# Patient Record
Sex: FEMALE
[De-identification: names, ages, dates, MRNs, and addresses within clinical notes are randomized per-mention and may not be internally consistent; named-entity substitution may affect disease eponyms.]

---

## 2017-07-11 ENCOUNTER — FORM ENCOUNTER (OUTPATIENT)
Age: 77
End: 2017-07-11

## 2018-10-14 ENCOUNTER — FORM ENCOUNTER (OUTPATIENT)
Age: 78
End: 2018-10-14

## 2019-10-27 ENCOUNTER — FORM ENCOUNTER (OUTPATIENT)
Age: 79
End: 2019-10-27

## 2020-04-06 ENCOUNTER — APPOINTMENT (OUTPATIENT)
Dept: DISASTER EMERGENCY | Facility: CLINIC | Age: 80
End: 2020-04-06
Payer: MEDICARE

## 2020-04-06 VITALS
HEART RATE: 64 BPM | TEMPERATURE: 98.6 F | OXYGEN SATURATION: 94 % | DIASTOLIC BLOOD PRESSURE: 80 MMHG | RESPIRATION RATE: 18 BRPM | SYSTOLIC BLOOD PRESSURE: 130 MMHG

## 2020-04-06 DIAGNOSIS — Z20.828 CONTACT WITH AND (SUSPECTED) EXPOSURE TO OTHER VIRAL COMMUNICABLE DISEASES: ICD-10-CM

## 2020-04-06 PROBLEM — Z00.00 ENCOUNTER FOR PREVENTIVE HEALTH EXAMINATION: Status: ACTIVE | Noted: 2020-04-06

## 2020-04-06 PROCEDURE — 99201 OFFICE OUTPATIENT NEW 10 MINUTES: CPT | Mod: CS

## 2020-04-06 NOTE — HISTORY OF PRESENT ILLNESS
[Patient presents to the office today for COVID-19 evaluation and testing.] : Patient presents to the office today for COVID-19 evaluation and testing. [] : no dizziness on standing [With Confirmed Case] : patient exposed to a confirmed case of COVID-19 [Age >= 60 years] : age >= 60 years [None] : none [Clear] : clear [Good Air Entry] : good air entry [Speaks in full sentences] : speaks in full sentences [Grossly normal, interacts, not tired or weak] : grossly normal, interacts, not tired or weak [COVID-19 testing ordered and specimen obtained] : COVID-19 testing ordered and specimen obtained [Discharged with current Quarantine instructions and advised of signs of worsening illness.] : Patient discharged with current quarantine instructions and advised of signs of worsening illness. Patient told to seek emergent care if symptoms occur. [FreeTextEntry1] : She states she is here today because she wants to be tested for Covid\par \par She states her  has been dx with covid and she states he is hospitalized\par \par She currently has no sx [TextBox_48] : Has cough but states it is chronic [TextBox_97] : O2 sat 94% RA

## 2020-04-06 NOTE — ADDENDUM
[FreeTextEntry1] : She was advised to follow up with her PMD regarding chronic cough and borderline low oxygen saturation

## 2020-04-07 LAB — SARS-COV-2 N GENE NPH QL NAA+PROBE: DETECTED

## 2020-12-09 ENCOUNTER — FORM ENCOUNTER (OUTPATIENT)
Age: 80
End: 2020-12-09

## 2021-12-15 ENCOUNTER — APPOINTMENT (OUTPATIENT)
Dept: BREAST CENTER | Facility: CLINIC | Age: 81
End: 2021-12-15

## 2021-12-17 ENCOUNTER — NON-APPOINTMENT (OUTPATIENT)
Age: 81
End: 2021-12-17

## 2022-11-09 ENCOUNTER — APPOINTMENT (OUTPATIENT)
Dept: NEUROSURGERY | Facility: CLINIC | Age: 82
End: 2022-11-09

## 2022-11-09 VITALS
WEIGHT: 185 LBS | BODY MASS INDEX: 29.38 KG/M2 | SYSTOLIC BLOOD PRESSURE: 123 MMHG | HEART RATE: 76 BPM | OXYGEN SATURATION: 97 % | HEIGHT: 66.5 IN | TEMPERATURE: 97.7 F | DIASTOLIC BLOOD PRESSURE: 82 MMHG

## 2022-11-09 DIAGNOSIS — Z86.39 PERSONAL HISTORY OF OTHER ENDOCRINE, NUTRITIONAL AND METABOLIC DISEASE: ICD-10-CM

## 2022-11-09 DIAGNOSIS — Z86.79 PERSONAL HISTORY OF OTHER DISEASES OF THE CIRCULATORY SYSTEM: ICD-10-CM

## 2022-11-09 DIAGNOSIS — U07.1 COVID-19: ICD-10-CM

## 2022-11-09 DIAGNOSIS — Z82.49 FAMILY HISTORY OF ISCHEMIC HEART DISEASE AND OTHER DISEASES OF THE CIRCULATORY SYSTEM: ICD-10-CM

## 2022-11-09 PROCEDURE — 99205 OFFICE O/P NEW HI 60 MIN: CPT

## 2022-11-09 RX ORDER — METOPROLOL SUCCINATE 25 MG/1
25 TABLET, EXTENDED RELEASE ORAL
Refills: 0 | Status: ACTIVE | COMMUNITY

## 2022-11-09 RX ORDER — HYDROXYCHLOROQUINE SULFATE 400 MG/1
TABLET ORAL
Refills: 0 | Status: ACTIVE | COMMUNITY

## 2022-11-09 RX ORDER — ATORVASTATIN CALCIUM 40 MG/1
40 TABLET, FILM COATED ORAL
Refills: 0 | Status: ACTIVE | COMMUNITY

## 2022-11-09 RX ORDER — ALENDRONATE SODIUM 70 MG/1
70 TABLET ORAL
Refills: 0 | Status: ACTIVE | COMMUNITY

## 2022-11-17 NOTE — ASSESSMENT
[FreeTextEntry1] : IMPRESSION:\par 1. Pt with increased episodes of falls, urinary incontinence and forgetfulness.  Suspect hydrocephalus.\par \par \par \par PLAN:\par 1. Will coordinate PT evaluation pre and post lumbar puncture.\par 2. High volume lumbar puncture.\par 3. F/U after lumbar puncture to discuss plan for possible VPS insertion.\par 4. MRI brain w/wo contrast\par 5. F/U after imaging.\par \par

## 2022-11-17 NOTE — REVIEW OF SYSTEMS
[Confused or Disoriented] : confusion [Memory Lapses or Loss] : memory loss [Dizziness] : dizziness [Fainting] : fainting [Joint Pain] : joint pain [Joint Swelling] : joint swelling [Negative] : Respiratory [de-identified] : hydrocephalus [FreeTextEntry9] : arthritis

## 2022-11-17 NOTE — PHYSICAL EXAM
[General Appearance - Alert] : alert [General Appearance - Well Nourished] : well nourished [General Appearance - Well-Appearing] : healthy appearing [Affect] : the affect was normal [Person] : oriented to person [Motor Handedness Right-Handed] : the patient is right hand dominant [] : no respiratory distress [Respiration, Rhythm And Depth] : normal respiratory rhythm and effort [Exaggerated Use Of Accessory Muscles For Inspiration] : no accessory muscle use [Place] : disoriented to place [Time] : disoriented to time [FreeTextEntry8] : came to visit via wheelchair

## 2022-11-17 NOTE — HISTORY OF PRESENT ILLNESS
[de-identified] : Jayla Vergara is a pleasant 82 year old lady who presents for consultation after recent fall.  Pt states that she was sent for a head CT done that shows hydrocephalus.  She states that she has been falling and tripping over her feet more often and she is much more forgetful and urinary incontinence has worsened to having to wear an adult diaper.\par \par Will coordinate PT evaluation pre and post  lumbar puncture.\par \par Her cardiologist is Dr. Anirudh Gonzales .

## 2022-11-29 ENCOUNTER — APPOINTMENT (OUTPATIENT)
Dept: RADIOLOGY | Facility: HOSPITAL | Age: 82
End: 2022-11-29

## 2023-02-01 ENCOUNTER — EMERGENCY (EMERGENCY)
Facility: HOSPITAL | Age: 83
LOS: 1 days | Discharge: HOME | End: 2023-02-01
Attending: EMERGENCY MEDICINE | Admitting: EMERGENCY MEDICINE
Payer: MEDICARE

## 2023-02-01 VITALS
HEART RATE: 72 BPM | RESPIRATION RATE: 18 BRPM | TEMPERATURE: 97 F | SYSTOLIC BLOOD PRESSURE: 156 MMHG | OXYGEN SATURATION: 96 % | WEIGHT: 149.91 LBS | DIASTOLIC BLOOD PRESSURE: 76 MMHG

## 2023-02-01 LAB
ALBUMIN SERPL ELPH-MCNC: 3.2 G/DL — LOW (ref 3.3–5)
ALP SERPL-CCNC: 64 U/L — SIGNIFICANT CHANGE UP (ref 40–120)
ALT FLD-CCNC: 16 U/L — SIGNIFICANT CHANGE UP (ref 12–78)
ANION GAP SERPL CALC-SCNC: 7 MMOL/L — SIGNIFICANT CHANGE UP (ref 5–17)
APPEARANCE UR: ABNORMAL
AST SERPL-CCNC: 28 U/L — SIGNIFICANT CHANGE UP (ref 15–37)
BACTERIA # UR AUTO: ABNORMAL
BASOPHILS # BLD AUTO: 0.03 K/UL — SIGNIFICANT CHANGE UP (ref 0–0.2)
BASOPHILS NFR BLD AUTO: 0.4 % — SIGNIFICANT CHANGE UP (ref 0–2)
BILIRUB SERPL-MCNC: 0.7 MG/DL — SIGNIFICANT CHANGE UP (ref 0.2–1.2)
BILIRUB UR-MCNC: NEGATIVE — SIGNIFICANT CHANGE UP
BUN SERPL-MCNC: 19 MG/DL — SIGNIFICANT CHANGE UP (ref 7–23)
CALCIUM SERPL-MCNC: 10 MG/DL — SIGNIFICANT CHANGE UP (ref 8.5–10.1)
CHLORIDE SERPL-SCNC: 110 MMOL/L — HIGH (ref 96–108)
CO2 SERPL-SCNC: 26 MMOL/L — SIGNIFICANT CHANGE UP (ref 22–31)
COLOR SPEC: YELLOW — SIGNIFICANT CHANGE UP
CREAT SERPL-MCNC: 0.68 MG/DL — SIGNIFICANT CHANGE UP (ref 0.5–1.3)
DIFF PNL FLD: ABNORMAL
EGFR: 86 ML/MIN/1.73M2 — SIGNIFICANT CHANGE UP
EOSINOPHIL # BLD AUTO: 0.12 K/UL — SIGNIFICANT CHANGE UP (ref 0–0.5)
EOSINOPHIL NFR BLD AUTO: 1.4 % — SIGNIFICANT CHANGE UP (ref 0–6)
EPI CELLS # UR: SIGNIFICANT CHANGE UP
FLUAV AG NPH QL: SIGNIFICANT CHANGE UP
FLUBV AG NPH QL: SIGNIFICANT CHANGE UP
GLUCOSE SERPL-MCNC: 76 MG/DL — SIGNIFICANT CHANGE UP (ref 70–99)
GLUCOSE UR QL: NEGATIVE — SIGNIFICANT CHANGE UP
HCT VFR BLD CALC: 34.9 % — SIGNIFICANT CHANGE UP (ref 34.5–45)
HGB BLD-MCNC: 11.4 G/DL — LOW (ref 11.5–15.5)
IMM GRANULOCYTES NFR BLD AUTO: 0.4 % — SIGNIFICANT CHANGE UP (ref 0–0.9)
KETONES UR-MCNC: ABNORMAL
LEUKOCYTE ESTERASE UR-ACNC: ABNORMAL
LYMPHOCYTES # BLD AUTO: 0.96 K/UL — LOW (ref 1–3.3)
LYMPHOCYTES # BLD AUTO: 11.3 % — LOW (ref 13–44)
MCHC RBC-ENTMCNC: 29.3 PG — SIGNIFICANT CHANGE UP (ref 27–34)
MCHC RBC-ENTMCNC: 32.7 GM/DL — SIGNIFICANT CHANGE UP (ref 32–36)
MCV RBC AUTO: 89.7 FL — SIGNIFICANT CHANGE UP (ref 80–100)
MONOCYTES # BLD AUTO: 0.8 K/UL — SIGNIFICANT CHANGE UP (ref 0–0.9)
MONOCYTES NFR BLD AUTO: 9.4 % — SIGNIFICANT CHANGE UP (ref 2–14)
NEUTROPHILS # BLD AUTO: 6.58 K/UL — SIGNIFICANT CHANGE UP (ref 1.8–7.4)
NEUTROPHILS NFR BLD AUTO: 77.1 % — HIGH (ref 43–77)
NITRITE UR-MCNC: POSITIVE
NRBC # BLD: 0 /100 WBCS — SIGNIFICANT CHANGE UP (ref 0–0)
PH UR: 6 — SIGNIFICANT CHANGE UP (ref 5–8)
PLATELET # BLD AUTO: 257 K/UL — SIGNIFICANT CHANGE UP (ref 150–400)
POTASSIUM SERPL-MCNC: 3.1 MMOL/L — LOW (ref 3.5–5.3)
POTASSIUM SERPL-SCNC: 3.1 MMOL/L — LOW (ref 3.5–5.3)
PROT SERPL-MCNC: 6.7 G/DL — SIGNIFICANT CHANGE UP (ref 6–8.3)
PROT UR-MCNC: 30 MG/DL
RBC # BLD: 3.89 M/UL — SIGNIFICANT CHANGE UP (ref 3.8–5.2)
RBC # FLD: 14.8 % — HIGH (ref 10.3–14.5)
RBC CASTS # UR COMP ASSIST: ABNORMAL /HPF (ref 0–4)
RSV RNA NPH QL NAA+NON-PROBE: SIGNIFICANT CHANGE UP
SARS-COV-2 RNA SPEC QL NAA+PROBE: SIGNIFICANT CHANGE UP
SODIUM SERPL-SCNC: 143 MMOL/L — SIGNIFICANT CHANGE UP (ref 135–145)
SP GR SPEC: 1.02 — SIGNIFICANT CHANGE UP (ref 1.01–1.02)
TROPONIN I, HIGH SENSITIVITY RESULT: 25.6 NG/L — SIGNIFICANT CHANGE UP
UROBILINOGEN FLD QL: 1
WBC # BLD: 8.52 K/UL — SIGNIFICANT CHANGE UP (ref 3.8–10.5)
WBC # FLD AUTO: 8.52 K/UL — SIGNIFICANT CHANGE UP (ref 3.8–10.5)
WBC UR QL: SIGNIFICANT CHANGE UP

## 2023-02-01 PROCEDURE — 71045 X-RAY EXAM CHEST 1 VIEW: CPT | Mod: 26

## 2023-02-01 PROCEDURE — 99285 EMERGENCY DEPT VISIT HI MDM: CPT

## 2023-02-01 PROCEDURE — 93010 ELECTROCARDIOGRAM REPORT: CPT | Mod: 77

## 2023-02-01 PROCEDURE — 73030 X-RAY EXAM OF SHOULDER: CPT | Mod: 26,LT

## 2023-02-01 PROCEDURE — 70450 CT HEAD/BRAIN W/O DYE: CPT | Mod: 26,MA

## 2023-02-01 PROCEDURE — 93010 ELECTROCARDIOGRAM REPORT: CPT

## 2023-02-01 RX ORDER — POTASSIUM CHLORIDE 20 MEQ
10 PACKET (EA) ORAL ONCE
Refills: 0 | Status: COMPLETED | OUTPATIENT
Start: 2023-02-01 | End: 2023-02-01

## 2023-02-01 RX ORDER — POTASSIUM CHLORIDE 20 MEQ
40 PACKET (EA) ORAL ONCE
Refills: 0 | Status: COMPLETED | OUTPATIENT
Start: 2023-02-01 | End: 2023-02-01

## 2023-02-01 RX ORDER — METOPROLOL TARTRATE 50 MG
25 TABLET ORAL ONCE
Refills: 0 | Status: COMPLETED | OUTPATIENT
Start: 2023-02-01 | End: 2023-02-01

## 2023-02-01 RX ORDER — SODIUM CHLORIDE 9 MG/ML
1000 INJECTION INTRAMUSCULAR; INTRAVENOUS; SUBCUTANEOUS ONCE
Refills: 0 | Status: COMPLETED | OUTPATIENT
Start: 2023-02-01 | End: 2023-02-01

## 2023-02-01 RX ADMIN — Medication 100 MILLIEQUIVALENT(S): at 15:50

## 2023-02-01 RX ADMIN — Medication 25 MILLIGRAM(S): at 23:46

## 2023-02-01 RX ADMIN — SODIUM CHLORIDE 1000 MILLILITER(S): 9 INJECTION INTRAMUSCULAR; INTRAVENOUS; SUBCUTANEOUS at 14:04

## 2023-02-01 RX ADMIN — Medication 40 MILLIEQUIVALENT(S): at 15:50

## 2023-02-01 NOTE — ED PROVIDER NOTE - NSFOLLOWUPINSTRUCTIONS_ED_ALL_ED_FT
Babar boss with neurosurgery, call Dr Robledo [phone]      Normal-Pressure Hydrocephalus      Normal-pressure hydrocephalus (NPH) is a buildup of fluid (cerebrospinal fluid, or CSF) inside the brain. This does not typically increase the pressure inside the brain (intracranial pressure). CSF is normally present in the brain, but when too much CSF is produced in the brain, it can affect brain function.    NPH usually occurs in people who are older than age 60. Many symptoms of NPH are also associated with aging or certain medical conditions. It is important to pay attention to changes in behavior and mental function.      What are the causes?    This condition may be caused by anything that blocks the flow of CSF, such as:  •Head injury.      •Infections.      •Bleeding from a blood vessel in the brain.      In many cases, the cause of this condition is not known.      What are the signs or symptoms?    Symptoms of this condition may include:•Difficulty walking, such as:  •Shuffling feet when walking.      •Unsteadiness.      •Problems when beginning to walk.      •Feet feeling as though they are stuck or "frozen" to the floor.        •Problems with bowel and bladder control.    •Problems with memory and thinking, such as:  •Forgetfulness.      •Lack of concentration.      •Mood problems.      •Confusion.          How is this diagnosed?    This condition is diagnosed based on:  •Your medical history.      •A physical exam. This can reveal walking (gait) changes or twitching or sudden muscle tightening (spasms) in the legs.    •Other tests to confirm the diagnosis and identify the best options for treatment. These tests include:  •Removal and examination of a large amount of CSF (large-volume lumbar puncture).      •CT scan of your head.      •MRI scan of your head.      •Cisternogram of your head. This test involves a lumbar puncture where a radioactive dye is put into your CSF. Nuclear medicine images are taken to see how well the dye is flowing through the network of cavities within your brain (ventricles).          How is this treated?  Drawing of a person with a shunt placed to drain fluid from the brain to another part of the body.   This condition may be treated with surgery in which a narrow tube (ventriculoperitoneal shunt, or  shunt) is placed in the brain to drain excess CSF into another part of the body. After a shunt is placed, you will be monitored to make sure CSF is draining properly. Depending on your age and overall health condition, you may not be a candidate for surgery.    In some cases, a shunt may be placed in the lumbar spine (lumbar peritoneal shunt) instead of the brain.    If your symptoms are mild, your condition may be monitored on a regular basis before a decision is made about whether a shunt is needed.      Follow these instructions at home:    Medicines     •Take over-the-counter and prescription medicines only as told by your health care provider.      •Avoid taking medicines that can affect thinking, such as pain or sleeping medicines.      Lifestyle     • Do not use any products that contain nicotine or tobacco. These products include cigarettes, chewing tobacco, and vaping devices, such as e-cigarettes. If you need help quitting, ask your health care provider.      •Drink enough fluid to keep your urine pale yellow.      If you have a shunt:     • Do not wear tight-fitting hats or headgear.      •Before having an MRI or abdominal surgery, tell your health care provider that you have a shunt.    •Watch for signs of infection, such as:  •Fever.      •Redness, pain, or swelling of the skin along the shunt path.      •Headache or stiff neck.      •Nausea or vomiting.        General instructions     •Work with your health care provider to determine what you need help with and what your safety needs are.      •Ask your health care provider when you can return to your normal activities.      •Keep all follow-up visits. This is important.        Contact a health care provider if:    •You have any new symptoms.      •Your symptoms get worse.      •Your symptoms do not improve after surgery.      •You lose coordination or balance.      •You or your family members become concerned for your safety.        Get help right away if:  •You have:  •A fever or other signs of infection.      •New or worsening confusion.      •New or worsening sleepiness.      •A hard time staying awake.      •A headache that is getting worse.      •Blurred vision, especially early in the morning.        •You start to twitch or shake (seizure).      These symptoms may represent a serious problem that is an emergency. Do not wait to see if the symptoms will go away. Get medical help right away. Call your local emergency services (911 in the [place] Do not drive yourself to the hospital.       Summary    •Normal-pressure hydrocephalus (NPH) is a buildup of fluid (cerebrospinal fluid, or CSF) inside the brain. When too much CSF is produced in the brain, it can affect brain function.      •Anything that blocks the flow of CSF can cause this condition. In some cases, the cause of this condition is not known.      •This condition may be treated with surgery in which a narrow tube (ventriculoperitoneal shunt, or  shunt) is placed in the brain to drain excess CSF into another part of the body.      This information is not intended to replace advice given to you by your health care provider. Make sure you discuss any questions you have with your health care provider.      Document Revised: 11/15/2021 Document Reviewed: 11/15/2021    Elsevier Patient Education © 2022 Else[employer] Inc[employer]

## 2023-02-01 NOTE — PHYSICAL THERAPY INITIAL EVALUATION ADULT - ADDITIONAL COMMENTS
Pt  lives w/ her spouse in a house, no steps. Pt ambulates independently with RW and spouse assisted with ADLs as needed.

## 2023-02-01 NOTE — CARE COORDINATION ASSESSMENT. - NSCAREPROVIDERS_GEN_ALL_CORE_FT
CARE PROVIDERS:  Administration: Oli Mary  Admitting: Doctor, Unknown  Attending: Doctor, José Miguel  ED Attending: Inocencia Morel  ED Nurse: Russell Copeland  Nurse: Chayo Boothe  Ordered: ADM, User  Physical Therapy: Yi Guzman  Physical Therapy: Dominguez Brizuela  Primary Team: Rajeev Wyatt  Primary Team: Inocencia Morel  : Tarah Garcia  : Brit Corrales

## 2023-02-01 NOTE — CARE COORDINATION ASSESSMENT. - OTHER PERTINENT DISCHARGE PLANNING INFORMATION:
SW consulted for dc to [facility] as recommended by PT in the ED. met with pts spouse Kyle at bedside. stated pt has been at [facility] in the recent past, discussed [facility] options with pts spouse. [facility], [facility], [place], [place] given as options. TYSHAWN requested.

## 2023-02-01 NOTE — ED PROVIDER NOTE - PHYSICAL EXAMINATION
Gen: Well appearing in NAD  Head: NC/AT  Neck: trachea midline, no midline tenderness  Resp:  No distress, lungs clear  cv rrr   Ext: no deformities  Neuro:  A&O appears non focal  Skin:  Warm and dry as visualized  Psych:  Normal affect and mood

## 2023-02-01 NOTE — ED PROVIDER NOTE - OBJECTIVE STATEMENT
82-year-old female history of hypertension hyperlipidemia normal pressure hydrocephalus, status post LP on January 3 and scheduled for neurosurgical eval for shunt on February 22.  For the last 8 months patient has been having multiple falls and urinary incontinence and was found to have normal pressure hydrocephalus.  For the last 2 days has been feels that her falls have increased and patient feels lightheaded and dizzy.  Does not feel that she is even walking at her baseline.  Patient fell this morning when coming out of the shower thinks she hit her head no loss of consciousness, no anticoagulant use.  Patient denies other injury from fall but has had multiple falls in the last few days.  No neck pain or back pain no abdominal pain no extremity pain or deformity.  Patient denies fevers chills nausea vomiting abdominal pain or urinary symptoms.

## 2023-02-01 NOTE — ED PROVIDER NOTE - CLINICAL SUMMARY MEDICAL DECISION MAKING FREE TEXT BOX
82-year-old female history of hypertension hyperlipidemia normal pressure hydrocephalus, status post LP on January 3 and scheduled for neurosurgical eval for shunt on February 22.  For the last 8 months patient has been having multiple falls and urinary incontinence and was found to have normal pressure hydrocephalus.  For the last 2 days has been feels that her falls have increased and patient feels lightheaded and dizzy.  Does not feel that she is even walking at her baseline.  Patient fell this morning when coming out of the shower thinks she hit her head no loss of consciousness, no anticoagulant use.  Patient denies other injury from fall but has had multiple falls in the last few days.  No neck pain or back pain no abdominal pain no extremity pain or deformity.  Patient denies fevers chills nausea vomiting abdominal pain or urinary symptoms.  Patient well-appearing vitals within normal limits lungs clear CV regular rate and rhythm abdomen nontender neuro exam within normal limits, ny dysmetria, did not test gait (will eval with PT) no bony tenderness or deformity.  Will check labs including lites troponins, will check UA and chest x-ray rule out infection, CT head rule out bleed, if all within normal limits we will likely get PT eval and rehab until can get shunt procedure.

## 2023-02-01 NOTE — ED PROVIDER NOTE - PATIENT PORTAL LINK FT
You can access the FollowMyHealth Patient Portal offered by [facility] by registering at the following website: [email or web address]. By joining [employer]’s FollowMyHealth portal, you will also be able to view your health information using other applications (apps) compatible with our system.

## 2023-02-01 NOTE — SOCIAL WORK PROGRESS NOTE - NSSWPROGRESSNOTE_GEN_ALL_CORE
sw consulted by PT in the ED recommending [facility]. TYSHAWN requested. met with pts  Kyle, aware of recommendation and inagreement with plan. [facility] options discussed. pt recently dc from [facility]. TYSHAWN to be faxed upon completion to [facility], [facility], [facility] and [facility] as discussed with pts spouse. sw to follow for dc to [facility].

## 2023-02-01 NOTE — ED ADULT NURSE NOTE - OBJECTIVE STATEMENT
82yr old female a&ox4 arrived to ED via ems, per ems pt fell at home, denies loc or thinners, pt noted to have been having multiple falls these past couple of months. Pt notes symptoms have been addressed and is seeing cardio and neuro. Pt notes unsteady ambulation and generalized weakness, pt also admits to dizziness. Speech noted to be clear, no facial droop pt noted to be able to move all extremities. Generalized bruising notes to pt, no obvious deformity noted at this time. Pt denies n/v, fever or chills at this time. 82yr old female a&ox3 confused at times, arrived to ED via ems, per ems pt fell at home, denies loc or thinners, pt noted to have been having multiple falls these past couple of months. Pt notes symptoms have been addressed and is seeing cardio and neuro. Pt notes unsteady ambulation and generalized weakness, pt also admits to dizziness. Speech noted to be clear, no facial droop pt noted to be able to move all extremities. Generalized bruising notes to pt, no obvious deformity noted at this time. Pt denies n/v, fever or chills at this time.

## 2023-02-01 NOTE — PHYSICAL THERAPY INITIAL EVALUATION ADULT - PREDICTED DURATION OF THERAPY (DAYS/WKS), PT EVAL
Detail Level: Detailed X Size Of Lesion In Cm: 0 Notification Instructions: Patient will be notified of biopsy results. However, patient instructed to call the office if not contacted within 2 weeks. Anesthesia Type: 1% Xylocaine with epinephrine Wound Care: Polysporin ointment Hemostasis: Aluminum Chloride Validate Anticipated Plan: No Depth Of Biopsy: dermis Post-Care Instructions: I reviewed with the patient in detail post-care instructions: If your area was covered with a Band-Aid, remove with your next bath and gently clean daily with soap and water.  Do not use hydrogen peroxide. Apply a thin later of Aquaphor/Vaseline and cover with a Band-Aid.  The goal is to keep the wound moist to prevent a scab. It is normal to have a little redness 1/8 inch around the area; however, if increased redness, pain, swelling or discolored drainage occurs, call the office.  If bleeding occurs, hold steady pressure for 10-15 minutes to stop it.  Our office normally receives your results in 7-10 business days and will call or email you when we receive them.  If you do not hear from us in two weeks, please call us. Billing Type: Third-Party Bill Biopsy Method: Dermablade Validate Note Data (See Information Below): Yes Consent: Written consent was obtained and risks were reviewed including but not limited to scarring, infection, bleeding, scabbing, incomplete removal, nerve damage and allergy to anesthesia. Electrodesiccation And Curettage Text: The wound bed was treated with electrodesiccation and curettage after the biopsy was performed. Electrodesiccation Text: The wound bed was treated with electrodesiccation after the biopsy was performed. Silver Nitrate Text: The wound bed was treated with silver nitrate after the biopsy was performed. Dressing: Band-Aid Anesthesia Volume In Cc: 1 Type Of Destruction Used: Electrodesiccation and Curettage Biopsy Type: H and E Information: Selecting Yes will display possible errors in your note based on the variables you have selected. This validation is only offered as a suggestion for you. PLEASE NOTE THAT THE VALIDATION TEXT WILL BE REMOVED WHEN YOU FINALIZE YOUR NOTE. IF YOU WANT TO FAX A PRELIMINARY NOTE YOU WILL NEED TO TOGGLE THIS TO 'NO' IF YOU DO NOT WANT IT IN YOUR FAXED NOTE. 2 wks

## 2023-02-01 NOTE — ED PROVIDER NOTE - PROGRESS NOTE DETAILS
spoke with neurosx dr watson who reviewd imaging, states no emergency to treat NPH but pt can call his offive and get more expedited appt [phone] and see rosa elena owens with plan for pt ot go to [facility] patient to go to [facility]

## 2023-02-02 VITALS — RESPIRATION RATE: 16 BRPM | HEART RATE: 64 BPM | OXYGEN SATURATION: 95 %

## 2023-02-02 PROCEDURE — 36415 COLL VENOUS BLD VENIPUNCTURE: CPT

## 2023-02-02 PROCEDURE — 70450 CT HEAD/BRAIN W/O DYE: CPT | Mod: MA

## 2023-02-02 PROCEDURE — 87086 URINE CULTURE/COLONY COUNT: CPT

## 2023-02-02 PROCEDURE — 97161 PT EVAL LOW COMPLEX 20 MIN: CPT

## 2023-02-02 PROCEDURE — 85025 COMPLETE CBC W/AUTO DIFF WBC: CPT

## 2023-02-02 PROCEDURE — 84484 ASSAY OF TROPONIN QUANT: CPT

## 2023-02-02 PROCEDURE — 87637 SARSCOV2&INF A&B&RSV AMP PRB: CPT

## 2023-02-02 PROCEDURE — 99285 EMERGENCY DEPT VISIT HI MDM: CPT | Mod: 25

## 2023-02-02 PROCEDURE — 71045 X-RAY EXAM CHEST 1 VIEW: CPT

## 2023-02-02 PROCEDURE — 93005 ELECTROCARDIOGRAM TRACING: CPT

## 2023-02-02 PROCEDURE — 80053 COMPREHEN METABOLIC PANEL: CPT

## 2023-02-02 PROCEDURE — 73030 X-RAY EXAM OF SHOULDER: CPT

## 2023-02-02 PROCEDURE — 81001 URINALYSIS AUTO W/SCOPE: CPT

## 2023-02-02 PROCEDURE — 96374 THER/PROPH/DIAG INJ IV PUSH: CPT

## 2023-02-02 RX ORDER — METOPROLOL TARTRATE 50 MG
5 TABLET ORAL ONCE
Refills: 0 | Status: DISCONTINUED | OUTPATIENT
Start: 2023-02-02 | End: 2023-02-02

## 2023-02-02 RX ORDER — METOPROLOL TARTRATE 50 MG
25 TABLET ORAL ONCE
Refills: 0 | Status: COMPLETED | OUTPATIENT
Start: 2023-02-02 | End: 2023-02-02

## 2023-02-02 RX ADMIN — Medication 25 MILLIGRAM(S): at 05:20

## 2023-02-02 NOTE — DISCHARGE NOTE NURSING/CASE MANAGEMENT/SOCIAL WORK - PATIENT PORTAL LINK FT
You can access the FollowMyHealth Patient Portal offered by [facility] by registering at the following website: [email or web address]. By joining [employer]’s FollowMyHealth portal, you will also be able to view your health information using other applications (apps) compatible with our system.

## 2023-02-02 NOTE — ED ADULT NURSE REASSESSMENT NOTE - NS ED NURSE REASSESS COMMENT FT1
Pt resting comfortably, comfort maintained. No signs of acute distress, pt tolerating PO intake and PO medication
Pt assisted with medications, mildly confused, but calm.  No s/s of acute distress. will continue to monitor
Received pt in bed alert, confused.  Pt waiting for Social work for placement.  No complaints at this time.

## 2023-02-02 NOTE — SOCIAL WORK PROGRESS NOTE - NSSWPROGRESSNOTE_GEN_ALL_CORE
pt for dc to [facility] fritz. all paperwork sent to [facility] and to accompany patient. 12:30  was scheduled via [facility] ems. Family and pt in agreement . No further sw services indicated at this time.

## 2023-02-02 NOTE — SOCIAL WORK PROGRESS NOTE - NSSWPROGRESSNOTE_GEN_ALL_CORE
sushil has sent janell and supporting information to following facilities: [facility] [employer] [facility], [employer], and [facility]. anticipated dc is for today  to [facility]. family supportive at bedside. plan:dc to [facility] today.

## 2023-02-02 NOTE — ED ADULT NURSE REASSESSMENT NOTE - NSIMPLEMENTINTERV_GEN_ALL_ED
Implemented All Fall with Harm Risk Interventions:  [place] to call system. Call bell, personal items and telephone within reach. Instruct patient to call for assistance. Room bathroom lighting operational. Non-slip footwear when patient is off stretcher. Physically safe environment: no spills, clutter or unnecessary equipment. Stretcher in lowest position, wheels locked, appropriate side rails in place. Provide visual cue, wrist band, yellow gown, etc. Monitor gait and stability. Monitor for mental status changes and reorient to person, place, and time. Review medications for side effects contributing to fall risk. Reinforce activity limits and safety measures with patient and family. Provide visual clues: red socks.

## 2023-02-02 NOTE — DISCHARGE NOTE NURSING/CASE MANAGEMENT/SOCIAL WORK - NSDCPEFALRISK_GEN_ALL_CORE
For information on Fall & Injury Prevention, visit: [email or web address] OR  [email or web address] OR  https://www.cdc.gov/steadi/patient.html

## 2023-02-03 LAB
CULTURE RESULTS: SIGNIFICANT CHANGE UP
SPECIMEN SOURCE: SIGNIFICANT CHANGE UP

## 2023-02-04 NOTE — ED POST DISCHARGE NOTE - RESULT SUMMARY
contaminated urine culture, call placed to  who advised I call [facility]. Call placed to [facility], spoke with supervisor Josette Hendrickson to make aware.

## 2023-11-08 ENCOUNTER — TRANSCRIPTION ENCOUNTER (OUTPATIENT)
Age: 83
End: 2023-11-08

## 2023-11-08 ENCOUNTER — EMERGENCY (EMERGENCY)
Facility: HOSPITAL | Age: 83
LOS: 1 days | Discharge: ANOTHER HOSPITAL | End: 2023-11-08
Attending: EMERGENCY MEDICINE | Admitting: EMERGENCY MEDICINE
Payer: MEDICARE

## 2023-11-08 ENCOUNTER — INPATIENT (INPATIENT)
Facility: HOSPITAL | Age: 83
LOS: 8 days | Discharge: NURSING, REHAB OR LONG-TERM CARE | DRG: 25 | End: 2023-11-17
Attending: STUDENT IN AN ORGANIZED HEALTH CARE EDUCATION/TRAINING PROGRAM | Admitting: STUDENT IN AN ORGANIZED HEALTH CARE EDUCATION/TRAINING PROGRAM
Payer: MEDICARE

## 2023-11-08 VITALS
RESPIRATION RATE: 17 BRPM | TEMPERATURE: 99 F | DIASTOLIC BLOOD PRESSURE: 71 MMHG | HEART RATE: 86 BPM | OXYGEN SATURATION: 97 % | SYSTOLIC BLOOD PRESSURE: 159 MMHG

## 2023-11-08 VITALS
TEMPERATURE: 97 F | HEART RATE: 87 BPM | SYSTOLIC BLOOD PRESSURE: 137 MMHG | OXYGEN SATURATION: 99 % | RESPIRATION RATE: 18 BRPM | DIASTOLIC BLOOD PRESSURE: 77 MMHG | HEIGHT: 68 IN | WEIGHT: 281.97 LBS

## 2023-11-08 VITALS
WEIGHT: 220.02 LBS | SYSTOLIC BLOOD PRESSURE: 139 MMHG | RESPIRATION RATE: 18 BRPM | TEMPERATURE: 98 F | HEART RATE: 86 BPM | DIASTOLIC BLOOD PRESSURE: 70 MMHG | OXYGEN SATURATION: 99 % | HEIGHT: 68 IN

## 2023-11-08 DIAGNOSIS — Z98.2 PRESENCE OF CEREBROSPINAL FLUID DRAINAGE DEVICE: Chronic | ICD-10-CM

## 2023-11-08 DIAGNOSIS — S06.5XAA TRAUMATIC SUBDURAL HEMORRHAGE WITH LOSS OF CONSCIOUSNESS STATUS UNKNOWN, INITIAL ENCOUNTER: ICD-10-CM

## 2023-11-08 PROBLEM — G91.2 (IDIOPATHIC) NORMAL PRESSURE HYDROCEPHALUS: Chronic | Status: ACTIVE | Noted: 2023-02-01

## 2023-11-08 LAB
A1C WITH ESTIMATED AVERAGE GLUCOSE RESULT: 5 % — SIGNIFICANT CHANGE UP (ref 4–5.6)
A1C WITH ESTIMATED AVERAGE GLUCOSE RESULT: 5 % — SIGNIFICANT CHANGE UP (ref 4–5.6)
ALBUMIN SERPL ELPH-MCNC: 3.2 G/DL — LOW (ref 3.3–5)
ALBUMIN SERPL ELPH-MCNC: 3.2 G/DL — LOW (ref 3.3–5)
ALBUMIN SERPL ELPH-MCNC: 3.4 G/DL — SIGNIFICANT CHANGE UP (ref 3.3–5.2)
ALBUMIN SERPL ELPH-MCNC: 3.4 G/DL — SIGNIFICANT CHANGE UP (ref 3.3–5.2)
ALP SERPL-CCNC: 71 U/L — SIGNIFICANT CHANGE UP (ref 40–120)
ALP SERPL-CCNC: 71 U/L — SIGNIFICANT CHANGE UP (ref 40–120)
ALP SERPL-CCNC: 80 U/L — SIGNIFICANT CHANGE UP (ref 40–120)
ALP SERPL-CCNC: 80 U/L — SIGNIFICANT CHANGE UP (ref 40–120)
ALT FLD-CCNC: 11 U/L — SIGNIFICANT CHANGE UP
ALT FLD-CCNC: 11 U/L — SIGNIFICANT CHANGE UP
ALT FLD-CCNC: 18 U/L — SIGNIFICANT CHANGE UP (ref 12–78)
ALT FLD-CCNC: 18 U/L — SIGNIFICANT CHANGE UP (ref 12–78)
ANION GAP SERPL CALC-SCNC: 10 MMOL/L — SIGNIFICANT CHANGE UP (ref 5–17)
ANION GAP SERPL CALC-SCNC: 10 MMOL/L — SIGNIFICANT CHANGE UP (ref 5–17)
ANION GAP SERPL CALC-SCNC: 8 MMOL/L — SIGNIFICANT CHANGE UP (ref 5–17)
ANION GAP SERPL CALC-SCNC: 8 MMOL/L — SIGNIFICANT CHANGE UP (ref 5–17)
APTT BLD: 25.5 SEC — SIGNIFICANT CHANGE UP (ref 24.5–35.6)
APTT BLD: 25.5 SEC — SIGNIFICANT CHANGE UP (ref 24.5–35.6)
AST SERPL-CCNC: 17 U/L — SIGNIFICANT CHANGE UP
AST SERPL-CCNC: 17 U/L — SIGNIFICANT CHANGE UP
AST SERPL-CCNC: 21 U/L — SIGNIFICANT CHANGE UP (ref 15–37)
AST SERPL-CCNC: 21 U/L — SIGNIFICANT CHANGE UP (ref 15–37)
BASOPHILS # BLD AUTO: 0.03 K/UL — SIGNIFICANT CHANGE UP (ref 0–0.2)
BASOPHILS # BLD AUTO: 0.03 K/UL — SIGNIFICANT CHANGE UP (ref 0–0.2)
BASOPHILS NFR BLD AUTO: 0.4 % — SIGNIFICANT CHANGE UP (ref 0–2)
BASOPHILS NFR BLD AUTO: 0.4 % — SIGNIFICANT CHANGE UP (ref 0–2)
BILIRUB SERPL-MCNC: 0.4 MG/DL — SIGNIFICANT CHANGE UP (ref 0.2–1.2)
BILIRUB SERPL-MCNC: 0.4 MG/DL — SIGNIFICANT CHANGE UP (ref 0.2–1.2)
BILIRUB SERPL-MCNC: 0.4 MG/DL — SIGNIFICANT CHANGE UP (ref 0.4–2)
BILIRUB SERPL-MCNC: 0.4 MG/DL — SIGNIFICANT CHANGE UP (ref 0.4–2)
BUN SERPL-MCNC: 28.5 MG/DL — HIGH (ref 8–20)
BUN SERPL-MCNC: 28.5 MG/DL — HIGH (ref 8–20)
BUN SERPL-MCNC: 32 MG/DL — HIGH (ref 7–23)
BUN SERPL-MCNC: 32 MG/DL — HIGH (ref 7–23)
CALCIUM SERPL-MCNC: 10.2 MG/DL — SIGNIFICANT CHANGE UP (ref 8.4–10.5)
CALCIUM SERPL-MCNC: 10.2 MG/DL — SIGNIFICANT CHANGE UP (ref 8.4–10.5)
CALCIUM SERPL-MCNC: 10.5 MG/DL — HIGH (ref 8.5–10.1)
CALCIUM SERPL-MCNC: 10.5 MG/DL — HIGH (ref 8.5–10.1)
CHLORIDE SERPL-SCNC: 107 MMOL/L — SIGNIFICANT CHANGE UP (ref 96–108)
CHLORIDE SERPL-SCNC: 107 MMOL/L — SIGNIFICANT CHANGE UP (ref 96–108)
CHLORIDE SERPL-SCNC: 112 MMOL/L — HIGH (ref 96–108)
CHLORIDE SERPL-SCNC: 112 MMOL/L — HIGH (ref 96–108)
CHOLEST SERPL-MCNC: 144 MG/DL — SIGNIFICANT CHANGE UP
CHOLEST SERPL-MCNC: 144 MG/DL — SIGNIFICANT CHANGE UP
CO2 SERPL-SCNC: 24 MMOL/L — SIGNIFICANT CHANGE UP (ref 22–29)
CO2 SERPL-SCNC: 24 MMOL/L — SIGNIFICANT CHANGE UP (ref 22–29)
CO2 SERPL-SCNC: 24 MMOL/L — SIGNIFICANT CHANGE UP (ref 22–31)
CO2 SERPL-SCNC: 24 MMOL/L — SIGNIFICANT CHANGE UP (ref 22–31)
CREAT SERPL-MCNC: 0.63 MG/DL — SIGNIFICANT CHANGE UP (ref 0.5–1.3)
CREAT SERPL-MCNC: 0.63 MG/DL — SIGNIFICANT CHANGE UP (ref 0.5–1.3)
CREAT SERPL-MCNC: 0.8 MG/DL — SIGNIFICANT CHANGE UP (ref 0.5–1.3)
CREAT SERPL-MCNC: 0.8 MG/DL — SIGNIFICANT CHANGE UP (ref 0.5–1.3)
EGFR: 72 ML/MIN/1.73M2 — SIGNIFICANT CHANGE UP
EGFR: 72 ML/MIN/1.73M2 — SIGNIFICANT CHANGE UP
EGFR: 87 ML/MIN/1.73M2 — SIGNIFICANT CHANGE UP
EGFR: 87 ML/MIN/1.73M2 — SIGNIFICANT CHANGE UP
EOSINOPHIL # BLD AUTO: 0.12 K/UL — SIGNIFICANT CHANGE UP (ref 0–0.5)
EOSINOPHIL # BLD AUTO: 0.12 K/UL — SIGNIFICANT CHANGE UP (ref 0–0.5)
EOSINOPHIL NFR BLD AUTO: 1.4 % — SIGNIFICANT CHANGE UP (ref 0–6)
EOSINOPHIL NFR BLD AUTO: 1.4 % — SIGNIFICANT CHANGE UP (ref 0–6)
ESTIMATED AVERAGE GLUCOSE: 97 MG/DL — SIGNIFICANT CHANGE UP (ref 68–114)
ESTIMATED AVERAGE GLUCOSE: 97 MG/DL — SIGNIFICANT CHANGE UP (ref 68–114)
GLUCOSE SERPL-MCNC: 105 MG/DL — HIGH (ref 70–99)
GLUCOSE SERPL-MCNC: 105 MG/DL — HIGH (ref 70–99)
GLUCOSE SERPL-MCNC: 107 MG/DL — HIGH (ref 70–99)
GLUCOSE SERPL-MCNC: 107 MG/DL — HIGH (ref 70–99)
HCT VFR BLD CALC: 29.9 % — LOW (ref 34.5–45)
HCT VFR BLD CALC: 29.9 % — LOW (ref 34.5–45)
HCT VFR BLD CALC: 31.9 % — LOW (ref 34.5–45)
HCT VFR BLD CALC: 31.9 % — LOW (ref 34.5–45)
HDLC SERPL-MCNC: 65 MG/DL — SIGNIFICANT CHANGE UP
HDLC SERPL-MCNC: 65 MG/DL — SIGNIFICANT CHANGE UP
HGB BLD-MCNC: 10.2 G/DL — LOW (ref 11.5–15.5)
HGB BLD-MCNC: 10.2 G/DL — LOW (ref 11.5–15.5)
HGB BLD-MCNC: 9.5 G/DL — LOW (ref 11.5–15.5)
HGB BLD-MCNC: 9.5 G/DL — LOW (ref 11.5–15.5)
IMM GRANULOCYTES NFR BLD AUTO: 0.2 % — SIGNIFICANT CHANGE UP (ref 0–0.9)
IMM GRANULOCYTES NFR BLD AUTO: 0.2 % — SIGNIFICANT CHANGE UP (ref 0–0.9)
INR BLD: 1.08 RATIO — SIGNIFICANT CHANGE UP (ref 0.85–1.18)
INR BLD: 1.08 RATIO — SIGNIFICANT CHANGE UP (ref 0.85–1.18)
LIPID PNL WITH DIRECT LDL SERPL: 72 MG/DL — SIGNIFICANT CHANGE UP
LIPID PNL WITH DIRECT LDL SERPL: 72 MG/DL — SIGNIFICANT CHANGE UP
LYMPHOCYTES # BLD AUTO: 1.02 K/UL — SIGNIFICANT CHANGE UP (ref 1–3.3)
LYMPHOCYTES # BLD AUTO: 1.02 K/UL — SIGNIFICANT CHANGE UP (ref 1–3.3)
LYMPHOCYTES # BLD AUTO: 12.3 % — LOW (ref 13–44)
LYMPHOCYTES # BLD AUTO: 12.3 % — LOW (ref 13–44)
MAGNESIUM SERPL-MCNC: 1.8 MG/DL — SIGNIFICANT CHANGE UP (ref 1.8–2.6)
MAGNESIUM SERPL-MCNC: 1.8 MG/DL — SIGNIFICANT CHANGE UP (ref 1.8–2.6)
MCHC RBC-ENTMCNC: 28.1 PG — SIGNIFICANT CHANGE UP (ref 27–34)
MCHC RBC-ENTMCNC: 28.1 PG — SIGNIFICANT CHANGE UP (ref 27–34)
MCHC RBC-ENTMCNC: 28.8 PG — SIGNIFICANT CHANGE UP (ref 27–34)
MCHC RBC-ENTMCNC: 28.8 PG — SIGNIFICANT CHANGE UP (ref 27–34)
MCHC RBC-ENTMCNC: 31.8 GM/DL — LOW (ref 32–36)
MCHC RBC-ENTMCNC: 31.8 GM/DL — LOW (ref 32–36)
MCHC RBC-ENTMCNC: 32 GM/DL — SIGNIFICANT CHANGE UP (ref 32–36)
MCHC RBC-ENTMCNC: 32 GM/DL — SIGNIFICANT CHANGE UP (ref 32–36)
MCV RBC AUTO: 88.5 FL — SIGNIFICANT CHANGE UP (ref 80–100)
MCV RBC AUTO: 88.5 FL — SIGNIFICANT CHANGE UP (ref 80–100)
MCV RBC AUTO: 90.1 FL — SIGNIFICANT CHANGE UP (ref 80–100)
MCV RBC AUTO: 90.1 FL — SIGNIFICANT CHANGE UP (ref 80–100)
MONOCYTES # BLD AUTO: 1.22 K/UL — HIGH (ref 0–0.9)
MONOCYTES # BLD AUTO: 1.22 K/UL — HIGH (ref 0–0.9)
MONOCYTES NFR BLD AUTO: 14.7 % — HIGH (ref 2–14)
MONOCYTES NFR BLD AUTO: 14.7 % — HIGH (ref 2–14)
NEUTROPHILS # BLD AUTO: 5.88 K/UL — SIGNIFICANT CHANGE UP (ref 1.8–7.4)
NEUTROPHILS # BLD AUTO: 5.88 K/UL — SIGNIFICANT CHANGE UP (ref 1.8–7.4)
NEUTROPHILS NFR BLD AUTO: 71 % — SIGNIFICANT CHANGE UP (ref 43–77)
NEUTROPHILS NFR BLD AUTO: 71 % — SIGNIFICANT CHANGE UP (ref 43–77)
NON HDL CHOLESTEROL: 79 MG/DL — SIGNIFICANT CHANGE UP
NON HDL CHOLESTEROL: 79 MG/DL — SIGNIFICANT CHANGE UP
NRBC # BLD: 0 /100 WBCS — SIGNIFICANT CHANGE UP (ref 0–0)
NRBC # BLD: 0 /100 WBCS — SIGNIFICANT CHANGE UP (ref 0–0)
PHOSPHATE SERPL-MCNC: 2.8 MG/DL — SIGNIFICANT CHANGE UP (ref 2.4–4.7)
PHOSPHATE SERPL-MCNC: 2.8 MG/DL — SIGNIFICANT CHANGE UP (ref 2.4–4.7)
PLATELET # BLD AUTO: 323 K/UL — SIGNIFICANT CHANGE UP (ref 150–400)
PLATELET # BLD AUTO: 323 K/UL — SIGNIFICANT CHANGE UP (ref 150–400)
PLATELET # BLD AUTO: 340 K/UL — SIGNIFICANT CHANGE UP (ref 150–400)
PLATELET # BLD AUTO: 340 K/UL — SIGNIFICANT CHANGE UP (ref 150–400)
POTASSIUM SERPL-MCNC: 4.2 MMOL/L — SIGNIFICANT CHANGE UP (ref 3.5–5.3)
POTASSIUM SERPL-MCNC: 4.2 MMOL/L — SIGNIFICANT CHANGE UP (ref 3.5–5.3)
POTASSIUM SERPL-MCNC: 4.3 MMOL/L — SIGNIFICANT CHANGE UP (ref 3.5–5.3)
POTASSIUM SERPL-MCNC: 4.3 MMOL/L — SIGNIFICANT CHANGE UP (ref 3.5–5.3)
POTASSIUM SERPL-SCNC: 4.2 MMOL/L — SIGNIFICANT CHANGE UP (ref 3.5–5.3)
POTASSIUM SERPL-SCNC: 4.2 MMOL/L — SIGNIFICANT CHANGE UP (ref 3.5–5.3)
POTASSIUM SERPL-SCNC: 4.3 MMOL/L — SIGNIFICANT CHANGE UP (ref 3.5–5.3)
POTASSIUM SERPL-SCNC: 4.3 MMOL/L — SIGNIFICANT CHANGE UP (ref 3.5–5.3)
PROT SERPL-MCNC: 6.6 G/DL — SIGNIFICANT CHANGE UP (ref 6.6–8.7)
PROT SERPL-MCNC: 6.6 G/DL — SIGNIFICANT CHANGE UP (ref 6.6–8.7)
PROT SERPL-MCNC: 7.2 G/DL — SIGNIFICANT CHANGE UP (ref 6–8.3)
PROT SERPL-MCNC: 7.2 G/DL — SIGNIFICANT CHANGE UP (ref 6–8.3)
PROTHROM AB SERPL-ACNC: 12 SEC — SIGNIFICANT CHANGE UP (ref 9.5–13)
PROTHROM AB SERPL-ACNC: 12 SEC — SIGNIFICANT CHANGE UP (ref 9.5–13)
RBC # BLD: 3.38 M/UL — LOW (ref 3.8–5.2)
RBC # BLD: 3.38 M/UL — LOW (ref 3.8–5.2)
RBC # BLD: 3.54 M/UL — LOW (ref 3.8–5.2)
RBC # BLD: 3.54 M/UL — LOW (ref 3.8–5.2)
RBC # FLD: 15.7 % — HIGH (ref 10.3–14.5)
RBC # FLD: 15.7 % — HIGH (ref 10.3–14.5)
RBC # FLD: 15.8 % — HIGH (ref 10.3–14.5)
RBC # FLD: 15.8 % — HIGH (ref 10.3–14.5)
SODIUM SERPL-SCNC: 141 MMOL/L — SIGNIFICANT CHANGE UP (ref 135–145)
SODIUM SERPL-SCNC: 141 MMOL/L — SIGNIFICANT CHANGE UP (ref 135–145)
SODIUM SERPL-SCNC: 144 MMOL/L — SIGNIFICANT CHANGE UP (ref 135–145)
SODIUM SERPL-SCNC: 144 MMOL/L — SIGNIFICANT CHANGE UP (ref 135–145)
T3 SERPL-MCNC: 102 NG/DL — SIGNIFICANT CHANGE UP (ref 80–200)
T3 SERPL-MCNC: 102 NG/DL — SIGNIFICANT CHANGE UP (ref 80–200)
T4 AB SER-ACNC: 7.3 UG/DL — SIGNIFICANT CHANGE UP (ref 4.5–12)
T4 AB SER-ACNC: 7.3 UG/DL — SIGNIFICANT CHANGE UP (ref 4.5–12)
TRIGL SERPL-MCNC: 36 MG/DL — SIGNIFICANT CHANGE UP
TRIGL SERPL-MCNC: 36 MG/DL — SIGNIFICANT CHANGE UP
TSH SERPL-MCNC: 3.51 UIU/ML — SIGNIFICANT CHANGE UP (ref 0.27–4.2)
TSH SERPL-MCNC: 3.51 UIU/ML — SIGNIFICANT CHANGE UP (ref 0.27–4.2)
WBC # BLD: 8.29 K/UL — SIGNIFICANT CHANGE UP (ref 3.8–10.5)
WBC # BLD: 8.29 K/UL — SIGNIFICANT CHANGE UP (ref 3.8–10.5)
WBC # BLD: 8.75 K/UL — SIGNIFICANT CHANGE UP (ref 3.8–10.5)
WBC # BLD: 8.75 K/UL — SIGNIFICANT CHANGE UP (ref 3.8–10.5)
WBC # FLD AUTO: 8.29 K/UL — SIGNIFICANT CHANGE UP (ref 3.8–10.5)
WBC # FLD AUTO: 8.29 K/UL — SIGNIFICANT CHANGE UP (ref 3.8–10.5)
WBC # FLD AUTO: 8.75 K/UL — SIGNIFICANT CHANGE UP (ref 3.8–10.5)
WBC # FLD AUTO: 8.75 K/UL — SIGNIFICANT CHANGE UP (ref 3.8–10.5)

## 2023-11-08 PROCEDURE — 99291 CRITICAL CARE FIRST HOUR: CPT

## 2023-11-08 PROCEDURE — 85027 COMPLETE CBC AUTOMATED: CPT

## 2023-11-08 PROCEDURE — 99285 EMERGENCY DEPT VISIT HI MDM: CPT | Mod: 25

## 2023-11-08 PROCEDURE — 71045 X-RAY EXAM CHEST 1 VIEW: CPT | Mod: 26

## 2023-11-08 PROCEDURE — 71045 X-RAY EXAM CHEST 1 VIEW: CPT

## 2023-11-08 PROCEDURE — 93010 ELECTROCARDIOGRAM REPORT: CPT

## 2023-11-08 PROCEDURE — 93005 ELECTROCARDIOGRAM TRACING: CPT

## 2023-11-08 PROCEDURE — 36415 COLL VENOUS BLD VENIPUNCTURE: CPT

## 2023-11-08 PROCEDURE — 80053 COMPREHEN METABOLIC PANEL: CPT

## 2023-11-08 PROCEDURE — 99285 EMERGENCY DEPT VISIT HI MDM: CPT

## 2023-11-08 PROCEDURE — 99292 CRITICAL CARE ADDL 30 MIN: CPT

## 2023-11-08 PROCEDURE — 70450 CT HEAD/BRAIN W/O DYE: CPT | Mod: 26,77

## 2023-11-08 PROCEDURE — 70450 CT HEAD/BRAIN W/O DYE: CPT | Mod: 26,MA

## 2023-11-08 PROCEDURE — 70450 CT HEAD/BRAIN W/O DYE: CPT | Mod: MA

## 2023-11-08 PROCEDURE — 99285 EMERGENCY DEPT VISIT HI MDM: CPT | Mod: GC

## 2023-11-08 RX ORDER — ATORVASTATIN CALCIUM 80 MG/1
40 TABLET, FILM COATED ORAL AT BEDTIME
Refills: 0 | Status: DISCONTINUED | OUTPATIENT
Start: 2023-11-08 | End: 2023-11-17

## 2023-11-08 RX ORDER — POLYETHYLENE GLYCOL 3350 17 G/17G
17 POWDER, FOR SOLUTION ORAL
Refills: 0 | Status: DISCONTINUED | OUTPATIENT
Start: 2023-11-08 | End: 2023-11-17

## 2023-11-08 RX ORDER — ALENDRONATE SODIUM 70 MG/1
1 TABLET ORAL
Qty: 0 | Refills: 0 | DISCHARGE

## 2023-11-08 RX ORDER — ATORVASTATIN CALCIUM 80 MG/1
1 TABLET, FILM COATED ORAL
Qty: 0 | Refills: 0 | DISCHARGE

## 2023-11-08 RX ORDER — ACETAMINOPHEN 500 MG
975 TABLET ORAL EVERY 6 HOURS
Refills: 0 | Status: DISCONTINUED | OUTPATIENT
Start: 2023-11-08 | End: 2023-11-13

## 2023-11-08 RX ORDER — DEXMEDETOMIDINE HYDROCHLORIDE IN 0.9% SODIUM CHLORIDE 4 UG/ML
0.2 INJECTION INTRAVENOUS
Qty: 200 | Refills: 0 | Status: DISCONTINUED | OUTPATIENT
Start: 2023-11-08 | End: 2023-11-08

## 2023-11-08 RX ORDER — OMEPRAZOLE 10 MG/1
1 CAPSULE, DELAYED RELEASE ORAL
Qty: 0 | Refills: 0 | DISCHARGE

## 2023-11-08 RX ORDER — CHLORHEXIDINE GLUCONATE 213 G/1000ML
1 SOLUTION TOPICAL DAILY
Refills: 0 | Status: DISCONTINUED | OUTPATIENT
Start: 2023-11-08 | End: 2023-11-09

## 2023-11-08 RX ORDER — SENNA PLUS 8.6 MG/1
2 TABLET ORAL AT BEDTIME
Refills: 0 | Status: DISCONTINUED | OUTPATIENT
Start: 2023-11-08 | End: 2023-11-17

## 2023-11-08 RX ORDER — SODIUM CHLORIDE 9 MG/ML
1000 INJECTION INTRAMUSCULAR; INTRAVENOUS; SUBCUTANEOUS ONCE
Refills: 0 | Status: COMPLETED | OUTPATIENT
Start: 2023-11-08 | End: 2023-11-08

## 2023-11-08 RX ORDER — LEVETIRACETAM 250 MG/1
500 TABLET, FILM COATED ORAL EVERY 12 HOURS
Refills: 0 | Status: DISCONTINUED | OUTPATIENT
Start: 2023-11-08 | End: 2023-11-11

## 2023-11-08 RX ORDER — HYDROXYCHLOROQUINE SULFATE 200 MG
1 TABLET ORAL
Qty: 0 | Refills: 0 | DISCHARGE

## 2023-11-08 RX ORDER — METOPROLOL TARTRATE 50 MG
1 TABLET ORAL
Qty: 0 | Refills: 0 | DISCHARGE

## 2023-11-08 RX ORDER — NICARDIPINE HYDROCHLORIDE 30 MG/1
5 CAPSULE, EXTENDED RELEASE ORAL
Qty: 40 | Refills: 0 | Status: DISCONTINUED | OUTPATIENT
Start: 2023-11-08 | End: 2023-11-09

## 2023-11-08 RX ORDER — FAMOTIDINE 10 MG/ML
20 INJECTION INTRAVENOUS EVERY 12 HOURS
Refills: 0 | Status: DISCONTINUED | OUTPATIENT
Start: 2023-11-08 | End: 2023-11-17

## 2023-11-08 RX ORDER — SODIUM CHLORIDE 9 MG/ML
1000 INJECTION INTRAMUSCULAR; INTRAVENOUS; SUBCUTANEOUS
Refills: 0 | Status: DISCONTINUED | OUTPATIENT
Start: 2023-11-08 | End: 2023-11-11

## 2023-11-08 RX ADMIN — SODIUM CHLORIDE 50 MILLILITER(S): 9 INJECTION INTRAMUSCULAR; INTRAVENOUS; SUBCUTANEOUS at 23:25

## 2023-11-08 RX ADMIN — SODIUM CHLORIDE 1000 MILLILITER(S): 9 INJECTION INTRAMUSCULAR; INTRAVENOUS; SUBCUTANEOUS at 15:35

## 2023-11-08 RX ADMIN — LEVETIRACETAM 400 MILLIGRAM(S): 250 TABLET, FILM COATED ORAL at 18:35

## 2023-11-08 RX ADMIN — DEXMEDETOMIDINE HYDROCHLORIDE IN 0.9% SODIUM CHLORIDE 4.99 MICROGRAM(S)/KG/HR: 4 INJECTION INTRAVENOUS at 21:23

## 2023-11-08 NOTE — ED ADULT NURSE NOTE - NS ED NURSE AMBULANCES2
[facility] Griseofulvin Pregnancy And Lactation Text: This medication is Pregnancy Category X and is known to cause serious birth defects. It is unknown if this medication is excreted in breast milk but breast feeding should be avoided.

## 2023-11-08 NOTE — ED PROVIDER NOTE - NS ED ROS FT
GENERAL: +Decreased appetite, No fever or chills  EYES: no change in vision   HEENT: no trouble swallowing or speaking   CARDIAC: no chest pain   PULMONARY: no cough or SOB  GI:  No abdominal pain  : +increased urinary frequency and incontinence   SKIN: +itchiness under left breast   NEURO: +increased confusion, +gait instability, no headache   MSK: +weakness, +joint pain    All other ROS negative unless otherwise specified in HPI.

## 2023-11-08 NOTE — ED PROVIDER NOTE - PHYSICAL EXAMINATION
PHYSICAL EXAM:    GENERAL: NAD  HEENT:  Atraumatic, symmetrical facial movements, no tongue deviation   CHEST/LUNG: Chest rise equal bilaterally  HEART: Regular rate and rhythm  ABDOMEN: Soft, Nontender, Nondistended  EXTREMITIES:  +b/l LE edema  PSYCH: A&Ox2 (person, place)  SKIN: +erythematous rash under left breast  MSK: +decreased ROM b/l LE worse on the right, +decreased ROM of LUE  NEUROLOGY: decreased strength in b/l LE, diminished b/l LE reflexes, brisk RUE reflexes, unable to ambulate

## 2023-11-08 NOTE — ED PROVIDER NOTE - PHYSICAL EXAMINATION
Gen: AAOx1, NAD  HEENT: Normocephalic atraumatic. EOMI. No scleral icterus. Moist mucus membranes.  CV: RRR. Audible S1 and S2. No murmurs. 2+ radial and PT pulses   Pulm: Clear to auscultation bilaterally. No wheezes, rales, or rhonchi. No accessory muscle use or respiratory distress.  Abdomen: soft, normoactive BS, non distended, nontender, no rebound, no guarding  Musculoskeletal:  Moving all extremities equally. No gross deformity. No tenderness to palpation.  Skin: No rashes or lesions. Warm.  Neurologic: No focal neurological deficits. CN II-XII grossly intact.  Global weakness, 3/5 strength LEs BL, 4/5 strength UEs BL. Sensation intact in all extremities.  Psych: Appropriate mood and affect. Cooperative.

## 2023-11-08 NOTE — ED PROVIDER NOTE - CLINICAL SUMMARY MEDICAL DECISION MAKING FREE TEXT BOX
Patient is an 82yo F with a PMH of HTN, HLD, Arthritis, NPH s/p  shunt in 03/23 who presented to the [place] ED with increased weakness and confusion and decreased PO intake. Found to have SDH with 1.4cm midline shift. Admit to neurosurg

## 2023-11-08 NOTE — PROGRESS NOTE ADULT - SUBJECTIVE AND OBJECTIVE BOX
Chief complaint:   Patient is a 83y old  Female who presents with a chief complaint of SDH (08 Nov 2023 17:59)    HPI:  Patient is a 83y old  Female who presents with a chief complaint of confusion x 4days    HPI: Patient is an 82yo F with a PMH of HTN, HLD, Arthritis, and NPH s/p  shunt in 03/23, at [place], who presented to the [place] ED with increased weakness and confusion and decreased PO intake. Patient follows with Neurologist Dr. Simon for her NPH. Per family, patient has been more confused and unable to walk over the past 4 days. Normally able to walk with a walker. Found to have mixed density SDH with 1.4cm midline shift, and transferred to [facility] for neurosurgical evaluation.    Interval history: Patient evaluated in ED, VSS, AOx3 (person/place (medical insitution)/year but not month), EOMI, PERRL, b/l 2r, FC, NAVARRO 4-/5 w/ drifts on Lt side.    ROS: denies pain  All other systems negative    -----------------------------------------------------------------------------------------------------------------------------------------------------------------------------------  ICU Vital Signs Last 24 Hrs  T(C): 36.4 (08 Nov 2023 17:10), Max: 37.3 (08 Nov 2023 13:51)  T(F): 97.5 (08 Nov 2023 17:10), Max: 99.1 (08 Nov 2023 13:51)  HR: 83 (08 Nov 2023 21:20) (81 - 87)  BP: 156/69 (08 Nov 2023 21:20) (137/77 - 173/74)  BP(mean): 101 (08 Nov 2023 18:28) (101 - 101)  ABP: --  ABP(mean): --  RR: 18 (08 Nov 2023 21:20) (17 - 19)  SpO2: 99% (08 Nov 2023 21:20) (97% - 99%)    O2 Parameters below as of 08 Nov 2023 21:20  Patient On (Oxygen Delivery Method): room air      I&O's Summary      MEDICATIONS  (STANDING):  atorvastatin 40 milliGRAM(s) Oral at bedtime  chlorhexidine 2% Cloths 1 Application(s) Topical daily  dexMEDEtomidine Infusion 0.2 MICROgram(s)/kG/Hr (4.99 mL/Hr) IV Continuous <Continuous>  famotidine Injectable 20 milliGRAM(s) IV Push every 12 hours  levETIRAcetam  IVPB 500 milliGRAM(s) IV Intermittent every 12 hours  niCARdipine Infusion 5 mG/Hr (25 mL/Hr) IV Continuous <Continuous>  polyethylene glycol 3350 17 Gram(s) Oral two times a day  senna 2 Tablet(s) Oral at bedtime  sodium chloride 0.9%. 1000 milliLiter(s) (50 mL/Hr) IV Continuous <Continuous>    IMAGING:   Recent imaging studies were reviewed.    LAB RESULTS:                          9.5    8.29  )-----------( 323      ( 08 Nov 2023 19:10 )             29.9       PT/INR - ( 08 Nov 2023 19:10 )   PT: 12.0 sec;   INR: 1.08 ratio         PTT - ( 08 Nov 2023 19:10 )  PTT:25.5 sec    11-08    141  |  107  |  28.5<H>  ----------------------------<  105<H>  4.2   |  24.0  |  0.63    Ca    10.2      08 Nov 2023 19:10  Phos  2.8     11-08  Mg     1.8     11-08    TPro  6.6  /  Alb  3.4  /  TBili  0.4  /  DBili  x   /  AST  17  /  ALT  11  /  AlkPhos  71  11-08    -----------------------------------------------------------------------------------------------------------------------------------------------------------------------------------    PHYSICAL EXAM:  General: Calm, laying in bed  HEENT: MMM  Neuro:  -Mental status- No acute distress, minimal eye opening, AOx1-2, following commands  -CN- PERRL 3mm, EOMI, tongue midline, face symmetric  -Motor-  bilat upper ext contracted  bilat lower ext WD  -Sensation- intact to LT   -Coordination- no dysmetria noted    CV: RRR  Pulm: Clear to auscultation  Abd: Soft, nontender, nondistended  Ext: No edema  Skin: warm, dry     Chief complaint:   Patient is a 83y old  Female who presents with a chief complaint of SDH (08 Nov 2023 17:59)    HPI:  Patient is a 83y old  Female who presents with a chief complaint of confusion x 4days    HPI: Patient is an 84yo F with a PMH of HTN, HLD, Arthritis, and NPH s/p  shunt in 03/23, at [place], who presented to the [place] ED with increased weakness and confusion and decreased PO intake. Patient follows with Neurologist Dr. Simon for her NPH. Per family, patient has been more confused and unable to walk over the past 4 days. Normally able to walk with a walker. Found to have mixed density SDH with 1.4cm midline shift, and transferred to [facility] for neurosurgical evaluation.    Interval history: Patient evaluated in ED, VSS, AOx3 (person/place (medical insitution)/year but not month), EOMI, PERRL, b/l 2r, FC, NAVARRO 4-/5 w/ drifts on Lt side.    ROS: denies pain  All other systems negative    -----------------------------------------------------------------------------------------------------------------------------------------------------------------------------------  ICU Vital Signs Last 24 Hrs  T(C): 36.4 (08 Nov 2023 17:10), Max: 37.3 (08 Nov 2023 13:51)  T(F): 97.5 (08 Nov 2023 17:10), Max: 99.1 (08 Nov 2023 13:51)  HR: 83 (08 Nov 2023 21:20) (81 - 87)  BP: 156/69 (08 Nov 2023 21:20) (137/77 - 173/74)  BP(mean): 101 (08 Nov 2023 18:28) (101 - 101)  ABP: --  ABP(mean): --  RR: 18 (08 Nov 2023 21:20) (17 - 19)  SpO2: 99% (08 Nov 2023 21:20) (97% - 99%)    O2 Parameters below as of 08 Nov 2023 21:20  Patient On (Oxygen Delivery Method): room air      I&O's Summary      MEDICATIONS  (STANDING):  atorvastatin 40 milliGRAM(s) Oral at bedtime  chlorhexidine 2% Cloths 1 Application(s) Topical daily  dexMEDEtomidine Infusion 0.2 MICROgram(s)/kG/Hr (4.99 mL/Hr) IV Continuous <Continuous>  famotidine Injectable 20 milliGRAM(s) IV Push every 12 hours  levETIRAcetam  IVPB 500 milliGRAM(s) IV Intermittent every 12 hours  niCARdipine Infusion 5 mG/Hr (25 mL/Hr) IV Continuous <Continuous>  polyethylene glycol 3350 17 Gram(s) Oral two times a day  senna 2 Tablet(s) Oral at bedtime  sodium chloride 0.9%. 1000 milliLiter(s) (50 mL/Hr) IV Continuous <Continuous>    IMAGING:   Recent imaging studies were reviewed.    LAB RESULTS:                          9.5    8.29  )-----------( 323      ( 08 Nov 2023 19:10 )             29.9       PT/INR - ( 08 Nov 2023 19:10 )   PT: 12.0 sec;   INR: 1.08 ratio         PTT - ( 08 Nov 2023 19:10 )  PTT:25.5 sec    11-08    141  |  107  |  28.5<H>  ----------------------------<  105<H>  4.2   |  24.0  |  0.63    Ca    10.2      08 Nov 2023 19:10  Phos  2.8     11-08  Mg     1.8     11-08    TPro  6.6  /  Alb  3.4  /  TBili  0.4  /  DBili  x   /  AST  17  /  ALT  11  /  AlkPhos  71  11-08    -----------------------------------------------------------------------------------------------------------------------------------------------------------------------------------    PHYSICAL EXAM:  General: Calm, laying in bed  HEENT: MMM  Neuro:  -Mental status- No acute distress, minimal eye opening, AOx1-2, following commands  -CN- PERRL 3mm, EOMI, tongue midline, face symmetric  -Motor-  bilat upper ext not raising elbows off bed, but 4/5 distally  bilat lower ext WD, 2/5  -Sensation- intact to LT   -Coordination- no dysmetria noted    CV: RRR  Pulm: Clear to auscultation  Abd: Soft, nontender, nondistended  Ext: No edema  Skin: warm, dry

## 2023-11-08 NOTE — CONSULT NOTE ADULT - NS ATTEND AMEND GEN_ALL_CORE FT
Neurosurgery Attending Attestation:    Patient seen and examined at bedside. Agree with plan and note as documented above.    82 y/o F w/ PMHx significant for NPH s/p  shunt placement 3/23 at [place], HTN, HLD, Arthritis, who presents as transfer from [place] ED w/ increased weakness and confusion and 4 days of inability to walk. CT Head w/o contrast demonstrates 2cm subacute on chronic R convexity SDH with 7mm of R to L midline shift. On exam, patient is awake, alert, oriented to self, not hospital, and year, PERRL, EOMI, FS, TML, L drift, 5/5 in RUE and 4-4+/5 LUE, 3/5 in BLE. Patient would benefit from repeat CT Head w/o contrast in 6h, ICU admission, q2h neurochecks, possible evacuation tomorrow.    -Sierra Rodriguez MD

## 2023-11-08 NOTE — ED PROVIDER NOTE - OBJECTIVE STATEMENT
Patient is an 84yo F with a PMH of HTN, HLD, NPH s/p  shunt in Patient is an 84yo F with a PMH of HTN, HLD, Arthritis, NPH s/p  shunt in 03/23 who presents to the ED with increased weakness and confusion. Patient follows with Neurologist Dr. Simon for her NPH. She had a CT head in 04/23, which revealed her  shunt was working properly. She was told to follow up in 6 months, and she had her f/u appointment scheduled for later on today. However, per patients , over the last 4d, patient has become increasingly more confused, has lost the ability to walk, and has had increased incontinence. Patient was formerly receiving PT and was able to walk with a walker up until 1 week ago. Earlier this week, patient became very unsteady on her feet, and has since been unable to get out of bed. This morning, her  reports she was talking to people who were not present in the room. Also confirms a decrease in PO intake. Denies any recent illness or known sick contacts. Patient denies any SOB, chest pain, n/v/d, dysuria, fever, chills, back pain.

## 2023-11-08 NOTE — PATIENT PROFILE ADULT - FALL HARM RISK - HARM RISK INTERVENTIONS

## 2023-11-08 NOTE — ED ADULT NURSE NOTE - OBJECTIVE STATEMENT
84 yo F txfr from [place] for SDH. Pt. A&Ox2. Unknown if fall was sustained. Neuro intact PERRLA. Resp. equal and unlabored b/l. VSS. NAD. Comfort measures provided, safety measures implemented, call bell in reach. MD at bedside.

## 2023-11-08 NOTE — H&P ADULT - HISTORY OF PRESENT ILLNESS
Patient is a 83y old  Female who presents with a chief complaint of confusion x 4days    HPI: Patient is an 84yo F with a PMH of HTN, HLD, Arthritis, and NPH s/p  shunt in 03/23, at [place], who presented to the [place] ED with increased weakness and confusion and decreased PO intake. Patient follows with Neurologist Dr. Simon for her NPH. Per family, patient has been more confused and unable to walk over the past 4 days. Normally able to walk with a walker. Found to have mixed density SDH with 1.4cm midline shift, and transferred to [facility] for neurosurgical evaluation.    Interval history: Patient evaluated in ED, VSS, AOx3 (person/place (medical insitution)/year but not month), EOMI, PERRL, b/l 2r, FC, NAVARRO 4-/5 w/ drifts on Lt side.      Vital Signs Last 24 Hrs  T(C): 36.4 (08 Nov 2023 17:10), Max: 37.3 (08 Nov 2023 13:51)  T(F): 97.5 (08 Nov 2023 17:10), Max: 99.1 (08 Nov 2023 13:51)  HR: 81 (08 Nov 2023 17:10) (81 - 87)  BP: 173/74 (08 Nov 2023 17:10) (137/77 - 173/74)  BP(mean): --  RR: 18 (08 Nov 2023 17:10) (17 - 18)  SpO2: 97% (08 Nov 2023 17:10) (97% - 99%)    Parameters below as of 08 Nov 2023 17:10  Patient On (Oxygen Delivery Method): room air          Physical Exam:  Constitutional: NAD, lying in bed  Neuro  AOx3 (person/place (medical insitution)/year but not month), EOMI, PERRL, B/L 2R, 4-/5 w/ drifts on Lt side.  Cardiovascular: Regular rate and rhythm.  Eyes: See neurologic examination with detailed examination of eyes.  ENT: No JVD, Trachea Midline  Respiratory: non labored breathing   Gastrointestinal: Soft, nontender, nondistended.  Genitourinary: [ ] Blair, [ x ] No Blair.   Musculoskeletal: No muscle wasting noted, (See neurologic assessment for full muscle strength assessment) No pretibial edema appreciated, no appreciable calf tenderness.  Skin:  no wounds, no redness, no abrasions noted  Hematologic / Lymph / Immunologic: No bleeding from IV sites or wounds, No lymphadenopathy, No Hives or allergic type skin lesions      LABS:                        10.2   8.75  )-----------( 340      ( 08 Nov 2023 13:30 )             31.9     11-08    144  |  112<H>  |  32<H>  ----------------------------<  107<H>  4.3   |  24  |  0.80    Ca    10.5<H>      08 Nov 2023 13:30    TPro  7.2  /  Alb  3.2<L>  /  TBili  0.4  /  DBili  x   /  AST  21  /  ALT  18  /  AlkPhos  80  11-08      Urinalysis Basic - ( 08 Nov 2023 13:30 )    Color: x / Appearance: x / SG: x / pH: x  Gluc: 107 mg/dL / Ketone: x  / Bili: x / Urobili: x   Blood: x / Protein: x / Nitrite: x   Leuk Esterase: x / RBC: x / WBC x   Sq Epi: x / Non Sq Epi: x / Bacteria: x    MEDICATIONS  (STANDING):  niCARdipine Infusion 5 mG/Hr (25 mL/Hr) IV Continuous <Continuous>    MEDICATIONS  (PRN):      RADIOLOGY & ADDITIONAL STUDIES:  < from: CT Head No Cont (02.01.23 @ 13:07) >  IMPRESSION:    Disproportionate enlargement of the ventricular system in relation to the   high convexity sulci with temporal flaring bilaterally may reflect   communicating/normal pressure hydrocephalus and/or advanced central   volume loss in the appropriate clinical setting.    Confluent periventricular white matter hypoattenuation due to chronic   microvascular ischemic changes and/or transependymal CSF resorption.    No acute intracranial bleeding.    --- End of Report ---            RACHEL LEONARDO MD; Attending Radiologist  This document has been electronically signed. Feb 1 2023  1:11PM    < end of copied text >  < from: CT Head No Cont (11.08.23 @ 14:13) >  IMPRESSION:    Moderate to large mixed attenuation right hemispheric convexity subdural   hematoma compressing right hemisphere and right lateral ventricle with   1.4 cm midline shift to left.    Right frontal ventriculostomy shunt catheter within right frontal horn.    Communication:  I discussed the finding of this report with Dr. Short at   2:25 PM on 11/8/2023.  Critical value policy of the hospital was   followed.  Read back and confirmation of receipt of this communication   was performed.  This verbal communication supplements the text report of   this document.    --- End of Report ---            KAE HUMPHREYS MD; Attending Radiologist  This document has been electronically signed. Nov 8 2023  2:28PM    < end of copied text >   Patient is a 83y old  Female who presents with a chief complaint of confusion x 4days    HPI: Patient is an 82yo F with a PMH of HTN, HLD, Arthritis, and NPH s/p  shunt in 03/23, at [place], who presented to the [place] ED with increased weakness and confusion and decreased PO intake. Patient follows with Neurologist Dr. Simon for her NPH. Per family, patient has been more confused and unable to walk over the past 4 days. Normally able to walk with a walker. Found to have mixed density SDH with 1.4cm midline shift, and transferred to [facility] for neurosurgical evaluation.    Interval history: Patient evaluated in ED, VSS, AOx3 (person/place (medical insitution)/year but not month), EOMI, PERRL, b/l 2r, FC, NAVARRO 4-/5 w/ drifts on Lt side.    *Modified Chip Score- 3  0 - No symptoms.  1 - No significant disability. Able to carry out all usual activities, despite some symptoms.  2 - Slight disability. Able to look after own affairs without assistance, but unable to carry out all previous activities.  3 - Moderate disability. Requires some help, but able to walk unassisted.  4 - Moderately severe disability. Unable to attend to own bodily needs without assistance, and unable to walk unassisted.  5 - Severe disability. Requires constant nursing care and attention, bedridden, incontinent.    Vital Signs Last 24 Hrs  T(C): 36.4 (08 Nov 2023 17:10), Max: 37.3 (08 Nov 2023 13:51)  T(F): 97.5 (08 Nov 2023 17:10), Max: 99.1 (08 Nov 2023 13:51)  HR: 81 (08 Nov 2023 17:10) (81 - 87)  BP: 173/74 (08 Nov 2023 17:10) (137/77 - 173/74)  BP(mean): --  RR: 18 (08 Nov 2023 17:10) (17 - 18)  SpO2: 97% (08 Nov 2023 17:10) (97% - 99%)    Parameters below as of 08 Nov 2023 17:10  Patient On (Oxygen Delivery Method): room air          Physical Exam:  Constitutional: NAD, lying in bed  Neuro  AOx3 (person/place (medical insitution)/year but not month), EOMI, PERRL, B/L 2R, 4-/5 w/ drifts on Lt side.  Cardiovascular: Regular rate and rhythm.  Eyes: See neurologic examination with detailed examination of eyes.  ENT: No JVD, Trachea Midline  Respiratory: non labored breathing   Gastrointestinal: Soft, nontender, nondistended.  Genitourinary: [ ] Blair, [ x ] No Blair.   Musculoskeletal: No muscle wasting noted, (See neurologic assessment for full muscle strength assessment) No pretibial edema appreciated, no appreciable calf tenderness.  Skin:  no wounds, no redness, no abrasions noted  Hematologic / Lymph / Immunologic: No bleeding from IV sites or wounds, No lymphadenopathy, No Hives or allergic type skin lesions      LABS:                        10.2   8.75  )-----------( 340      ( 08 Nov 2023 13:30 )             31.9     11-08    144  |  112<H>  |  32<H>  ----------------------------<  107<H>  4.3   |  24  |  0.80    Ca    10.5<H>      08 Nov 2023 13:30    TPro  7.2  /  Alb  3.2<L>  /  TBili  0.4  /  DBili  x   /  AST  21  /  ALT  18  /  AlkPhos  80  11-08      Urinalysis Basic - ( 08 Nov 2023 13:30 )    Color: x / Appearance: x / SG: x / pH: x  Gluc: 107 mg/dL / Ketone: x  / Bili: x / Urobili: x   Blood: x / Protein: x / Nitrite: x   Leuk Esterase: x / RBC: x / WBC x   Sq Epi: x / Non Sq Epi: x / Bacteria: x    MEDICATIONS  (STANDING):  niCARdipine Infusion 5 mG/Hr (25 mL/Hr) IV Continuous <Continuous>    MEDICATIONS  (PRN):      RADIOLOGY & ADDITIONAL STUDIES:  < from: CT Head No Cont (02.01.23 @ 13:07) >  IMPRESSION:    Disproportionate enlargement of the ventricular system in relation to the   high convexity sulci with temporal flaring bilaterally may reflect   communicating/normal pressure hydrocephalus and/or advanced central   volume loss in the appropriate clinical setting.    Confluent periventricular white matter hypoattenuation due to chronic   microvascular ischemic changes and/or transependymal CSF resorption.    No acute intracranial bleeding.    --- End of Report ---            RACHEL LEONARDO MD; Attending Radiologist  This document has been electronically signed. Feb 1 2023  1:11PM    < end of copied text >  < from: CT Head No Cont (11.08.23 @ 14:13) >  IMPRESSION:    Moderate to large mixed attenuation right hemispheric convexity subdural   hematoma compressing right hemisphere and right lateral ventricle with   1.4 cm midline shift to left.    Right frontal ventriculostomy shunt catheter within right frontal horn.    Communication:  I discussed the finding of this report with Dr. Short at   2:25 PM on 11/8/2023.  Critical value policy of the hospital was   followed.  Read back and confirmation of receipt of this communication   was performed.  This verbal communication supplements the text report of   this document.    --- End of Report ---            KAE HUMPHREYS MD; Attending Radiologist  This document has been electronically signed. Nov 8 2023  2:28PM    < end of copied text >

## 2023-11-08 NOTE — ED ADULT NURSE NOTE - NS PRO PASSIVE SMOKE EXP
HEALTH ISSUES - PROBLEM Dx:  HPI:  74 yo f with hx of bovine valve replacement, afib on coumadin, chronic kidney insufficiency, s/p fall from standing 3 days ago. Per patient, she slipped, and as she was bracing her fall went to grab the kitchen counter and missed, causing some skin tears to her LUE. She's also had a nose bleed for 2-3 weeks, now more frequent. She had been in the ED on Sunday, got an XR which showed no fracture and was sent home. She comes today due to worsening epistaxis and bleeding from her skin tears. c/o SOB, headache/dizziness. Denies hitting head or LOC. (11 Apr 2017 15:16)    NOW  Pulm HTN, newly initiated HD    INTERVAL HPI/ OVERNIGHT EVENTS:  mild SOB on NC oxygen, james arms and forearm  ecchymosis and blister, ecchymosis right shoulder due to HD PC access site  denies chest pain, nausea, vomits, cough, fever, HA    MEDICATIONS  (STANDING):  atorvastatin 40milliGRAM(s) Oral at bedtime  calcium acetate 1334milliGRAM(s) Oral three times a day with meals  folic acid 1milliGRAM(s) Oral daily  lactobacillus acidophilus 1Tablet(s) Oral daily  polyethylene glycol 3350 17Gram(s) Oral daily  midodrine 2.5milliGRAM(s) Oral two times a day  sodium chloride 0.65% Nasal 1Spray(s) Both Nostrils three times a day  epoetin jaswinder Injectable 72390Fapv(s) IV Push <User Schedule>  ATENolol  Tablet 12.5milliGRAM(s) Oral daily  sildenafil (REVATIO) 20milliGRAM(s) Oral two times a day  midodrine 5milliGRAM(s) Oral <User Schedule>  fludroCORTISONE 0.1milliGRAM(s) Oral daily    MEDICATIONS  (PRN):  albumin human 25% IVPB 50milliLiter(s) IV Intermittent every 1 hour PRN for sbp < 100  ALBUTerol    0.083% 2.5milliGRAM(s) Nebulizer every 6 hours PRN Bronchospasm      Allergies    allopurinol (Rash)  codeine (Nausea; Vomiting)  penicillin (Rash)  spironolactone (Unknown)    Intolerances        Vital Signs Last 24 Hrs  T(C): 36.3, Max: 36.6 (04-21 @ 23:08)  T(F): 97.4, Max: 97.8 (04-21 @ 23:08)  HR: 75 (66 - 75)  BP: 96/57 (87/51 - 98/54)  BP(mean): --  RR: 18 (16 - 18)  SpO2: 99% (95% - 100%)    ACCUCHECKS    PHYSICAL EXAM-  GENERAL: mild SOB on NC oxygen well-groomed, well-developed  HEAD:  Atraumatic, Normocephalic  EYES: EOMI, PERRLA, conjunctiva and sclera clear  ENMT:  Moist mucous membranes,  NECK: Supple, No JVD, Normal thyroid  NERVOUS SYSTEM:  Alert & Oriented X 3, Motor Strength 4/5 B/L upper and lower extremities;   CHEST/LUNG: No rales, rhonchi, wheezing, or rubs  HEART: Regular rate and rhythm; No murmurs, rubs, or gallops  ABDOMEN: Soft, Nontender, Nondistended; Bowel sounds present  EXTREMITIES:  2+ Peripheral Pulses, No clubbing, cyanosis; james LE edema with some erythema, no cellulitis  LYMPH: No lymphadenopathy noted  SKIN: No rashes or lesions    LABS:                        9.1    2.6   )-----------( 74       ( 22 Apr 2017 07:08 )             29.8           PT/INR - ( 22 Apr 2017 07:08 )   PT: 11.9 sec;   INR: 1.08 ratio             RADIOLOGY & ADDITIONAL TESTS:    Assessment and Plan  DVT Prophylaxis    Discussed with: Patient, family, RN, CM, Consultants  Plan of care/ Discharge planning discussed.    Visit Time: HEALTH ISSUES - PROBLEM Dx:  HPI:  74 yo f with hx of bovine valve replacement, afib on coumadin, chronic kidney insufficiency, s/p fall from standing 3 days ago. Per patient, she slipped, and as she was bracing her fall went to grab the kitchen counter and missed, causing some skin tears to her LUE. She's also had a nose bleed for 2-3 weeks, now more frequent. She had been in the ED on Sunday, got an XR which showed no fracture and was sent home. She comes today due to worsening epistaxis and bleeding from her skin tears. c/o SOB, headache/dizziness. Denies hitting head or LOC. (11 Apr 2017 15:16)    NOW  Pulm HTN, newly initiated HD    INTERVAL HPI/ OVERNIGHT EVENTS:  mild SOB on NC oxygen, james arms and forearm  ecchymosis and blister, ecchymosis right shoulder due to HD PC access site  denies chest pain, nausea, vomits, cough, fever, HA    MEDICATIONS  (STANDING):  atorvastatin 40milliGRAM(s) Oral at bedtime  calcium acetate 1334milliGRAM(s) Oral three times a day with meals  folic acid 1milliGRAM(s) Oral daily  lactobacillus acidophilus 1Tablet(s) Oral daily  polyethylene glycol 3350 17Gram(s) Oral daily  midodrine 2.5milliGRAM(s) Oral two times a day  sodium chloride 0.65% Nasal 1Spray(s) Both Nostrils three times a day  epoetin jaswinder Injectable 01325Hhko(s) IV Push <User Schedule>  ATENolol  Tablet 12.5milliGRAM(s) Oral daily  sildenafil (REVATIO) 20milliGRAM(s) Oral two times a day  midodrine 5milliGRAM(s) Oral <User Schedule>  fludroCORTISONE 0.1milliGRAM(s) Oral daily    MEDICATIONS  (PRN):  albumin human 25% IVPB 50milliLiter(s) IV Intermittent every 1 hour PRN for sbp < 100  ALBUTerol    0.083% 2.5milliGRAM(s) Nebulizer every 6 hours PRN Bronchospasm      Allergies    allopurinol (Rash)  codeine (Nausea; Vomiting)  penicillin (Rash)  spironolactone (Unknown)    Intolerances        Vital Signs Last 24 Hrs  T(C): 36.3, Max: 36.6 (04-21 @ 23:08)  T(F): 97.4, Max: 97.8 (04-21 @ 23:08)  HR: 75 (66 - 75)  BP: 96/57 (87/51 - 98/54)  BP(mean): --  RR: 18 (16 - 18)  SpO2: 99% (95% - 100%)    ACCUCHECKS    PHYSICAL EXAM-  GENERAL: mild SOB on NC oxygen well-groomed, well-developed  HEAD:  Atraumatic, Normocephalic  EYES: EOMI, PERRLA, conjunctiva and sclera clear  ENMT:  Moist mucous membranes,  NECK: Supple, No JVD, Normal thyroid  NERVOUS SYSTEM:  Alert & Oriented X 3, Motor Strength 4/5 B/L upper and lower extremities;   CHEST/LUNG: No rales, rhonchi, wheezing, or rubs  HEART: Regular rate and rhythm; No murmurs, rubs, or gallops  ABDOMEN: Soft, Nontender, Nondistended; Bowel sounds present  EXTREMITIES:  2+ Peripheral Pulses, No clubbing, cyanosis; james LE edema with some erythema, no cellulitis  LYMPH: No lymphadenopathy noted  SKIN: No rashes or lesions    LABS:                        9.1    2.6   )-----------( 74       ( 22 Apr 2017 07:08 )             29.8           PT/INR - ( 22 Apr 2017 07:08 )   PT: 11.9 sec;   INR: 1.08 ratio             RADIOLOGY & ADDITIONAL TESTS:    Assessment and Plan  DVT Prophylaxis    Discussed with: Patient, family, RN, CM, Consultants  Plan of care/ Discharge planning discussed.       Visit Time: Unknown

## 2023-11-08 NOTE — CONSULT NOTE ADULT - SUBJECTIVE AND OBJECTIVE BOX
HISTORY OF PRESENT ILLNESS:   83yF w/ PMHx of NPH s/p  shunt placement 3/23 at [facility], HTN, HLD, Arthritis, NPH s/p  shunt in 03/23 who presents as transfer from [place] ED w/ increased weakness and confusion. Pt reports she came to the hospital because her  sent her in due to confusion. She reports her confusion has slightly improved. Per pt, she fell on Saturday and has been unsteady since. Pt reports some headache, R hip pain and L shoulder pain. CTH at [place] showed mixed R SDH w/ 1.4 cm R to L midline shift.    Called pt's  for further hx but was unable to get in contact.         PAST MEDICAL & SURGICAL HISTORY:  Normal pressure hydrocephalus      HTN (hypertension)      HLD (hyperlipidemia)      Arthritis      S/P  shunt        FAMILY HISTORY:      SOCIAL HISTORY:  Tobacco Use: Denies  EtOH use: Denies  Substance: Denies    Allergies    No Known Allergies    Intolerances        REVIEW OF SYSTEMS  Negative except as noted in HPI      HOME MEDICATIONS:  Home Medications:  Fosamax 70 mg oral tablet: 1 tab(s) orally once a week (02 Feb 2023 05:02)  Lipitor 40 mg oral tablet: 1 tab(s) orally once a day (02 Feb 2023 05:02)  Plaquenil 200 mg oral tablet: 1 tab(s) orally 2 times a day (02 Feb 2023 05:02)  PriLOSEC 40 mg oral delayed release capsule: 1 cap(s) orally once a day (02 Feb 2023 05:02)  Toprol-XL 25 mg oral tablet, extended release: 1 tab(s) orally once a day (02 Feb 2023 05:02)      Vital Signs Last 24 Hrs  T(C): 36.4 (08 Nov 2023 17:10), Max: 37.3 (08 Nov 2023 13:51)  T(F): 97.5 (08 Nov 2023 17:10), Max: 99.1 (08 Nov 2023 13:51)  HR: 81 (08 Nov 2023 17:10) (81 - 87)  BP: 173/74 (08 Nov 2023 17:10) (137/77 - 173/74)  BP(mean): --  RR: 18 (08 Nov 2023 17:10) (17 - 18)  SpO2: 97% (08 Nov 2023 17:10) (97% - 99%)    Parameters below as of 08 Nov 2023 17:10  Patient On (Oxygen Delivery Method): room air          PHYSICAL EXAM:  GENERAL: NAD, well-groomed  HEAD:  Atraumatic, normocephalic  SHUNT: strata shunt set at 2.5, does not refill   EYES: Conjunctiva and sclera clear  ENMT: Good dentition   NECK: Supple  SILVER COMA SCORE: E- V- M- = 15       E: 4= opens eyes spontaneously 3= to voice 2= to noxious 1= no opening       V: 5= oriented 4= confused 3= inappropriate words 2= incomprehensible sounds 1= nonverbal 1T= intubated       M: 6= follows commands 5= localizes 4= withdraws 3= flexor posturing 2= extensor posturing 1= no movement  MENTAL STATUS: AAO x3 w/ choices; Awake; Opens eyes spontaneously; Appropriately conversant without aphasia with slight confusion; following simple commands but L sided neglect   CRANIAL NERVES: PERRL. EOMI without nystagmus. Facial sensation intact V1-3 distribution b/l. Face symmetric w/ normal eye closure and smile, tongue midline. Hearing grossly intact. Speech clear. Head turning and shoulder shrug intact.   MOTOR: strength 5/5 RUE, L limited d/t pain deltoid 2/5, Bi/tri 5/5, HG 4/5; RLE limited d/t pain proximally 4/5, distally 5/5; LLE 5/5  SENSATION: Decreased sensation in RLE otherwise grossly intact to light touch in all other extremities but L sided neglect  COORDINATION: Gait testing deferred; no upper extremity dysmetria w/ RUE, LUE unable to test d/t weakness  CHEST/LUNG: Nonlabored on room air, no accessory muscle use  SKIN: Warm, dry    LABS:                        10.2   8.75  )-----------( 340      ( 08 Nov 2023 13:30 )             31.9     11-08    144  |  112<H>  |  32<H>  ----------------------------<  107<H>  4.3   |  24  |  0.80    Ca    10.5<H>      08 Nov 2023 13:30    TPro  7.2  /  Alb  3.2<L>  /  TBili  0.4  /  DBili  x   /  AST  21  /  ALT  18  /  AlkPhos  80  11-08      Urinalysis Basic - ( 08 Nov 2023 13:30 )    Color: x / Appearance: x / SG: x / pH: x  Gluc: 107 mg/dL / Ketone: x  / Bili: x / Urobili: x   Blood: x / Protein: x / Nitrite: x   Leuk Esterase: x / RBC: x / WBC x   Sq Epi: x / Non Sq Epi: x / Bacteria: x        RADIOLOGY & ADDITIONAL STUDIES:    ACC: [id number] EXAM: CT BRAIN    PROCEDURE DATE: 11/08/2023    IMPRESSION:    Moderate to large mixed attenuation right hemispheric convexity subdural hematoma compressing right hemisphere and right lateral ventricle with 1.4 cm midline shift to left.    Right frontal ventriculostomy shunt catheter within right frontal horn.

## 2023-11-08 NOTE — ED PROVIDER NOTE - DIFFERENTIAL DIAGNOSIS
Differential diagnosis includes subdural hematoma epidural hematoma intracerebral bleed subarachnoid hemorrhage worsening normal pressure hydrocephalus obstructed shunt Differential Diagnosis

## 2023-11-08 NOTE — ED ADULT NURSE NOTE - NSFALLHARMRISKINTERV_ED_ALL_ED

## 2023-11-08 NOTE — ED ADULT NURSE NOTE - NSFALLHARMRISKINTERV_ED_ALL_ED

## 2023-11-08 NOTE — ED PROVIDER NOTE - ATTENDING CONTRIBUTION TO CARE
Elderly white female with previous history of normal pressure hydrocephalus and shunt placed March of this year at [place] now with 4 days of worsening alteration in mental status cognition memory and gait.  Exam reveals a white female awake alert oriented to person and place.  HEENT normocephalic atraumatic pupils are equal round reactive to light and accommodation extraocular movements intact neck is supple no JVD no bruits lungs are clear heart regular rate and rhythm without any murmurs rubs gallops abdomen is soft nontender positive bowel sounds extremities are without clubbing cyanosis though there is is 1+ pitting edema bilateral lower extremities.  Neurologically alert and oriented x2 bilateral upper extremities 4/5 strength bilateral lower extremities 3/5 strength upper extremities bilaterally reflexes are brisk lower extremities bilaterally are flat to 1/5.

## 2023-11-08 NOTE — ED PROVIDER NOTE - NS_EDPROVIDERDISPOUSERTYPE_ED_A_ED
Clear bilaterally, pupils equal, round and reactive to light. Attending Attestation (For Attendings USE Only)...

## 2023-11-08 NOTE — ED PROVIDER NOTE - EKG ADDITIONAL INFORMATION FREE TEXT
EKG reveals which was independently reviewed normal sinus rhythm left axis deviation LVH nonspecific ST segment T wave changes lateral leads

## 2023-11-08 NOTE — PATIENT PROFILE ADULT - FOOD INSECURITY
Inpatient Cardiology Consultation      Reason for Consult:  [place] Physician: Dr Vandana Cook    Requesting Physician:  Dr Lamin Richardson    Date of Consultation: 5/19/2021    HISTORY OF PRESENT ILLNESS: 69 yo AAF known to Dr Carlyn Ayoub seen in office 10/2020 as a new patient. PMH: HTN, HLD, obesity, T2DM with neuropathy/retinopthy, r breast carcinoma s/p mastectomy/XRT, C in 2011 (medical management), ESRD on HD, not a candidate for renal transplant (Jehovah Witness), and lifelong non smoker. Unresponsive-->Pulseless (AED recommended to shock, rhythm not known) defibrillated x 1 at dialysis with return of spontaneous circulation. NO CPR or medications administered  According to patients sister patient was complaining of some CP last week but earlier this am when they spoke on the phone she did not mention any CP or SOB. [facility]-ED BP upon arrival 150/133 HR 79. Was noted to have RUE weakness and a stroke alert was called. CT Brain perfusion negative. CTA Neck/brain negative. , K+ 3.9, Bun/Cr 21/3.9, p-BNP >70,000, troponin 0.87, ALT 98, , WBC 4.3, Hgb10.2    Ketamine IV given in ED  Heparin bolus/gtt ordered      Please note: past medical records were reviewed per electronic medical record (EMR) - see detailed reports under Past Medical/ Surgical History. Past Medical History:    1. Jehovah Witness  2. 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 3. HTN  4. HLD  5. BMI 27.1 on 5/19/2021  6. Anemia  7. Bladder carcinoma s/p chemotherapy  8. Right breast cancer with mastectomy  2005 followed by radiation and Arimidex. Chronic  lymphedema  RUE.    9. [facility] admission with SOB and chest discomfort 08/2011.  Cardiac enzymes negative.  CT angiogram of the chest negative for PE and dissection.  EKG:  NSR, nonspecific ST T abnormalities.    10. TTE 08/2011 with severe concentric LVH, normal wall motion.  LAE, RVE, Stage I diastolic dysfunction. 11.  MPS 08/2011 with small area of lateral ischemia, normal wall motion and EF.    12. Cardiac catheterization 08/30/2012:  CX 90% mid stenosis, OM2 totally occluded.  LAD 20% stenosis, RVA 50% stenosis, LV normal.  She was treated medically.    13. [facility] admission 02/13/2013 with SOB, orthopnea, nonproductive nocturnal cough and edema.  CXR:  cardiomegaly and bilateral effusion.  EKG:  NSR, LAE, low voltage, PVC, poor R wave progression.  BUN 23, Cr 1.5, BNP 1868.  Cardiac enzymes negative.  Hemoglobin 9.9, WBC 5.4.  Rx with aggressive diuretic therapy.    14. Echo 02/14/2013: [address], normal LV systolic function, Stage II diastolic dysfunction, LAE, normal RVSP, no valvulopathy. 15. Lifetime non-smoker. 16. NKDA.  She had worsening renal function in the past when taking lasix. 17. Echo, 07/17/2013. Mild concentric LVH with normal systolic function. RVSP 53 mmHg. Otherwise normal study. 18. [facility] admission with worsening back pain and inability to walk on 09/22/2014 associated with mild SOB. CXR: Pulmonary vascular congestion. EKG: NSR, possible old inferior MI. Electrolytes normal, BUN=30, Cr=2.0, KPN=29623, Hb=11.1, WBC=5.9, cardiac enzymes negative. 19. Echo, 09/23/2014. Moderate CLVH, normal wall motion, Stage II diastolic dysfunction, RVE, OFELIA, RVSP=50 mmHg. 20. Surgery, Dr. Carmela Keith, 09/25/2014. Decompressive lumbar laminectomy L2-L5 with fusion L3-4 and L4-5. 21. Insertion intrathecal drain, 10/04/2014 for CSF leak, removed several days later, but reinserted 10/12/2014 for recurrent leak. 22. Implant spinal cord stimulator, Penta lead from [address] [place] [address] by thoracic laminotomy, T10. Implant IPG Protege in the right buttock  23. TTE 11/2017, EF 55%. Mild LVH. Stage II diastolic dysfunction. Moderate to severe left atrial dilatation. Mild MR.  RVSP 76.  24. 2017 ESRD dialysis initiated  25. Abdominal CT 2017: Extensive anasarca. Extensive arteriosclerotic disease.   26. Surgical history: Correction of Charcot joint right, carpal tunnel release 3/2020, cholecystectomy, dialysis fistula creation, mastectomy, laser treatment of eyes, spinal cord stimulator, surgery for retinal detachment. Spinal cord decompression L2.  27. 11/2017 Insertion of right internal jugular vein tunneled hemodialysis catheter fluoroscopy Removal of right femoral temporary hemodialysis catheter  28. 1/31/2018 Creation of L brachiocephalic AVF  29. 9/33/8339 transposition of left upper extremity brachiobasilic  fistula, stage II  30. 9/25/2018 Superficialization of L basilic AVF  31. 74/72/1187  TTE: EF 60-65%. Moderate CLVH. No VHD. 32. Ambulates with walker  33. Completed COVID Vaccine      Medications Prior to admit:  Prior to Admission medications    Medication Sig Start Date End Date Taking?  Authorizing Provider   atorvastatin (LIPITOR) 40 MG tablet Take 1 tablet by mouth daily 3/8/21   Miguelina Franco MD   isosorbide mononitrate (IMDUR) 30 MG extended release tablet TAKE ONE TABLET BY MOUTH DAILY 3/5/21   Miguelina Franco MD   allopurinol (ZYLOPRIM) 100 MG tablet Take 1 tablet by mouth daily 3/5/21   Miguelina Franco MD   diclofenac sodium (VOLTAREN) 1 % GEL Apply 2 g topically 2 times daily 2/25/21   Miguelina Franco MD   insulin glargine (LANTUS SOLOSTAR) 100 UNIT/ML injection pen inject 8 units into the skin nightly 2/25/21   Miguelina Franco MD   triamcinolone (KENALOG) 0.1 % cream APPLY 2 TIMES A DAY  NEVER TO FACE 12/29/20   Historical Provider, MD   bumetanide (BUMEX) 2 MG tablet Take 1 tablet by mouth three times a week Sund, Tues, Thurs, 12/18/20   Miguelina Franco MD   omeprazole (PRILOSEC) 20 MG delayed release capsule TAKE ONE CAPSULE BY MOUTH EVERY DAY AT BEDTIME 11/23/20   Miguelina Franco MD   B Complex-C-Folic Acid (RENAL-FADI) 0.8 MG TABS Take 0.8 tablets by mouth    Historical Provider, MD   Insulin Pen Needle (PEN NEEDLES) 31G X 6 MM MISC 1 each by Does not apply route daily 8/6/20   Miguelina Franco MD   LUMIGAN 0.01 % SOLN ophthalmic drops instill 1 drop every evening into right eye 6/9/20   Historical Provider, MD   cyanocobalamin 1000 MCG/ML injection Inject 1,000 mcg into the muscle once Once a month at dialysis    Historical Provider, MD   VELPHORO 500 MG CHEW CRUSH OR CHEW AND SWALLOW 2 TABLETS 3 TIMES A DAY WITH MEALS 1/9/20   Historical Provider, MD   gabapentin (NEURONTIN) 100 MG capsule Take 2 capsules by mouth 3 times daily for 30 days. Patient taking differently: Take 200 mg by mouth as needed. 2/6/20 2/25/21  Chip Gordon DO   lidocaine-prilocaine (EMLA) 2.5-2.5 % cream APPLY SMALL AMOUNT TO ACCESS SITE (AVF) 1 HOUR BEFORE DIALYSIS. COVER WITH OCCLUSIVE DRESSING (SARAN WRAP) 10/25/19   Historical Provider, MD   COMBIGAN 0.2-0.5 % ophthalmic solution INSTILL ONE DROP INTO THE RIGHT EYE TWICE A DAY 8/1/19   Historical Provider, MD   camphor-menthol (SARNA) 0.5-0.5 % lotion Apply topically as needed. 5/21/19   Carol Tovar MD   insulin lispro (HUMALOG KWIKPEN) 100 UNIT/ML pen Take 2 units > 140 , take 3 units > 170, take 4 units > 200 units  Patient taking differently: Sliding scale as needed with meals 5/3/19   Carol Tovar MD   blood glucose test strips (ACCU-CHEK ACTIVE STRIPS) strip 1 each by In Vitro route 2 times daily As needed. 12/4/18   Carol Tovar MD   sevelamer (RENVELA) 800 MG tablet Take 1 tablet by mouth 3 times daily (with meals)    Historical Provider, MD   darbepoetin fani-polysorbate (ARANESP) 40 MCG/0.4ML SOLN injection Inject 0.4 mLs into the skin once a week.   Patient taking differently: Inject 40 mcg into the skin every 14 days  10/31/14   Matthew Ruiz MD       Current Medications:    Current Facility-Administered Medications: sodium chloride flush 0.9 % injection 10 mL, 10 mL, Intravenous, PRN  0.9 % sodium chloride infusion, 25 mL, Intravenous, PRN  ketamine (KETALAR) 10 MG/ML injection, , ,     Allergies:  Lasix [furosemide] \"worsening kidney function\"    Social History:    Denies tobacco and illicit drug use  Denies alcohol consumption  Caffeine intake includes an occasional cup of coffee and an occasional Diet Coke  Currently lives in one floor condo alone. Ambulates with walker. Calls Taxi for transportation to dialysis. Code Status: Full Code    Family History: Father had CABG in his [de-identified] and has since passed away      REVIEW OF SYSTEMS:  Lethargic unable to obtain. PHYSICAL EXAM:   /60   Pulse 63   Temp 97.3 °F (36.3 °C)   Resp 16   SpO2 95%   CONST:  Well developed, AAF who appears older than her stated age. Lethargic No apparent distress. HEENT:   Head- Normocephalic, atraumatic   Eyes- Conjunctivae pink, anicteric  Throat- Oral mucosa pink and moist  Neck-  Thick. No stridor, trachea midline, no jugular venous distention. No carotid bruit. .    CHEST: Chest symmetrical and no grimacing to palpation. No accessory muscle use or intercostal retractions. R chest wall infusa-port  RESPIRATORY: Lung sounds - diminished in the bases secondary to poor inspiratory effort, on NC O2  CARDIOVASCULAR:     Heart Ausculation- Regular rate and rhythm, no murmur heard. PV: RUE lymphedema. No lower extremity edema. No varicosities. Pedal pulses palpable, no clubbing or cyanosis   ABDOMEN: Soft, no grimacing to light palpation. Bowel sounds present. No palpable masses; no abdominal bruit  MS: Good muscle strength and tone. No atrophy or abnormal movements. : Deferred  SKIN: Warm and dry no statis dermatitis or ulcers   NEURO / PSYCH: Lethargic, opens eyes to lod noises or pain. Not answering questions, not following commands. DATA:    ECG SR with NSSTT wave changes  Tele strips: SR    Diagnostic:    CT Head 5/19/2021: No acute intracranial abnormality    CT Brain perfusion 5/19/2021: No significant ischemic penumbra identified     CTA Head/Neck W 5/19/221: Mild atherosclerotic disease . No large vessel occlusion   Identified.  Estimated stenosis of the proximal right and left internal carotid   artery by NASCET criteria is not hemodynamically significant      Labs:   CBC:   Recent Labs     05/19/21  1245   WBC 4.3*   HGB 10.2*   HCT 34.2        BMP:   Recent Labs     05/19/21  1245      K 3.9   CO2 28   BUN 21   CREATININE 3.9*  4.0*   LABGLOM 14  11   CALCIUM 8.8     TFT:   Lab Results   Component Value Date    TSH 3.810 11/08/2017    L5LBGBB 5.0 12/24/2012    T4FREE 1.21 11/07/2017      HgA1c:   Lab Results   Component Value Date    LABA1C 6.7 02/25/2021     PT/INR:   Recent Labs     05/19/21  1245   PROTIME 11.5   INR 1.1     APTT:  Recent Labs     05/19/21  1245   APTT 36.5*     CARDIAC ENZYMES:  Recent Labs     05/19/21  1245   TROPONINI 0.87*     FASTING LIPID PANEL:  Lab Results   Component Value Date    CHOL 122 08/20/2019    HDL 40 08/20/2019    LDLCALC 62 08/20/2019    TRIG 99 08/20/2019     LIVER PROFILE:  Recent Labs     05/19/21  1245   *   ALT 98*   LABALBU 4.2   A&P per Dr Zechariah Chew  Electronically signed by Lata Cerrato. VENECIA Means on 5/19/2021 at 1:50 PM     Consulted for post cardiac arrest     IMPRESSION:  1. Episode of unresponsiveness with reported shockable rhythm by AED s/p defibrillation x 1  2. Reported VT in ED Lidocaine gtt   3. AMS, confusion  4. Elevated troponin (more than baseline)  5. No acute ischemic EKG changes  6. Known history of CAD, treated medically. 7. TTE 11/2020 at [facility] EF 60-65% with moderate CLVH and no reported valvular abnormalities  8. ESRD on HD through LUE AVF  9. Anemia  10. Elevated LFT's  11. HTN  12. HLD  13. Obesity  14. T2DM insulin requiring with neuropathy/retinopathy, nephropathy and R foot charcot  15. R breast breast carcinoma s/p R masectomy s/p XRT. 16. RUE lymphedema  17. Bladder carcinoma s/p chemotherapy  18. Gait difficulty ambulates with walker  19. Jehovah Witness        PLAN:   1. Continue IV Heparin gtt  2. Continue Lidocaine  3. Start ASA  4. Resume Lipitor 40 mg QD  5.  Start topical nitrates (instead of oral)  6. Start BB therapy  7. Fluid management/diurectics/dialysisi as per renal  8. [facility] 5/20/2021 versus [facility] pending improvement in mental status  9.  Further recommendations to follow     Electronically signed by Sharon Corea MD on 5/19/2021 at 5:15 PM       no

## 2023-11-08 NOTE — H&P ADULT - CRITICAL CARE ATTENDING COMMENT
82 yo F with large mixed density R SDH with mass effect. S/p recent fall.  PMH of NPH, VPS placed in March 2023.    Plan:  neurochecks  stability CTh  NSGy involved - pending plan  sz ppx with Keppra  maintain -160, cont Metoprolol IV PRN, Cardene ggt PRN  NPO for now  SCDs, hold any anti-thrombotics  rest as above      Pt is critically ill and at high risk of rapid deterioration/death due to abovementioned conditions.   Still requires critical care interventions - ongoing frequent evaluations, interventions and management adjustment by the Attending and ICU team, - and included review of relevant history, clinical examination, review of data and images, discussion of treatment with the multidisciplinary team and any consultants involved in this patient’s care.

## 2023-11-08 NOTE — ED PROVIDER NOTE - PROGRESS NOTE DETAILS
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 Case reviewed with Dr. Rodriguez neurosurgeon at [place] feels that CT is acute on chronic subdural suggested I transfer the patient ER to ER and he will admit the patient and see the patient from there.

## 2023-11-08 NOTE — PROGRESS NOTE ADULT - ASSESSMENT
ASSESSMENT/PLAN: 83y old  Female who presents with a chief complaint of SDH     NEURO:  Neuro/Vital checks q 1  6hr stability scan @ 8pm 11/8  OR Plans to be discussed with Dr. Michael Pal 500 BID for 7 days  Pain control- Tylenol PRN  RA  Incentive Spirometry as tolerated  SBP goal:   Cardene @ 5  IV Lopressor 5q6 PRN for HR >100  Fluids: NS @ 50  Diet: NPO for possible OR  GI prophylaxis [x] other: famotidine  Goal euglycemia (-180)

## 2023-11-08 NOTE — ED ADULT TRIAGE NOTE - MEANS OF ARRIVAL
-- DO NOT REPLY / DO NOT REPLY ALL --  -- Message is from Engagement Center Operations (ECO) --    General Patient Message: Nereida with [employer] called stating the Holter monitor report is ready online to be viewed.       Alternative phone number: na     Can a detailed message be left? Yes    Message Turnaround: [place]:    Refer to site's KB page for routing instructions    Please give this turnaround time to the caller:   \"You can expect to receive a response 2-3 business days after your provider's clinical team reviews the message\"               stretcher

## 2023-11-08 NOTE — ED ADULT TRIAGE NOTE - CHIEF COMPLAINT QUOTE
Patient A/Ox4 BIBA from home for generalized weakness that began today. No facial droop noted. Moving all extremities and has clear speech. Hx of hydrocephalus with  shunt.

## 2023-11-08 NOTE — ED ADULT NURSE NOTE - OBJECTIVE STATEMENT
Received the patient in the ER. Patient is awake and  confused. Patient has a  shunt which is working properly as per family. As per patients , over the last 4days, patient has become increasingly more confused, has lost the ability to walk, and has had increased incontinence. . S/P fall a week ago. Able to move all extremities.

## 2023-11-08 NOTE — ED PROVIDER NOTE - CLINICAL SUMMARY MEDICAL DECISION MAKING FREE TEXT BOX
Worsening problems with mental status memory cognition over the past 4 to 5 days with an additional problem with gait here now requiring thorough evaluation labs EKG neuroimaging as well as discussion with transfer center and neurosurgery Dr. Rodriguez at [facility].

## 2023-11-08 NOTE — PHARMACOTHERAPY INTERVENTION NOTE - COMMENTS
Spoke with patient and spouse at bedside, confirmed medication list from outpatient pharmacy record.     Outpatient Medication Review updated.    HOME MEDICATIONS:  Fosamax 70 mg oral tablet: 1 tab(s) orally once a week (08 Nov 2023 18:50)  Lipitor 40 mg oral tablet: 1 tab(s) orally once a day (08 Nov 2023 18:50)  Plaquenil 200 mg oral tablet: 1 tab(s) orally 2 times a day (08 Nov 2023 18:50)  Toprol-XL 25 mg oral tablet, extended release: 1 tab(s) orally once a day (08 Nov 2023 18:50)

## 2023-11-08 NOTE — ED PROVIDER NOTE - OBJECTIVE STATEMENT
Patient is an 82yo F with a PMH of HTN, HLD, Arthritis, NPH s/p  shunt in 03/23 who presented to the [place] ED with increased weakness and confusion and decreased PO intake. Patient follows with Neurologist Dr. Simon for her NPH. Per family, patient has been more confused and unable to walk over the past 4 days. Normally able to walk with a walker. Found to have mixed density SDH with 1.4cm midline shift, transferred to [facility] for neurosurgical evaluation.

## 2023-11-08 NOTE — ED PROVIDER NOTE - NSICDXPASTMEDICALHX_GEN_ALL_CORE_FT
PAST MEDICAL HISTORY:  Arthritis     HLD (hyperlipidemia)     HTN (hypertension)     Normal pressure hydrocephalus

## 2023-11-08 NOTE — H&P ADULT - ASSESSMENT
ASSESSMENT/PLAN:    NEURO:  Neuro/Vital checks q 1  6hr stability scan @ 8pm 11/8  OR Plans to be discussed with Dr. Michael Pal 500 BID for 7 days  Pain control- Tylenol PRN  Activity: [] OOB as tolerated [] Bedrest [x] PT [x] OT [] PMNR    PULM: RA  Incentive Spirometry as tolerated    CV:  SBP goal:   Cardene @ 5  IV Lopressor 5q6 PRN for HR >100  Hold Toprol XL 25mg qd    RENAL:  Fluids: NS @ 50    GI:  Diet: NPO for possible OR  GI prophylaxis [] not indicated [] PPI [x] other: famotidine  Bowel regimen [] colace [x] senna [] other: miralax    ENDO:   Goal euglycemia (-180)  A1C pending    HEME/ONC:  VTE prophylaxis: [x] SCDs [x] hold chemoprophylaxis due to AoC SDH    ID: Afebrile  Monitor Fever Curve    CODE STATUS:  [x] Full Code [] DNR [] DNI [] Palliative/Comfort Care    DISPOSITION:  [x] ICU [] Stroke Unit [] Floor [] EMU [] RCU [] PCU    [x] Patient is at high risk of neurologic deterioration/death due to: Cerebral herniation    Time seen:  Time spent: 60 [x] critical care minutes

## 2023-11-09 ENCOUNTER — APPOINTMENT (OUTPATIENT)
Dept: NEUROSURGERY | Facility: HOSPITAL | Age: 83
End: 2023-11-09

## 2023-11-09 ENCOUNTER — TRANSCRIPTION ENCOUNTER (OUTPATIENT)
Age: 83
End: 2023-11-09

## 2023-11-09 DIAGNOSIS — Z01.810 ENCOUNTER FOR PREPROCEDURAL CARDIOVASCULAR EXAMINATION: ICD-10-CM

## 2023-11-09 PROBLEM — M19.90 UNSPECIFIED OSTEOARTHRITIS, UNSPECIFIED SITE: Chronic | Status: ACTIVE | Noted: 2023-11-08

## 2023-11-09 PROBLEM — E78.5 HYPERLIPIDEMIA, UNSPECIFIED: Chronic | Status: ACTIVE | Noted: 2023-11-08

## 2023-11-09 PROBLEM — I10 ESSENTIAL (PRIMARY) HYPERTENSION: Chronic | Status: ACTIVE | Noted: 2023-11-08

## 2023-11-09 LAB
ABO RH CONFIRMATION: SIGNIFICANT CHANGE UP
ABO RH CONFIRMATION: SIGNIFICANT CHANGE UP
ANION GAP SERPL CALC-SCNC: 11 MMOL/L — SIGNIFICANT CHANGE UP (ref 5–17)
ANION GAP SERPL CALC-SCNC: 11 MMOL/L — SIGNIFICANT CHANGE UP (ref 5–17)
BLD GP AB SCN SERPL QL: SIGNIFICANT CHANGE UP
BLD GP AB SCN SERPL QL: SIGNIFICANT CHANGE UP
BUN SERPL-MCNC: 25.5 MG/DL — HIGH (ref 8–20)
BUN SERPL-MCNC: 25.5 MG/DL — HIGH (ref 8–20)
CALCIUM SERPL-MCNC: 10.1 MG/DL — SIGNIFICANT CHANGE UP (ref 8.4–10.5)
CALCIUM SERPL-MCNC: 10.1 MG/DL — SIGNIFICANT CHANGE UP (ref 8.4–10.5)
CHLORIDE SERPL-SCNC: 109 MMOL/L — HIGH (ref 96–108)
CHLORIDE SERPL-SCNC: 109 MMOL/L — HIGH (ref 96–108)
CO2 SERPL-SCNC: 23 MMOL/L — SIGNIFICANT CHANGE UP (ref 22–29)
CO2 SERPL-SCNC: 23 MMOL/L — SIGNIFICANT CHANGE UP (ref 22–29)
CREAT SERPL-MCNC: 0.61 MG/DL — SIGNIFICANT CHANGE UP (ref 0.5–1.3)
CREAT SERPL-MCNC: 0.61 MG/DL — SIGNIFICANT CHANGE UP (ref 0.5–1.3)
EGFR: 88 ML/MIN/1.73M2 — SIGNIFICANT CHANGE UP
EGFR: 88 ML/MIN/1.73M2 — SIGNIFICANT CHANGE UP
GLUCOSE BLDC GLUCOMTR-MCNC: 76 MG/DL — SIGNIFICANT CHANGE UP (ref 70–99)
GLUCOSE BLDC GLUCOMTR-MCNC: 76 MG/DL — SIGNIFICANT CHANGE UP (ref 70–99)
GLUCOSE SERPL-MCNC: 100 MG/DL — HIGH (ref 70–99)
GLUCOSE SERPL-MCNC: 100 MG/DL — HIGH (ref 70–99)
HCT VFR BLD CALC: 28.1 % — LOW (ref 34.5–45)
HCT VFR BLD CALC: 28.1 % — LOW (ref 34.5–45)
HGB BLD-MCNC: 8.9 G/DL — LOW (ref 11.5–15.5)
HGB BLD-MCNC: 8.9 G/DL — LOW (ref 11.5–15.5)
MAGNESIUM SERPL-MCNC: 1.9 MG/DL — SIGNIFICANT CHANGE UP (ref 1.6–2.6)
MAGNESIUM SERPL-MCNC: 1.9 MG/DL — SIGNIFICANT CHANGE UP (ref 1.6–2.6)
MCHC RBC-ENTMCNC: 28.3 PG — SIGNIFICANT CHANGE UP (ref 27–34)
MCHC RBC-ENTMCNC: 28.3 PG — SIGNIFICANT CHANGE UP (ref 27–34)
MCHC RBC-ENTMCNC: 31.7 GM/DL — LOW (ref 32–36)
MCHC RBC-ENTMCNC: 31.7 GM/DL — LOW (ref 32–36)
MCV RBC AUTO: 89.5 FL — SIGNIFICANT CHANGE UP (ref 80–100)
MCV RBC AUTO: 89.5 FL — SIGNIFICANT CHANGE UP (ref 80–100)
MRSA PCR RESULT.: DETECTED
MRSA PCR RESULT.: DETECTED
PHOSPHATE SERPL-MCNC: 2.7 MG/DL — SIGNIFICANT CHANGE UP (ref 2.4–4.7)
PHOSPHATE SERPL-MCNC: 2.7 MG/DL — SIGNIFICANT CHANGE UP (ref 2.4–4.7)
PLATELET # BLD AUTO: 275 K/UL — SIGNIFICANT CHANGE UP (ref 150–400)
PLATELET # BLD AUTO: 275 K/UL — SIGNIFICANT CHANGE UP (ref 150–400)
POTASSIUM SERPL-MCNC: 4.2 MMOL/L — SIGNIFICANT CHANGE UP (ref 3.5–5.3)
POTASSIUM SERPL-MCNC: 4.2 MMOL/L — SIGNIFICANT CHANGE UP (ref 3.5–5.3)
POTASSIUM SERPL-SCNC: 4.2 MMOL/L — SIGNIFICANT CHANGE UP (ref 3.5–5.3)
POTASSIUM SERPL-SCNC: 4.2 MMOL/L — SIGNIFICANT CHANGE UP (ref 3.5–5.3)
RBC # BLD: 3.14 M/UL — LOW (ref 3.8–5.2)
RBC # BLD: 3.14 M/UL — LOW (ref 3.8–5.2)
RBC # FLD: 15.7 % — HIGH (ref 10.3–14.5)
RBC # FLD: 15.7 % — HIGH (ref 10.3–14.5)
S AUREUS DNA NOSE QL NAA+PROBE: DETECTED
S AUREUS DNA NOSE QL NAA+PROBE: DETECTED
SODIUM SERPL-SCNC: 143 MMOL/L — SIGNIFICANT CHANGE UP (ref 135–145)
SODIUM SERPL-SCNC: 143 MMOL/L — SIGNIFICANT CHANGE UP (ref 135–145)
T4 FREE SERPL-MCNC: 1.3 NG/DL — SIGNIFICANT CHANGE UP (ref 0.9–1.8)
T4 FREE SERPL-MCNC: 1.3 NG/DL — SIGNIFICANT CHANGE UP (ref 0.9–1.8)
WBC # BLD: 6.34 K/UL — SIGNIFICANT CHANGE UP (ref 3.8–10.5)
WBC # BLD: 6.34 K/UL — SIGNIFICANT CHANGE UP (ref 3.8–10.5)
WBC # FLD AUTO: 6.34 K/UL — SIGNIFICANT CHANGE UP (ref 3.8–10.5)
WBC # FLD AUTO: 6.34 K/UL — SIGNIFICANT CHANGE UP (ref 3.8–10.5)

## 2023-11-09 PROCEDURE — 99292 CRITICAL CARE ADDL 30 MIN: CPT

## 2023-11-09 PROCEDURE — 99291 CRITICAL CARE FIRST HOUR: CPT

## 2023-11-09 PROCEDURE — 93970 EXTREMITY STUDY: CPT | Mod: 26

## 2023-11-09 PROCEDURE — 74018 RADEX ABDOMEN 1 VIEW: CPT | Mod: 26

## 2023-11-09 PROCEDURE — 70250 X-RAY EXAM OF SKULL: CPT | Mod: 26

## 2023-11-09 PROCEDURE — 93010 ELECTROCARDIOGRAM REPORT: CPT

## 2023-11-09 PROCEDURE — 99221 1ST HOSP IP/OBS SF/LOW 40: CPT

## 2023-11-09 PROCEDURE — 61154 BURR HOLE W/EVAC&/DRG HMTMA: CPT | Mod: RT

## 2023-11-09 PROCEDURE — 93306 TTE W/DOPPLER COMPLETE: CPT | Mod: 26

## 2023-11-09 PROCEDURE — 71045 X-RAY EXAM CHEST 1 VIEW: CPT | Mod: 26

## 2023-11-09 DEVICE — PLATE COVER BURRHOLE UN3 W/TAB 10MM: Type: IMPLANTABLE DEVICE | Site: RIGHT | Status: FUNCTIONAL

## 2023-11-09 DEVICE — GRAFT DURAL MATRX 1 X 3IN DURGN: Type: IMPLANTABLE DEVICE | Site: RIGHT | Status: FUNCTIONAL

## 2023-11-09 DEVICE — SURGICEL 4 X 8": Type: IMPLANTABLE DEVICE | Site: RIGHT | Status: FUNCTIONAL

## 2023-11-09 DEVICE — COLLAGEN DURAMATRIX ONLAY PLUS 3X3IN: Type: IMPLANTABLE DEVICE | Site: RIGHT | Status: FUNCTIONAL

## 2023-11-09 DEVICE — SCREW UN3 SLF DRILL 1.5X4MM MUST ORDER IN MULTIPLES OF 5: Type: IMPLANTABLE DEVICE | Site: RIGHT | Status: FUNCTIONAL

## 2023-11-09 DEVICE — SURGICEL FIBRILLAR 4 X 4": Type: IMPLANTABLE DEVICE | Site: RIGHT | Status: FUNCTIONAL

## 2023-11-09 DEVICE — FLOSEAL WITH RECOTHROM THROMBIN 10ML: Type: IMPLANTABLE DEVICE | Site: RIGHT | Status: FUNCTIONAL

## 2023-11-09 DEVICE — MAYFIELD SKULL PIN ADULT STEEL: Type: IMPLANTABLE DEVICE | Site: RIGHT | Status: FUNCTIONAL

## 2023-11-09 DEVICE — SURGIFOAM PAD 8CM X 12.5CM X 10MM (100): Type: IMPLANTABLE DEVICE | Site: RIGHT | Status: FUNCTIONAL

## 2023-11-09 DEVICE — FLOSEAL FAST PREP 10ML: Type: IMPLANTABLE DEVICE | Site: RIGHT | Status: FUNCTIONAL

## 2023-11-09 RX ORDER — ONDANSETRON 8 MG/1
4 TABLET, FILM COATED ORAL EVERY 6 HOURS
Refills: 0 | Status: DISCONTINUED | OUTPATIENT
Start: 2023-11-09 | End: 2023-11-11

## 2023-11-09 RX ORDER — CEFAZOLIN SODIUM 1 G
2000 VIAL (EA) INJECTION EVERY 8 HOURS
Refills: 0 | Status: DISCONTINUED | OUTPATIENT
Start: 2023-11-09 | End: 2023-11-09

## 2023-11-09 RX ORDER — OXYCODONE HYDROCHLORIDE 5 MG/1
5 TABLET ORAL EVERY 4 HOURS
Refills: 0 | Status: DISCONTINUED | OUTPATIENT
Start: 2023-11-09 | End: 2023-11-14

## 2023-11-09 RX ORDER — CHLORHEXIDINE GLUCONATE 213 G/1000ML
1 SOLUTION TOPICAL ONCE
Refills: 0 | Status: COMPLETED | OUTPATIENT
Start: 2023-11-09 | End: 2023-11-09

## 2023-11-09 RX ORDER — MAGNESIUM SULFATE 500 MG/ML
1 VIAL (ML) INJECTION ONCE
Refills: 0 | Status: COMPLETED | OUTPATIENT
Start: 2023-11-09 | End: 2023-11-09

## 2023-11-09 RX ORDER — OXYCODONE HYDROCHLORIDE 5 MG/1
10 TABLET ORAL EVERY 4 HOURS
Refills: 0 | Status: DISCONTINUED | OUTPATIENT
Start: 2023-11-09 | End: 2023-11-11

## 2023-11-09 RX ORDER — CHLORHEXIDINE GLUCONATE 213 G/1000ML
15 SOLUTION TOPICAL
Refills: 0 | Status: DISCONTINUED | OUTPATIENT
Start: 2023-11-09 | End: 2023-11-09

## 2023-11-09 RX ORDER — ACETAMINOPHEN 500 MG
1000 TABLET ORAL EVERY 6 HOURS
Refills: 0 | Status: DISCONTINUED | OUTPATIENT
Start: 2023-11-09 | End: 2023-11-11

## 2023-11-09 RX ORDER — MUPIROCIN 20 MG/G
1 OINTMENT TOPICAL
Refills: 0 | Status: COMPLETED | OUTPATIENT
Start: 2023-11-09 | End: 2023-11-14

## 2023-11-09 RX ORDER — ACETAMINOPHEN 500 MG
1000 TABLET ORAL ONCE
Refills: 0 | Status: COMPLETED | OUTPATIENT
Start: 2023-11-09 | End: 2023-11-09

## 2023-11-09 RX ORDER — CEFAZOLIN SODIUM 1 G
2000 VIAL (EA) INJECTION EVERY 8 HOURS
Refills: 0 | Status: COMPLETED | OUTPATIENT
Start: 2023-11-09 | End: 2023-11-10

## 2023-11-09 RX ORDER — CHLORHEXIDINE GLUCONATE 213 G/1000ML
1 SOLUTION TOPICAL DAILY
Refills: 0 | Status: DISCONTINUED | OUTPATIENT
Start: 2023-11-09 | End: 2023-11-17

## 2023-11-09 RX ADMIN — Medication 400 MILLIGRAM(S): at 20:18

## 2023-11-09 RX ADMIN — LEVETIRACETAM 400 MILLIGRAM(S): 250 TABLET, FILM COATED ORAL at 05:33

## 2023-11-09 RX ADMIN — Medication 1000 MILLIGRAM(S): at 20:48

## 2023-11-09 RX ADMIN — SODIUM CHLORIDE 50 MILLILITER(S): 9 INJECTION INTRAMUSCULAR; INTRAVENOUS; SUBCUTANEOUS at 20:18

## 2023-11-09 RX ADMIN — Medication 100 GRAM(S): at 06:00

## 2023-11-09 RX ADMIN — CHLORHEXIDINE GLUCONATE 1 APPLICATION(S): 213 SOLUTION TOPICAL at 12:19

## 2023-11-09 RX ADMIN — Medication 63.75 MILLIMOLE(S): at 06:00

## 2023-11-09 RX ADMIN — Medication 1000 MILLIGRAM(S): at 01:37

## 2023-11-09 RX ADMIN — FAMOTIDINE 20 MILLIGRAM(S): 10 INJECTION INTRAVENOUS at 05:33

## 2023-11-09 RX ADMIN — Medication 2000 MILLIGRAM(S): at 22:24

## 2023-11-09 RX ADMIN — SODIUM CHLORIDE 50 MILLILITER(S): 9 INJECTION INTRAMUSCULAR; INTRAVENOUS; SUBCUTANEOUS at 05:34

## 2023-11-09 RX ADMIN — Medication 400 MILLIGRAM(S): at 01:22

## 2023-11-09 RX ADMIN — CHLORHEXIDINE GLUCONATE 1 APPLICATION(S): 213 SOLUTION TOPICAL at 12:20

## 2023-11-09 NOTE — CONSULT NOTE ADULT - PROBLEM SELECTOR RECOMMENDATION 9
-Denies anginal symptoms  -No junctional viewed upon telemetry and EKG review  -TTE with preserved EF    Cardiac Risk Stratification for Procedure  - METS >4  - RCRI Risk Stratification: Class 0  Risk,  3.9%  Risk of Major Cardiac Event  -Hager Scale 0.1%    No active chest pain, evidence of ischemia, decompensated CHF, significant valvular abnormality, or unstable arrhythmia then therefore no absolute cardiac contraindication to the planned surgical procedure.  No further cardiac work up is needed.     Further cardiac work up will not change risk of the patient. Proceed for surgery as indicated.     Signing off

## 2023-11-09 NOTE — PROGRESS NOTE ADULT - ASSESSMENT
ASSESSMENT/PLAN:   84 yo F with large mixed density R SDH with mass effect. S/p recent fall.  PMH of NPH, VPS placed in March 2023.    Plan:  neurochecks  POD 0 reilly holes for SDH evac  followed by [facility] embo tomorrow  drain clamped per neurosurg, HOB flat  sz ppx with Keppra  maintain -160, cont Metoprolol IV PRN, Cardene ggt PRN  statin  repeat EKG, pending TTE  maintain Osats>92%, incentive spirometry  NPO at midnight; IV hydration   maitnain -180  SCDs, hold any anti-thrombotics

## 2023-11-09 NOTE — DISCHARGE NOTE NURSING/CASE MANAGEMENT/SOCIAL WORK - PATIENT PORTAL LINK FT
You can access the FollowMyHealth Patient Portal offered by [facility] by registering at the following website: [email or web address]. By joining [facility]’s FollowMyHealth portal, you will also be able to view your health information using other applications (apps) compatible with our system.

## 2023-11-09 NOTE — DISCHARGE NOTE NURSING/CASE MANAGEMENT/SOCIAL WORK - NSDCPEFALRISK_GEN_ALL_CORE
For information on Fall & Injury Prevention, visit: [email or web address] OR  [email or web address] OR  https://www.cdc.gov/steadi/patient.html

## 2023-11-09 NOTE — PROGRESS NOTE ADULT - ASSESSMENT
ASSESSMENT/PLAN:   84 yo F with large mixed density R SDH with mass effect. S/p recent fall.  PMH of NPH, VPS placed in March 2023.    Plan:  neurochecks  plan for OR this afternoon for SDh evac followed by [facility] embo 11/10  sz ppx with Keppra  maintain -160, cont Metoprolol IV PRN, Cardene ggt PRN  repeat EKG, pending TTE  maintain Osats>92%, incentive spirometry  NPO for now; IV hydration   maitalla -180  SCDs, hold any anti-thrombotics

## 2023-11-09 NOTE — PROGRESS NOTE ADULT - SUBJECTIVE AND OBJECTIVE BOX
HPI:  Patient is a 83y old  Female who presents with a chief complaint of confusion x 4days    HPI: Patient is an 84yo F with a PMH of HTN, HLD, Arthritis, and NPH s/p  shunt in 03/23, at [place], who presented to the [place] ED with increased weakness and confusion and decreased PO intake. Patient follows with Neurologist Dr. Simon for her NPH. Per family, patient has been more confused and unable to walk over the past 4 days. Normally able to walk with a walker. Found to have mixed density SDH with 1.4cm midline shift, and transferred to [facility] for neurosurgical evaluation.   *Modified [place] Score- 3      INTERVAL HPI/OVERNIGHT EVENTS:  83y Female seen laying comfortably in bed. Exam limited 2/2 effort. Patient exam stable with a lot of encouragement. Reports pain all over which she states is chronic.    Vital Signs Last 24 Hrs  T(C): 36.8 (09 Nov 2023 00:00), Max: 37.3 (08 Nov 2023 13:51)  T(F): 98.2 (09 Nov 2023 00:00), Max: 99.1 (08 Nov 2023 13:51)  HR: 53 (09 Nov 2023 03:00) (53 - 87)  BP: 105/46 (09 Nov 2023 03:00) (104/52 - 173/74)  BP(mean): 64 (09 Nov 2023 03:00) (64 - 101)  RR: 16 (09 Nov 2023 03:00) (16 - 19)  SpO2: 99% (09 Nov 2023 03:00) (97% - 100%)    Parameters below as of 09 Nov 2023 04:00  Patient On (Oxygen Delivery Method): room air        PHYSICAL EXAM:  GENERAL: NAD, well-groomed  HEAD:  Atraumatic, normocephalic  SHUNT: strata shunt set at 2.5, does not refill   EYES: Conjunctiva and sclera clear  NECK: Supple  SILVER COMA SCORE: E- V- M- = 14       E: 4= opens eyes spontaneously 3= to voice 2= to noxious 1= no opening       V: 5= oriented 4= confused 3= inappropriate words 2= incomprehensible sounds 1= nonverbal 1T= intubated       M: 6= follows commands 5= localizes 4= withdraws 3= flexor posturing 2= extensor posturing 1= no movement  MENTAL STATUS: AAO x1-2 (self and sometimes place), Awake; Opens eyes spontaneously; Appropriately conversant without aphasia with slight confusion; following simple commands with at lot convincing   CRANIAL NERVES: PERRL. EOMI without nystagmus. Facial sensation intact V1-3 distribution b/l. Face symmetric w/ normal eye closure and smile, tongue midline. Hearing grossly intact. Speech clear. Head turning and shoulder shrug intact.   MOTOR: strength 5/5 RUE, L limited d/t pain deltoid 2/5, Bi/tri 5/5, HG 4/5; RLE limited d/t pain proximally 4/5, distally 5/5; LLE 5/5  SENSATION: Decreased sensation in RLE otherwise grossly intact to light touch in all other extremities but L sided neglect  COORDINATION: Gait testing deferred; no upper extremity dysmetria w/ RUE, LUE unable to test d/t weakness  CHEST/LUNG: Nonlabored on room air, no accessory muscle use  SKIN: Warm, dry      LABS:                        8.9    6.34  )-----------( 275      ( 09 Nov 2023 02:25 )             28.1     11-09    143  |  109<H>  |  25.5<H>  ----------------------------<  100<H>  4.2   |  23.0  |  0.61    Ca    10.1      09 Nov 2023 02:25  Phos  2.7     11-09  Mg     1.9     11-09    TPro  6.6  /  Alb  3.4  /  TBili  0.4  /  DBili  x   /  AST  17  /  ALT  11  /  AlkPhos  71  11-08    PT/INR - ( 08 Nov 2023 19:10 )   PT: 12.0 sec;   INR: 1.08 ratio         PTT - ( 08 Nov 2023 19:10 )  PTT:25.5 sec  Urinalysis Basic - ( 09 Nov 2023 02:25 )    Color: x / Appearance: x / SG: x / pH: x  Gluc: 100 mg/dL / Ketone: x  / Bili: x / Urobili: x   Blood: x / Protein: x / Nitrite: x   Leuk Esterase: x / RBC: x / WBC x   Sq Epi: x / Non Sq Epi: x / Bacteria: x        11-08 @ 07:01  -  11-09 @ 04:46  --------------------------------------------------------  IN: 350 mL / OUT: 150 mL / NET: 200 mL        RADIOLOGY & ADDITIONAL TESTS:    < from: CT Head No Cont (02.01.23 @ 13:07) >  IMPRESSION:    Disproportionate enlargement of the ventricular system in relation to the   high convexity sulci with temporal flaring bilaterally may reflect   communicating/normal pressure hydrocephalus and/or advanced central   volume loss in the appropriate clinical setting.    Confluent periventricular white matter hypoattenuation due to chronic   microvascular ischemic changes and/or transependymal CSF resorption.    No acute intracranial bleeding.    --- End of Report ---            RACHEL LEONARDO MD; Attending Radiologist  This document has been electronically signed. Feb 1 2023  1:11PM    < end of copied text >  < from: CT Head No Cont (11.08.23 @ 14:13) >  IMPRESSION:    Moderate to large mixed attenuation right hemispheric convexity subdural   hematoma compressing right hemisphere and right lateral ventricle with   1.4 cm midline shift to left.    Right frontal ventriculostomy shunt catheter within right frontal horn.    Communication:  I discussed the finding of this report with Dr. Short at   2:25 PM on 11/8/2023.  Critical value policy of the hospital was   followed.  Read back and confirmation of receipt of this communication   was performed.  This verbal communication supplements the text report of   this document.    --- End of Report ---            KAE HUMPHREYS MD; Attending Radiologist  This document has been electronically signed. Nov 8 2023  2:28PM    < end of copied text >   (08 Nov 2023 17:59)

## 2023-11-09 NOTE — CONSULT NOTE ADULT - SUBJECTIVE AND OBJECTIVE BOX
[facility]                                              CARDIOLOGY AT [place] [address]                                             Telephone: [phone]. Fax:[phone]                                                       CARDIOLOGY CONSULTATION NOTE                                                                                             History obtained by: Patient and medical record  [facility] Cardiologist: Juanjose Gonzales [phone]   obtained: Yes [  ] No [x  ]  Reason for Consultation: RCRI / junctional   Available out pt records reviewed: Yes [x  ] No [  ]    HPI:  Patient is a 83y old  Female PMH NPH with shunt, HTN, HLD. Presents s/p fall found to have SDH with midline shift. Cardiology consulted for junctional rhythm and RCRI. Patients mental status isn't lucid. Collateral information obtained by .    CARDIAC TESTING   ECHO:  < from: TTE Echo Complete w/ Contrast w/ Doppler (11.09.23 @ 12:00) >  PHYSICIAN INTERPRETATION:  Left Ventricle: The left ventricular internal cavity size is normal.  Global LV systolic function was normal. Left ventricular ejection   fraction, by visual estimation, is 60 to 65%. Spectral Doppler shows   impaired relaxation pattern of left ventricular myocardial filling (Grade   I diastolic dysfunction). Elevated mean left atrial pressure.  Right Ventricle: Normal right ventricular size and function.  Left Atrium: The left atrium is normal in size.  Right Atrium: The right atrium is normal in size.  Pericardium: There is no evidence of pericardial effusion.  Mitral Valve: Mild thickening and calcification of the anterior and   posterior mitral valve leaflets. There is mild mitral annular   calcification. Mild mitral valve regurgitation is seen.  Tricuspid Valve: The tricuspid valve is normal in structure. Mild   tricuspid regurgitation is visualized.  Aortic Valve: The aortic valve is trileaflet. Sclerotic aortic valve with   normal opening. No evidence of aortic valve regurgitation is seen.  Pulmonic Valve: Structurally normal pulmonic valve, with normal leaflet   excursion. Trace pulmonic valve regurgitation.  Aorta: The aortic root and ascending aorta are structurally normal, with   no evidence of dilitation.  Pulmonary Artery: The main pulmonary artery is normal in size.  Venous: The inferior vena cava was normal sized, with respiratory size   variation greater than 50%.  In comparison to the previous echocardiogram(s): There are no prior   studies on this patient for comparison purposes.    < end of copied text >    STRESS:    CATH:     ELECTROPHYSIOLOGY:     PAST MEDICAL HISTORY  Normal pressure hydrocephalus    HTN (hypertension)    HLD (hyperlipidemia)    Arthritis        PAST SURGICAL HISTORY  S/P  shunt        SOCIAL HISTORY:  Denies smoking/alcohol/drugs  CIGARETTES:     ALCOHOL:  DRUGS:    FAMILY HISTORY:    Family History of Cardiovascular Disease:  Yes [  ] No [ x ]  Coronary Artery Disease in first degree relative: Yes [  ] No [ x ]  Sudden Cardiac Death in First degree relative: Yes [  ] No [ x ]    HOME MEDICATIONS:  Fosamax 70 mg oral tablet: 1 tab(s) orally once a week (08 Nov 2023 18:50)  Lipitor 40 mg oral tablet: 1 tab(s) orally once a day (08 Nov 2023 18:50)  Plaquenil 200 mg oral tablet: 1 tab(s) orally 2 times a day (08 Nov 2023 18:50)  Toprol-XL 25 mg oral tablet, extended release: 1 tab(s) orally once a day (08 Nov 2023 18:50)      CURRENT CARDIAC MEDICATIONS:      CURRENT OTHER MEDICATIONS:  acetaminophen     Tablet .. 975 milliGRAM(s) Oral every 6 hours PRN Temp greater or equal to 38.5C (101.3F), Mild Pain (1 - 3)  levETIRAcetam  IVPB 500 milliGRAM(s) IV Intermittent every 12 hours  famotidine Injectable 20 milliGRAM(s) IV Push every 12 hours  polyethylene glycol 3350 17 Gram(s) Oral two times a day  senna 2 Tablet(s) Oral at bedtime  atorvastatin 40 milliGRAM(s) Oral at bedtime  chlorhexidine 2% Cloths 1 Application(s) Topical daily  mupirocin 2% Nasal 1 Application(s) Both Nostrils two times a day, Stop order after: 5 Days  sodium chloride 0.9%. 1000 milliLiter(s) (50 mL/Hr) IV Continuous <Continuous>      ALLERGIES:   No Known Allergies      REVIEW OF SYMPTOMS:   CONSTITUTIONAL: No fever, no chills, no weight loss, no weight gain, no fatigue   ENMT:  No vertigo; No sinus or throat pain  NECK: No pain or stiffness  CARDIOVASCULAR: No chest pain, no dyspnea, no syncope/presyncope, no palpitations, no dizziness, no Orthopnea, no Paroxsymal nocturnal dyspnea  RESPIRATORY: no Shortness of breath, no cough, no wheezing  : No dysuria, no hematuria   GI: No dark color stool, no nausea, no diarrhea, no constipation, no abdominal pain   NEURO: No headache, no slurred speech   MUSCULOSKELETAL: No joint pain or swelling; No muscle, back, or extremity pain  PSYCH: No agitation, no anxiety.    ALL OTHER REVIEW OF SYSTEMS ARE NEGATIVE.    VITAL SIGNS:  T(C): 36.3 (11-09-23 @ 07:51), Max: 37.3 (11-08-23 @ 13:51)  T(F): 97.3 (11-09-23 @ 07:51), Max: 99.1 (11-08-23 @ 13:51)  HR: 72 (11-09-23 @ 12:00) (53 - 86)  BP: 131/60 (11-09-23 @ 12:00) (101/46 - 173/74)  RR: 18 (11-09-23 @ 12:00) (12 - 19)  SpO2: 99% (11-09-23 @ 12:00) (96% - 100%)    INTAKE AND OUTPUT:     11-08 @ 07:01  -  11-09 @ 07:00  --------------------------------------------------------  IN: 725 mL / OUT: 300 mL / NET: 425 mL    11-09 @ 07:01  -  11-09 @ 13:10  --------------------------------------------------------  IN: 250 mL / OUT: 0 mL / NET: 250 mL        PHYSICAL EXAM:  Constitutional: Comfortable . No acute distress.   HEENT: Atraumatic and normocephalic , neck is supple . no JVD. No carotid bruit.  CNS: A&Ox3. No focal deficits.   Respiratory: CTAB, unlabored   Cardiovascular: RRR normal s1 s2. No murmur. No rubs or gallop.  Gastrointestinal: Soft, non-tender. +Bowel sounds.   Extremities: 2+ Peripheral Pulses, No clubbing, cyanosis, or edema  Psychiatric: Calm . no agitation.   Skin: Warm and dry, no ulcers on extremities     LABS:                            8.9    6.34  )-----------( 275      ( 09 Nov 2023 02:25 )             28.1     11-09    143  |  109<H>  |  25.5<H>  ----------------------------<  100<H>  4.2   |  23.0  |  0.61    Ca    10.1      09 Nov 2023 02:25  Phos  2.7     11-09  Mg     1.9     11-09    TPro  6.6  /  Alb  3.4  /  TBili  0.4  /  DBili  x   /  AST  17  /  ALT  11  /  AlkPhos  71  11-08    PT/INR - ( 08 Nov 2023 19:10 )   PT: 12.0 sec;   INR: 1.08 ratio         PTT - ( 08 Nov 2023 19:10 )  PTT:25.5 sec  Urinalysis Basic - ( 09 Nov 2023 02:25 )    Color: x / Appearance: x / SG: x / pH: x  Gluc: 100 mg/dL / Ketone: x  / Bili: x / Urobili: x   Blood: x / Protein: x / Nitrite: x   Leuk Esterase: x / RBC: x / WBC x   Sq Epi: x / Non Sq Epi: x / Bacteria: x      11-08-23 @ 19:10  CHolesterol: 144,  HDL: 65,  LDL: 72, Triglycerides: 36       Thyroid Stimulating Hormone, Serum: 3.51 uIU/mL (11-08-23 @ 19:10)      INTERPRETATION OF TELEMETRY: SR    ECG: SR  Prior ECG: Yes [x  ] No [  ]    RADIOLOGY & ADDITIONAL STUDIES:       CT scan:   < from: CT Head No Cont (11.08.23 @ 22:14) >  IMPRESSION:  Stable moderate to large heterogeneous subdural hematoma right frontal   parietal convexity with associated stable mass effect and midline shift.    < end of copied text >

## 2023-11-09 NOTE — PROGRESS NOTE ADULT - SUBJECTIVE AND OBJECTIVE BOX
Chief complaint:   Patient is a 83y old  Female who presents with a chief complaint of SDH (09 Nov 2023 16:11)    HPI:  Patient is a 83y old  Female who presents with a chief complaint of confusion x 4days    HPI: Patient is an 82yo F with a PMH of HTN, HLD, Arthritis, and NPH s/p  shunt in 03/23, at [place], who presented to the [place] ED with increased weakness and confusion and decreased PO intake. Patient follows with Neurologist Dr. Simon for her NPH. Per family, patient has been more confused and unable to walk over the past 4 days. Normally able to walk with a walker. Found to have mixed density SDH with 1.4cm midline shift, and transferred to [facility] for neurosurgical evaluation.    24hr EVENTS:  POD 0 reilly holes/ SDH evac    ROS: headache  All other systems negative    -----------------------------------------------------------------------------------------------------------------------------------------------------------------------------------  ICU Vital Signs Last 24 Hrs  T(C): 36.6 (09 Nov 2023 21:15), Max: 36.8 (08 Nov 2023 23:25)  T(F): 97.9 (09 Nov 2023 21:15), Max: 98.3 (08 Nov 2023 23:25)  HR: 72 (09 Nov 2023 21:15) (53 - 78)  BP: 116/52 (09 Nov 2023 21:15) (101/46 - 134/72)  BP(mean): 70 (09 Nov 2023 21:15) (63 - 105)  ABP: --  ABP(mean): --  RR: 14 (09 Nov 2023 21:15) (12 - 22)  SpO2: 98% (09 Nov 2023 21:15) (95% - 100%)    O2 Parameters below as of 09 Nov 2023 20:00  Patient On (Oxygen Delivery Method): room air        I&O's Summary    08 Nov 2023 07:01  -  09 Nov 2023 07:00  --------------------------------------------------------  IN: 725 mL / OUT: 300 mL / NET: 425 mL    09 Nov 2023 07:01  -  09 Nov 2023 21:52  --------------------------------------------------------  IN: 550 mL / OUT: 425 mL / NET: 125 mL        MEDICATIONS  (STANDING):  atorvastatin 40 milliGRAM(s) Oral at bedtime  ceFAZolin  Injectable. 2000 milliGRAM(s) IV Push every 8 hours  chlorhexidine 2% Cloths 1 Application(s) Topical daily  famotidine Injectable 20 milliGRAM(s) IV Push every 12 hours  levETIRAcetam  IVPB 500 milliGRAM(s) IV Intermittent every 12 hours  mupirocin 2% Nasal 1 Application(s) Both Nostrils two times a day  polyethylene glycol 3350 17 Gram(s) Oral two times a day  senna 2 Tablet(s) Oral at bedtime  sodium chloride 0.9%. 1000 milliLiter(s) (50 mL/Hr) IV Continuous <Continuous>      RESPIRATORY:        IMAGING:   Recent imaging studies were reviewed.    LAB RESULTS:                          8.9    6.34  )-----------( 275      ( 09 Nov 2023 02:25 )             28.1       PT/INR - ( 08 Nov 2023 19:10 )   PT: 12.0 sec;   INR: 1.08 ratio         PTT - ( 08 Nov 2023 19:10 )  PTT:25.5 sec    11-09    143  |  109<H>  |  25.5<H>  ----------------------------<  100<H>  4.2   |  23.0  |  0.61    Ca    10.1      09 Nov 2023 02:25  Phos  2.7     11-09  Mg     1.9     11-09    TPro  6.6  /  Alb  3.4  /  TBili  0.4  /  DBili  x   /  AST  17  /  ALT  11  /  AlkPhos  71  11-08    -----------------------------------------------------------------------------------------------------------------------------------------------------------------------------------    PHYSICAL EXAM:  General: Calm  HEENT: MMM  Neuro:  -Mental status- No acute distress  -CN- PERRL 3mm, EOMI, tongue midline, face symmetric    CV: RRR  Pulm: clear to auscultation  Abd: Soft, nontender, nondistended  Ext: no noted edema in lower ext  Skin: warm, dry       Chief complaint:   Patient is a 83y old  Female who presents with a chief complaint of SDH (09 Nov 2023 16:11)    HPI:  Patient is a 83y old  Female who presents with a chief complaint of confusion x 4days    HPI: Patient is an 84yo F with a PMH of HTN, HLD, Arthritis, and NPH s/p  shunt in 03/23, at [place], who presented to the [place] ED with increased weakness and confusion and decreased PO intake. Patient follows with Neurologist Dr. Simon for her NPH. Per family, patient has been more confused and unable to walk over the past 4 days. Normally able to walk with a walker. Found to have mixed density SDH with 1.4cm midline shift, and transferred to [facility] for neurosurgical evaluation.    24hr EVENTS:  POD 0 reilly holes/ SDH evac    ROS: headache  All other systems negative    -----------------------------------------------------------------------------------------------------------------------------------------------------------------------------------  ICU Vital Signs Last 24 Hrs  T(C): 36.6 (09 Nov 2023 21:15), Max: 36.8 (08 Nov 2023 23:25)  T(F): 97.9 (09 Nov 2023 21:15), Max: 98.3 (08 Nov 2023 23:25)  HR: 72 (09 Nov 2023 21:15) (53 - 78)  BP: 116/52 (09 Nov 2023 21:15) (101/46 - 134/72)  BP(mean): 70 (09 Nov 2023 21:15) (63 - 105)  ABP: --  ABP(mean): --  RR: 14 (09 Nov 2023 21:15) (12 - 22)  SpO2: 98% (09 Nov 2023 21:15) (95% - 100%)    O2 Parameters below as of 09 Nov 2023 20:00  Patient On (Oxygen Delivery Method): room air        I&O's Summary    08 Nov 2023 07:01  -  09 Nov 2023 07:00  --------------------------------------------------------  IN: 725 mL / OUT: 300 mL / NET: 425 mL    09 Nov 2023 07:01  -  09 Nov 2023 21:52  --------------------------------------------------------  IN: 550 mL / OUT: 425 mL / NET: 125 mL        MEDICATIONS  (STANDING):  atorvastatin 40 milliGRAM(s) Oral at bedtime  ceFAZolin  Injectable. 2000 milliGRAM(s) IV Push every 8 hours  chlorhexidine 2% Cloths 1 Application(s) Topical daily  famotidine Injectable 20 milliGRAM(s) IV Push every 12 hours  levETIRAcetam  IVPB 500 milliGRAM(s) IV Intermittent every 12 hours  mupirocin 2% Nasal 1 Application(s) Both Nostrils two times a day  polyethylene glycol 3350 17 Gram(s) Oral two times a day  senna 2 Tablet(s) Oral at bedtime  sodium chloride 0.9%. 1000 milliLiter(s) (50 mL/Hr) IV Continuous <Continuous>      RESPIRATORY:        IMAGING:   Recent imaging studies were reviewed.    LAB RESULTS:                          8.9    6.34  )-----------( 275      ( 09 Nov 2023 02:25 )             28.1       PT/INR - ( 08 Nov 2023 19:10 )   PT: 12.0 sec;   INR: 1.08 ratio         PTT - ( 08 Nov 2023 19:10 )  PTT:25.5 sec    11-09    143  |  109<H>  |  25.5<H>  ----------------------------<  100<H>  4.2   |  23.0  |  0.61    Ca    10.1      09 Nov 2023 02:25  Phos  2.7     11-09  Mg     1.9     11-09    TPro  6.6  /  Alb  3.4  /  TBili  0.4  /  DBili  x   /  AST  17  /  ALT  11  /  AlkPhos  71  11-08    -----------------------------------------------------------------------------------------------------------------------------------------------------------------------------------  PHYSICAL EXAM:  General: Calm, laying in bed  HEENT: MMM  Neuro:  -Mental status- No acute distress, minimal eye opening, AOx2, following commands  -CN- PERRL 3mm, EOMI, tongue midline, face symmetric  -Motor-  bilat upper ext not raising elbows off bed, but 5/5 distally  bilat lower ext WD, 2/5  -Sensation- intact to LT   -Coordination- no dysmetria noted    CV: RRR  Pulm: Clear to auscultation  Abd: Soft, nontender, nondistended  Ext: No edema  Skin: warm, dry

## 2023-11-09 NOTE — PROGRESS NOTE ADULT - ASSESSMENT
83yF w/ PMHx of NPH s/p  shunt placement 3/23 at [place], HTN, HLD, Arthritis, NPH s/p  shunt in 03/23 who presents as transfer from [place] ED w/ increased weakness and confusion. Pt reports she came to the hospital because her  sent her in due to confusion. She reports her confusion has slightly improved. Per pt, she fell on Saturday and has been unsteady since. Pt reports some headache, R hip pain and L shoulder pain. CTH at [place] showed mixed R SDH w/ 1.4 cm R to L midline shift.     PLAN:    -Q1h neuro checks  -Repeat CTH reviewed with Dr. Rodriguez, appears stable follow up official read  -Shunt Series XR  -Neuro IR consult for possible MMA embo (per Dr. Méndez likely a candidate possible plan for Friday)  -Pain control prn, avoid oversedation  --160  -HOB 30 degrees  -Keppra 500mg BID  -Updated coag studies-PT/INR/aPTT  -Keep NPO for now. Will need bedside dysphagia screen prior to oral intake. Patient may require evacuation if change in clinical exam   -VTE prophylaxis: SCDs, hold chemoprophylaxis due to: post-op bleeding risk  -Further medical management per NSICU  -Will discuss further with Dr. Rodriguez on am rounds

## 2023-11-09 NOTE — PROGRESS NOTE ADULT - SUBJECTIVE AND OBJECTIVE BOX
HPI: Patient is an 82yo F with a PMH of HTN, HLD, Arthritis, and NPH s/p  shunt in 03/23, at [place], who presented to the [place] ED with increased weakness and confusion and decreased PO intake. Patient follows with Neurologist Dr. Simon for her NPH. Per family, patient has been more confused and unable to walk over the past 4 days. Normally able to walk with a walker. Found to have mixed density SDH with 1.4cm midline shift, and transferred to [facility] for neurosurgical evaluation.  Interval history: Patient evaluated in ED, VSS, AOx3 (person/place (medical insitution)/year but not month), EOMI, PERRL, b/l 2r, FC, NAVARRO 4-/5 w/ drifts on Lt side. (11/8/2023)    24 hr events: none reported.    ROS: reports headache.    -----------------------------------------------------------------------------  ICU Vital Signs Last 24 Hrs  T(C): 36.3 (09 Nov 2023 07:51), Max: 36.8 (08 Nov 2023 23:25)  T(F): 97.3 (09 Nov 2023 07:51), Max: 98.3 (08 Nov 2023 23:25)  HR: 64 (09 Nov 2023 15:00) (53 - 86)  BP: 129/55 (09 Nov 2023 15:00) (101/46 - 173/74)  BP(mean): 77 (09 Nov 2023 15:00) (63 - 105)  ABP: --  ABP(mean): --  RR: 17 (09 Nov 2023 15:00) (12 - 19)  SpO2: 95% (09 Nov 2023 15:00) (95% - 100%)    O2 Parameters below as of 09 Nov 2023 04:00  Patient On (Oxygen Delivery Method): room air    -----------------------------------------------------------------------------------------------------------------------------------------------------------------------------------    PHYSICAL EXAM:  General: Calm, laying in bed  HEENT: MMM  Neuro:  -Mental status- No acute distress, EO spont, AOx2, following commands  -CN- PERRL 3mm, EOMI, tongue midline, face symmetric  -Motor-  RUE 5/5, LUE limited due to chronic shoulder pain, drift noted;   bilat lower ext 2/5      CV: RRR  Pulm: Clear to auscultation  Abd: Soft, nontender, nondistended  Ext: No edema  Skin: warm, dry

## 2023-11-09 NOTE — PROGRESS NOTE ADULT - CRITICAL CARE ATTENDING COMMENT
Pt is critically ill and at high risk of rapid deterioration/death due to abovementioned conditions.   Still requires critical care interventions - ongoing frequent evaluations, interventions and management adjustment by the Attending and ICU team, - and included review of relevant history, clinical examination, review of data and images, discussion of treatment with the multidisciplinary team and any consultants involved in this patient’s care as well as family discussion.
I spent 60 minutes of critical care time examining patient, reviewing vitals, labs, medications, imaging and discussing with the team goals of care to prevent life-threatening in this patient who is at high risk for deterioration or death due to:  SDH
I spent 45 minutes of critical care time examining patient, reviewing vitals, labs, medications, imaging and discussing with the team goals of care to prevent life-threatening in this patient who is at high risk for deterioration or death due to:  SDH

## 2023-11-09 NOTE — PROGRESS NOTE ADULT - SUBJECTIVE AND OBJECTIVE BOX
S/p [facility] Hole w/ evac of SDH  Extub  min EBL    Pt w/o complaints  Asking for food  CTH pending at midnight    InPt Meds:  acetaminophen     Tablet .. 975 milliGRAM(s) Oral every 6 hours PRN  acetaminophen   IVPB .. 1000 milliGRAM(s) IV Intermittent every 6 hours PRN  atorvastatin 40 milliGRAM(s) Oral at bedtime  ceFAZolin  Injectable. 2000 milliGRAM(s) IV Push every 8 hours  chlorhexidine 2% Cloths 1 Application(s) Topical daily  famotidine Injectable 20 milliGRAM(s) IV Push every 12 hours  levETIRAcetam  IVPB 500 milliGRAM(s) IV Intermittent every 12 hours  mupirocin 2% Nasal 1 Application(s) Both Nostrils two times a day  ondansetron Injectable 4 milliGRAM(s) IV Push every 6 hours PRN  oxyCODONE    IR 5 milliGRAM(s) Oral every 4 hours PRN  oxyCODONE    IR 10 milliGRAM(s) Oral every 4 hours PRN  polyethylene glycol 3350 17 Gram(s) Oral two times a day  senna 2 Tablet(s) Oral at bedtime  sodium chloride 0.9%. 1000 milliLiter(s) IV Continuous <Continuous>    VS:  T(C): 36.6 (11-09-23 @ 20:15), Max: 36.8 (11-08-23 @ 23:25)  HR: 77 (11-09-23 @ 20:15) (53 - 83)  BP: 132/75 (11-09-23 @ 20:15) (101/46 - 156/69)  RR: 19 (11-09-23 @ 20:15) (12 - 22)  SpO2: 99% (11-09-23 @ 20:15) (95% - 100%)  NPO    NAD  Awake and Alert x 2 (Name and place), clear speech  PERRLA / EOMI  Rt Crani dressing Intact, some drainage on pillow, + HV  Face Symmetric  4+ /5 B/L UE's, No Drift  Kicked B/L feet  Sen intact  S1,2 RRR CTA  S/NT/ND  No edema    Labs:                        8.9    6.34  )-----------( 275      ( 09 Nov 2023 02:25 )             28.1     11-09    143  |  109<H>  |  25.5<H>  ----------------------------<  100<H>  4.2   |  23.0  |  0.61    Ca    10.1      09 Nov 2023 02:25  Phos  2.7     11-09  Mg     1.9     11-09    TPro  6.6  /  Alb  3.4  /  TBili  0.4  /  DBili  x   /  AST  17  /  ALT  11  /  AlkPhos  71  11-08      Post op CTH 11/9: pending    A/P:  83F PMH NPH s/p  shunt placement 3/23 at [place], HTN, HLD, Arthritis P/W Encephalopathy 2nd to Traumatic SDH 2nd to [facility] Fall POD # 0 [facility] w/ Evac    -CTH tonight, keep HV clamped, f/up post scan imaging for any residue collection   -Neuro ck q 1 hr  -Keep SBP < 160  -Keppra 500 q 12, gary preca  -Pain Control PRN / Bowel regm  -Keep Sats > 92, Supplemental O2, Dariel [place]  -NPO for now except meds  -Abx -Ancef until HV d/c  -PT/OT, OOB once able, fall prev  -GI/DVT prox    Diss w/ attending

## 2023-11-10 ENCOUNTER — APPOINTMENT (OUTPATIENT)
Dept: NEUROSURGERY | Facility: HOSPITAL | Age: 83
End: 2023-11-10

## 2023-11-10 LAB
ANION GAP SERPL CALC-SCNC: 13 MMOL/L — SIGNIFICANT CHANGE UP (ref 5–17)
ANION GAP SERPL CALC-SCNC: 13 MMOL/L — SIGNIFICANT CHANGE UP (ref 5–17)
BUN SERPL-MCNC: 18.9 MG/DL — SIGNIFICANT CHANGE UP (ref 8–20)
BUN SERPL-MCNC: 18.9 MG/DL — SIGNIFICANT CHANGE UP (ref 8–20)
CALCIUM SERPL-MCNC: 9.2 MG/DL — SIGNIFICANT CHANGE UP (ref 8.4–10.5)
CALCIUM SERPL-MCNC: 9.2 MG/DL — SIGNIFICANT CHANGE UP (ref 8.4–10.5)
CHLORIDE SERPL-SCNC: 109 MMOL/L — HIGH (ref 96–108)
CHLORIDE SERPL-SCNC: 109 MMOL/L — HIGH (ref 96–108)
CO2 SERPL-SCNC: 20 MMOL/L — LOW (ref 22–29)
CO2 SERPL-SCNC: 20 MMOL/L — LOW (ref 22–29)
CREAT SERPL-MCNC: 0.68 MG/DL — SIGNIFICANT CHANGE UP (ref 0.5–1.3)
CREAT SERPL-MCNC: 0.68 MG/DL — SIGNIFICANT CHANGE UP (ref 0.5–1.3)
EGFR: 85 ML/MIN/1.73M2 — SIGNIFICANT CHANGE UP
EGFR: 85 ML/MIN/1.73M2 — SIGNIFICANT CHANGE UP
GLUCOSE BLDC GLUCOMTR-MCNC: 114 MG/DL — HIGH (ref 70–99)
GLUCOSE BLDC GLUCOMTR-MCNC: 114 MG/DL — HIGH (ref 70–99)
GLUCOSE BLDC GLUCOMTR-MCNC: 126 MG/DL — HIGH (ref 70–99)
GLUCOSE BLDC GLUCOMTR-MCNC: 126 MG/DL — HIGH (ref 70–99)
GLUCOSE SERPL-MCNC: 148 MG/DL — HIGH (ref 70–99)
GLUCOSE SERPL-MCNC: 148 MG/DL — HIGH (ref 70–99)
HCT VFR BLD CALC: 31.1 % — LOW (ref 34.5–45)
HCT VFR BLD CALC: 31.1 % — LOW (ref 34.5–45)
HGB BLD-MCNC: 9.8 G/DL — LOW (ref 11.5–15.5)
HGB BLD-MCNC: 9.8 G/DL — LOW (ref 11.5–15.5)
MAGNESIUM SERPL-MCNC: 2 MG/DL — SIGNIFICANT CHANGE UP (ref 1.6–2.6)
MAGNESIUM SERPL-MCNC: 2 MG/DL — SIGNIFICANT CHANGE UP (ref 1.6–2.6)
MCHC RBC-ENTMCNC: 28.2 PG — SIGNIFICANT CHANGE UP (ref 27–34)
MCHC RBC-ENTMCNC: 28.2 PG — SIGNIFICANT CHANGE UP (ref 27–34)
MCHC RBC-ENTMCNC: 31.5 GM/DL — LOW (ref 32–36)
MCHC RBC-ENTMCNC: 31.5 GM/DL — LOW (ref 32–36)
MCV RBC AUTO: 89.4 FL — SIGNIFICANT CHANGE UP (ref 80–100)
MCV RBC AUTO: 89.4 FL — SIGNIFICANT CHANGE UP (ref 80–100)
PHOSPHATE SERPL-MCNC: 3.2 MG/DL — SIGNIFICANT CHANGE UP (ref 2.4–4.7)
PHOSPHATE SERPL-MCNC: 3.2 MG/DL — SIGNIFICANT CHANGE UP (ref 2.4–4.7)
PLATELET # BLD AUTO: 266 K/UL — SIGNIFICANT CHANGE UP (ref 150–400)
PLATELET # BLD AUTO: 266 K/UL — SIGNIFICANT CHANGE UP (ref 150–400)
POTASSIUM SERPL-MCNC: 4.6 MMOL/L — SIGNIFICANT CHANGE UP (ref 3.5–5.3)
POTASSIUM SERPL-MCNC: 4.6 MMOL/L — SIGNIFICANT CHANGE UP (ref 3.5–5.3)
POTASSIUM SERPL-SCNC: 4.6 MMOL/L — SIGNIFICANT CHANGE UP (ref 3.5–5.3)
POTASSIUM SERPL-SCNC: 4.6 MMOL/L — SIGNIFICANT CHANGE UP (ref 3.5–5.3)
RBC # BLD: 3.48 M/UL — LOW (ref 3.8–5.2)
RBC # BLD: 3.48 M/UL — LOW (ref 3.8–5.2)
RBC # FLD: 15.5 % — HIGH (ref 10.3–14.5)
RBC # FLD: 15.5 % — HIGH (ref 10.3–14.5)
SODIUM SERPL-SCNC: 142 MMOL/L — SIGNIFICANT CHANGE UP (ref 135–145)
SODIUM SERPL-SCNC: 142 MMOL/L — SIGNIFICANT CHANGE UP (ref 135–145)
WBC # BLD: 4.85 K/UL — SIGNIFICANT CHANGE UP (ref 3.8–10.5)
WBC # BLD: 4.85 K/UL — SIGNIFICANT CHANGE UP (ref 3.8–10.5)
WBC # FLD AUTO: 4.85 K/UL — SIGNIFICANT CHANGE UP (ref 3.8–10.5)
WBC # FLD AUTO: 4.85 K/UL — SIGNIFICANT CHANGE UP (ref 3.8–10.5)

## 2023-11-10 PROCEDURE — 36227 PLACE CATH XTRNL CAROTID: CPT | Mod: 50

## 2023-11-10 PROCEDURE — 36224 PLACE CATH CAROTD ART: CPT | Mod: 50

## 2023-11-10 PROCEDURE — 70450 CT HEAD/BRAIN W/O DYE: CPT | Mod: 26

## 2023-11-10 PROCEDURE — 36226 PLACE CATH VERTEBRAL ART: CPT | Mod: LT

## 2023-11-10 PROCEDURE — 99291 CRITICAL CARE FIRST HOUR: CPT

## 2023-11-10 PROCEDURE — 75894 X-RAYS TRANSCATH THERAPY: CPT | Mod: 26

## 2023-11-10 PROCEDURE — 75898 FOLLOW-UP ANGIOGRAPHY: CPT | Mod: 26

## 2023-11-10 PROCEDURE — 61624 TCAT PERM OCCLS/EMBOLJ CNS: CPT

## 2023-11-10 RX ORDER — HYDROMORPHONE HYDROCHLORIDE 2 MG/ML
0.25 INJECTION INTRAMUSCULAR; INTRAVENOUS; SUBCUTANEOUS ONCE
Refills: 0 | Status: DISCONTINUED | OUTPATIENT
Start: 2023-11-10 | End: 2023-11-10

## 2023-11-10 RX ORDER — NYSTATIN CREAM 100000 [USP'U]/G
1 CREAM TOPICAL
Refills: 0 | Status: DISCONTINUED | OUTPATIENT
Start: 2023-11-10 | End: 2023-11-17

## 2023-11-10 RX ORDER — HYDROMORPHONE HYDROCHLORIDE 2 MG/ML
0.5 INJECTION INTRAMUSCULAR; INTRAVENOUS; SUBCUTANEOUS ONCE
Refills: 0 | Status: DISCONTINUED | OUTPATIENT
Start: 2023-11-10 | End: 2023-11-10

## 2023-11-10 RX ADMIN — Medication 1000 MILLIGRAM(S): at 13:07

## 2023-11-10 RX ADMIN — FAMOTIDINE 20 MILLIGRAM(S): 10 INJECTION INTRAVENOUS at 06:28

## 2023-11-10 RX ADMIN — HYDROMORPHONE HYDROCHLORIDE 0.5 MILLIGRAM(S): 2 INJECTION INTRAMUSCULAR; INTRAVENOUS; SUBCUTANEOUS at 14:46

## 2023-11-10 RX ADMIN — FAMOTIDINE 20 MILLIGRAM(S): 10 INJECTION INTRAVENOUS at 19:25

## 2023-11-10 RX ADMIN — NYSTATIN CREAM 1 APPLICATION(S): 100000 CREAM TOPICAL at 19:25

## 2023-11-10 RX ADMIN — Medication 2000 MILLIGRAM(S): at 13:23

## 2023-11-10 RX ADMIN — HYDROMORPHONE HYDROCHLORIDE 0.5 MILLIGRAM(S): 2 INJECTION INTRAMUSCULAR; INTRAVENOUS; SUBCUTANEOUS at 15:01

## 2023-11-10 RX ADMIN — LEVETIRACETAM 400 MILLIGRAM(S): 250 TABLET, FILM COATED ORAL at 19:24

## 2023-11-10 RX ADMIN — CHLORHEXIDINE GLUCONATE 1 APPLICATION(S): 213 SOLUTION TOPICAL at 11:31

## 2023-11-10 RX ADMIN — MUPIROCIN 1 APPLICATION(S): 20 OINTMENT TOPICAL at 19:25

## 2023-11-10 RX ADMIN — LEVETIRACETAM 400 MILLIGRAM(S): 250 TABLET, FILM COATED ORAL at 06:29

## 2023-11-10 RX ADMIN — MUPIROCIN 1 APPLICATION(S): 20 OINTMENT TOPICAL at 06:28

## 2023-11-10 RX ADMIN — HYDROMORPHONE HYDROCHLORIDE 0.25 MILLIGRAM(S): 2 INJECTION INTRAMUSCULAR; INTRAVENOUS; SUBCUTANEOUS at 13:48

## 2023-11-10 RX ADMIN — HYDROMORPHONE HYDROCHLORIDE 0.25 MILLIGRAM(S): 2 INJECTION INTRAMUSCULAR; INTRAVENOUS; SUBCUTANEOUS at 13:23

## 2023-11-10 RX ADMIN — Medication 2000 MILLIGRAM(S): at 06:28

## 2023-11-10 RX ADMIN — Medication 400 MILLIGRAM(S): at 12:37

## 2023-11-10 NOTE — DIETITIAN INITIAL EVALUATION ADULT - ORAL INTAKE PTA/DIET HISTORY
MST flagged for unsure weight loss. Per documentation pt admitted with confused x 4 days and decreased PO intake. No family at bedside at time of attempted visit. Unable to obtain hx at this time. No weight hx per chart review, BW 11/9 - 169 lbs. Plan for OR today, pt NPO. RD to follow, will monitor malnutrition parameters closely.

## 2023-11-10 NOTE — PROGRESS NOTE ADULT - SUBJECTIVE AND OBJECTIVE BOX
HPI: Patient is an 84yo F with a PMH of HTN, HLD, Arthritis, and NPH s/p  shunt in 03/23, at [place], who presented to the [place] ED with increased weakness and confusion and decreased PO intake. Patient follows with Neurologist Dr. Simon for her NPH. Per family, patient has been more confused and unable to walk over the past 4 days. Normally able to walk with a walker. Found to have mixed density SDH with 1.4cm midline shift, and transferred to [facility] for neurosurgical evaluation.  Interval history: Patient evaluated in ED, VSS, AOx3 (person/place (medical insitution)/year but not month), EOMI, PERRL, b/l 2r, FC, NAVARRO 4-/5 w/ drifts on Lt side. (11/8/2023)    24 hr events: none reported.    ROS: reports headache.    -----------------------------------------------------------------------------------------------------------------------------------------------------------------------------------    PHYSICAL EXAM:  General: Calm, laying in bed  HEENT: MMM  Neuro:  -Mental status- No acute distress, EO spont, AOx2, following commands  -CN- PERRL 3mm, EOMI, tongue midline, face symmetric  -Motor- R HF 2/5 with KE and DF 5/5 isolated with decreased sensation appears LMN  - Otherwise full strength throughout    CV: RRR  Pulm: Clear to auscultation  Abd: Soft, nontender, nondistended  Ext: No edema  Skin: warm, dry    ------------------------------------------------------------------------------------------------------  ICU Vital Signs Last 24 Hrs  T(C): 37.1 (10 Nov 2023 13:00), Max: 37.1 (10 Nov 2023 13:00)  T(F): 98.8 (10 Nov 2023 13:00), Max: 98.8 (10 Nov 2023 13:00)  HR: 74 (10 Nov 2023 13:00) (58 - 80)  BP: 138/62 (10 Nov 2023 13:00) (107/53 - 150/71)  BP(mean): 84 (10 Nov 2023 13:00) (69 - 99)  ABP: --  ABP(mean): --  RR: 24 (10 Nov 2023 13:00) (14 - 24)  SpO2: 97% (10 Nov 2023 13:00) (94% - 100%)    O2 Parameters below as of 10 Nov 2023 12:00  Patient On (Oxygen Delivery Method): room air            I&O's Summary    09 Nov 2023 07:01  -  10 Nov 2023 07:00  --------------------------------------------------------  IN: 1050 mL / OUT: 1345 mL / NET: -295 mL    10 Nov 2023 07:01  -  10 Nov 2023 13:39  --------------------------------------------------------  IN: 300 mL / OUT: 360 mL / NET: -60 mL        MEDICATIONS  (STANDING):  atorvastatin 40 milliGRAM(s) Oral at bedtime  chlorhexidine 2% Cloths 1 Application(s) Topical daily  famotidine Injectable 20 milliGRAM(s) IV Push every 12 hours  levETIRAcetam  IVPB 500 milliGRAM(s) IV Intermittent every 12 hours  mupirocin 2% Nasal 1 Application(s) Both Nostrils two times a day  nystatin Powder 1 Application(s) Topical two times a day  polyethylene glycol 3350 17 Gram(s) Oral two times a day  senna 2 Tablet(s) Oral at bedtime  sodium chloride 0.9%. 1000 milliLiter(s) (50 mL/Hr) IV Continuous <Continuous>      RESPIRATORY:      IMAGING:   Recent imaging studies were reviewed.    LAB RESULTS:                          9.8    4.85  )-----------( 266      ( 10 Nov 2023 02:30 )             31.1       PT/INR - ( 08 Nov 2023 19:10 )   PT: 12.0 sec;   INR: 1.08 ratio         PTT - ( 08 Nov 2023 19:10 )  PTT:25.5 sec    11-10    142  |  109<H>  |  18.9  ----------------------------<  148<H>  4.6   |  20.0<L>  |  0.68    Ca    9.2      10 Nov 2023 02:30  Phos  3.2     11-10  Mg     2.0     11-10    TPro  6.6  /  Alb  3.4  /  TBili  0.4  /  DBili  x   /  AST  17  /  ALT  11  /  AlkPhos  71  11-08

## 2023-11-10 NOTE — DIETITIAN INITIAL EVALUATION ADULT - OTHER INFO
84 yo female with pmhx of htn, hld, arthritis, and NPH s/p  shunt in 3/22/23 at [place], who presented to [place] ED with increased weakness, confusion, and decreased PO intake. Pt follows with neurologist Dr. Simon for her NPH. Pt found to have mixed density SDH with 1.4 cm midline shift s/p recent fall and transferred to [facility] for neurosurgical evaluation. Plan for OR for reilly hole 11/10.

## 2023-11-10 NOTE — DIETITIAN INITIAL EVALUATION ADULT - ADD RECOMMEND
Diet advancement when medically feasible  Rx: MVI, Vit C 500 mg daily to promote wound healing  Daily weights to trend

## 2023-11-10 NOTE — DIETITIAN INITIAL EVALUATION ADULT - FUNCTIONAL SCREEN CURRENT LEVEL: SWALLOWING (IF SCORE 2 OR MORE FOR ANY ITEM, CONSULT REHAB SERVICES), MLM)
Mother of this patient returned call. Call was connected to back line. Staff informed mother message will be sent to Dr. Tripp's team.    NPO

## 2023-11-10 NOTE — DIETITIAN INITIAL EVALUATION ADULT - PERTINENT LABORATORY DATA
11-10    142  |  109<H>  |  18.9  ----------------------------<  148<H>  4.6   |  20.0<L>  |  0.68    Ca    9.2      10 Nov 2023 02:30  Phos  3.2     11-10  Mg     2.0     11-10    TPro  6.6  /  Alb  3.4  /  TBili  0.4  /  DBili  x   /  AST  17  /  ALT  11  /  AlkPhos  71  11-08  POCT Blood Glucose.: 126 mg/dL (11-10-23 @ 08:17)  A1C with Estimated Average Glucose Result: 5.0 % (11-08-23 @ 19:10)

## 2023-11-10 NOTE — DIETITIAN INITIAL EVALUATION ADULT - PERTINENT MEDS FT
MEDICATIONS  (STANDING):  atorvastatin 40 milliGRAM(s) Oral at bedtime  ceFAZolin  Injectable. 2000 milliGRAM(s) IV Push every 8 hours  chlorhexidine 2% Cloths 1 Application(s) Topical daily  famotidine Injectable 20 milliGRAM(s) IV Push every 12 hours  levETIRAcetam  IVPB 500 milliGRAM(s) IV Intermittent every 12 hours  mupirocin 2% Nasal 1 Application(s) Both Nostrils two times a day  nystatin Powder 1 Application(s) Topical two times a day  polyethylene glycol 3350 17 Gram(s) Oral two times a day  senna 2 Tablet(s) Oral at bedtime  sodium chloride 0.9%. 1000 milliLiter(s) (50 mL/Hr) IV Continuous <Continuous>    MEDICATIONS  (PRN):  acetaminophen     Tablet .. 975 milliGRAM(s) Oral every 6 hours PRN Temp greater or equal to 38.5C (101.3F), Mild Pain (1 - 3)  acetaminophen   IVPB .. 1000 milliGRAM(s) IV Intermittent every 6 hours PRN Severe Pain (7 - 10)  ondansetron Injectable 4 milliGRAM(s) IV Push every 6 hours PRN Nausea and/or Vomiting  oxyCODONE    IR 5 milliGRAM(s) Oral every 4 hours PRN Moderate Pain (4 - 6)  oxyCODONE    IR 10 milliGRAM(s) Oral every 4 hours PRN Severe Pain (7 - 10)

## 2023-11-10 NOTE — CHART NOTE - NSCHARTNOTEFT_GEN_A_CORE
Neurointerventional Surgery  Pre-Procedure Note     HPI:  83F with PMH mild dementia, HTN, HLD, NPH s/ shunt 3/2023 who presented with 4 days of confusion. Patient was found to have R SDH with shift. Patient was transferred to [facility]. Patient underwent [place] holes for R SDH evacuation on 11/9/23. Patient presents today for cerebral angiogram with MMA embolization. Patient admits to R hip pain but otherwise has no complaints.       Allergies: No Known Allergies      PAST MEDICAL & SURGICAL HISTORY:  Normal pressure hydrocephalus  HTN (hypertension)  HLD (hyperlipidemia)  Arthritis  S/P  shunt      Current Medications:   acetaminophen     Tablet .. 975 milliGRAM(s) Oral every 6 hours PRN  acetaminophen   IVPB .. 1000 milliGRAM(s) IV Intermittent every 6 hours PRN  atorvastatin 40 milliGRAM(s) Oral at bedtime  chlorhexidine 2% Cloths 1 Application(s) Topical daily  famotidine Injectable 20 milliGRAM(s) IV Push every 12 hours  levETIRAcetam  IVPB 500 milliGRAM(s) IV Intermittent every 12 hours  mupirocin 2% Nasal 1 Application(s) Both Nostrils two times a day  nystatin Powder 1 Application(s) Topical two times a day  ondansetron Injectable 4 milliGRAM(s) IV Push every 6 hours PRN  oxyCODONE    IR 5 milliGRAM(s) Oral every 4 hours PRN  oxyCODONE    IR 10 milliGRAM(s) Oral every 4 hours PRN  polyethylene glycol 3350 17 Gram(s) Oral two times a day  senna 2 Tablet(s) Oral at bedtime  sodium chloride 0.9%. 1000 milliLiter(s) IV Continuous <Continuous>      Physical Exam:  Constitutional: NAD, lying in bed  Neuro  * Mental Status:  GCS 15: Awake, alert, oriented to conversation. No aphasia or difficulty speaking. No dysarthria. Able to name objects and their function.  * Cranial Nerves: Cnii-Cnxii grossly intact. PERRL, EOMI, tongue midline, no gaze deviation  * Motor: RUE 5/5, LUE 5/5, RLE 5/5, LLE 5/5, no drift or dysmetria  * Sensory: Sensation intact to light touch  * Reflexes: not assessed   Cardiovascular: Regular rate and rhythm.  Eyes: See neurologic examination with detailed examination of eyes.  ENT: No JVD, Trachea Midline  Respiratory: non labored breathing   Gastrointestinal: Soft, nontender, nondistended.  Genitourinary: [ ] Blair, [ x ] No Blair.   Musculoskeletal: No muscle wasting noted, (See neurologic assessment for full muscle strength assessment) No pretibial edema appreciated, no appreciable calf tenderness.  Skin:  no wounds, no redness, no abrasions noted  Hematologic / Lymph / Immunologic: No bleeding from IV sites or wounds, No lymphadenopathy, No Hives or allergic type skin lesions      [facility] SS:  DATE: 11/10/23   TIME: 1545  1A: Level of consciousness (0-3): 1  1B: Questions (0-2): 2  1C: Commands (0-2): 0  2: Gaze (0-2): 0  3: Visual fields (0-3): 0  4: Facial palsy (0-3): 0  MOTOR:  5A: Left arm motor drift (0-4): 0  5B: Right arm motor drift (0-4): 0  6A: Left leg motor drift (0-4): 0  6B: Right leg motor drift (0-4): 3  7: Limb ataxia (0-2): 0  SENSORY:  8: Sensation (0-2): 0  SPEECH:  9: Language (0-3): 0  10: Dysarthria (0-2): 0  EXTINCTION:  11: Extinction/inattention (0-2): 0    TOTAL SCORE: 6      Labs:                         9.8    4.85  )-----------( 266      ( 10 Nov 2023 02:30 )             31.1       11-10    142  |  109<H>  |  18.9  ----------------------------<  148<H>  4.6   |  20.0<L>  |  0.68    Ca    9.2      10 Nov 2023 02:30  Phos  3.2     11-10  Mg     2.0     11-10    TPro  6.6  /  Alb  3.4  /  TBili  0.4  /  DBili  x   /  AST  17  /  ALT  11  /  AlkPhos  71  11-08    PT/INR - ( 08 Nov 2023 19:10 )   PT: 12.0 sec;   INR: 1.08 ratio    PTT - ( 08 Nov 2023 19:10 )  PTT:25.5 sec      Assessment/Plan:   This is a 83y  year old Female  presents with R SDH s/p evacuation. Patient presents to neuro-IR for selective cerebral angiography.     Procedure, goals, risks, benefits and alternatives  were discussed with patient's  Kyle Church [phone].  All questions were answered.  Risks include but are not limited to stroke, vessel injury, hemorrhage, and or groin hematoma.  Patient's  demonstrates understanding  of all risks involved with this procedure and wishes to continue.   Appropriate  consent was obtained from patient's  and consent is in the patient's chart.

## 2023-11-10 NOTE — CHART NOTE - NSCHARTNOTEFT_GEN_A_CORE
Neurointerventional Surgery Post Procedure Note    Procedure: Selective Cerebral Angiography     Pre- Procedure Diagnosis: R SDH    Post- Procedure Diagnosis: R SDH s/p     : Dr. Méndez  Physician Assistant: Amanda Myers   Nurse: Lucrecia Mcdonald  Anesthesiologist: Dr. Wilson            Radiology Tech:       Sheath:  - Portuguese Sheath    I/Os: estimated blood loss less than 20cc,  IV fluids cc, Urine output cc, Contrast: Omnipaque 240   cc    Vitals:  BP   HR   Spo2  %    Preliminary Report:  Under a 4 Portuguese short sheath via the right groin under MAC sedation via left vertebral artery, left internal carotid artery, left external carotid artery, right vertebral artery, right internal carotid artery, right external carotid artery, a selective cerebral angiography  was performed and reveals       . ( Official note to follow).    Patient tolerated procedure well.  Patient remains hemodynamically stable, no change in neurological status compared to baseline.  Results were discussed with patient, patient's family and Neurosurgery.  Right groin sheath  was discontinued. Hemostasis was obtained with approximately 15 minutes of manual compression.     No active bleeding, no hematoma, no ecchymosis.   Quick clot and safeguard balloon dressing applied at   Patient transferred to recovery room in stable condition. Neurointerventional Surgery Post Procedure Note    Procedure: Selective Cerebral Angiography     Pre- Procedure Diagnosis: R SDH    Post- Procedure Diagnosis: R SDH s/p embolization with jannette 0.9 cc    : Dr. Méndez  Physician Assistant: Amanda Myers   Nurse: Lucrecia Mcdonald  Anesthesiologist: Dr. Wilson            Radiology Tech: Luis Gonzáles       Sheath:  5 Surinamese Sheath    I/Os: estimated blood loss less than 20cc,  IV fluids 1000cc, Urine output 500cc, Contrast: Omnipaque 240  78 cc, ancef 2g, decadron 4mg     Vitals:  /62   HR 58  Spo2 100 %    Preliminary Report:  Under a 5 Surinamese BMX 81 sheath via the right groin under general anesthesia via left vertebral artery, left internal carotid artery, left external carotid artery, right internal carotid artery, right external carotid artery, a selective cerebral angiography  was performed and reveals R SDH s/p embolization with jannette 0.9 cc. ( Official note to follow).    Patient tolerated procedure well.  Patient remains hemodynamically stable, no change in neurological status compared to baseline.  Results were discussed with patient, patient's family and Neurosurgery.  Right groin sheath  was discontinued using Vascade closure device at 1748. Hemostasis was obtained with approximately 8 minutes of manual compression.     No active bleeding, no hematoma, no ecchymosis.   Quick clot and safeguard balloon dressing applied at 1755  Patient transferred to neuro ICU in stable condition.

## 2023-11-10 NOTE — PROGRESS NOTE ADULT - ASSESSMENT
83F PMH NPH s/p  shunt placement 3/23 at [place], HTN, HLD, Arthritis P/W Encephalopathy 2nd to Traumatic SDH 2nd to [facility] Fall POD # 0 [place] w/ Evac    -CTH tonight, keep HV clamped, f/up post scan imaging for any residue collection   -Neuro ck q 1 hr  -Keep SBP < 160  -Keppra 500 q 12, gayr preca  -Pain Control PRN / Bowel regm  -Keep Sats > 92, Supplemental O2, Dariel [place]  -NPO for now except meds  -Abx -Ancef until HV d/c  -PT/OT, OOB once able, fall prev  -GI/DVT prox  neurochecks  POD 0 [place]illy holes for SDH evac  followed by MMA embo tomorrow  drain clamped per neurosurg, HOB flat  sz ppx with Keppra  maintain -160, cont Metoprolol IV PRN, Cardene ggt PRN  statin  repeat EKG, pending TTE  maintain Osats>92%, incentive spirometry  NPO at midnight; IV hydration   deniz -180  SCDs, hold any anti-thrombotics    Diss w/ attending   83F PMH NPH s/p  shunt placement 3/23 at [place], HTN, HLD, Arthritis P/W Encephalopathy 2nd to Traumatic SDH 2nd to [facility] Fall. Plan for OR 11/10 for reilly hole.    -CTH tonight, keep HV clamped, f/up post scan imaging for any residue collection   -Neuro ck q 1 hr  -Keep SBP < 160  -Keppra 500 q 12, gary preca  -Pain Control PRN / Bowel regm  -Keep Sats > 92, Supplemental O2, Dariel [place]  -NPO for now except meds  -PT/OT, OOB once able, fall prev  -GI/DVT prox  followed by MMA embo tomorrow  drain clamped per neurosurg, HOB flat  sz ppx with Keppra  maintain -160, cont Metoprolol IV PRN, Cardene ggt PRN  statin  maintain Osats>92%, incentive spirometry  NPO at midnight  deniz -180  SCDs, hold any anti-thrombotics

## 2023-11-10 NOTE — PROGRESS NOTE ADULT - ASSESSMENT
ASSESSMENT/PLAN:   84 yo F with large mixed density R SDH with mass effect. S/p recent fall.  PMH of NPH, VPS placed in March 2023.    The patient is critically ill due to the following diagnoses:  SDH  Cerebral Edema    Plan:  neurochecks  OR for MMA embo  sz ppx with Keppra  maintain -160, Cardene ggt PRN  maintain Osats>92%, incentive spirometry  NPO for now; IV hydration   maitnain -180  SCDs, hold any anti-thrombotics

## 2023-11-10 NOTE — PROGRESS NOTE ADULT - SUBJECTIVE AND OBJECTIVE BOX
HPI:  HPI:  Patient is a 83y old  Female who presents with a chief complaint of confusion x 4days    HPI: Patient is an 82yo F with a PMH of HTN, HLD, Arthritis, and NPH s/p  shunt in 03/23, at [place], who presented to the [place] ED with increased weakness and confusion and decreased PO intake. Patient follows with Neurologist Dr. Simon for her NPH. Per family, patient has been more confused and unable to walk over the past 4 days. Normally able to walk with a walker. Found to have mixed density SDH with 1.4cm midline shift, and transferred to [facility] for neurosurgical evaluation.    Interval history: Patient evaluated in ED, VSS, AOx3 (person/place (medical insitution)/year but not month), EOMI, PERRL, b/l 2r, FC, NAVARRO 4-/5 w/ drifts on Lt side.    *Modified Chip Score- 3  0 - No symptoms.  1 - No significant disability. Able to carry out all usual activities, despite some symptoms.  2 - Slight disability. Able to look after own affairs without assistance, but unable to carry out all previous activities.  3 - Moderate disability. Requires some help, but able to walk unassisted.  4 - Moderately severe disability. Unable to attend to own bodily needs without assistance, and unable to walk unassisted.  5 - Severe disability. Requires constant nursing care and attention, bedridden, incontinent.    Vital Signs Last 24 Hrs  T(C): 36.4 (08 Nov 2023 17:10), Max: 37.3 (08 Nov 2023 13:51)  T(F): 97.5 (08 Nov 2023 17:10), Max: 99.1 (08 Nov 2023 13:51)  HR: 81 (08 Nov 2023 17:10) (81 - 87)  BP: 173/74 (08 Nov 2023 17:10) (137/77 - 173/74)  BP(mean): --  RR: 18 (08 Nov 2023 17:10) (17 - 18)  SpO2: 97% (08 Nov 2023 17:10) (97% - 99%)    Parameters below as of 08 Nov 2023 17:10  Patient On (Oxygen Delivery Method): room air          Physical Exam:  Constitutional: NAD, lying in bed  Neuro  AOx3 (person/place (medical insitution)/year but not month), EOMI, PERRL, B/L 2R, 4-/5 w/ drifts on Lt side.  Cardiovascular: Regular rate and rhythm.  Eyes: See neurologic examination with detailed examination of eyes.  ENT: No JVD, Trachea Midline  Respiratory: non labored breathing   Gastrointestinal: Soft, nontender, nondistended.  Genitourinary: [ ] Blair, [ x ] No Blair.   Musculoskeletal: No muscle wasting noted, (See neurologic assessment for full muscle strength assessment) No pretibial edema appreciated, no appreciable calf tenderness.  Skin:  no wounds, no redness, no abrasions noted  Hematologic / Lymph / Immunologic: No bleeding from IV sites or wounds, No lymphadenopathy, No Hives or allergic type skin lesions      LABS:                        10.2   8.75  )-----------( 340      ( 08 Nov 2023 13:30 )             31.9     11-08    144  |  112<H>  |  32<H>  ----------------------------<  107<H>  4.3   |  24  |  0.80    Ca    10.5<H>      08 Nov 2023 13:30    TPro  7.2  /  Alb  3.2<L>  /  TBili  0.4  /  DBili  x   /  AST  21  /  ALT  18  /  AlkPhos  80  11-08      Urinalysis Basic - ( 08 Nov 2023 13:30 )    Color: x / Appearance: x / SG: x / pH: x  Gluc: 107 mg/dL / Ketone: x  / Bili: x / Urobili: x   Blood: x / Protein: x / Nitrite: x   Leuk Esterase: x / RBC: x / WBC x   Sq Epi: x / Non Sq Epi: x / Bacteria: x    MEDICATIONS  (STANDING):  niCARdipine Infusion 5 mG/Hr (25 mL/Hr) IV Continuous <Continuous>    MEDICATIONS  (PRN):      RADIOLOGY & ADDITIONAL STUDIES:  < from: CT Head No Cont (02.01.23 @ 13:07) >  IMPRESSION:    Disproportionate enlargement of the ventricular system in relation to the   high convexity sulci with temporal flaring bilaterally may reflect   communicating/normal pressure hydrocephalus and/or advanced central   volume loss in the appropriate clinical setting.    Confluent periventricular white matter hypoattenuation due to chronic   microvascular ischemic changes and/or transependymal CSF resorption.    No acute intracranial bleeding.    --- End of Report ---            RACHEL LEONARDO MD; Attending Radiologist  This document has been electronically signed. Feb 1 2023  1:11PM    < end of copied text >  < from: CT Head No Cont (11.08.23 @ 14:13) >  IMPRESSION:    Moderate to large mixed attenuation right hemispheric convexity subdural   hematoma compressing right hemisphere and right lateral ventricle with   1.4 cm midline shift to left.    Right frontal ventriculostomy shunt catheter within right frontal horn.    Communication:  I discussed the finding of this report with Dr. Short at   2:25 PM on 11/8/2023.  Critical value policy of the hospital was   followed.  Read back and confirmation of receipt of this communication   was performed.  This verbal communication supplements the text report of   this document.    --- End of Report ---            KAE HUMPHREYS MD; Attending Radiologist  This document has been electronically signed. Nov 8 2023  2:28PM    < end of copied text >   (08 Nov 2023 17:59)      INTERVAL HPI/OVERNIGHT EVENTS:  83y Female s/p __ seen lying comfortably in bed. Tolerating diet. Passing gas/BM. Voiding. Blair in with __ clear urine. Denies headache, weakness, numbness, n/v/d, fevers, chills, chest pain, SOB.     Vital Signs Last 24 Hrs  T(C): 36.5 (09 Nov 2023 22:00), Max: 36.7 (09 Nov 2023 04:00)  T(F): 97.7 (09 Nov 2023 22:00), Max: 98.1 (09 Nov 2023 20:45)  HR: 78 (09 Nov 2023 22:00) (53 - 78)  BP: 120/63 (09 Nov 2023 22:00) (101/46 - 134/72)  BP(mean): 80 (09 Nov 2023 22:00) (63 - 105)  RR: 18 (09 Nov 2023 22:00) (12 - 22)  SpO2: 94% (09 Nov 2023 22:00) (94% - 100%)    Parameters below as of 09 Nov 2023 20:00  Patient On (Oxygen Delivery Method): room air        PHYSICAL EXAM:  GENERAL: NAD, well-groomed  HEAD:  Atraumatic, normocephalic  DRAINS:   WOUND: Dressing clean dry intact  SILVER COMA SCORE: E- V- M- =       E: 4= opens eyes spontaneously 3= to voice 2= to noxious 1= no opening       V: 5= oriented 4= confused 3= inappropriate words 2= incomprehensible sounds 1= nonverbal 1T= intubated       M: 6= follows commands 5= localizes 4= withdraws 3= flexor posturing 2= extensor posturing 1= no movement  MENTAL STATUS: AAO x3; Awake/Comatose; Opens eyes spontaneously/to voice/to light touch/to noxious stimuli; Appropriately conversant without aphasia/Nonverbal; following simple commands/mimicking/not following commands  CRANIAL NERVES: Visual acuity normal for age, visual fields full to confrontation, PERRL. EOMI without nystagmus. Facial sensation intact V1-3 distribution b/l. Face symmetric w/ normal eye closure and smile, tongue midline. Hearing grossly intact. Speech clear. Head turning and shoulder shrug intact.   REFLEXES: PERRL. Corneals intact b/l. Gag intact. Cough intact. Oculocephalic reflex intact (Doll's eye). Negative Quintero's b/l. Negative clonus b/l  MOTOR: strength 5/5 b/l upper and lower extremities  Uppers     Delt (C5/6)     Bicep (C5/6)     Wrist Extend (C6)     Tricep (C7)     HG (C8/T1)  R                     5/5                 5/5                         5/5                           5/5                   5/5  L                      5/5                 5/5                         5/5                           5/5                   5/5  Lowers      HF(L1/L2)     KE (L3)     DF (L4)     EHL (L5)     PF (S1)      R                     5/5              5/5           5/5           5/5            5/5  L                     5/5               5/5          5/5            5/5            5/5  SENSATION: grossly intact to light touch all extremities  COORDINATION: Gait intact; rapid alternating movements intact; heel to shin intact; no upper extremity dysmetria  CHEST/LUNG: Clear to auscultation bilaterally; no rales, rhonchi, wheezing, or rubs  HEART: +S1/+S2; Regular rate and rhythm; no murmurs, rubs, or gallops  ABDOMEN: Soft, nontender, nondistended; bowel sounds present all four quadrants  EXTREMITIES:  2+ peripheral pulses, no clubbing, cyanosis, or edema  SKIN: Warm, dry; no rashes or lesions    LABS:                        8.9    6.34  )-----------( 275      ( 09 Nov 2023 02:25 )             28.1     11-09    143  |  109<H>  |  25.5<H>  ----------------------------<  100<H>  4.2   |  23.0  |  0.61    Ca    10.1      09 Nov 2023 02:25  Phos  2.7     11-09  Mg     1.9     11-09    TPro  6.6  /  Alb  3.4  /  TBili  0.4  /  DBili  x   /  AST  17  /  ALT  11  /  AlkPhos  71  11-08    PT/INR - ( 08 Nov 2023 19:10 )   PT: 12.0 sec;   INR: 1.08 ratio         PTT - ( 08 Nov 2023 19:10 )  PTT:25.5 sec  Urinalysis Basic - ( 09 Nov 2023 02:25 )    Color: x / Appearance: x / SG: x / pH: x  Gluc: 100 mg/dL / Ketone: x  / Bili: x / Urobili: x   Blood: x / Protein: x / Nitrite: x   Leuk Esterase: x / RBC: x / WBC x   Sq Epi: x / Non Sq Epi: x / Bacteria: x        11-08 @ 07:01 - 11-09 @ 07:00  --------------------------------------------------------  IN: 725 mL / OUT: 300 mL / NET: 425 mL    11-09 @ 07:01  -  11-10 @ 00:24  --------------------------------------------------------  IN: 650 mL / OUT: 700 mL / NET: -50 mL        RADIOLOGY & ADDITIONAL TESTS:   HPI:  Patient is a 83y old  Female who presents with a chief complaint of confusion x 4days    HPI: Patient is an 82yo F with a PMH of HTN, HLD, Arthritis, and NPH s/p  shunt in 03/23, at [place], who presented to the [place] ED with increased weakness and confusion and decreased PO intake. Patient follows with Neurologist Dr. Simon for her NPH. Per family, patient has been more confused and unable to walk over the past 4 days. Normally able to walk with a walker. Found to have mixed density SDH with 1.4cm midline shift, and transferred to [facility] for neurosurgical evaluation.    Interval history: Patient evaluated in ED, VSS, AOx3 (person/place (medical insitution)/year but not month), EOMI, PERRL, b/l 2r, FC, NAVARRO 4-/5 w/ drifts on Lt side.    Vital Signs Last 24 Hrs  T(C): 36.4 (08 Nov 2023 17:10), Max: 37.3 (08 Nov 2023 13:51)  T(F): 97.5 (08 Nov 2023 17:10), Max: 99.1 (08 Nov 2023 13:51)  HR: 81 (08 Nov 2023 17:10) (81 - 87)  BP: 173/74 (08 Nov 2023 17:10) (137/77 - 173/74)  BP(mean): --  RR: 18 (08 Nov 2023 17:10) (17 - 18)  SpO2: 97% (08 Nov 2023 17:10) (97% - 99%)    Parameters below as of 08 Nov 2023 17:10  Patient On (Oxygen Delivery Method): room air    Physical Exam:  Constitutional: NAD, lying in bed  Neuro:   Cardiovascular: Regular rate and rhythm.  Eyes: See neurologic examination with detailed examination of eyes.  ENT: No JVD, Trachea Midline  Respiratory: non labored breathing   Gastrointestinal: Soft, nontender, nondistended.  Genitourinary: [ ] Blair, [ x ] No Blair.   Musculoskeletal: No muscle wasting noted, (See neurologic assessment for full muscle strength assessment) No pretibial edema appreciated, no appreciable calf tenderness.  Skin:  no wounds, no redness, no abrasions noted  Hematologic / Lymph / Immunologic: No bleeding from IV sites or wounds, No lymphadenopathy, No Hives or allergic type skin lesions          LABS:                        10.2   8.75  )-----------( 340      ( 08 Nov 2023 13:30 )             31.9     11-08    144  |  112<H>  |  32<H>  ----------------------------<  107<H>  4.3   |  24  |  0.80    Ca    10.5<H>      08 Nov 2023 13:30    TPro  7.2  /  Alb  3.2<L>  /  TBili  0.4  /  DBili  x   /  AST  21  /  ALT  18  /  AlkPhos  80  11-08      Urinalysis Basic - ( 08 Nov 2023 13:30 )    Color: x / Appearance: x / SG: x / pH: x  Gluc: 107 mg/dL / Ketone: x  / Bili: x / Urobili: x   Blood: x / Protein: x / Nitrite: x   Leuk Esterase: x / RBC: x / WBC x   Sq Epi: x / Non Sq Epi: x / Bacteria: x    MEDICATIONS  (STANDING):  niCARdipine Infusion 5 mG/Hr (25 mL/Hr) IV Continuous <Continuous>    MEDICATIONS  (PRN):      RADIOLOGY & ADDITIONAL STUDIES:  < from: CT Head No Cont (02.01.23 @ 13:07) >  IMPRESSION:    Disproportionate enlargement of the ventricular system in relation to the   high convexity sulci with temporal flaring bilaterally may reflect   communicating/normal pressure hydrocephalus and/or advanced central   volume loss in the appropriate clinical setting.    Confluent periventricular white matter hypoattenuation due to chronic   microvascular ischemic changes and/or transependymal CSF resorption.    No acute intracranial bleeding.    --- End of Report ---    RACHEL LEONARDO MD; Attending Radiologist  This document has been electronically signed. Feb 1 2023  1:11PM      < from: CT Head No Cont (11.08.23 @ 14:13) >  IMPRESSION:    Moderate to large mixed attenuation right hemispheric convexity subdural   hematoma compressing right hemisphere and right lateral ventricle with   1.4 cm midline shift to left.    Right frontal ventriculostomy shunt catheter within right frontal horn.    Communication:  I discussed the finding of this report with Dr. Short at   2:25 PM on 11/8/2023.  Critical value policy of the hospital was   followed.  Read back and confirmation of receipt of this communication   was performed.  This verbal communication supplements the text report of   this document.    --- End of Report ---    KAE HUMPHREYS MD; Attending Radiologist  This document has been electronically signed. Nov 8 2023  2:28PM    < end of copied text >   (08 Nov 2023 17:59)      INTERVAL HPI/OVERNIGHT EVENTS:  83y Female s/p __ seen lying comfortably in bed. Tolerating diet. Passing gas/BM. Voiding. Blair in with __ clear urine. Denies headache, weakness, numbness, n/v/d, fevers, chills, chest pain, SOB.     Vital Signs Last 24 Hrs  T(C): 36.5 (09 Nov 2023 22:00), Max: 36.7 (09 Nov 2023 04:00)  T(F): 97.7 (09 Nov 2023 22:00), Max: 98.1 (09 Nov 2023 20:45)  HR: 78 (09 Nov 2023 22:00) (53 - 78)  BP: 120/63 (09 Nov 2023 22:00) (101/46 - 134/72)  BP(mean): 80 (09 Nov 2023 22:00) (63 - 105)  RR: 18 (09 Nov 2023 22:00) (12 - 22)  SpO2: 94% (09 Nov 2023 22:00) (94% - 100%)    Parameters below as of 09 Nov 2023 20:00  Patient On (Oxygen Delivery Method): room air    PHYSICAL EXAM:  GENERAL: No acute distress, well-developed  HEAD:  Atraumatic, Normocephalic  EYES: EOMI, PERRLA, conjunctiva and sclera clear  NECK: Supple, no lymphadenopathy, no JVD  CHEST/LUNG: Lung sounds clear, diminished at the bases  HEART: Regular rate and rhythm. Normal S1/S2. No murmurs, rubs, or gallops  ABDOMEN: Soft, non-tender, non-distended; normal bowel sounds, no organomegaly  EXTREMITIES:  2+ peripheral pulses b/l, No clubbing, cyanosis, or edema  NEUROLOGY: AOx1 (person), arouses to voice, withdraws to pain, intermittently follows commands. EOMI, PERRL.  SKIN: No rashes     LABS:                        8.9    6.34  )-----------( 275      ( 09 Nov 2023 02:25 )             28.1     11-09    143  |  109<H>  |  25.5<H>  ----------------------------<  100<H>  4.2   |  23.0  |  0.61    Ca    10.1      09 Nov 2023 02:25  Phos  2.7     11-09  Mg     1.9     11-09    TPro  6.6  /  Alb  3.4  /  TBili  0.4  /  DBili  x   /  AST  17  /  ALT  11  /  AlkPhos  71  11-08    PT/INR - ( 08 Nov 2023 19:10 )   PT: 12.0 sec;   INR: 1.08 ratio         PTT - ( 08 Nov 2023 19:10 )  PTT:25.5 sec  Urinalysis Basic - ( 09 Nov 2023 02:25 )    Color: x / Appearance: x / SG: x / pH: x  Gluc: 100 mg/dL / Ketone: x  / Bili: x / Urobili: x   Blood: x / Protein: x / Nitrite: x   Leuk Esterase: x / RBC: x / WBC x   Sq Epi: x / Non Sq Epi: x / Bacteria: x        11-08 @ 07:01 - 11-09 @ 07:00  --------------------------------------------------------  IN: 725 mL / OUT: 300 mL / NET: 425 mL    11-09 @ 07:01  -  11-10 @ 00:24  --------------------------------------------------------  IN: 650 mL / OUT: 700 mL / NET: -50 mL

## 2023-11-11 LAB
ANION GAP SERPL CALC-SCNC: 10 MMOL/L — SIGNIFICANT CHANGE UP (ref 5–17)
ANION GAP SERPL CALC-SCNC: 10 MMOL/L — SIGNIFICANT CHANGE UP (ref 5–17)
BUN SERPL-MCNC: 18.9 MG/DL — SIGNIFICANT CHANGE UP (ref 8–20)
BUN SERPL-MCNC: 18.9 MG/DL — SIGNIFICANT CHANGE UP (ref 8–20)
CALCIUM SERPL-MCNC: 9.2 MG/DL — SIGNIFICANT CHANGE UP (ref 8.4–10.5)
CALCIUM SERPL-MCNC: 9.2 MG/DL — SIGNIFICANT CHANGE UP (ref 8.4–10.5)
CHLORIDE SERPL-SCNC: 113 MMOL/L — HIGH (ref 96–108)
CHLORIDE SERPL-SCNC: 113 MMOL/L — HIGH (ref 96–108)
CO2 SERPL-SCNC: 21 MMOL/L — LOW (ref 22–29)
CO2 SERPL-SCNC: 21 MMOL/L — LOW (ref 22–29)
CREAT SERPL-MCNC: 0.54 MG/DL — SIGNIFICANT CHANGE UP (ref 0.5–1.3)
CREAT SERPL-MCNC: 0.54 MG/DL — SIGNIFICANT CHANGE UP (ref 0.5–1.3)
EGFR: 90 ML/MIN/1.73M2 — SIGNIFICANT CHANGE UP
EGFR: 90 ML/MIN/1.73M2 — SIGNIFICANT CHANGE UP
GLUCOSE BLDC GLUCOMTR-MCNC: 92 MG/DL — SIGNIFICANT CHANGE UP (ref 70–99)
GLUCOSE BLDC GLUCOMTR-MCNC: 92 MG/DL — SIGNIFICANT CHANGE UP (ref 70–99)
GLUCOSE SERPL-MCNC: 115 MG/DL — HIGH (ref 70–99)
GLUCOSE SERPL-MCNC: 115 MG/DL — HIGH (ref 70–99)
HCT VFR BLD CALC: 27.6 % — LOW (ref 34.5–45)
HCT VFR BLD CALC: 27.6 % — LOW (ref 34.5–45)
HGB BLD-MCNC: 8.8 G/DL — LOW (ref 11.5–15.5)
HGB BLD-MCNC: 8.8 G/DL — LOW (ref 11.5–15.5)
MAGNESIUM SERPL-MCNC: 2 MG/DL — SIGNIFICANT CHANGE UP (ref 1.6–2.6)
MAGNESIUM SERPL-MCNC: 2 MG/DL — SIGNIFICANT CHANGE UP (ref 1.6–2.6)
MCHC RBC-ENTMCNC: 28.9 PG — SIGNIFICANT CHANGE UP (ref 27–34)
MCHC RBC-ENTMCNC: 28.9 PG — SIGNIFICANT CHANGE UP (ref 27–34)
MCHC RBC-ENTMCNC: 31.9 GM/DL — LOW (ref 32–36)
MCHC RBC-ENTMCNC: 31.9 GM/DL — LOW (ref 32–36)
MCV RBC AUTO: 90.5 FL — SIGNIFICANT CHANGE UP (ref 80–100)
MCV RBC AUTO: 90.5 FL — SIGNIFICANT CHANGE UP (ref 80–100)
PHOSPHATE SERPL-MCNC: 2.4 MG/DL — SIGNIFICANT CHANGE UP (ref 2.4–4.7)
PHOSPHATE SERPL-MCNC: 2.4 MG/DL — SIGNIFICANT CHANGE UP (ref 2.4–4.7)
PLATELET # BLD AUTO: 260 K/UL — SIGNIFICANT CHANGE UP (ref 150–400)
PLATELET # BLD AUTO: 260 K/UL — SIGNIFICANT CHANGE UP (ref 150–400)
POTASSIUM SERPL-MCNC: 4.3 MMOL/L — SIGNIFICANT CHANGE UP (ref 3.5–5.3)
POTASSIUM SERPL-MCNC: 4.3 MMOL/L — SIGNIFICANT CHANGE UP (ref 3.5–5.3)
POTASSIUM SERPL-SCNC: 4.3 MMOL/L — SIGNIFICANT CHANGE UP (ref 3.5–5.3)
POTASSIUM SERPL-SCNC: 4.3 MMOL/L — SIGNIFICANT CHANGE UP (ref 3.5–5.3)
RBC # BLD: 3.05 M/UL — LOW (ref 3.8–5.2)
RBC # BLD: 3.05 M/UL — LOW (ref 3.8–5.2)
RBC # FLD: 15.6 % — HIGH (ref 10.3–14.5)
RBC # FLD: 15.6 % — HIGH (ref 10.3–14.5)
SODIUM SERPL-SCNC: 144 MMOL/L — SIGNIFICANT CHANGE UP (ref 135–145)
SODIUM SERPL-SCNC: 144 MMOL/L — SIGNIFICANT CHANGE UP (ref 135–145)
WBC # BLD: 7.41 K/UL — SIGNIFICANT CHANGE UP (ref 3.8–10.5)
WBC # BLD: 7.41 K/UL — SIGNIFICANT CHANGE UP (ref 3.8–10.5)
WBC # FLD AUTO: 7.41 K/UL — SIGNIFICANT CHANGE UP (ref 3.8–10.5)
WBC # FLD AUTO: 7.41 K/UL — SIGNIFICANT CHANGE UP (ref 3.8–10.5)

## 2023-11-11 PROCEDURE — 99232 SBSQ HOSP IP/OBS MODERATE 35: CPT

## 2023-11-11 PROCEDURE — 70450 CT HEAD/BRAIN W/O DYE: CPT | Mod: 26

## 2023-11-11 RX ORDER — ENOXAPARIN SODIUM 100 MG/ML
40 INJECTION SUBCUTANEOUS
Refills: 0 | Status: DISCONTINUED | OUTPATIENT
Start: 2023-11-11 | End: 2023-11-17

## 2023-11-11 RX ORDER — LEVETIRACETAM 250 MG/1
500 TABLET, FILM COATED ORAL
Refills: 0 | Status: DISCONTINUED | OUTPATIENT
Start: 2023-11-11 | End: 2023-11-17

## 2023-11-11 RX ADMIN — NYSTATIN CREAM 1 APPLICATION(S): 100000 CREAM TOPICAL at 17:02

## 2023-11-11 RX ADMIN — POLYETHYLENE GLYCOL 3350 17 GRAM(S): 17 POWDER, FOR SOLUTION ORAL at 17:01

## 2023-11-11 RX ADMIN — SENNA PLUS 2 TABLET(S): 8.6 TABLET ORAL at 22:36

## 2023-11-11 RX ADMIN — NYSTATIN CREAM 1 APPLICATION(S): 100000 CREAM TOPICAL at 06:36

## 2023-11-11 RX ADMIN — ENOXAPARIN SODIUM 40 MILLIGRAM(S): 100 INJECTION SUBCUTANEOUS at 17:02

## 2023-11-11 RX ADMIN — LEVETIRACETAM 400 MILLIGRAM(S): 250 TABLET, FILM COATED ORAL at 06:35

## 2023-11-11 RX ADMIN — MUPIROCIN 1 APPLICATION(S): 20 OINTMENT TOPICAL at 17:01

## 2023-11-11 RX ADMIN — MUPIROCIN 1 APPLICATION(S): 20 OINTMENT TOPICAL at 06:36

## 2023-11-11 RX ADMIN — LEVETIRACETAM 500 MILLIGRAM(S): 250 TABLET, FILM COATED ORAL at 17:02

## 2023-11-11 RX ADMIN — FAMOTIDINE 20 MILLIGRAM(S): 10 INJECTION INTRAVENOUS at 17:02

## 2023-11-11 RX ADMIN — POLYETHYLENE GLYCOL 3350 17 GRAM(S): 17 POWDER, FOR SOLUTION ORAL at 06:36

## 2023-11-11 RX ADMIN — Medication 85 MILLIMOLE(S): at 06:35

## 2023-11-11 RX ADMIN — ATORVASTATIN CALCIUM 40 MILLIGRAM(S): 80 TABLET, FILM COATED ORAL at 22:36

## 2023-11-11 RX ADMIN — FAMOTIDINE 20 MILLIGRAM(S): 10 INJECTION INTRAVENOUS at 06:46

## 2023-11-11 RX ADMIN — CHLORHEXIDINE GLUCONATE 1 APPLICATION(S): 213 SOLUTION TOPICAL at 11:28

## 2023-11-11 NOTE — PROGRESS NOTE ADULT - ASSESSMENT
83F PMH NPH s/p  shunt placement 3/23 at [place], HTN, HLD, Arthritis P/W Encephalopathy 2nd to Traumatic SDH 2nd to [facility] Fall. Plan for OR 11/10 for reilly hole.    -CTH tonight, keep HV clamped, f/up post scan imaging for any residue collection   -Neuro ck q 1 hr  -Keep SBP < 160  -Keppra 500 q 12, gary preca  -Pain Control PRN / Bowel regm  -Keep Sats > 92, Supplemental O2, Dariel Jn  -NPO for now except meds  -PT/OT, OOB once able, fall prev  -GI/DVT prox  followed by [facility] embo tomorrow  drain clamped per neurosurg, HOB flat  sz ppx with Keppra  maintain -160, cont Metoprolol IV PRN, Cardene ggt PRN  statin  maintain Osats>92%, incentive spirometry  NPO at midnight  deniz -180  SCDs, hold any anti-thrombotics   83F PMH NPH s/p  shunt placement 3/23 at [place], HTN, HLD, Arthritis P/W Encephalopathy 2nd to Traumatic SDH 2nd to [facility] Fall. Plan for OR 11/10 for reilly hole.    -Neuro ck q 1 hr  -Keep SBP < 160  -Keppra 500 q 12  -Pain Control PRN / Bowel regimen   -Keep Sats > 92, Supplemental O2, Incentive Spirometer  -NPO for now except meds  -PT/OT, OOB once able, fall precautions  -GI/DVT prox  -Statin  -Maintain Osats>92%, incentive spirometry  -Maintain -180  -SCDs, hold any anti-thrombotics   83F PMH NPH s/p  shunt placement 3/23 at [place], HTN, HLD, Arthritis P/W Encephalopathy 2nd to Traumatic SDH 2nd to [facility] Fall. Plan for OR 11/10 for reilly hole.    -Neuro ck q 1 hr  -Keep SBP < 160  -Keppra 500 q 12  -Pain Control PRN / Bowel regimen   -Keep Sats > 92, Supplemental O2, Incentive Spirometer  -NPO for now except meds  -PT/OT, OOB once able, fall precautions  -GI/DVT prox  -Trial of void  -AM head CT  -Statin  -Maintain Osats>92%, incentive spirometry  -Maintain -180  -SCDs, hold any anti-thrombotics   83F PMH NPH s/p  shunt placement 3/23 at [place], HTN, HLD, Arthritis P/W Encephalopathy 2nd to Traumatic SDH 2nd to [facility] Fall. Plan for OR 11/10 for reilly hole.    -Neuro ck q 1 hr  -Keep SBP < 160  -Keppra 500 q 12  -Pain Control PRN / Bowel regimen   -NPO for now except meds  -PT/OT, OOB once able, fall precautions  -GI/DVT prox  -Trial of void  -AM head CT  -Statin  -Maintain Osats>92%, incentive spirometry  -Maintain -180  -SCDs, hold any anti-thrombotics

## 2023-11-11 NOTE — PROGRESS NOTE ADULT - SUBJECTIVE AND OBJECTIVE BOX
HPI:  HPI:  Patient is a 83y old  Female who presents with a chief complaint of confusion x 4days    HPI: Patient is an 84yo F with a PMH of HTN, HLD, Arthritis, and NPH s/p  shunt in 03/23, at [place], who presented to the [facility] ED with increased weakness and confusion and decreased PO intake. Patient follows with Neurologist Dr. Simon for her NPH. Per family, patient has been more confused and unable to walk over the past 4 days. Normally able to walk with a walker. Found to have mixed density SDH with 1.4cm midline shift, and transferred to [facility] for neurosurgical evaluation.    Interval history: Patient evaluated in ED, VSS, AOx3 (person/place (medical insitution)/year but not month), EOMI, PERRL, b/l 2r, FC, NAVARRO 4-/5 w/ drifts on Lt side.    *Modified Chip Score- 3  0 - No symptoms.  1 - No significant disability. Able to carry out all usual activities, despite some symptoms.  2 - Slight disability. Able to look after own affairs without assistance, but unable to carry out all previous activities.  3 - Moderate disability. Requires some help, but able to walk unassisted.  4 - Moderately severe disability. Unable to attend to own bodily needs without assistance, and unable to walk unassisted.  5 - Severe disability. Requires constant nursing care and attention, bedridden, incontinent.    Vital Signs Last 24 Hrs  T(C): 36.4 (08 Nov 2023 17:10), Max: 37.3 (08 Nov 2023 13:51)  T(F): 97.5 (08 Nov 2023 17:10), Max: 99.1 (08 Nov 2023 13:51)  HR: 81 (08 Nov 2023 17:10) (81 - 87)  BP: 173/74 (08 Nov 2023 17:10) (137/77 - 173/74)  BP(mean): --  RR: 18 (08 Nov 2023 17:10) (17 - 18)  SpO2: 97% (08 Nov 2023 17:10) (97% - 99%)    Parameters below as of 08 Nov 2023 17:10  Patient On (Oxygen Delivery Method): room air          Physical Exam:  Constitutional: NAD, lying in bed  Neuro  AOx3 (person/place (medical insitution)/year but not month), EOMI, PERRL, B/L 2R, 4-/5 w/ drifts on Lt side.  Cardiovascular: Regular rate and rhythm.  Eyes: See neurologic examination with detailed examination of eyes.  ENT: No JVD, Trachea Midline  Respiratory: non labored breathing   Gastrointestinal: Soft, nontender, nondistended.  Genitourinary: [ ] Blair, [ x ] No Blair.   Musculoskeletal: No muscle wasting noted, (See neurologic assessment for full muscle strength assessment) No pretibial edema appreciated, no appreciable calf tenderness.  Skin:  no wounds, no redness, no abrasions noted  Hematologic / Lymph / Immunologic: No bleeding from IV sites or wounds, No lymphadenopathy, No Hives or allergic type skin lesions      LABS:                        10.2   8.75  )-----------( 340      ( 08 Nov 2023 13:30 )             31.9     11-08    144  |  112<H>  |  32<H>  ----------------------------<  107<H>  4.3   |  24  |  0.80    Ca    10.5<H>      08 Nov 2023 13:30    TPro  7.2  /  Alb  3.2<L>  /  TBili  0.4  /  DBili  x   /  AST  21  /  ALT  18  /  AlkPhos  80  11-08      Urinalysis Basic - ( 08 Nov 2023 13:30 )    Color: x / Appearance: x / SG: x / pH: x  Gluc: 107 mg/dL / Ketone: x  / Bili: x / Urobili: x   Blood: x / Protein: x / Nitrite: x   Leuk Esterase: x / RBC: x / WBC x   Sq Epi: x / Non Sq Epi: x / Bacteria: x    MEDICATIONS  (STANDING):  niCARdipine Infusion 5 mG/Hr (25 mL/Hr) IV Continuous <Continuous>    MEDICATIONS  (PRN):      RADIOLOGY & ADDITIONAL STUDIES:  < from: CT Head No Cont (02.01.23 @ 13:07) >  IMPRESSION:    Disproportionate enlargement of the ventricular system in relation to the   high convexity sulci with temporal flaring bilaterally may reflect   communicating/normal pressure hydrocephalus and/or advanced central   volume loss in the appropriate clinical setting.    Confluent periventricular white matter hypoattenuation due to chronic   microvascular ischemic changes and/or transependymal CSF resorption.    No acute intracranial bleeding.    --- End of Report ---            RACHEL LEONARDO MD; Attending Radiologist  This document has been electronically signed. Feb 1 2023  1:11PM    < end of copied text >  < from: CT Head No Cont (11.08.23 @ 14:13) >  IMPRESSION:    Moderate to large mixed attenuation right hemispheric convexity subdural   hematoma compressing right hemisphere and right lateral ventricle with   1.4 cm midline shift to left.    Right frontal ventriculostomy shunt catheter within right frontal horn.    Communication:  I discussed the finding of this report with Dr. Short at   2:25 PM on 11/8/2023.  Critical value policy of the hospital was   followed.  Read back and confirmation of receipt of this communication   was performed.  This verbal communication supplements the text report of   this document.    --- End of Report ---            KAE HUMPHREYS MD; Attending Radiologist  This document has been electronically signed. Nov 8 2023  2:28PM    < end of copied text >   (08 Nov 2023 17:59)      INTERVAL HPI/OVERNIGHT EVENTS:  83y Female s/p __ seen lying comfortably in bed. Tolerating diet. Passing gas/BM. Voiding. Blair in with __ clear urine. Denies headache, weakness, numbness, n/v/d, fevers, chills, chest pain, SOB.     Vital Signs Last 24 Hrs  T(C): 36.6 (11 Nov 2023 00:00), Max: 37.1 (10 Nov 2023 13:00)  T(F): 97.9 (11 Nov 2023 00:00), Max: 98.8 (10 Nov 2023 13:00)  HR: 53 (11 Nov 2023 00:00) (50 - 80)  BP: 118/54 (11 Nov 2023 00:00) (107/53 - 150/71)  BP(mean): 74 (11 Nov 2023 00:00) (69 - 95)  RR: 13 (11 Nov 2023 00:00) (11 - 24)  SpO2: 97% (11 Nov 2023 00:00) (87% - 100%)    Parameters below as of 11 Nov 2023 00:00  Patient On (Oxygen Delivery Method): room air        PHYSICAL EXAM:  GENERAL: NAD, well-groomed  HEAD:  Atraumatic, normocephalic  DRAINS:   WOUND: Dressing clean dry intact  SILVER COMA SCORE: E- V- M- =       E: 4= opens eyes spontaneously 3= to voice 2= to noxious 1= no opening       V: 5= oriented 4= confused 3= inappropriate words 2= incomprehensible sounds 1= nonverbal 1T= intubated       M: 6= follows commands 5= localizes 4= withdraws 3= flexor posturing 2= extensor posturing 1= no movement  MENTAL STATUS: AAO x3; Awake/Comatose; Opens eyes spontaneously/to voice/to light touch/to noxious stimuli; Appropriately conversant without aphasia/Nonverbal; following simple commands/mimicking/not following commands  CRANIAL NERVES: Visual acuity normal for age, visual fields full to confrontation, PERRL. EOMI without nystagmus. Facial sensation intact V1-3 distribution b/l. Face symmetric w/ normal eye closure and smile, tongue midline. Hearing grossly intact. Speech clear. Head turning and shoulder shrug intact.   REFLEXES: PERRL. Corneals intact b/l. Gag intact. Cough intact. Oculocephalic reflex intact (Doll's eye). Negative Quintero's b/l. Negative clonus b/l  MOTOR: strength 5/5 b/l upper and lower extremities  Uppers     Delt (C5/6)     Bicep (C5/6)     Wrist Extend (C6)     Tricep (C7)     HG (C8/T1)  R                     5/5                 5/5                         5/5                           5/5                   5/5  L                      5/5                 5/5                         5/5                           5/5                   5/5  Lowers      HF(L1/L2)     KE (L3)     DF (L4)     EHL (L5)     PF (S1)      R                     5/5              5/5           5/5           5/5            5/5  L                     5/5               5/5          5/5            5/5            5/5  SENSATION: grossly intact to light touch all extremities  COORDINATION: Gait intact; rapid alternating movements intact; heel to shin intact; no upper extremity dysmetria  CHEST/LUNG: Clear to auscultation bilaterally; no rales, rhonchi, wheezing, or rubs  HEART: +S1/+S2; Regular rate and rhythm; no murmurs, rubs, or gallops  ABDOMEN: Soft, nontender, nondistended; bowel sounds present all four quadrants  EXTREMITIES:  2+ peripheral pulses, no clubbing, cyanosis, or edema  SKIN: Warm, dry; no rashes or lesions    LABS:                        9.8    4.85  )-----------( 266      ( 10 Nov 2023 02:30 )             31.1     11-10    142  |  109<H>  |  18.9  ----------------------------<  148<H>  4.6   |  20.0<L>  |  0.68    Ca    9.2      10 Nov 2023 02:30  Phos  3.2     11-10  Mg     2.0     11-10        Urinalysis Basic - ( 10 Nov 2023 02:30 )    Color: x / Appearance: x / SG: x / pH: x  Gluc: 148 mg/dL / Ketone: x  / Bili: x / Urobili: x   Blood: x / Protein: x / Nitrite: x   Leuk Esterase: x / RBC: x / WBC x   Sq Epi: x / Non Sq Epi: x / Bacteria: x        11-09 @ 07:01  -  11-10 @ 07:00  --------------------------------------------------------  IN: 1050 mL / OUT: 1345 mL / NET: -295 mL    11-10 @ 07:01  -  11-11 @ 01:09  --------------------------------------------------------  IN: 1050 mL / OUT: 835 mL / NET: 215 mL        RADIOLOGY & ADDITIONAL TESTS:   HPI: Patient is a 83y old  Female who presents with a chief complaint of confusion x 4days. Patient is an 82yo F with a PMH of HTN, HLD, Arthritis, and NPH s/p  shunt in 03/23, at [place], who presented to the [place] ED with increased weakness and confusion and decreased PO intake. Patient follows with Neurologist Dr. Simon for her NPH. Per family, patient has been more confused and unable to walk over the past 4 days. Normally able to walk with a walker. Found to have mixed density SDH with 1.4cm midline shift, and transferred to [facility] for neurosurgical evaluation.    Interval history: Pt is s/p R MMA embolization.     PHYSICAL EXAM:    GENERAL: No acute distress  HEAD:  Atraumatic, Normocephalic  EYES: EOMI, PERRLA, conjunctiva and sclera clear  NECK: Supple, no lymphadenopathy, no JVD  CHEST/LUNG: Lung sounds clear, diminished at the bases  HEART: Regular rate and rhythm. Normal S1/S2.   ABDOMEN: Soft, non-tender, non-distended; hypoactive bowel sounds  EXTREMITIES:  2+ peripheral pulses b/l, No clubbing, cyanosis, or edema  NEUROLOGY: A&O x 1, LUE and LLE weakness  SKIN: No rashes or lesions    Vital Signs Last 24 Hrs  T(C): 36.4 (08 Nov 2023 17:10), Max: 37.3 (08 Nov 2023 13:51)  T(F): 97.5 (08 Nov 2023 17:10), Max: 99.1 (08 Nov 2023 13:51)  HR: 81 (08 Nov 2023 17:10) (81 - 87)  BP: 173/74 (08 Nov 2023 17:10) (137/77 - 173/74)  BP(mean): --  RR: 18 (08 Nov 2023 17:10) (17 - 18)  SpO2: 97% (08 Nov 2023 17:10) (97% - 99%)    Parameters below as of 08 Nov 2023 17:10  Patient On (Oxygen Delivery Method): room air    LABS:                        10.2   8.75  )-----------( 340      ( 08 Nov 2023 13:30 )             31.9     11-08    144  |  112<H>  |  32<H>  ----------------------------<  107<H>  4.3   |  24  |  0.80    Ca    10.5<H>      08 Nov 2023 13:30    TPro  7.2  /  Alb  3.2<L>  /  TBili  0.4  /  DBili  x   /  AST  21  /  ALT  18  /  AlkPhos  80  11-08      Urinalysis Basic - ( 08 Nov 2023 13:30 )    Color: x / Appearance: x / SG: x / pH: x  Gluc: 107 mg/dL / Ketone: x  / Bili: x / Urobili: x   Blood: x / Protein: x / Nitrite: x   Leuk Esterase: x / RBC: x / WBC x   Sq Epi: x / Non Sq Epi: x / Bacteria: x    MEDICATIONS  (STANDING):  niCARdipine Infusion 5 mG/Hr (25 mL/Hr) IV Continuous <Continuous>    MEDICATIONS  (PRN):      RADIOLOGY & ADDITIONAL STUDIES:  < from: CT Head No Cont (02.01.23 @ 13:07) >  IMPRESSION:    Disproportionate enlargement of the ventricular system in relation to the   high convexity sulci with temporal flaring bilaterally may reflect   communicating/normal pressure hydrocephalus and/or advanced central   volume loss in the appropriate clinical setting.    Confluent periventricular white matter hypoattenuation due to chronic   microvascular ischemic changes and/or transependymal CSF resorption.    No acute intracranial bleeding.    --- End of Report ---    RACHEL LEONARDO MD; Attending Radiologist  This document has been electronically signed. Feb 1 2023  1:11PM    < end of copied text >  < from: CT Head No Cont (11.08.23 @ 14:13) >  IMPRESSION:    Moderate to large mixed attenuation right hemispheric convexity subdural   hematoma compressing right hemisphere and right lateral ventricle with   1.4 cm midline shift to left.    Right frontal ventriculostomy shunt catheter within right frontal horn.    Communication:  I discussed the finding of this report with Dr. Short at   2:25 PM on 11/8/2023.  Critical value policy of the hospital was   followed.  Read back and confirmation of receipt of this communication   was performed.  This verbal communication supplements the text report of   this document.    --- End of Report ---  KAE HUMPHREYS MD; Attending Radiologist  This document has been electronically signed. Nov 8 2023  2:28PM    < end of copied text >   (08 Nov 2023 17:59)      INTERVAL HPI/OVERNIGHT EVENTS:  83y Female s/p __ seen lying comfortably in bed. Tolerating diet. Passing gas/BM. Voiding. Blair in with __ clear urine. Denies headache, weakness, numbness, n/v/d, fevers, chills, chest pain, SOB.     Vital Signs Last 24 Hrs  T(C): 36.6 (11 Nov 2023 00:00), Max: 37.1 (10 Nov 2023 13:00)  T(F): 97.9 (11 Nov 2023 00:00), Max: 98.8 (10 Nov 2023 13:00)  HR: 53 (11 Nov 2023 00:00) (50 - 80)  BP: 118/54 (11 Nov 2023 00:00) (107/53 - 150/71)  BP(mean): 74 (11 Nov 2023 00:00) (69 - 95)  RR: 13 (11 Nov 2023 00:00) (11 - 24)  SpO2: 97% (11 Nov 2023 00:00) (87% - 100%)    Parameters below as of 11 Nov 2023 00:00  Patient On (Oxygen Delivery Method): room air    LABS:                        9.8    4.85  )-----------( 266      ( 10 Nov 2023 02:30 )             31.1     11-10    142  |  109<H>  |  18.9  ----------------------------<  148<H>  4.6   |  20.0<L>  |  0.68    Ca    9.2      10 Nov 2023 02:30  Phos  3.2     11-10  Mg     2.0     11-10    Urinalysis Basic - ( 10 Nov 2023 02:30 )    Color: x / Appearance: x / SG: x / pH: x  Gluc: 148 mg/dL / Ketone: x  / Bili: x / Urobili: x   Blood: x / Protein: x / Nitrite: x   Leuk Esterase: x / RBC: x / WBC x   Sq Epi: x / Non Sq Epi: x / Bacteria: x    11-09 @ 07:01  -  11-10 @ 07:00  --------------------------------------------------------  IN: 1050 mL / OUT: 1345 mL / NET: -295 mL    11-10 @ 07:01 - 11-11 @ 01:09  --------------------------------------------------------  IN: 1050 mL / OUT: 835 mL / NET: 215 mL         HPI: Patient is a 83y old  Female who presents with a chief complaint of confusion x 4days. Patient is an 82yo F with a PMH of HTN, HLD, Arthritis, and NPH s/p  shunt in 03/23, at [place], who presented to the [place] ED with increased weakness and confusion and decreased PO intake. Patient follows with Neurologist Dr. Simon for her NPH. Per family, patient has been more confused and unable to walk over the past 4 days. Normally able to walk with a walker. Found to have mixed density SDH with 1.4cm midline shift, and transferred to [facility] for neurosurgical evaluation.    Interval history: Pt is s/p R MMA embolization. Head hemovac drain removed.     PHYSICAL EXAM:    GENERAL: No acute distress  HEAD:  Atraumatic, Normocephalic  EYES: EOMI, PERRLA, conjunctiva and sclera clear  NECK: Supple, no lymphadenopathy, no JVD  CHEST/LUNG: Lung sounds clear, diminished at the bases  HEART: Regular rate and rhythm. Normal S1/S2.   ABDOMEN: Soft, non-tender, non-distended; hypoactive bowel sounds  EXTREMITIES:  2+ peripheral pulses b/l, No clubbing, cyanosis, or edema  NEUROLOGY: A&O x 1, LUE and LLE weakness  SKIN: No rashes or lesions    Vital Signs Last 24 Hrs  T(C): 36.4 (08 Nov 2023 17:10), Max: 37.3 (08 Nov 2023 13:51)  T(F): 97.5 (08 Nov 2023 17:10), Max: 99.1 (08 Nov 2023 13:51)  HR: 81 (08 Nov 2023 17:10) (81 - 87)  BP: 173/74 (08 Nov 2023 17:10) (137/77 - 173/74)  BP(mean): --  RR: 18 (08 Nov 2023 17:10) (17 - 18)  SpO2: 97% (08 Nov 2023 17:10) (97% - 99%)    Parameters below as of 08 Nov 2023 17:10  Patient On (Oxygen Delivery Method): room air    LABS:                        10.2   8.75  )-----------( 340      ( 08 Nov 2023 13:30 )             31.9     11-08    144  |  112<H>  |  32<H>  ----------------------------<  107<H>  4.3   |  24  |  0.80    Ca    10.5<H>      08 Nov 2023 13:30    TPro  7.2  /  Alb  3.2<L>  /  TBili  0.4  /  DBili  x   /  AST  21  /  ALT  18  /  AlkPhos  80  11-08      Urinalysis Basic - ( 08 Nov 2023 13:30 )    Color: x / Appearance: x / SG: x / pH: x  Gluc: 107 mg/dL / Ketone: x  / Bili: x / Urobili: x   Blood: x / Protein: x / Nitrite: x   Leuk Esterase: x / RBC: x / WBC x   Sq Epi: x / Non Sq Epi: x / Bacteria: x    MEDICATIONS  (STANDING):  niCARdipine Infusion 5 mG/Hr (25 mL/Hr) IV Continuous <Continuous>    MEDICATIONS  (PRN):      RADIOLOGY & ADDITIONAL STUDIES:  < from: CT Head No Cont (02.01.23 @ 13:07) >  IMPRESSION:    Disproportionate enlargement of the ventricular system in relation to the   high convexity sulci with temporal flaring bilaterally may reflect   communicating/normal pressure hydrocephalus and/or advanced central   volume loss in the appropriate clinical setting.    Confluent periventricular white matter hypoattenuation due to chronic   microvascular ischemic changes and/or transependymal CSF resorption.    No acute intracranial bleeding.    --- End of Report ---    RACHEL LEONARDO MD; Attending Radiologist  This document has been electronically signed. Feb 1 2023  1:11PM    < end of copied text >  < from: CT Head No Cont (11.08.23 @ 14:13) >  IMPRESSION:    Moderate to large mixed attenuation right hemispheric convexity subdural   hematoma compressing right hemisphere and right lateral ventricle with   1.4 cm midline shift to left.    Right frontal ventriculostomy shunt catheter within right frontal horn.    Communication:  I discussed the finding of this report with Dr. Short at   2:25 PM on 11/8/2023.  Critical value policy of the hospital was   followed.  Read back and confirmation of receipt of this communication   was performed.  This verbal communication supplements the text report of   this document.    --- End of Report ---  KAE HUMPHREYS MD; Attending Radiologist  This document has been electronically signed. Nov 8 2023  2:28PM    < end of copied text >   (08 Nov 2023 17:59)      INTERVAL HPI/OVERNIGHT EVENTS:  83y Female s/p __ seen lying comfortably in bed. Tolerating diet. Passing gas/BM. Voiding. Blair in with __ clear urine. Denies headache, weakness, numbness, n/v/d, fevers, chills, chest pain, SOB.     Vital Signs Last 24 Hrs  T(C): 36.6 (11 Nov 2023 00:00), Max: 37.1 (10 Nov 2023 13:00)  T(F): 97.9 (11 Nov 2023 00:00), Max: 98.8 (10 Nov 2023 13:00)  HR: 53 (11 Nov 2023 00:00) (50 - 80)  BP: 118/54 (11 Nov 2023 00:00) (107/53 - 150/71)  BP(mean): 74 (11 Nov 2023 00:00) (69 - 95)  RR: 13 (11 Nov 2023 00:00) (11 - 24)  SpO2: 97% (11 Nov 2023 00:00) (87% - 100%)    Parameters below as of 11 Nov 2023 00:00  Patient On (Oxygen Delivery Method): room air    LABS:                        9.8    4.85  )-----------( 266      ( 10 Nov 2023 02:30 )             31.1     11-10    142  |  109<H>  |  18.9  ----------------------------<  148<H>  4.6   |  20.0<L>  |  0.68    Ca    9.2      10 Nov 2023 02:30  Phos  3.2     11-10  Mg     2.0     11-10    Urinalysis Basic - ( 10 Nov 2023 02:30 )    Color: x / Appearance: x / SG: x / pH: x  Gluc: 148 mg/dL / Ketone: x  / Bili: x / Urobili: x   Blood: x / Protein: x / Nitrite: x   Leuk Esterase: x / RBC: x / WBC x   Sq Epi: x / Non Sq Epi: x / Bacteria: x    11-09 @ 07:01  -  11-10 @ 07:00  --------------------------------------------------------  IN: 1050 mL / OUT: 1345 mL / NET: -295 mL    11-10 @ 07:01  - 11-11 @ 01:09  --------------------------------------------------------  IN: 1050 mL / OUT: 835 mL / NET: 215 mL

## 2023-11-11 NOTE — PHYSICAL THERAPY INITIAL EVALUATION ADULT - GAIT DEVIATIONS NOTED, PT EVAL
Scissoring gait with turns/decreased rosmery/decreased step length/decreased stride length/decreased weight-shifting ability

## 2023-11-11 NOTE — CHART NOTE - NSCHARTNOTEFT_GEN_A_CORE
STEVEN [id number]      Drain type: SG HMV    Drain was taken off of suction and removed while patient was laying supine and flat. Catheter tip intact upon removal. Patient tolerated well. No complications. Exit site was secured with 1 suture. STEVEN [id number]    TIME: 11/10 @ 11PM  Drain type: SG HMV    Drain was taken off of suction and removed while patient was laying supine and flat. Catheter tip intact upon removal. Patient tolerated well. No complications. Exit site was secured with 1 suture.

## 2023-11-12 DIAGNOSIS — I48.0 PAROXYSMAL ATRIAL FIBRILLATION: ICD-10-CM

## 2023-11-12 LAB
ANION GAP SERPL CALC-SCNC: 10 MMOL/L — SIGNIFICANT CHANGE UP (ref 5–17)
ANION GAP SERPL CALC-SCNC: 10 MMOL/L — SIGNIFICANT CHANGE UP (ref 5–17)
BUN SERPL-MCNC: 16 MG/DL — SIGNIFICANT CHANGE UP (ref 8–20)
BUN SERPL-MCNC: 16 MG/DL — SIGNIFICANT CHANGE UP (ref 8–20)
CALCIUM SERPL-MCNC: 9.7 MG/DL — SIGNIFICANT CHANGE UP (ref 8.4–10.5)
CALCIUM SERPL-MCNC: 9.7 MG/DL — SIGNIFICANT CHANGE UP (ref 8.4–10.5)
CHLORIDE SERPL-SCNC: 109 MMOL/L — HIGH (ref 96–108)
CHLORIDE SERPL-SCNC: 109 MMOL/L — HIGH (ref 96–108)
CO2 SERPL-SCNC: 23 MMOL/L — SIGNIFICANT CHANGE UP (ref 22–29)
CO2 SERPL-SCNC: 23 MMOL/L — SIGNIFICANT CHANGE UP (ref 22–29)
CREAT SERPL-MCNC: 0.54 MG/DL — SIGNIFICANT CHANGE UP (ref 0.5–1.3)
CREAT SERPL-MCNC: 0.54 MG/DL — SIGNIFICANT CHANGE UP (ref 0.5–1.3)
EGFR: 90 ML/MIN/1.73M2 — SIGNIFICANT CHANGE UP
EGFR: 90 ML/MIN/1.73M2 — SIGNIFICANT CHANGE UP
GLUCOSE SERPL-MCNC: 78 MG/DL — SIGNIFICANT CHANGE UP (ref 70–99)
GLUCOSE SERPL-MCNC: 78 MG/DL — SIGNIFICANT CHANGE UP (ref 70–99)
HCT VFR BLD CALC: 28.1 % — LOW (ref 34.5–45)
HCT VFR BLD CALC: 28.1 % — LOW (ref 34.5–45)
HGB BLD-MCNC: 8.6 G/DL — LOW (ref 11.5–15.5)
HGB BLD-MCNC: 8.6 G/DL — LOW (ref 11.5–15.5)
MAGNESIUM SERPL-MCNC: 1.7 MG/DL — SIGNIFICANT CHANGE UP (ref 1.6–2.6)
MAGNESIUM SERPL-MCNC: 1.7 MG/DL — SIGNIFICANT CHANGE UP (ref 1.6–2.6)
MCHC RBC-ENTMCNC: 28 PG — SIGNIFICANT CHANGE UP (ref 27–34)
MCHC RBC-ENTMCNC: 28 PG — SIGNIFICANT CHANGE UP (ref 27–34)
MCHC RBC-ENTMCNC: 30.6 GM/DL — LOW (ref 32–36)
MCHC RBC-ENTMCNC: 30.6 GM/DL — LOW (ref 32–36)
MCV RBC AUTO: 91.5 FL — SIGNIFICANT CHANGE UP (ref 80–100)
MCV RBC AUTO: 91.5 FL — SIGNIFICANT CHANGE UP (ref 80–100)
PHOSPHATE SERPL-MCNC: 2 MG/DL — LOW (ref 2.4–4.7)
PHOSPHATE SERPL-MCNC: 2 MG/DL — LOW (ref 2.4–4.7)
PLATELET # BLD AUTO: 293 K/UL — SIGNIFICANT CHANGE UP (ref 150–400)
PLATELET # BLD AUTO: 293 K/UL — SIGNIFICANT CHANGE UP (ref 150–400)
POTASSIUM SERPL-MCNC: 3.7 MMOL/L — SIGNIFICANT CHANGE UP (ref 3.5–5.3)
POTASSIUM SERPL-MCNC: 3.7 MMOL/L — SIGNIFICANT CHANGE UP (ref 3.5–5.3)
POTASSIUM SERPL-SCNC: 3.7 MMOL/L — SIGNIFICANT CHANGE UP (ref 3.5–5.3)
POTASSIUM SERPL-SCNC: 3.7 MMOL/L — SIGNIFICANT CHANGE UP (ref 3.5–5.3)
RBC # BLD: 3.07 M/UL — LOW (ref 3.8–5.2)
RBC # BLD: 3.07 M/UL — LOW (ref 3.8–5.2)
RBC # FLD: 15.4 % — HIGH (ref 10.3–14.5)
RBC # FLD: 15.4 % — HIGH (ref 10.3–14.5)
SODIUM SERPL-SCNC: 142 MMOL/L — SIGNIFICANT CHANGE UP (ref 135–145)
SODIUM SERPL-SCNC: 142 MMOL/L — SIGNIFICANT CHANGE UP (ref 135–145)
WBC # BLD: 7.38 K/UL — SIGNIFICANT CHANGE UP (ref 3.8–10.5)
WBC # BLD: 7.38 K/UL — SIGNIFICANT CHANGE UP (ref 3.8–10.5)
WBC # FLD AUTO: 7.38 K/UL — SIGNIFICANT CHANGE UP (ref 3.8–10.5)
WBC # FLD AUTO: 7.38 K/UL — SIGNIFICANT CHANGE UP (ref 3.8–10.5)

## 2023-11-12 PROCEDURE — 99232 SBSQ HOSP IP/OBS MODERATE 35: CPT

## 2023-11-12 PROCEDURE — 93010 ELECTROCARDIOGRAM REPORT: CPT

## 2023-11-12 RX ORDER — POTASSIUM CHLORIDE 20 MEQ
20 PACKET (EA) ORAL ONCE
Refills: 0 | Status: COMPLETED | OUTPATIENT
Start: 2023-11-12 | End: 2023-11-12

## 2023-11-12 RX ORDER — MAGNESIUM OXIDE 400 MG ORAL TABLET 241.3 MG
400 TABLET ORAL AT BEDTIME
Refills: 0 | Status: DISCONTINUED | OUTPATIENT
Start: 2023-11-12 | End: 2023-11-17

## 2023-11-12 RX ORDER — MAGNESIUM SULFATE 500 MG/ML
2 VIAL (ML) INJECTION ONCE
Refills: 0 | Status: COMPLETED | OUTPATIENT
Start: 2023-11-12 | End: 2023-11-12

## 2023-11-12 RX ORDER — POTASSIUM CHLORIDE 20 MEQ
10 PACKET (EA) ORAL DAILY
Refills: 0 | Status: DISCONTINUED | OUTPATIENT
Start: 2023-11-13 | End: 2023-11-17

## 2023-11-12 RX ORDER — SODIUM,POTASSIUM PHOSPHATES 278-250MG
1 POWDER IN PACKET (EA) ORAL ONCE
Refills: 0 | Status: COMPLETED | OUTPATIENT
Start: 2023-11-12 | End: 2023-11-12

## 2023-11-12 RX ORDER — METOPROLOL TARTRATE 50 MG
25 TABLET ORAL EVERY 12 HOURS
Refills: 0 | Status: DISCONTINUED | OUTPATIENT
Start: 2023-11-12 | End: 2023-11-17

## 2023-11-12 RX ADMIN — CHLORHEXIDINE GLUCONATE 1 APPLICATION(S): 213 SOLUTION TOPICAL at 11:39

## 2023-11-12 RX ADMIN — POLYETHYLENE GLYCOL 3350 17 GRAM(S): 17 POWDER, FOR SOLUTION ORAL at 06:12

## 2023-11-12 RX ADMIN — Medication 20 MILLIEQUIVALENT(S): at 11:39

## 2023-11-12 RX ADMIN — FAMOTIDINE 20 MILLIGRAM(S): 10 INJECTION INTRAVENOUS at 18:56

## 2023-11-12 RX ADMIN — MAGNESIUM OXIDE 400 MG ORAL TABLET 400 MILLIGRAM(S): 241.3 TABLET ORAL at 13:31

## 2023-11-12 RX ADMIN — LEVETIRACETAM 500 MILLIGRAM(S): 250 TABLET, FILM COATED ORAL at 06:12

## 2023-11-12 RX ADMIN — SENNA PLUS 2 TABLET(S): 8.6 TABLET ORAL at 21:52

## 2023-11-12 RX ADMIN — MAGNESIUM OXIDE 400 MG ORAL TABLET 400 MILLIGRAM(S): 241.3 TABLET ORAL at 21:52

## 2023-11-12 RX ADMIN — ATORVASTATIN CALCIUM 40 MILLIGRAM(S): 80 TABLET, FILM COATED ORAL at 21:52

## 2023-11-12 RX ADMIN — Medication 25 MILLIGRAM(S): at 18:56

## 2023-11-12 RX ADMIN — NYSTATIN CREAM 1 APPLICATION(S): 100000 CREAM TOPICAL at 18:57

## 2023-11-12 RX ADMIN — MUPIROCIN 1 APPLICATION(S): 20 OINTMENT TOPICAL at 18:57

## 2023-11-12 RX ADMIN — LEVETIRACETAM 500 MILLIGRAM(S): 250 TABLET, FILM COATED ORAL at 18:56

## 2023-11-12 RX ADMIN — Medication 975 MILLIGRAM(S): at 21:52

## 2023-11-12 RX ADMIN — Medication 1 PACKET(S): at 12:05

## 2023-11-12 RX ADMIN — Medication 975 MILLIGRAM(S): at 22:53

## 2023-11-12 RX ADMIN — NYSTATIN CREAM 1 APPLICATION(S): 100000 CREAM TOPICAL at 06:13

## 2023-11-12 RX ADMIN — FAMOTIDINE 20 MILLIGRAM(S): 10 INJECTION INTRAVENOUS at 06:11

## 2023-11-12 RX ADMIN — MUPIROCIN 1 APPLICATION(S): 20 OINTMENT TOPICAL at 06:12

## 2023-11-12 RX ADMIN — Medication 25 GRAM(S): at 11:39

## 2023-11-12 NOTE — PROGRESS NOTE ADULT - SUBJECTIVE AND OBJECTIVE BOX
[facility]                                                         CARDIOLOGY AT [place] [address]                                                         Telephone: [phone]. Fax:[phone]                                                                             PROGRESS NOTE    Reason for follow up:  New onset of atrial fib  Was found to have atrial fib with rapid rate to 130  now back in nsr  Patient did not feel the episode    Review of symptoms:   Cardiac:  No chest pain. No dyspnea. No palpitations.  Respiratory: no cough. No dyspnea  Gastrointestinal: No diarrhea. No abdominal pain. No bleeding.   Neuro: No focal neuro complaints.      Vitals:  T(C): 36.6 (11-12-23 @ 08:15), Max: 37.1 (11-12-23 @ 00:31)  HR: 69 (11-12-23 @ 08:15) (66 - 91)  BP: 153/75 (11-12-23 @ 08:15) (92/67 - 153/75)  RR: 18 (11-12-23 @ 08:15) (16 - 20)  SpO2: 95% (11-12-23 @ 08:15) (95% - 99%)  Wt(kg): --  I&O's Summary    11 Nov 2023 07:01  -  12 Nov 2023 07:00  --------------------------------------------------------  IN: 1637.5 mL / OUT: 500 mL / NET: 1137.5 mL    12 Nov 2023 07:01  -  12 Nov 2023 10:55  --------------------------------------------------------  IN: 300 mL / OUT: 220 mL / NET: 80 mL      Weight (kg): 79.1 (11-08 @ 23:25), 127.868 (11-08 @ 12:55)      PHYSICAL EXAM:  Appearance: Comfortable. No acute distress  HEENT:  Atraumatic. Normocephalic.  Normal oral mucosa  Neurologic: Alert mild confusion  Cardiovascular:S1&s2 regular  Respiratory: Lungs clear to auscultation, unlabored   Gastrointestinal:  Soft, Non-tender, + BS  Lower Extremities: No edema  Psychiatry: Midly agitated  Skin: warm and dry.      CURRENT MEDICATIONS:  MEDICATIONS  (STANDING):  atorvastatin 40 milliGRAM(s) Oral at bedtime  chlorhexidine 2% Cloths 1 Application(s) Topical daily  enoxaparin Injectable 40 milliGRAM(s) SubCutaneous <User Schedule>  famotidine Injectable 20 milliGRAM(s) IV Push every 12 hours  levETIRAcetam 500 milliGRAM(s) Oral two times a day  mupirocin 2% Nasal 1 Application(s) Both Nostrils two times a day  nystatin Powder 1 Application(s) Topical two times a day  polyethylene glycol 3350 17 Gram(s) Oral two times a day  potassium phosphate / sodium phosphate Powder (PHOS-NaK) 1 Packet(s) Oral once  senna 2 Tablet(s) Oral at bedtime    LABS:	 	                        8.6    7.38  )-----------( 293      ( 12 Nov 2023 05:26 )             28.1     11-12    142  |  109<H>  |  16.0  ----------------------------<  78  3.7   |  23.0  |  0.54    Ca    9.7      12 Nov 2023 05:26  Phos  2.0     11-12  Mg     1.7     11-12      proBNP:   Lipid Profile: Date: 11-08 @ 19:10  Total cholesterol 144; Direct LDL: --; HDL: 65; Triglycerides:36  HgA1c:   TSH: Thyroid Stimulating Hormone, Serum: 3.51 uIU/mL      TELEMETRY:  Sinus rhythm then atrial fib then sinus tach now NSR       DIAGNOSTIC TESTING:  [ ] Echocardiogram: < from: TTE Echo Complete w/ Contrast w/ Doppler (11.09.23 @ 12:00) >  Summary:   1. Normal global left ventricular systolic function.   2. There is mild concentric left ventricular hypertrophy.   3. Left ventricular ejection fraction, by visual estimation, is 60 to   65%.   4. Spectral Doppler shows impaired relaxation pattern of left   ventricular myocardial filling (Grade I diastolic dysfunction).   5. Elevated mean left atrial pressure.   6. Normal right ventricular size and function.   7. The left atrium is normal in size.   8. The right atrium is normal in size.   9. Sclerotic aortic valve with normal opening.  10. Mild mitral annular calcification.  11. Mild thickening and calcification of the anterior and posterior   mitral valve leaflets.  12. Mild mitral valve regurgitation.  13. Mild tricuspid regurgitation.  14. There is no evidence of pericardial effusion.    < end of copied text >    [ ]  Catheterization:  [ ] Stress Test:    OTHER:

## 2023-11-12 NOTE — PROGRESS NOTE ADULT - SUBJECTIVE AND OBJECTIVE BOX
NEUROSURGERY PROGRESS NOTE:    HPI: Patient is a 83y old Female who presents with a chief complaint of confusion x 4days.   Patient is an 82yo F with a PMH of HTN, HLD, Arthritis, and NPH s/p  shunt in 03/23, at [place], who presented to the [place] ED with increased weakness and confusion and decreased PO intake. Patient follows with Neurologist Dr. Simon for her NPH. Per family, patient has been more confused and unable to walk over the past 4 days. Normally able to walk with a walker. Found to have mixed density SDH with 1.4cm midline shift, and transferred to [facility] for neurosurgical evaluation.    Interval history:   11/9 R sided reilly holes for SDH evac/ HMV drain   11/10 R MMA embo & HMV dc’d   11/12 halkim VPS reprogrammed @200 ( from 140)     pt seen in AM, OOB to chair ( max assist per NA/ RN) and states is at baseline, denied any new or worsening sensorimotor changes  RN stated that pt was in rapid Afib when getting OOB/ cardiology reconsulted and tele reviewed/ EBKG obtained event and returned to NSR, indio signed off w meds adjustment recommendations and recommended OP f/u for watchman/ electrolytes replacement.     -headache  - Nausea / - Vomiting  baseline L-sided weakness  denies sensorimotor changes   voiding via primafit       MEDICATIONS  (STANDING):  atorvastatin 40 milliGRAM(s) Oral at bedtime  chlorhexidine 2% Cloths 1 Application(s) Topical daily  enoxaparin Injectable 40 milliGRAM(s) SubCutaneous <User Schedule>  famotidine Injectable 20 milliGRAM(s) IV Push every 12 hours  levETIRAcetam 500 milliGRAM(s) Oral two times a day  magnesium oxide 400 milliGRAM(s) Oral at bedtime  metoprolol tartrate 25 milliGRAM(s) Oral every 12 hours  mupirocin 2% Nasal 1 Application(s) Both Nostrils two times a day  nystatin Powder 1 Application(s) Topical two times a day  polyethylene glycol 3350 17 Gram(s) Oral two times a day  senna 2 Tablet(s) Oral at bedtime    MEDICATIONS  (PRN):  acetaminophen     Tablet .. 975 milliGRAM(s) Oral every 6 hours PRN Temp greater or equal to 38.5C (101.3F), Mild Pain (1 - 3)  bisacodyl 5 milliGRAM(s) Oral every 12 hours PRN Constipation  oxyCODONE    IR 5 milliGRAM(s) Oral every 4 hours PRN Moderate Pain (4 - 6)    Allergies    No Known Allergies    Intolerances      Vital Signs Last 24 Hrs  T(C): 37 (12 Nov 2023 16:35), Max: 37.1 (12 Nov 2023 00:31)  T(F): 98.6 (12 Nov 2023 16:35), Max: 98.7 (12 Nov 2023 00:31)  HR: 98 (12 Nov 2023 16:35) (69 - 98)  BP: 138/79 (12 Nov 2023 16:35) (138/79 - 153/75)  BP(mean): --  RR: 18 (12 Nov 2023 16:35) (17 - 18)  SpO2: 100% (12 Nov 2023 16:35) (95% - 100%)    Parameters below as of 12 Nov 2023 16:35  Patient On (Oxygen Delivery Method): room air        PHYSICAL EXAM:  GENERAL: No acute distress  HEAD: Atraumatic, Normocephalic  EYES: EOMI, PERRLA, conjunctiva and sclera clear  NECK: Supple, no lymphadenopathy,  CHEST/LUNG: Lung sounds clear, diminished at the bases  HEART: Regular rate and rhythm. Normal S1/S2.   ABDOMEN: Soft, non-tender, non-distended; hypoactive bowel sounds  EXTREMITIES:  2+ peripheral pulses b/l, No clubbing, cyanosis, or edema  NEUROLOGY: A&O x 1, LUE and LLE weakness at baseline w minimal improvement   SKIN: No rashes or lesions appreciated         LABS:                        8.6    7.38  )-----------( 293      ( 12 Nov 2023 05:26 )             28.1     11-12    142  |  109<H>  |  16.0  ----------------------------<  78  3.7   |  23.0  |  0.54    Ca    9.7      12 Nov 2023 05:26  Phos  2.0     11-12  Mg     1.7     11-12        Urinalysis Basic - ( 12 Nov 2023 05:26 )    Color: x / Appearance: x / SG: x / pH: x  Gluc: 78 mg/dL / Ketone: x  / Bili: x / Urobili: x   Blood: x / Protein: x / Nitrite: x   Leuk Esterase: x / RBC: x / WBC x   Sq Epi: x / Non Sq Epi: x / Bacteria: x      RADIOLOGY & ADDITIONAL TESTS:  no new NSx images available for review     < from: CT Head No Cont (11.11.23 @ 03:20) >  ACC: [id number] EXAM:  CT BRAIN   ORDERED BY: CHA HERNODN     PROCEDURE DATE:  11/11/2023    INTERPRETATION:  CLINICAL INDICATIONS:  f/u R MMA embo  COMPARISON: Head CT dated 11/10/2023  TECHNIQUE: Noncontrast CT of the head. Multiplanar reformations are submitted.    FINDINGS:  Decreased size of right convexity subdural collection, now measuring approximately 9 mm in caliber (2:30 and 2:26), compared to 1.5 cm on 11/10/2023. Improved mass effect and decreased midline shift to the right. Mild increase in size of the right lateral ventricle is likely due to diminished mass effect rather than hydrocephalus.  shunt catheter, with its tip in the frontal horn of the right lateral ventricle is stable in position. No newintracranial findings. Chronic microvascular changes.   Stigmata of prior sinonasal surgery. Mild mucosal thickening throughout the visualized paranasal sinuses. Status post right-sided middle meningeal artery embolization.    IMPRESSION: Decreased right convexity subdural collection.    --- End of Report ---  TRESA JONES MD; Attending Radiologist  This document has been electronically signed. Nov 11 2023  9:57AM  < end of copied text >    I spent a total time of 35 mins with the patient at bedside of which more than 50% of time was spent on counseling/coordination of care

## 2023-11-12 NOTE — PROGRESS NOTE ADULT - PROBLEM SELECTOR PLAN 1
Now in NSR  would add metoprolol 25mg bid  keep k >4 and mg >2  Would favor against long term anticoagulation due to recent SDH and Fall  Patient should follow in our office out patient for atrial fib and to be considered for a watchman device after discharge    will sign off, please call for questions

## 2023-11-12 NOTE — PROGRESS NOTE ADULT - NS ATTEND AMEND GEN_ALL_CORE FT
Neurosurgery Attending Attestation:    Patient seen and examined at bedside. Agree with plan and note as documented above.    84 y/o F w/ PMHx significant for NPH s/p  shunt placement 3/23 at [place], HTN, HLD, Arthritis, who presents as transfer from [place] ED w/ increased weakness and confusion and 4 days of inability to walk. CT Head w/o contrast demonstrates 2cm subacute on chronic R convexity SDH with 7mm of R to L midline shift. On exam, patient is awake, alert, oriented to self, hospital, and not year, PERRL, EOMI, FS, TML, L drift, 5/5 in RUE and 4-4+/5 LUE, 4-4+/5 in BLE. I had an extensive discussion with patient and patient's . We discussed that patient would benefit from subdural hematoma evacuation and likely postoperative MMA embolization to prevent recurrence subdural hematoma. We discussed the surgical plan would be try for burrholes for evacuation but should there be extensive membranes, the operation would convert to craniotomy for subdural evacuation. Given the location of the shunt valve, we discussed that if the need exists for conversion to craniotomy, we would plan to change the shunt valve to Certas to allow for virtual off at Certas 8 to encourage re-expansion of the brain.    Discussed risks and benefits of surgery with patient and family. Benefits include resection of tumor and relief of local swelling and mass effect as well as obtaining tissue for pathology diagnosis. Risks include but are not limited to bleeding, pain, infection, motor/sensory/cognitive deficits, stroke, coma, paralysis, death and need for repeat or further surgery. Patient and family expressed understanding of the risks and benefits and wish to proceed.    I reviewed the patient’s imaging before the case. I confirmed with the patient the planned surgical site and side of the procedure. I confirmed that the appropriate imaging demonstrating the surgical side and site was available and present for the planned case. The present imaging confirmed the surgical indication for the planned surgery. The secondary provider reviewing the imaging was Dr. Leena Coreas.    Plan:    -proceed to OR for R burrholes vs craniotomy for subdural hematoma evacuation, possible shunt valve replacement    -Sierra Rodriguez MD.
seen with above,    83F history significant for HTN, HLD, NPH with  shunt presents with fall found with SDH with midline shift s/p Dariusz hole evacuation, cardiology initially seen 3 days ago for junctional rhythm and preop. cardiac risk eval, Echo unremarkable, found with new onset pAfib RVR 130s short lasting only few minutes while sitting up, converted to sinus tachycardia  -start metoprolol 25mg BID  -CHADSVasc 4, however not candidate for anticoagulation given recent traumatic SDH and h/o NPH with falls, can consider elective outpatient LAAO eval.   -reconsult if new cardiac issue arise      Omar Holman DO, [facility]  Faculty Non-Invasive Cardiologist  [phone]

## 2023-11-12 NOTE — PROGRESS NOTE ADULT - ASSESSMENT
83y old  Female PMH NPH with shunt, HTN, HLD. Presents s/p fall found to have SDH with midline shift s/p [place] hole with drainage of hematoma 09-Nov who now has Paroxysmal atrial fib.  EF 60% DD

## 2023-11-12 NOTE — PROGRESS NOTE ADULT - ASSESSMENT
83F PMH NPH s/p  shunt placement 3/23 at [place], HTN, HLD, Arthritis P/W Encephalopathy 2nd to Traumatic SDH 2nd to [facility] Fall.   11/9 R sided reilly holes for SDH evac/ HMV drain   11/10 R MMA embo & HMV dc’d   11/12 halkim VPS reprogrammed @200 ( from 140)     pt seen in AM, OOB to chair ( max assist per NA/ RN) and states is at baseline, denied any new or worsening sensorimotor changes  RN stated that pt was in rapid Afib when getting OOB/ cardiology reconsulted and tele reviewed/ EBKG obtained event and returned to NSR, indio signed off w meds adjustment recommendations and recommended OP f/u for watchman/ electrolytes replacement.    -lateral skull/ shunt setting review ordered for review prior to discharge   -PMnR eval for poss short term AR   -Neuro ck q4 hr  -Keep SBP < 160  -Keppra 500 q 12  -Pain Control PRN / Bowel regimen   -PT/OT & fall precautions  -GI/DVT prox  -Statin  -Maintain Osats>92%, incentive spirometry  -Maintain -180  -SCDs/ chemical DVT PPX as ordered  -electrolytes supplemented as recommended by cardio   -further plan as per attending

## 2023-11-12 NOTE — PROGRESS NOTE ADULT - NS ATTEND OPT1 GEN_ALL_CORE
Patient called and advised her insurance will cover the labs at the [facility] facility. Termed the call   
I attest my time as attending is greater than 50% of the total combined time spent on qualifying patient care activities by the PA/NP and attending.
I independently performed the documented:

## 2023-11-13 LAB
ANION GAP SERPL CALC-SCNC: 14 MMOL/L — SIGNIFICANT CHANGE UP (ref 5–17)
ANION GAP SERPL CALC-SCNC: 14 MMOL/L — SIGNIFICANT CHANGE UP (ref 5–17)
BUN SERPL-MCNC: 12 MG/DL — SIGNIFICANT CHANGE UP (ref 8–20)
BUN SERPL-MCNC: 12 MG/DL — SIGNIFICANT CHANGE UP (ref 8–20)
CALCIUM SERPL-MCNC: 9.8 MG/DL — SIGNIFICANT CHANGE UP (ref 8.4–10.5)
CALCIUM SERPL-MCNC: 9.8 MG/DL — SIGNIFICANT CHANGE UP (ref 8.4–10.5)
CHLORIDE SERPL-SCNC: 105 MMOL/L — SIGNIFICANT CHANGE UP (ref 96–108)
CHLORIDE SERPL-SCNC: 105 MMOL/L — SIGNIFICANT CHANGE UP (ref 96–108)
CO2 SERPL-SCNC: 22 MMOL/L — SIGNIFICANT CHANGE UP (ref 22–29)
CO2 SERPL-SCNC: 22 MMOL/L — SIGNIFICANT CHANGE UP (ref 22–29)
CREAT SERPL-MCNC: 0.46 MG/DL — LOW (ref 0.5–1.3)
CREAT SERPL-MCNC: 0.46 MG/DL — LOW (ref 0.5–1.3)
EGFR: 94 ML/MIN/1.73M2 — SIGNIFICANT CHANGE UP
EGFR: 94 ML/MIN/1.73M2 — SIGNIFICANT CHANGE UP
GLUCOSE BLDC GLUCOMTR-MCNC: 93 MG/DL — SIGNIFICANT CHANGE UP (ref 70–99)
GLUCOSE BLDC GLUCOMTR-MCNC: 93 MG/DL — SIGNIFICANT CHANGE UP (ref 70–99)
GLUCOSE SERPL-MCNC: 57 MG/DL — LOW (ref 70–99)
GLUCOSE SERPL-MCNC: 57 MG/DL — LOW (ref 70–99)
MAGNESIUM SERPL-MCNC: 1.9 MG/DL — SIGNIFICANT CHANGE UP (ref 1.6–2.6)
MAGNESIUM SERPL-MCNC: 1.9 MG/DL — SIGNIFICANT CHANGE UP (ref 1.6–2.6)
PHOSPHATE SERPL-MCNC: 2.5 MG/DL — SIGNIFICANT CHANGE UP (ref 2.4–4.7)
PHOSPHATE SERPL-MCNC: 2.5 MG/DL — SIGNIFICANT CHANGE UP (ref 2.4–4.7)
POTASSIUM SERPL-MCNC: 4.2 MMOL/L — SIGNIFICANT CHANGE UP (ref 3.5–5.3)
POTASSIUM SERPL-MCNC: 4.2 MMOL/L — SIGNIFICANT CHANGE UP (ref 3.5–5.3)
POTASSIUM SERPL-SCNC: 4.2 MMOL/L — SIGNIFICANT CHANGE UP (ref 3.5–5.3)
POTASSIUM SERPL-SCNC: 4.2 MMOL/L — SIGNIFICANT CHANGE UP (ref 3.5–5.3)
SODIUM SERPL-SCNC: 141 MMOL/L — SIGNIFICANT CHANGE UP (ref 135–145)
SODIUM SERPL-SCNC: 141 MMOL/L — SIGNIFICANT CHANGE UP (ref 135–145)

## 2023-11-13 PROCEDURE — ZZZZZ: CPT

## 2023-11-13 PROCEDURE — 99223 1ST HOSP IP/OBS HIGH 75: CPT | Mod: GC

## 2023-11-13 RX ORDER — DEXTROSE 50 % IN WATER 50 %
25 SYRINGE (ML) INTRAVENOUS ONCE
Refills: 0 | Status: DISCONTINUED | OUTPATIENT
Start: 2023-11-13 | End: 2023-11-17

## 2023-11-13 RX ORDER — LANOLIN ALCOHOL/MO/W.PET/CERES
5 CREAM (GRAM) TOPICAL AT BEDTIME
Refills: 0 | Status: DISCONTINUED | OUTPATIENT
Start: 2023-11-13 | End: 2023-11-14

## 2023-11-13 RX ORDER — SODIUM CHLORIDE 9 MG/ML
1000 INJECTION, SOLUTION INTRAVENOUS
Refills: 0 | Status: DISCONTINUED | OUTPATIENT
Start: 2023-11-13 | End: 2023-11-17

## 2023-11-13 RX ORDER — MEMANTINE HYDROCHLORIDE 10 MG/1
5 TABLET ORAL DAILY
Refills: 0 | Status: DISCONTINUED | OUTPATIENT
Start: 2023-11-13 | End: 2023-11-14

## 2023-11-13 RX ORDER — DEXTROSE 50 % IN WATER 50 %
12.5 SYRINGE (ML) INTRAVENOUS ONCE
Refills: 0 | Status: DISCONTINUED | OUTPATIENT
Start: 2023-11-13 | End: 2023-11-17

## 2023-11-13 RX ORDER — DEXTROSE 50 % IN WATER 50 %
15 SYRINGE (ML) INTRAVENOUS ONCE
Refills: 0 | Status: DISCONTINUED | OUTPATIENT
Start: 2023-11-13 | End: 2023-11-17

## 2023-11-13 RX ORDER — HYDRALAZINE HCL 50 MG
10 TABLET ORAL
Refills: 0 | Status: DISCONTINUED | OUTPATIENT
Start: 2023-11-13 | End: 2023-11-17

## 2023-11-13 RX ORDER — GLUCAGON INJECTION, SOLUTION 0.5 MG/.1ML
1 INJECTION, SOLUTION SUBCUTANEOUS ONCE
Refills: 0 | Status: DISCONTINUED | OUTPATIENT
Start: 2023-11-13 | End: 2023-11-17

## 2023-11-13 RX ORDER — MAGNESIUM SULFATE 500 MG/ML
1 VIAL (ML) INJECTION ONCE
Refills: 0 | Status: COMPLETED | OUTPATIENT
Start: 2023-11-13 | End: 2023-11-13

## 2023-11-13 RX ORDER — ACETAMINOPHEN 500 MG
975 TABLET ORAL EVERY 8 HOURS
Refills: 0 | Status: DISCONTINUED | OUTPATIENT
Start: 2023-11-13 | End: 2023-11-17

## 2023-11-13 RX ADMIN — FAMOTIDINE 20 MILLIGRAM(S): 10 INJECTION INTRAVENOUS at 05:59

## 2023-11-13 RX ADMIN — Medication 5 MILLIGRAM(S): at 21:59

## 2023-11-13 RX ADMIN — POLYETHYLENE GLYCOL 3350 17 GRAM(S): 17 POWDER, FOR SOLUTION ORAL at 16:18

## 2023-11-13 RX ADMIN — LEVETIRACETAM 500 MILLIGRAM(S): 250 TABLET, FILM COATED ORAL at 16:18

## 2023-11-13 RX ADMIN — ENOXAPARIN SODIUM 40 MILLIGRAM(S): 100 INJECTION SUBCUTANEOUS at 17:23

## 2023-11-13 RX ADMIN — SENNA PLUS 2 TABLET(S): 8.6 TABLET ORAL at 21:59

## 2023-11-13 RX ADMIN — MUPIROCIN 1 APPLICATION(S): 20 OINTMENT TOPICAL at 16:18

## 2023-11-13 RX ADMIN — Medication 25 MILLIGRAM(S): at 16:18

## 2023-11-13 RX ADMIN — NYSTATIN CREAM 1 APPLICATION(S): 100000 CREAM TOPICAL at 16:18

## 2023-11-13 RX ADMIN — Medication 10 MILLIEQUIVALENT(S): at 12:12

## 2023-11-13 RX ADMIN — CHLORHEXIDINE GLUCONATE 1 APPLICATION(S): 213 SOLUTION TOPICAL at 12:12

## 2023-11-13 RX ADMIN — Medication 10 MILLIGRAM(S): at 20:48

## 2023-11-13 RX ADMIN — ATORVASTATIN CALCIUM 40 MILLIGRAM(S): 80 TABLET, FILM COATED ORAL at 21:59

## 2023-11-13 RX ADMIN — POLYETHYLENE GLYCOL 3350 17 GRAM(S): 17 POWDER, FOR SOLUTION ORAL at 05:58

## 2023-11-13 RX ADMIN — FAMOTIDINE 20 MILLIGRAM(S): 10 INJECTION INTRAVENOUS at 16:18

## 2023-11-13 RX ADMIN — LEVETIRACETAM 500 MILLIGRAM(S): 250 TABLET, FILM COATED ORAL at 05:59

## 2023-11-13 RX ADMIN — Medication 100 GRAM(S): at 16:17

## 2023-11-13 RX ADMIN — MEMANTINE HYDROCHLORIDE 5 MILLIGRAM(S): 10 TABLET ORAL at 16:55

## 2023-11-13 RX ADMIN — Medication 25 MILLIGRAM(S): at 05:59

## 2023-11-13 RX ADMIN — MUPIROCIN 1 APPLICATION(S): 20 OINTMENT TOPICAL at 05:59

## 2023-11-13 RX ADMIN — MAGNESIUM OXIDE 400 MG ORAL TABLET 400 MILLIGRAM(S): 241.3 TABLET ORAL at 21:59

## 2023-11-13 RX ADMIN — NYSTATIN CREAM 1 APPLICATION(S): 100000 CREAM TOPICAL at 05:59

## 2023-11-13 NOTE — PROGRESS NOTE ADULT - ASSESSMENT
Assessment:  82yo F PMH HTN, HLD, arthritis and NPH s/p  shunt 3/23 presented with acute on chronic SDH s/p mechanical fall Underwent R sided reilly holes for SDH evacuation on 11/9 and R MMA embo on 11/10. Hakim VPS shunt reprogrammed from 140 to 200 on 11/12, pending review of shunt imaging.     Plan:  - Q4h neuro checks  - Lateral skull/shunt setting review ordered for review prior to discharge   - PMnR eval pending for poss short term AR   - Cardio recs appreciated for new onset Afib; Metoprolol 25mg BID, outpatient f/u for possible watchman, K > 4, Mg > 2  - SBP < 160  - Keppra 500 BID  - Pain control; Oxy, Tylenol  - Bowel regimen; Senna, Miralax, dulcolax. Monitor for BM  - DVT ppx; SCDs, SQL  - electrolytes supplemented as recommended by cardio   - Case and plan discussed with Dr. Rodriguez   Assessment:  82yo F PMH HTN, HLD, arthritis and NPH s/p  shunt 3/23 presented with acute on chronic SDH s/p mechanical fall Underwent R sided reilly holes for SDH evacuation on 11/9 and R MMA embo on 11/10. Hakim VPS shunt reprogrammed from 140 to 200 on 11/12, pending review of shunt imaging.     Plan:  - Q4h neuro checks  - Lateral skull/shunt setting review ordered for review prior to discharge   - PMnR consult appreciated; Meds recs, delirium precautions, dispo acute inpatient rehab  - Cardio recs appreciated for new onset Afib; Metoprolol 25mg BID, outpatient f/u for possible watchman, K > 4, Mg > 2  - SBP < 160  - Keppra 500 BID  - Pain control; Oxy, Tylenol  - Bowel regimen; Senna, Miralax, dulcolax. Monitor for BM  - DVT ppx; SCDs, SQL  - electrolytes supplemented as recommended by cardio   - Case and plan discussed with Dr. Rodriguez

## 2023-11-13 NOTE — PROGRESS NOTE ADULT - SUBJECTIVE AND OBJECTIVE BOX
HPI:   Patient is an 84yo F with a PMH of HTN, HLD, Arthritis, and NPH s/p  shunt in 03/23, at [place], who presented to the [place] ED with increased weakness and confusion and decreased PO intake. Patient follows with Neurologist Dr. Simon for her NPH. Per family, patient has been more confused and unable to walk over the past 4 days. Normally able to walk with a walker. Found to have mixed density SDH with 1.4cm midline shift, and transferred to [facility] for neurosurgical evaluation. Pt underwent a reilly hole placement for SDH evacuation on 11/9 and R MMA embo on 11/10.       INTERVAL HPI/OVERNIGHT EVENTS:  83y Female s/p R sided reilly holes for SDH evacuation on 11/9 and R MMA embo on 11/10. Hakim VPS reprogrammed from 140 to 200 on 11/12.  Seen and examined by neurosurgery team. Patient confused at baseline. Offers no complaints.     Vital Signs Last 24 Hrs  T(C): 36.9 (13 Nov 2023 07:38), Max: 37.2 (12 Nov 2023 21:00)  T(F): 98.4 (13 Nov 2023 07:38), Max: 99 (12 Nov 2023 21:00)  HR: 66 (13 Nov 2023 07:38) (66 - 98)  BP: 157/81 (13 Nov 2023 07:38) (131/86 - 157/81)  BP(mean): --  RR: 18 (13 Nov 2023 07:38) (18 - 18)  SpO2: 98% (13 Nov 2023 07:38) (96% - 100%)    Parameters below as of 13 Nov 2023 07:38  Patient On (Oxygen Delivery Method): room air      PHYSICAL EXAM:  GENERAL: NAD  HEAD:  normocephalic  WOUND: Dressing clean dry intact  SILVER COMA SCORE: E-4 V-4 M-6 = 14  MENTAL STATUS: AAOx1 (self); Awake; Opens eyes spontaneously; follows simple commands  CRANIAL NERVES: PERRL. EOMI without nystagmus. Face symmetric w/ normal eye closure and smile, tongue midline. Hearing grossly intact. Speech clear.   MOTOR: LUE and LLE weakness at baseline   SENSATION: grossly intact to light touch all extremities  SKIN: Warm, dry; no rashes or lesions    LABS:                        8.6    7.38  )-----------( 293      ( 12 Nov 2023 05:26 )             28.1     11-12    142  |  109<H>  |  16.0  ----------------------------<  78  3.7   |  23.0  |  0.54    Ca    9.7      12 Nov 2023 05:26  Phos  2.0     11-12  Mg     1.7     11-12        Urinalysis Basic - ( 12 Nov 2023 05:26 )    Color: x / Appearance: x / SG: x / pH: x  Gluc: 78 mg/dL / Ketone: x  / Bili: x / Urobili: x   Blood: x / Protein: x / Nitrite: x   Leuk Esterase: x / RBC: x / WBC x   Sq Epi: x / Non Sq Epi: x / Bacteria: x        11-12 @ 07:01  -  11-13 @ 07:00  --------------------------------------------------------  IN: 950 mL / OUT: 2020 mL / NET: -1070 mL        RADIOLOGY & ADDITIONAL TESTS:  < from: CT Head No Cont (11.08.23 @ 14:13) >  IMPRESSION:    Moderate to large mixed attenuation right hemispheric convexity subdural   hematoma compressing right hemisphere and right lateral ventricle with   1.4 cm midline shift to left.    Right frontal ventriculostomy shunt catheter within right frontal horn.      < end of copied text >    < from: CT Head No Cont (11.10.23 @ 00:32) >  Impression:    Expected postoperative changes status post evacuation of right-sided   subdural hematoma, which is decreased in size and density compared with   prior. Decreased mass effect with decreasing leftward midline shift, now   measuring up to 7 mm.    Grossly stable position of the articular catheter. Slightly increased   caliber of the ventricles is likely related to decreasing mass effect,   recommend attention on follow-up to exclude developing hydrocephalus.    < end of copied text >    < from: Xray Shunt Series (11.09.23 @ 13:59) >  NTERPRETATION:  Clinical history: Shunt    Frontal views of the head chest and abdomen demonstrate a shunt catheter   exiting via a right-sided reilly hole and courses down the right side of   the head and neck and chest and abdomen into the pelvis and terminating   in the left lower quadrant.    IMPRESSION:    Shunt catheter as described above.      < end of copied text >   HPI:   Patient is an 82yo F with a PMH of HTN, HLD, Arthritis, and NPH s/p  shunt in 03/23, at [place], who presented to the [place] ED with increased weakness and confusion and decreased PO intake. Patient follows with Neurologist Dr. Simon for her NPH. Per family, patient has been more confused and unable to walk over the past 4 days. Normally able to walk with a walker. Found to have mixed density SDH with 1.4cm midline shift, and transferred to [facility] for neurosurgical evaluation. Pt underwent a reilly hole placement for SDH evacuation on 11/9 and R MMA embo on 11/10.       INTERVAL HPI/OVERNIGHT EVENTS:  83y Female s/p R sided reilly holes for SDH evacuation on 11/9 and R MMA embo on 11/10. Hakim VPS reprogrammed from 140 to 200 on 11/12.  Seen and examined by neurosurgery team. Patient confused, only oriented to self. Offers no complaints.     Vital Signs Last 24 Hrs  T(C): 36.9 (13 Nov 2023 07:38), Max: 37.2 (12 Nov 2023 21:00)  T(F): 98.4 (13 Nov 2023 07:38), Max: 99 (12 Nov 2023 21:00)  HR: 66 (13 Nov 2023 07:38) (66 - 98)  BP: 157/81 (13 Nov 2023 07:38) (131/86 - 157/81)  BP(mean): --  RR: 18 (13 Nov 2023 07:38) (18 - 18)  SpO2: 98% (13 Nov 2023 07:38) (96% - 100%)    Parameters below as of 13 Nov 2023 07:38  Patient On (Oxygen Delivery Method): room air      PHYSICAL EXAM:  GENERAL: NAD  HEAD:  normocephalic  WOUND: Dressing clean dry intact  SILVER COMA SCORE: E-4 V-4 M-6 = 14  MENTAL STATUS: AAOx1 (self); Awake; Opens eyes spontaneously; follows simple commands  CRANIAL NERVES: PERRL. EOMI without nystagmus. Face symmetric w/ normal eye closure and smile, tongue midline. Hearing grossly intact. Speech clear.   MOTOR: RUE 5/5, LUE 4+/5 w drift, b/l HF/KE 5/5, b/l DF/PF 4+/5  SENSATION: grossly intact to light touch all extremities  SKIN: Warm, dry; no rashes or lesions    LABS:                        8.6    7.38  )-----------( 293      ( 12 Nov 2023 05:26 )             28.1     11-12    142  |  109<H>  |  16.0  ----------------------------<  78  3.7   |  23.0  |  0.54    Ca    9.7      12 Nov 2023 05:26  Phos  2.0     11-12  Mg     1.7     11-12        Urinalysis Basic - ( 12 Nov 2023 05:26 )    Color: x / Appearance: x / SG: x / pH: x  Gluc: 78 mg/dL / Ketone: x  / Bili: x / Urobili: x   Blood: x / Protein: x / Nitrite: x   Leuk Esterase: x / RBC: x / WBC x   Sq Epi: x / Non Sq Epi: x / Bacteria: x        11-12 @ 07:01  -  11-13 @ 07:00  --------------------------------------------------------  IN: 950 mL / OUT: 2020 mL / NET: -1070 mL        RADIOLOGY & ADDITIONAL TESTS:  < from: CT Head No Cont (11.08.23 @ 14:13) >  IMPRESSION:    Moderate to large mixed attenuation right hemispheric convexity subdural   hematoma compressing right hemisphere and right lateral ventricle with   1.4 cm midline shift to left.    Right frontal ventriculostomy shunt catheter within right frontal horn.      < end of copied text >    < from: CT Head No Cont (11.10.23 @ 00:32) >  Impression:    Expected postoperative changes status post evacuation of right-sided   subdural hematoma, which is decreased in size and density compared with   prior. Decreased mass effect with decreasing leftward midline shift, now   measuring up to 7 mm.    Grossly stable position of the articular catheter. Slightly increased   caliber of the ventricles is likely related to decreasing mass effect,   recommend attention on follow-up to exclude developing hydrocephalus.    < end of copied text >    < from: Xray Shunt Series (11.09.23 @ 13:59) >  NTERPRETATION:  Clinical history: Shunt    Frontal views of the head chest and abdomen demonstrate a shunt catheter   exiting via a right-sided reilly hole and courses down the right side of   the head and neck and chest and abdomen into the pelvis and terminating   in the left lower quadrant.    IMPRESSION:    Shunt catheter as described above.      < end of copied text >

## 2023-11-13 NOTE — CONSULT NOTE ADULT - SUBJECTIVE AND OBJECTIVE BOX
Name: STEVEN LYON  Age: 83y  Gender: Female  Admitting Diagnosis: Traumatic subdural hemorrhage with loss of consciousness status unknown, initial encounter [S06.5XAA]  TRAUM SUBDR HEM WITH LOC STATUS UNKNOWN, INITIAL ENCOUNTER      Current Diagnoses:   Normal pressure hydrocephalus  HTN (hypertension)  HLD (hyperlipidemia)  Arthritis  SDH (subdural hematoma)  Preoperative cardiovascular examination  Paroxysmal atrial fibrillation  Creation of reilly hole on right side  [place]incoln hole with drainage of hematoma  S/P  shunt      HPI Per Record: Patient is an 83-yo female with a history of HTN, HLD, arthritis, and NPH s/p  shunt at [facility] (03/23). Records indicate that she ambulates with a walker. She presented to the [facility] ED with increased weakness, confusion and decreased PO intake. Neuroimaging revealed right hemisphere convexity subdural hematoma and 1.4 cm midline shift to the left. She was transferred to [facility] for neurosurgical evaluation on 11/08/2023 and underwent SDH evacuation on 11/09/2023. She underwent right MMA embolism on 11/10/2023 and her  shunt was reprogrammed on 11/12/2023. Per patient's , she has been exhibiting worsening confusion over the last several days.    MEDICATIONS  (STANDING):  atorvastatin 40 milliGRAM(s) Oral at bedtime  chlorhexidine 2% Cloths 1 Application(s) Topical daily  dextrose 5%. 1000 milliLiter(s) (100 mL/Hr) IV Continuous <Continuous>  dextrose 5%. 1000 milliLiter(s) (50 mL/Hr) IV Continuous <Continuous>  dextrose 50% Injectable 25 Gram(s) IV Push once  dextrose 50% Injectable 25 Gram(s) IV Push once  dextrose 50% Injectable 12.5 Gram(s) IV Push once  enoxaparin Injectable 40 milliGRAM(s) SubCutaneous <User Schedule>  famotidine Injectable 20 milliGRAM(s) IV Push every 12 hours  glucagon  Injectable 1 milliGRAM(s) IntraMuscular once  levETIRAcetam 500 milliGRAM(s) Oral two times a day  magnesium oxide 400 milliGRAM(s) Oral at bedtime  magnesium sulfate  IVPB 1 Gram(s) IV Intermittent once  melatonin 5 milliGRAM(s) Oral at bedtime  memantine 5 milliGRAM(s) Oral daily  metoprolol tartrate 25 milliGRAM(s) Oral every 12 hours  mupirocin 2% Nasal 1 Application(s) Both Nostrils two times a day  nystatin Powder 1 Application(s) Topical two times a day  polyethylene glycol 3350 17 Gram(s) Oral two times a day  potassium chloride    Tablet ER 10 milliEquivalent(s) Oral daily  senna 2 Tablet(s) Oral at bedtime    MEDICATIONS  (PRN):  acetaminophen     Tablet .. 975 milliGRAM(s) Oral every 8 hours PRN Mild Pain (1 - 3)  acetaminophen     Tablet .. 975 milliGRAM(s) Oral every 6 hours PRN Temp greater or equal to 38.5C (101.3F), Mild Pain (1 - 3)  bisacodyl 5 milliGRAM(s) Oral every 12 hours PRN Constipation  dextrose Oral Gel 15 Gram(s) Oral once PRN Blood Glucose LESS THAN 70 milliGRAM(s)/deciliter  oxyCODONE    IR 5 milliGRAM(s) Oral every 4 hours PRN Moderate Pain (4 - 6)    Neuroimaging:    Head CT 11/8/2023  IMPRESSION:  Moderate to large mixed attenuation right hemispheric convexity subdural   hematoma compressing right hemisphere and right lateral ventricle with   1.4 cm midline shift to left.  Right frontal ventriculostomy shunt catheter within right frontal horn.    Head CT 11/10/2023    IMPRESSION:  Expected postoperative changes status post evacuation of right-sided   subdural hematoma, which is decreased in size and density compared with   prior. Decreased mass effect with decreasing leftward midline shift, now   measuring up to 7 mm.    Grossly stable position of the articular catheter. Slightly increased   caliber of the ventricles is likely related to decreasing mass effect,   recommend attention on follow-up to exclude developing hydrocephalus.    Clinical Interview:     MSE:  Appearance- Appropriately dressed and groomed.  Behavior- Polite and cooperative; easily distractible.  Affect- Appropriate, in full-range.  Mood- Euthymic.  Speech - Normal rate, rhythm, volume, and prosody.   Thought Process- Tangential.  Thought Content- Notable for hallucinations (seeing herself in a fabric factory).    Measures Administered:    Results Summary:           Name: STEVEN LYON  Age: 83y  Gender: Female  Admitting Diagnosis: Traumatic subdural hemorrhage with loss of consciousness status unknown, initial encounter [S06.5XAA]  TRAUM SUBDR HEM WITH LOC STATUS UNKNOWN, INITIAL ENCOUNTER    Current Diagnoses:   Normal pressure hydrocephalus  HTN (hypertension)  HLD (hyperlipidemia)  Arthritis  SDH (subdural hematoma)  Preoperative cardiovascular examination  Paroxysmal atrial fibrillation  Creation of reilly hole on right side  Reilly hole with drainage of hematoma  S/P  shunt    HPI: Patient is an 83-yo female with a history of HTN, HLD, arthritis, and NPH s/p  shunt at [facility] (). Records indicate that she ambulates with a walker. She presented to the [facility] ED with increased weakness, confusion and decreased PO intake. Neuroimaging revealed right hemisphere convexity subdural hematoma and 1.4 cm midline shift to the left. She was transferred to [facility] for neurosurgical evaluation on 2023 and underwent SDH evacuation on 2023. She underwent right MMA embolism on 11/10/2023 and her  shunt was reprogrammed on 2023. Per patient's , she has been exhibiting worsening confusion since her shunt was reprogrammed.    MEDICATIONS  (STANDING):  atorvastatin 40 milliGRAM(s) Oral at bedtime  chlorhexidine 2% Cloths 1 Application(s) Topical daily  dextrose 5%. 1000 milliLiter(s) (100 mL/Hr) IV Continuous <Continuous>  dextrose 5%. 1000 milliLiter(s) (50 mL/Hr) IV Continuous <Continuous>  dextrose 50% Injectable 25 Gram(s) IV Push once  dextrose 50% Injectable 25 Gram(s) IV Push once  dextrose 50% Injectable 12.5 Gram(s) IV Push once  enoxaparin Injectable 40 milliGRAM(s) SubCutaneous <User Schedule>  famotidine Injectable 20 milliGRAM(s) IV Push every 12 hours  glucagon  Injectable 1 milliGRAM(s) IntraMuscular once  levETIRAcetam 500 milliGRAM(s) Oral two times a day  magnesium oxide 400 milliGRAM(s) Oral at bedtime  magnesium sulfate  IVPB 1 Gram(s) IV Intermittent once  melatonin 5 milliGRAM(s) Oral at bedtime  memantine 5 milliGRAM(s) Oral daily  metoprolol tartrate 25 milliGRAM(s) Oral every 12 hours  mupirocin 2% Nasal 1 Application(s) Both Nostrils two times a day  nystatin Powder 1 Application(s) Topical two times a day  polyethylene glycol 3350 17 Gram(s) Oral two times a day  potassium chloride    Tablet ER 10 milliEquivalent(s) Oral daily  senna 2 Tablet(s) Oral at bedtime    MEDICATIONS  (PRN):  acetaminophen     Tablet .. 975 milliGRAM(s) Oral every 8 hours PRN Mild Pain (1 - 3)  acetaminophen     Tablet .. 975 milliGRAM(s) Oral every 6 hours PRN Temp greater or equal to 38.5C (101.3F), Mild Pain (1 - 3)  bisacodyl 5 milliGRAM(s) Oral every 12 hours PRN Constipation  dextrose Oral Gel 15 Gram(s) Oral once PRN Blood Glucose LESS THAN 70 milliGRAM(s)/deciliter  oxyCODONE    IR 5 milliGRAM(s) Oral every 4 hours PRN Moderate Pain (4 - 6)    Neuroimaging:  Head CT 2023  IMPRESSION:  Moderate to large mixed attenuation right hemispheric convexity subdural   hematoma compressing right hemisphere and right lateral ventricle with   1.4 cm midline shift to left.  Right frontal ventriculostomy shunt catheter within right frontal horn.    Head CT 11/10/2023    IMPRESSION:  Expected postoperative changes status post evacuation of right-sided   subdural hematoma, which is decreased in size and density compared with   prior. Decreased mass effect with decreasing leftward midline shift, now   measuring up to 7 mm.    Grossly stable position of the articular catheter. Slightly increased   caliber of the ventricles is likely related to decreasing mass effect,   recommend attention on follow-up to exclude developing hydrocephalus.    MSE:  Appearance- Appropriately dressed and groomed.  Behavior- Polite and cooperative; easily distractible.  Affect- Appropriate, in full-range. Appropriately tearful at times.  Mood- Euthymic.  Speech - Normal rate, rhythm, volume, and prosody.   Thought Process- Tangential.  Thought Content- Notable for hallucinations (seeing herself in a fabric factory).    Summary of Brief Orientation Screen: She correctly stated her name, , and the current president. She incorrectly stated her age (84 yo), the month (September), and year (unsure if it was , , or ). She was unable to identify the date or state, and identified her current location as [facility]. She endorsed visual hallucinations, stating that she saw her hospital room as a fabric factory.

## 2023-11-13 NOTE — CONSULT NOTE ADULT - SUBJECTIVE AND OBJECTIVE BOX
HPI: Patient is an 82yo F with a PMH of HTN, HLD, Arthritis, and NPH s/p  shunt in 03/23, at [place], who presented to the [place] ED with increased weakness and confusion and decreased PO intake. Patient follows with Neurologist Dr. Simon for her NPH. Per family, patient has been more confused and unable to walk over the past 4 days. Normally able to walk with a walker. Found to have mixed density SDH with 1.4cm midline shift, and transferred to [facility] for neurosurgical evaluation. She underwent a reilly hole placement for SDH evacuation on 11/9. PM&R was consulted for SDH.     Today, the patient ...      Imaging Reviewed Today:    ----------------------------    Vital Signs Last 24 Hrs  T(C): 36.4 (08 Nov 2023 17:10), Max: 37.3 (08 Nov 2023 13:51)  T(F): 97.5 (08 Nov 2023 17:10), Max: 99.1 (08 Nov 2023 13:51)  HR: 81 (08 Nov 2023 17:10) (81 - 87)  BP: 173/74 (08 Nov 2023 17:10) (137/77 - 173/74)  BP(mean): --  RR: 18 (08 Nov 2023 17:10) (17 - 18)  SpO2: 97% (08 Nov 2023 17:10) (97% - 99%)    Parameters below as of 08 Nov 2023 17:10  Patient On (Oxygen Delivery Method): room air          LABS:                        10.2   8.75  )-----------( 340      ( 08 Nov 2023 13:30 )             31.9     11-08    144  |  112<H>  |  32<H>  ----------------------------<  107<H>  4.3   |  24  |  0.80    Ca    10.5<H>      08 Nov 2023 13:30    TPro  7.2  /  Alb  3.2<L>  /  TBili  0.4  /  DBili  x   /  AST  21  /  ALT  18  /  AlkPhos  80  11-08      Urinalysis Basic - ( 08 Nov 2023 13:30 )    Color: x / Appearance: x / SG: x / pH: x  Gluc: 107 mg/dL / Ketone: x  / Bili: x / Urobili: x   Blood: x / Protein: x / Nitrite: x   Leuk Esterase: x / RBC: x / WBC x   Sq Epi: x / Non Sq Epi: x / Bacteria: x    MEDICATIONS  (STANDING):  niCARdipine Infusion 5 mG/Hr (25 mL/Hr) IV Continuous <Continuous>    MEDICATIONS  (PRN):      VITALS  T(C): 36.9 (11-13-23 @ 07:38), Max: 37.2 (11-12-23 @ 21:00)  HR: 66 (11-13-23 @ 07:38) (66 - 98)  BP: 157/81 (11-13-23 @ 07:38) (131/86 - 157/81)  RR: 18 (11-13-23 @ 07:38) (18 - 18)  SpO2: 98% (11-13-23 @ 07:38) (96% - 100%)  Wt(kg): --    PAST MEDICAL & SURGICAL HISTORY  Normal pressure hydrocephalus    HTN (hypertension)    HLD (hyperlipidemia)    Arthritis    S/P  shunt        SOCIAL HISTORY - as per documentation/history  Smoking - None  EtOH - None  Drugs - None    FUNCTIONAL HISTORY  Lives   Independent    CURRENT FUNCTIONAL STATUS      RECENT LABS/IMAGING  REVIEWED    CBC Full  -  ( 12 Nov 2023 05:26 )  WBC Count : 7.38 K/uL  RBC Count : 3.07 M/uL  Hemoglobin : 8.6 g/dL  Hematocrit : 28.1 %  Platelet Count - Automated : 293 K/uL  Mean Cell Volume : 91.5 fl  Mean Cell Hemoglobin : 28.0 pg  Mean Cell Hemoglobin Concentration : 30.6 gm/dL  Auto Neutrophil # : x  Auto Lymphocyte # : x  Auto Monocyte # : x  Auto Eosinophil # : x  Auto Basophil # : x  Auto Neutrophil % : x  Auto Lymphocyte % : x  Auto Monocyte % : x  Auto Eosinophil % : x  Auto Basophil % : x    11-12    142  |  109<H>  |  16.0  ----------------------------<  78  3.7   |  23.0  |  0.54    Ca    9.7      12 Nov 2023 05:26  Phos  2.0     11-12  Mg     1.7     11-12      Urinalysis Basic - ( 12 Nov 2023 05:26 )    Color: x / Appearance: x / SG: x / pH: x  Gluc: 78 mg/dL / Ketone: x  / Bili: x / Urobili: x   Blood: x / Protein: x / Nitrite: x   Leuk Esterase: x / RBC: x / WBC x   Sq Epi: x / Non Sq Epi: x / Bacteria: x        ALLERGIES  No Known Allergies      MEDICATIONS   acetaminophen     Tablet .. 975 milliGRAM(s) Oral every 6 hours PRN  atorvastatin 40 milliGRAM(s) Oral at bedtime  bisacodyl 5 milliGRAM(s) Oral every 12 hours PRN  chlorhexidine 2% Cloths 1 Application(s) Topical daily  enoxaparin Injectable 40 milliGRAM(s) SubCutaneous <User Schedule>  famotidine Injectable 20 milliGRAM(s) IV Push every 12 hours  levETIRAcetam 500 milliGRAM(s) Oral two times a day  magnesium oxide 400 milliGRAM(s) Oral at bedtime  metoprolol tartrate 25 milliGRAM(s) Oral every 12 hours  mupirocin 2% Nasal 1 Application(s) Both Nostrils two times a day  nystatin Powder 1 Application(s) Topical two times a day  oxyCODONE    IR 5 milliGRAM(s) Oral every 4 hours PRN  polyethylene glycol 3350 17 Gram(s) Oral two times a day  potassium chloride    Tablet ER 10 milliEquivalent(s) Oral daily  senna 2 Tablet(s) Oral at bedtime          ----------------------------------------------------------------------------------------  PHYSICAL EXAM  Constitutional - NAD, Comfortable  HEENT - NCAT, EOMI  Neck - Supple, No limited ROM  Chest - Breathing comfortably, No wheezing  Cardiovascular - S1S2   Abdomen - Soft   Extremities - No C/C/E, No calf tenderness   Neurologic Exam -                    Cognitive - AAO to self, place, date, year, situation     Communication - Fluent, No dysarthria     Cranial Nerves - CN 2-12 intact     FUNCTIONAL MOTOR EXAM - No focal deficits                    LEFT    UE - ShAB 5/5, EF 5/5, EE 5/5, WE 5/5,  5/5                    RIGHT UE - ShAB 5/5, EF 5/5, EE 5/5, WE 5/5,  5/5                    LEFT    LE - HF 5/5, KE 5/5, DF 5/5, PF 5/5                    RIGHT LE - HF 5/5, KE 5/5, DF 5/5, PF 5/5        Sensory - Intact to LT     Reflexes - DTR Intact, No primitive reflexive     Coordination - FTN intact     OculoVestibular - No saccades, No nystagmus, VOR         Balance - WNL Static  Psychiatric - Mood stable, Affect WNL  ----------------------------------------------------------------------------------------  ASSESSMENT/PLAN  83yFemale with functional deficits after R SDH s/p recent fall.   R SDH - Reilly hole   NPH - VPS (placed 3/23)   Pain - Tylenol  DVT PPX - SCDs  Impaired Mobility/Function/Rehab Recommendations -  HPI: Patient is an 82yo F with a PMH of HTN, HLD, Arthritis, and NPH s/p  shunt in 03/23, at [place], who presented to the [place] ED with increased weakness and confusion and decreased PO intake. Patient follows with Neurologist Dr. Simon for her NPH. Per family, patient has been more confused and unable to walk over the past 4 days. Normally able to walk with a walker. Found to have mixed density SDH with 1.4cm midline shift, and transferred to [facility] for neurosurgical evaluation. She underwent a reilly hole placement for SDH evacuation on 11/9. PM&R was consulted for SDH.     Today, the patient is accompanied by her  who is helping with history taking. She is still markedly confused and is intermittently following commands. As per , this is not her baseline. Her mental status improved after reilly hole placement then seemed to decline again after  shunt adjustment. The patient has no complaints of CP/SOB/NV/weakness/numbness/vision changes. She thinks she is in a fabric store and that it is 1994. She participates in exam, but is unable to hold her attention. On discussion with patient's , it is unclear if SDH was a result of a fall at home. VSS. afebrile.       Imaging Reviewed Today:    CTH 11/8   IMPRESSION:  Moderate to large mixed attenuation right hemispheric convexity subdural hematoma compressing right hemisphere and right lateral ventricle with 1.4 cm midline shift to left.  Right frontal ventriculostomy shunt catheter within right frontal horn.    CTH 11/10  Impression:  Expected postoperative changes status post evacuation of right-sided subdural hematoma, which is decreased in size and density compared with prior. Decreased mass effect with decreasing leftward midline shift, now measuring up to 7 mm.  Grossly stable position of the articular catheter. Slightly increased caliber of the ventricles is likely related to decreasing mass effect, recommend attention on follow-up to exclude developing hydrocephalus.    CTH 11/11  IMPRESSION: Decreased right convexity subdural collection.    ----------------------------    Vital Signs Last 24 Hrs  T(C): 36.4 (08 Nov 2023 17:10), Max: 37.3 (08 Nov 2023 13:51)  T(F): 97.5 (08 Nov 2023 17:10), Max: 99.1 (08 Nov 2023 13:51)  HR: 81 (08 Nov 2023 17:10) (81 - 87)  BP: 173/74 (08 Nov 2023 17:10) (137/77 - 173/74)  BP(mean): --  RR: 18 (08 Nov 2023 17:10) (17 - 18)  SpO2: 97% (08 Nov 2023 17:10) (97% - 99%)    Parameters below as of 08 Nov 2023 17:10  Patient On (Oxygen Delivery Method): room air      LABS:                        10.2   8.75  )-----------( 340      ( 08 Nov 2023 13:30 )             31.9     11-08    144  |  112<H>  |  32<H>  ----------------------------<  107<H>  4.3   |  24  |  0.80    Ca    10.5<H>      08 Nov 2023 13:30    TPro  7.2  /  Alb  3.2<L>  /  TBili  0.4  /  DBili  x   /  AST  21  /  ALT  18  /  AlkPhos  80  11-08      Urinalysis Basic - ( 08 Nov 2023 13:30 )    Color: x / Appearance: x / SG: x / pH: x  Gluc: 107 mg/dL / Ketone: x  / Bili: x / Urobili: x   Blood: x / Protein: x / Nitrite: x   Leuk Esterase: x / RBC: x / WBC x   Sq Epi: x / Non Sq Epi: x / Bacteria: x    MEDICATIONS  (STANDING):  niCARdipine Infusion 5 mG/Hr (25 mL/Hr) IV Continuous <Continuous>    MEDICATIONS  (PRN):    VITALS  T(C): 36.9 (11-13-23 @ 07:38), Max: 37.2 (11-12-23 @ 21:00)  HR: 66 (11-13-23 @ 07:38) (66 - 98)  BP: 157/81 (11-13-23 @ 07:38) (131/86 - 157/81)  RR: 18 (11-13-23 @ 07:38) (18 - 18)  SpO2: 98% (11-13-23 @ 07:38) (96% - 100%)  Wt(kg): --    PAST MEDICAL & SURGICAL HISTORY  Normal pressure hydrocephalus    HTN (hypertension)    HLD (hyperlipidemia)    Arthritis    S/P  shunt      FUNCTIONAL/SOCIAL HISTORY - as per documentation/history  Patient lives at home with  in multi level home with 1STE through back door and 0 MAKENNA through garage. Pt was independent PTA with use of RW, owns WC,  home for  24/7 assist.      CURRENT FUNCTIONAL STATUS  PT 11/11  Bed Mobility Analysis:     · Bed Mobility Limitations	decreased ability to use legs for bridging/pushing  · Impairments Contributing to Impaired Bed Mobility	decreased strength; impaired balance    Transfer: Sit to Stand:     · Level of [place]	minimum assist (75% patients effort)  · Physical Assist/Nonphysical Assist	1 person assist; nonverbal cues (demo/gestures); verbal cues  · Weight-Bearing Restrictions	full weight-bearing  · Assistive Device	rolling walker    Transfer: Stand to Sit:     · Level of [place]	minimum assist (75% patients effort)  · Physical Assist/Nonphysical Assist	1 person assist; nonverbal cues (demo/gestures); verbal cues  · Weight-Bearing Restrictions	full weight-bearing  · Assistive Device	rolling walker    Sit/Stand Transfer Safety Analysis:     · Transfer Safety Concerns Noted	decreased step length  · Impairments Contributing to Impaired Transfers	decreased strength; impaired balance; cognition    Gait Skills:     · Level of [place]	moderate assist (50% patients effort)  · Physical Assist/Nonphysical Assist	1 person assist; nonverbal cues (demo/gestures); verbal cues  · Weight-Bearing Restrictions	full weight-bearing  · Assistive Device	rolling walker  · Gait Distance	bed to chair    Gait Analysis:     · Gait Pattern Used	3-point gait  · Gait Deviations Noted	decreased rosmery; decreased step length; decreased stride length; decreased weight-shifting ability; Scissoring gait with turns  · Impairments Contributing to Gait Deviations	decreased strength; cognition; impaired balance    OT 11/11  Bathing Training:     · Level of [place]	minimum assist (75% patients effort)    Upper Body Dressing Training:     · Level of [place]	minimum assist (75% patients effort)  	    Lower Body Dressing Training:     · Level of [place]	minimum assist (75% patients effort)    Toilet Hygiene Training:     · Level of [place]	minimum assist (75% patients effort)    Grooming Training:     · Level of [place]	stand-by assist        RECENT LABS/IMAGING  REVIEWED    CBC Full  -  ( 12 Nov 2023 05:26 )  WBC Count : 7.38 K/uL  RBC Count : 3.07 M/uL  Hemoglobin : 8.6 g/dL  Hematocrit : 28.1 %  Platelet Count - Automated : 293 K/uL  Mean Cell Volume : 91.5 fl  Mean Cell Hemoglobin : 28.0 pg  Mean Cell Hemoglobin Concentration : 30.6 gm/dL  Auto Neutrophil # : x  Auto Lymphocyte # : x  Auto Monocyte # : x  Auto Eosinophil # : x  Auto Basophil # : x  Auto Neutrophil % : x  Auto Lymphocyte % : x  Auto Monocyte % : x  Auto Eosinophil % : x  Auto Basophil % : x    11-12    142  |  109<H>  |  16.0  ----------------------------<  78  3.7   |  23.0  |  0.54    Ca    9.7      12 Nov 2023 05:26  Phos  2.0     11-12  Mg     1.7     11-12      Urinalysis Basic - ( 12 Nov 2023 05:26 )    Color: x / Appearance: x / SG: x / pH: x  Gluc: 78 mg/dL / Ketone: x  / Bili: x / Urobili: x   Blood: x / Protein: x / Nitrite: x   Leuk Esterase: x / RBC: x / WBC x   Sq Epi: x / Non Sq Epi: x / Bacteria: x        ALLERGIES  No Known Allergies      MEDICATIONS   acetaminophen     Tablet .. 975 milliGRAM(s) Oral every 6 hours PRN  atorvastatin 40 milliGRAM(s) Oral at bedtime  bisacodyl 5 milliGRAM(s) Oral every 12 hours PRN  chlorhexidine 2% Cloths 1 Application(s) Topical daily  enoxaparin Injectable 40 milliGRAM(s) SubCutaneous <User Schedule>  famotidine Injectable 20 milliGRAM(s) IV Push every 12 hours  levETIRAcetam 500 milliGRAM(s) Oral two times a day  magnesium oxide 400 milliGRAM(s) Oral at bedtime  metoprolol tartrate 25 milliGRAM(s) Oral every 12 hours  mupirocin 2% Nasal 1 Application(s) Both Nostrils two times a day  nystatin Powder 1 Application(s) Topical two times a day  oxyCODONE    IR 5 milliGRAM(s) Oral every 4 hours PRN  polyethylene glycol 3350 17 Gram(s) Oral two times a day  potassium chloride    Tablet ER 10 milliEquivalent(s) Oral daily  senna 2 Tablet(s) Oral at bedtime  ----------------------------------------------------------------------------------------  PHYSICAL EXAM  Constitutional - NAD, Comfortable  HEENT - Right-sided Reilly hole sutures    Neck - Supple, No limited ROM  Chest - Breathing comfortably, No wheezing  Cardiovascular - S1S2   Abdomen - Soft   Extremities - No C/C/E, No calf tenderness   Neurologic Exam -                    Cognitive - A&O to self, thinks it is "1993", (-) location, (+) president      Communication - Fluent, No dysarthria     Cranial Nerves - CN 2-12 intact     FUNCTIONAL MOTOR EXAM - No focal deficits     Sensory - Intact to LT     Coordination - FTN intact  Psychiatric - Confused, pleasant   ----------------------------------------------------------------------------------------  ASSESSMENT/PLAN  83yFemale with functional deficits after R SDH s/p recent fall.   R SDH - [place] hole (11/9), keppra   NPH - VPS (placed 3/23)   Delirium/ AMS - Recommend Namenda 5mg daily, Recommend delirium precautions, Recommend avoiding polypharmacy   HLD - Atorvastatin   HTN - Metoprolol   Pain - Recommend Tylenol 975mg q8h PRN, Recommend discontinuing oxycodone   Sleep - Recommend Melatonin 6mg qhs   Diet - Regular   DVT PPX - SCDs, lovenox   Rehab/Impaired mobility and function - Continuous hospitalization is crucial for managing the patient's acute medical issues (SDH, AMS), which have significantly impacted their mobility, quality of life, and function. Rehabilitation recommendations will be based on the patient's functional progress and their ability to participate in and tolerate therapeutic interventions, which may change over time. Maintaining ongoing mobilization by the staff is imperative to prevent secondary medical complications and associated health issues related to debility.    The patient is currently displaying since of delirium. She is not oriented and shows little participation in examination. As per , he is not at her mental or functional baseline. At this time, would recommend ACUTE inpatient rehabilitation where they will receive 3H/daily of PT/OT/Speech to maximize their functional outcomes. She will also be followed by a Rehabilitation specialist for medical management of ongoing comorbidities and safe discharge.     Additionally, would recommend starting Namenda 5mg daily. Would limit opioid use, therefore, recommend discontinuing oxycodone and starting Tylenol 975mg q8h PRN. Would also add Melatonin 6mg qhs for sleep regulation.     Delirium precautions - Light in room during day, promote cognitive stimulation, discourage daytime napping, avoid using indwelling catheters, minimize polypharmacy, orient patient to time     The patient will continue to actively engage in Physical Therapy (PT), Occupational Therapy (OT), and Speech Therapy (SLP) to optimize functional outcomes. The Physical Medicine and Rehabilitation (PM&R) team will closely monitor their progress, and discharge recommendations will depend on their functional improvement and overall medical stability.

## 2023-11-14 LAB
ANION GAP SERPL CALC-SCNC: 14 MMOL/L — SIGNIFICANT CHANGE UP (ref 5–17)
ANION GAP SERPL CALC-SCNC: 14 MMOL/L — SIGNIFICANT CHANGE UP (ref 5–17)
BUN SERPL-MCNC: 11.3 MG/DL — SIGNIFICANT CHANGE UP (ref 8–20)
BUN SERPL-MCNC: 11.3 MG/DL — SIGNIFICANT CHANGE UP (ref 8–20)
CALCIUM SERPL-MCNC: 10.2 MG/DL — SIGNIFICANT CHANGE UP (ref 8.4–10.5)
CALCIUM SERPL-MCNC: 10.2 MG/DL — SIGNIFICANT CHANGE UP (ref 8.4–10.5)
CHLORIDE SERPL-SCNC: 102 MMOL/L — SIGNIFICANT CHANGE UP (ref 96–108)
CHLORIDE SERPL-SCNC: 102 MMOL/L — SIGNIFICANT CHANGE UP (ref 96–108)
CO2 SERPL-SCNC: 22 MMOL/L — SIGNIFICANT CHANGE UP (ref 22–29)
CO2 SERPL-SCNC: 22 MMOL/L — SIGNIFICANT CHANGE UP (ref 22–29)
CREAT SERPL-MCNC: 0.49 MG/DL — LOW (ref 0.5–1.3)
CREAT SERPL-MCNC: 0.49 MG/DL — LOW (ref 0.5–1.3)
EGFR: 92 ML/MIN/1.73M2 — SIGNIFICANT CHANGE UP
EGFR: 92 ML/MIN/1.73M2 — SIGNIFICANT CHANGE UP
GLUCOSE BLDC GLUCOMTR-MCNC: 109 MG/DL — HIGH (ref 70–99)
GLUCOSE BLDC GLUCOMTR-MCNC: 109 MG/DL — HIGH (ref 70–99)
GLUCOSE BLDC GLUCOMTR-MCNC: 124 MG/DL — HIGH (ref 70–99)
GLUCOSE BLDC GLUCOMTR-MCNC: 124 MG/DL — HIGH (ref 70–99)
GLUCOSE BLDC GLUCOMTR-MCNC: 94 MG/DL — SIGNIFICANT CHANGE UP (ref 70–99)
GLUCOSE BLDC GLUCOMTR-MCNC: 94 MG/DL — SIGNIFICANT CHANGE UP (ref 70–99)
GLUCOSE SERPL-MCNC: 77 MG/DL — SIGNIFICANT CHANGE UP (ref 70–99)
GLUCOSE SERPL-MCNC: 77 MG/DL — SIGNIFICANT CHANGE UP (ref 70–99)
HCT VFR BLD CALC: 31.6 % — LOW (ref 34.5–45)
HCT VFR BLD CALC: 31.6 % — LOW (ref 34.5–45)
HGB BLD-MCNC: 10 G/DL — LOW (ref 11.5–15.5)
HGB BLD-MCNC: 10 G/DL — LOW (ref 11.5–15.5)
MAGNESIUM SERPL-MCNC: 2 MG/DL — SIGNIFICANT CHANGE UP (ref 1.6–2.6)
MAGNESIUM SERPL-MCNC: 2 MG/DL — SIGNIFICANT CHANGE UP (ref 1.6–2.6)
MCHC RBC-ENTMCNC: 28.2 PG — SIGNIFICANT CHANGE UP (ref 27–34)
MCHC RBC-ENTMCNC: 28.2 PG — SIGNIFICANT CHANGE UP (ref 27–34)
MCHC RBC-ENTMCNC: 31.6 GM/DL — LOW (ref 32–36)
MCHC RBC-ENTMCNC: 31.6 GM/DL — LOW (ref 32–36)
MCV RBC AUTO: 89.3 FL — SIGNIFICANT CHANGE UP (ref 80–100)
MCV RBC AUTO: 89.3 FL — SIGNIFICANT CHANGE UP (ref 80–100)
PHOSPHATE SERPL-MCNC: 2.7 MG/DL — SIGNIFICANT CHANGE UP (ref 2.4–4.7)
PHOSPHATE SERPL-MCNC: 2.7 MG/DL — SIGNIFICANT CHANGE UP (ref 2.4–4.7)
PLATELET # BLD AUTO: 249 K/UL — SIGNIFICANT CHANGE UP (ref 150–400)
PLATELET # BLD AUTO: 249 K/UL — SIGNIFICANT CHANGE UP (ref 150–400)
POTASSIUM SERPL-MCNC: 4.4 MMOL/L — SIGNIFICANT CHANGE UP (ref 3.5–5.3)
POTASSIUM SERPL-MCNC: 4.4 MMOL/L — SIGNIFICANT CHANGE UP (ref 3.5–5.3)
POTASSIUM SERPL-SCNC: 4.4 MMOL/L — SIGNIFICANT CHANGE UP (ref 3.5–5.3)
POTASSIUM SERPL-SCNC: 4.4 MMOL/L — SIGNIFICANT CHANGE UP (ref 3.5–5.3)
RBC # BLD: 3.54 M/UL — LOW (ref 3.8–5.2)
RBC # BLD: 3.54 M/UL — LOW (ref 3.8–5.2)
RBC # FLD: 14.6 % — HIGH (ref 10.3–14.5)
RBC # FLD: 14.6 % — HIGH (ref 10.3–14.5)
SODIUM SERPL-SCNC: 138 MMOL/L — SIGNIFICANT CHANGE UP (ref 135–145)
SODIUM SERPL-SCNC: 138 MMOL/L — SIGNIFICANT CHANGE UP (ref 135–145)
WBC # BLD: 9.5 K/UL — SIGNIFICANT CHANGE UP (ref 3.8–10.5)
WBC # BLD: 9.5 K/UL — SIGNIFICANT CHANGE UP (ref 3.8–10.5)
WBC # FLD AUTO: 9.5 K/UL — SIGNIFICANT CHANGE UP (ref 3.8–10.5)
WBC # FLD AUTO: 9.5 K/UL — SIGNIFICANT CHANGE UP (ref 3.8–10.5)

## 2023-11-14 PROCEDURE — 70450 CT HEAD/BRAIN W/O DYE: CPT | Mod: 26,77

## 2023-11-14 PROCEDURE — 70450 CT HEAD/BRAIN W/O DYE: CPT | Mod: 26

## 2023-11-14 PROCEDURE — 95720 EEG PHY/QHP EA INCR W/VEEG: CPT

## 2023-11-14 PROCEDURE — 70250 X-RAY EXAM OF SKULL: CPT | Mod: 26

## 2023-11-14 PROCEDURE — 99223 1ST HOSP IP/OBS HIGH 75: CPT

## 2023-11-14 PROCEDURE — 99233 SBSQ HOSP IP/OBS HIGH 50: CPT

## 2023-11-14 RX ADMIN — FAMOTIDINE 20 MILLIGRAM(S): 10 INJECTION INTRAVENOUS at 05:43

## 2023-11-14 RX ADMIN — MUPIROCIN 1 APPLICATION(S): 20 OINTMENT TOPICAL at 05:43

## 2023-11-14 RX ADMIN — LEVETIRACETAM 500 MILLIGRAM(S): 250 TABLET, FILM COATED ORAL at 16:26

## 2023-11-14 RX ADMIN — NYSTATIN CREAM 1 APPLICATION(S): 100000 CREAM TOPICAL at 16:28

## 2023-11-14 RX ADMIN — NYSTATIN CREAM 1 APPLICATION(S): 100000 CREAM TOPICAL at 05:43

## 2023-11-14 RX ADMIN — Medication 10 MILLIEQUIVALENT(S): at 12:39

## 2023-11-14 RX ADMIN — MAGNESIUM OXIDE 400 MG ORAL TABLET 400 MILLIGRAM(S): 241.3 TABLET ORAL at 21:20

## 2023-11-14 RX ADMIN — MEMANTINE HYDROCHLORIDE 5 MILLIGRAM(S): 10 TABLET ORAL at 12:39

## 2023-11-14 RX ADMIN — ENOXAPARIN SODIUM 40 MILLIGRAM(S): 100 INJECTION SUBCUTANEOUS at 17:45

## 2023-11-14 RX ADMIN — LEVETIRACETAM 500 MILLIGRAM(S): 250 TABLET, FILM COATED ORAL at 05:38

## 2023-11-14 RX ADMIN — FAMOTIDINE 20 MILLIGRAM(S): 10 INJECTION INTRAVENOUS at 16:28

## 2023-11-14 RX ADMIN — POLYETHYLENE GLYCOL 3350 17 GRAM(S): 17 POWDER, FOR SOLUTION ORAL at 16:26

## 2023-11-14 RX ADMIN — SENNA PLUS 2 TABLET(S): 8.6 TABLET ORAL at 21:20

## 2023-11-14 RX ADMIN — Medication 10 MILLIGRAM(S): at 14:52

## 2023-11-14 RX ADMIN — POLYETHYLENE GLYCOL 3350 17 GRAM(S): 17 POWDER, FOR SOLUTION ORAL at 05:43

## 2023-11-14 RX ADMIN — CHLORHEXIDINE GLUCONATE 1 APPLICATION(S): 213 SOLUTION TOPICAL at 13:00

## 2023-11-14 RX ADMIN — ATORVASTATIN CALCIUM 40 MILLIGRAM(S): 80 TABLET, FILM COATED ORAL at 21:20

## 2023-11-14 RX ADMIN — Medication 25 MILLIGRAM(S): at 05:38

## 2023-11-14 NOTE — PROGRESS NOTE ADULT - SUBJECTIVE AND OBJECTIVE BOX
HPI:  Patient is an 82yo F with a PMH of HTN, HLD, Arthritis, and NPH s/p  shunt in 03/23, at [place], who presented to the [place] ED with increased weakness and confusion and decreased PO intake. Patient follows with Neurologist Dr. Simon for her NPH. Per family, patient has been more confused and unable to walk over the past 4 days. Normally able to walk with a walker. Found to have mixed density SDH with 1.4cm midline shift, and transferred to [facility] for neurosurgical evaluation. Pt underwent a reilly hole placement for SDH evacuation on 11/9 and R MMA embo on 11/10.     INTERVAL HPI/OVERNIGHT EVENTS:  83y Female s/p R sided reilly holes for SDH evacuation on 11/9 and R MMA embo on 11/10. Hakim VPS reprogrammed from 140 to 200 on 11/12.  Patient resting comfortably in bed. Repeat CT head done overnight due to concerns of lethargy, imaging stable. Patient awakes easily but remains confused, only oriented to self.     Vital Signs Last 24 Hrs  T(C): 36.2 (14 Nov 2023 08:42), Max: 37.1 (13 Nov 2023 21:30)  T(F): 97.1 (14 Nov 2023 08:42), Max: 98.7 (13 Nov 2023 21:30)  HR: 73 (14 Nov 2023 08:42) (65 - 81)  BP: 195/112 (14 Nov 2023 08:42) (138/70 - 195/112)  BP(mean): --  RR: 20 (14 Nov 2023 08:42) (17 - 20)  SpO2: 98% (14 Nov 2023 08:42) (95% - 99%)    Parameters below as of 14 Nov 2023 04:35  Patient On (Oxygen Delivery Method): room air      PHYSICAL EXAM:  GENERAL: NAD  HEAD:  normocephalic  WOUND: clean dry intact  MENTAL STATUS: AAO x1 (self); Confused, opening eyes to voice, follows simple commands   CRANIAL NERVES: PERRL. EOMI without nystagmus. Face symmetric w/ normal eye closure and smile, tongue midline. Hearing grossly intact.  MOTOR: RUE 5/5, LUE 4+/5 w drift, b/l HF/KE 5/5, b/l DF/PF 4+/5  SENSATION: grossly intact to light touch all extremities  SKIN: Warm, dry    LABS:                        10.0   9.50  )-----------( 249      ( 14 Nov 2023 06:01 )             31.6     11-14    138  |  102  |  11.3  ----------------------------<  77  4.4   |  22.0  |  0.49<L>    Ca    10.2      14 Nov 2023 06:01  Phos  2.7     11-14  Mg     2.0     11-14        Urinalysis Basic - ( 14 Nov 2023 06:01 )    Color: x / Appearance: x / SG: x / pH: x  Gluc: 77 mg/dL / Ketone: x  / Bili: x / Urobili: x   Blood: x / Protein: x / Nitrite: x   Leuk Esterase: x / RBC: x / WBC x   Sq Epi: x / Non Sq Epi: x / Bacteria: x        11-13 @ 07:01  -  11-14 @ 07:00  --------------------------------------------------------  IN: 240 mL / OUT: 1150 mL / NET: -910 mL        RADIOLOGY & ADDITIONAL TESTS:  < from: CT Head No Cont (11.08.23 @ 14:13) >  IMPRESSION:    Moderate to large mixed attenuation right hemispheric convexity subdural   hematoma compressing right hemisphere and right lateral ventricle with   1.4 cm midline shift to left.    Right frontal ventriculostomy shunt catheter within right frontal horn.      < from: CT Head No Cont (11.10.23 @ 00:32) >  Impression:    Expected postoperative changes status post evacuation of right-sided   subdural hematoma, which is decreased in size and density compared with   prior. Decreased mass effect with decreasing leftward midline shift, now   measuring up to 7 mm.    Grossly stable position of the articular catheter. Slightly increased   caliber of the ventricles is likely related to decreasing mass effect,   recommend attention on follow-up to exclude developing hydrocephalus.        < from: CT Head No Cont (11.14.23 @ 02:22) >  FINDINGS:    BRAIN: Right convexity subdural collection of air, blood and fluid has   decreased in size further. Intraventricular catheter via right frontal   reilly hole with the tip in the frontal horn of the right lateral   ventricle, a reservoir in the right frontal scalp, and a shunt catheter   partially visible descending into the right occipital scalp are all   similar to prior. No evidence of new hemorrhage, acute infarct, or mass.   Size and configuration of the ventricles similar to prior. No significant   midline shift or herniation. Embolic material remains visible in the   right MMA.    CALVARIUM: No skull fracture or concerning osseous lesions. High right   parietal reilly hole with overlying closure device and skin staples again   demonstrated.    EXTRACRANIAL SOFT TISSUES: No acute findings. Right-sided skin staples   remain in place.    VISUALIZED PORTIONS OF THE PARANASAL SINUSES AND MASTOID AIR CELLS: No   air fluid levels.    IMPRESSION:  Right convexity subdural collection of air, blood and fluid continues to   decrease in size.

## 2023-11-14 NOTE — CONSULT NOTE ADULT - ASSESSMENT
Patient is an 82yo F with a PMH of HTN, HLD, Arthritis, and NPH s/p  shunt in 03/23, at [place], who presented to the [place] ED with increased weakness and confusion and decreased PO intake. Patient follows with Neurologist Dr. Simon for her NPH. Per family, patient has been more confused and unable to walk over the past 4 days prior admission . Normally able to walk with a walker. Found to have mixed density SDH with 1.4cm midline shift, and transferred to [facility] for neurosurgical evaluation.  S/p R sided reilly holes for SDH evacuation on 11/9 and R MMA embo on 11/10. VPS reprogrammed from 140 to 200 on 11/12.  Repeat CT head done overnight due to concerns of lethargy, imaging stable.     1. SDH   S/p R sided reilly holes for SDH evacuation on 11/9 and R MMA embo on 11/10.    2. Hx  VPS-  reprogrammed from 140 to 200 on 11/12.  Repeat CT head done overnight due to concerns of lethargy, imaging stable.     3. Lethargic - as per nursing / [place] AAOX1, noted more lethargic and confused -   s/p rapid response x2 , respond to noxious stimuli . 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 F/S - 124 ,  , monitor reviewed - NSR AT 80"S , SATURATING 96% on RA   Will recommend STAT CT - head , EEG , D/C Namenda/ Melatonin , keep NPO , swallow eval at bed side once awake before diet ordered .   Neuro checks as per NS . Consider to upgrade to SDU/ICU .  Check UA .    4. DVT prophylaxis  - as per primary team .   D/W nurse / Dr Méndez / NS PA .   Thank you for the courtesy of this consult ,   will follow along with you 
ASSESSMENT:   83yF w/ PMHx of NPH s/p  shunt placement 3/23 at [place], HTN, HLD, Arthritis, NPH s/p  shunt in 03/23 who presents as transfer from [place] ED w/ increased weakness and confusion. Pt reports she came to the hospital because her  sent her in due to confusion. She reports her confusion has slightly improved. Per pt, she fell on Saturday and has been unsteady since. Pt reports some headache, R hip pain and L shoulder pain. CTH at [place] showed mixed R SDH w/ 1.4 cm R to L midline shift.     PLAN:    -Q1h neuro checks  -NeuroICU admit  -Repeat CTH in 6 hrs ( ~8pm)  -Shunt Series XR  -Pain control prn, avoid oversedation  -Recommend XR R hip and L shoulder  --160  -HOB 30 degrees  -Trauma eval given fall  -Keppra 500mg BID  -Updated coag studies-PT/INR/aPTT  -Keep NPO for now pending 6hr CTH  -VTE prophylaxis: SCDs, hold chemoprophylaxis due to: post-op bleeding risk  -Further medical management per NSICU  -Discussed case w/ Dr. Rodriguez  
 83y old  Female PMH NPH with shunt, HTN, HLD. Presents s/p fall found to have SDH with midline shift. Cardiology consulted for junctional rhythm and RCRI. Patients mental status isn't lucid. Collateral information obtained by .
Based on the current brief cognitive screen, the patient presents with symptoms of delirium, including disorientation and  hallucinations. Continued monitoring is warranted by her medical team to assess any changes in cognition or behavior.     Recommendations:   1. Standard delirium precautions should be implemented, including keeping the patient on a regular sleep schedule, and keeping her room quiet and calm.   2. The patient should be frequently re-oriented to the date and her current location by her family and medical staff.

## 2023-11-14 NOTE — CONSULT NOTE ADULT - SUBJECTIVE AND OBJECTIVE BOX
Patient is a 83y old  Female who was more lethargic while with OT, rapid response was called .   Medicine consulted . Patient seen and examined , found sleeping , not responsive to moderate sternal rub but opened eyes to noxious stimuli   with opening eyes and moving both upper extremity .  at bed side , states patient is deaf and usually hard to wake her up during day as patient sleeps poorly over night .       CC: more confused , hard to wake up       HPI: Patient is an 82yo F with a PMH of HTN, HLD, Arthritis, and NPH s/p  shunt in 03/23, at [place], who presented to the [place] ED with increased weakness and confusion and decreased PO intake. Patient follows with Neurologist Dr. Simon for her NPH. Per family, patient has been more confused and unable to walk over the past 4 days prior admission . Normally able to walk with a walker. Found to have mixed density SDH with 1.4cm midline shift, and transferred to [facility] for neurosurgical evaluation.  S/p R sided reilly holes for SDH evacuation on 11/9 and R MMA embo on 11/10. VPS reprogrammed from 140 to 200 on 11/12.  Repeat CT head done overnight due to concerns of lethargy, imaging stable.     PAST MEDICAL & SURGICAL HISTORY:  Normal pressure hydrocephalus      HTN (hypertension)      HLD (hyperlipidemia)      Arthritis      S/P  shunt          Social History:  Unable to obtain at this time         FAMILY HISTORY:  Unable top obtain at this time     Allergies    No Known Allergies    Intolerances        HOME MEDICATIONS :     Fosamax 70 mg oral tablet: 1 tab(s) orally once a week (08 Nov 2023 18:50)  Lipitor 40 mg oral tablet: 1 tab(s) orally once a day (08 Nov 2023 18:50)  Plaquenil 200 mg oral tablet: 1 tab(s) orally 2 times a day (08 Nov 2023 18:50)  Toprol-XL 25 mg oral tablet, extended release: 1 tab(s) orally once a day (08 Nov 2023 18:50)      REVIEW OF SYSTEMS:      MEDICATIONS  (STANDING):  atorvastatin 40 milliGRAM(s) Oral at bedtime  chlorhexidine 2% Cloths 1 Application(s) Topical daily  dextrose 5%. 1000 milliLiter(s) (50 mL/Hr) IV Continuous <Continuous>  dextrose 5%. 1000 milliLiter(s) (100 mL/Hr) IV Continuous <Continuous>  dextrose 50% Injectable 25 Gram(s) IV Push once  dextrose 50% Injectable 25 Gram(s) IV Push once  dextrose 50% Injectable 12.5 Gram(s) IV Push once  enoxaparin Injectable 40 milliGRAM(s) SubCutaneous <User Schedule>  famotidine Injectable 20 milliGRAM(s) IV Push every 12 hours  glucagon  Injectable 1 milliGRAM(s) IntraMuscular once  levETIRAcetam 500 milliGRAM(s) Oral two times a day  magnesium oxide 400 milliGRAM(s) Oral at bedtime  metoprolol tartrate 25 milliGRAM(s) Oral every 12 hours  nystatin Powder 1 Application(s) Topical two times a day  polyethylene glycol 3350 17 Gram(s) Oral two times a day  potassium chloride    Tablet ER 10 milliEquivalent(s) Oral daily  senna 2 Tablet(s) Oral at bedtime    MEDICATIONS  (PRN):  acetaminophen     Tablet .. 975 milliGRAM(s) Oral every 8 hours PRN Mild Pain (1 - 3)  bisacodyl 5 milliGRAM(s) Oral every 12 hours PRN Constipation  dextrose Oral Gel 15 Gram(s) Oral once PRN Blood Glucose LESS THAN 70 milliGRAM(s)/deciliter  hydrALAZINE Injectable 10 milliGRAM(s) IV Push every 2 hours PRN SBP > 160      Vital Signs Last 24 Hrs  T(C): 36.7 (14 Nov 2023 12:15), Max: 37.1 (13 Nov 2023 21:30)  T(F): 98 (14 Nov 2023 12:15), Max: 98.7 (13 Nov 2023 21:30)  HR: 85 (14 Nov 2023 12:15) (71 - 85)  BP: 144/70 (14 Nov 2023 12:15) (138/70 - 195/112)  BP(mean): --  RR: 18 (14 Nov 2023 12:15) (17 - 20)  SpO2: 96% (14 Nov 2023 12:15) (95% - 99%)    Parameters below as of 14 Nov 2023 12:15  Patient On (Oxygen Delivery Method): room air        PHYSICAL EXAM:    GENERAL: NAD, well-groomed, well-developed  HEAD: L side staples+   EYES: EOMI, PERRLA, conjunctiva and sclera clear  NECK: Supple, No JVD, Normal thyroid  NERVOUS SYSTEM:  Alert & Oriented X 0 ,lethargic , responds to noxious stimuli only   CHEST/LUNG: CTA  b/l,  no rales, rhonchi, wheezing, or rubs  HEART: Regular rate and rhythm; No murmurs, rubs, or gallops  ABDOMEN: Soft, Nontender, Nondistended; Bowel sounds present  EXTREMITIES:  2+ Peripheral Pulses, No clubbing, cyanosis, or edema ,   LYMPH: No lymphadenopathy noted  SKIN: No rashes or lesions    LABS:                        10.0   9.50  )-----------( 249      ( 14 Nov 2023 06:01 )             31.6     11-14    < from: CT Head No Cont (11.10.23 @ 00:32) >    ACC: [id number] EXAM:  CT BRAIN   ORDERED BY: GERARDO TESFAYE     PROCEDURE DATE:  11/10/2023          INTERPRETATION:  History: Status post right subdural hematoma evacuation.    TECHNIQUE: Noncontrast head CT with coronal and sagittal reformatted   images.    COMPARISON: 11/8/2023.    < end of copied text >  < from: CT Head No Cont (11.10.23 @ 00:32) >  Impression:    Expected postoperative changes status post evacuation of right-sided   subdural hematoma, which is decreased in size and density compared with   prior. Decreased mass effect with decreasing leftward midline shift, now   measuring up to 7 mm.    Grossly stable position of the articular catheter. Slightly increased   caliber of the ventricles is likely related to decreasing mass effect,   recommend attention on follow-up to exclude developing hydrocephalus.    --- End of Report ---      < end of copied text >  138  |  102  |  11.3  ----------------------------<  77  4.4   |  22.0  |  0.49<L>    Ca    10.2      14 Nov 2023 06:01  Phos  2.7     11-14  Mg     2.0     11-14    < from: CT Head No Cont (11.14.23 @ 02:22) >    ACC: [id number] EXAM:  CT BRAIN   ORDERED BY: REBECCA ROSS     PROCEDURE DATE:  11/14/2023          INTERPRETATION:  EXAM: CT HEAD WITHOUT INTRAVENOUS CONTRAST    CLINICAL INFORMATION: Follow-up hemorrhage, shunt placement, and right   MMA embolization.    < end of copied text >  < from: CT Head No Cont (11.14.23 @ 02:22) >    IMPRESSION:  Right convexity subdural collection of air, blood and fluid continues to   decrease in size.    --- End of Report ---      < end of copied text >    < from: US Duplex Venous Lower Ext Complete, Bilateral (11.09.23 @ 18:31) >    ACC: [id number] EXAM:  US DPLX LWR EXT VEINS COMPL BI   ORDERED BY: SHADY HERNANDEZ     PROCEDURE DATE:  11/09/2023      < end of copied text >  < from: US Duplex Venous Lower Ext Complete, Bilateral (11.09.23 @ 18:31) >    IMPRESSION:  < from: 12 Lead ECG (11.12.23 @ 08:48) >    Ventricular Rate 98 BPM    Atrial Rate 98 BPM    P-R Interval 146 ms    QRS Duration 90 ms    Q-T Interval 350 ms    QTC Calculation(Bazett) 446 ms    P Axis 27 degrees    R Axis -11 degrees    T Axis 145 degrees    Diagnosis Line *** Poor data quality, interpretation may be adversely affected  Sinus rhythm with Premature atrial complexes and Premature ventricular complexes or Fusion complexes  Left ventricular hypertrophy with repolarization abnormality  Abnormal ECG    < end of copied text >  No evidence of deep venous thrombosis in either lower extremity.    --- End of Report ---    < end of copied text >  < from: Xray Chest 1 View-PORTABLE IMMEDIATE (Xray Chest 1 View-PORTABLE IMMEDIATE .) (11.08.23 @ 14:39) >    ACC: [id number] EXAM:  XR CHEST PORTABLE IMMED 1V   ORDERED BY: BORA MCCAIN     PROCEDURE DATE:  11/08/2023          INTERPRETATION:  An AP portable semiupright chest x-ray was performed   without known history.    Comparison is made to 2/1/2023.    There is an apparent  shunt on the right side. The visualized lungs are   clear. No infiltrates are seen. There is no pneumothorax. There are no   pleural effusions. There is no hilar or mediastinal widening. The cardiac   silhouette may be magnified by technique. There is an atherosclerotic   aorta, but no CHF. There are degenerative changes of the spine.    IMPRESSION:  1. Clear lungs with no acute cardiopulmonary abnormalities.  2.  shunt suspected on the right side of midline, not present on the   prior exam.    --- End of Report ---    < end of copied text >    < from: TTE Echo Complete w/ Contrast w/ Doppler (11.09.23 @ 12:00) >    ACC: [id number] EXAM:  ECHO TTE WITH CON COMP W DOPP                          PROCEDURE DATE:  11/09/2023          INTERPRETATION:  TRANSTHORACIC ECHOCARDIOGRAM REPORT    < end of copied text >  < from: TTE Echo Complete w/ Contrast w/ Doppler (11.09.23 @ 12:00) >  Summary:   1. Normal global left ventricular systolic function.   2. There is mild concentric left ventricular hypertrophy.   3. Left ventricular ejection fraction, by visual estimation, is 60 to   65%.   4. Spectral Doppler shows impaired relaxation pattern of left   ventricular myocardial filling (Grade I diastolic dysfunction).   5. Elevated mean left atrial pressure.   6. Normal right ventricular size and function.   7. The left atrium is normal in size.   8. The right atrium is normal in size.   9. Sclerotic aortic valve with normal opening.  10. Mild mitral annular calcification.  11. Mild thickening and calcification of the anterior and posterior   mitral valve leaflets.  12. Mild mitral valve regurgitation.  13. Mild tricuspid regurgitation.  14. There is no evidence of pericardial effusion.    MD Michael Electronically signed on 11/9/2023 at 12:41:01 PM    < end of copied text >

## 2023-11-14 NOTE — CHART NOTE - NSCHARTNOTEFT_GEN_A_CORE
Responded to RRT, primary team at bedside. Orders placed for urgent head CT and EEG monitoring. Medicine dismissed by primary team, primary team cancelled RRT.

## 2023-11-14 NOTE — CONSULT NOTE ADULT - CONSULT REASON
Cognitive changes
s/p hayde for SDH evacuation, March 2023 VPS placed at [place] and reprogrammed today to at [place] [id number]
R mixed SDH
RCRI / junctional
patient found more lethargic and unresponsive with OT

## 2023-11-14 NOTE — CHART NOTE - NSCHARTNOTEFT_GEN_A_CORE
Rapid response called, near syncopal episode reported while patient was working with OT. Upon arrival, patient lying in bed, vitals stable, glucose 109. On exam, AxOx2 (name and place) which is improved from this morning, following commands, RAMON antigravity R > L. Medicine consult placed.

## 2023-11-14 NOTE — PROGRESS NOTE ADULT - SUBJECTIVE AND OBJECTIVE BOX
Patient fatigued.  Feels cold, and provided her with blankets.  Reports no pain.   Continues to be confused.     FUNCTIONAL PROGRESS  PT 11/11  Bed Mobility Analysis:     · Bed Mobility Limitations	decreased ability to use legs for bridging/pushing  · Impairments Contributing to Impaired Bed Mobility	decreased strength; impaired balance    Transfer: Sit to Stand:     · Level of [place]	minimum assist (75% patients effort)  · Physical Assist/Nonphysical Assist	1 person assist; nonverbal cues (demo/gestures); verbal cues  · Weight-Bearing Restrictions	full weight-bearing  · Assistive Device	rolling walker    Transfer: Stand to Sit:     · Level of [place]	minimum assist (75% patients effort)  · Physical Assist/Nonphysical Assist	1 person assist; nonverbal cues (demo/gestures); verbal cues  · Weight-Bearing Restrictions	full weight-bearing  · Assistive Device	rolling walker    Sit/Stand Transfer Safety Analysis:     · Transfer Safety Concerns Noted	decreased step length  · Impairments Contributing to Impaired Transfers	decreased strength; impaired balance; cognition    Gait Skills:     · Level of [place]	moderate assist (50% patients effort)  · Physical Assist/Nonphysical Assist	1 person assist; nonverbal cues (demo/gestures); verbal cues  · Weight-Bearing Restrictions	full weight-bearing  · Assistive Device	rolling walker  · Gait Distance	bed to chair    Gait Analysis:     · Gait Pattern Used	3-point gait  · Gait Deviations Noted	decreased rosmery; decreased step length; decreased stride length; decreased weight-shifting ability; Scissoring gait with turns  · Impairments Contributing to Gait Deviations	decreased strength; cognition; impaired balance    OT 11/11  Bathing Training:     · Level of [place]	minimum assist (75% patients effort)    Upper Body Dressing Training:     · Level of [place]	minimum assist (75% patients effort)  	    Lower Body Dressing Training:     · Level of [place]	minimum assist (75% patients effort)    Toilet Hygiene Training:     · Level of [place]	minimum assist (75% patients effort)    Grooming Training:     · Level of [place]	stand-by assist      VITALS  T(C): 36.7 (11-14-23 @ 04:35), Max: 37.1 (11-13-23 @ 21:30)  HR: 73 (11-14-23 @ 04:35) (65 - 81)  BP: 138/70 (11-14-23 @ 04:35) (138/70 - 165/68)  RR: 18 (11-14-23 @ 04:35) (17 - 18)  SpO2: 96% (11-14-23 @ 04:35) (95% - 99%)  Wt(kg): --    MEDICATIONS   acetaminophen     Tablet .. 975 milliGRAM(s) every 8 hours PRN  atorvastatin 40 milliGRAM(s) at bedtime  bisacodyl 5 milliGRAM(s) every 12 hours PRN  chlorhexidine 2% Cloths 1 Application(s) daily  dextrose 5%. 1000 milliLiter(s) <Continuous>  dextrose 5%. 1000 milliLiter(s) <Continuous>  dextrose 50% Injectable 25 Gram(s) once  dextrose 50% Injectable 25 Gram(s) once  dextrose 50% Injectable 12.5 Gram(s) once  dextrose Oral Gel 15 Gram(s) once PRN  enoxaparin Injectable 40 milliGRAM(s) <User Schedule>  famotidine Injectable 20 milliGRAM(s) every 12 hours  glucagon  Injectable 1 milliGRAM(s) once  hydrALAZINE Injectable 10 milliGRAM(s) every 2 hours PRN  levETIRAcetam 500 milliGRAM(s) two times a day  magnesium oxide 400 milliGRAM(s) at bedtime  melatonin 5 milliGRAM(s) at bedtime  memantine 5 milliGRAM(s) daily  metoprolol tartrate 25 milliGRAM(s) every 12 hours  nystatin Powder 1 Application(s) two times a day  polyethylene glycol 3350 17 Gram(s) two times a day  potassium chloride    Tablet ER 10 milliEquivalent(s) daily  senna 2 Tablet(s) at bedtime      RECENT LABS/IMAGING  - Reviewed Today                        10.0   9.50  )-----------( 249      ( 14 Nov 2023 06:01 )             31.6     11-14    138  |  102  |  11.3  ----------------------------<  77  4.4   |  22.0  |  0.49<L>    Ca    10.2      14 Nov 2023 06:01  Phos  2.7     11-14  Mg     2.0     11-14        Urinalysis Basic - ( 14 Nov 2023 06:01 )    Color: x / Appearance: x / SG: x / pH: x  Gluc: 77 mg/dL / Ketone: x  / Bili: x / Urobili: x   Blood: x / Protein: x / Nitrite: x   Leuk Esterase: x / RBC: x / WBC x   Sq Epi: x / Non Sq Epi: x / Bacteria: x            HEAD CT 11/8 - Moderate to large mixed attenuation right hemispheric convexity subdural hematoma compressing right hemisphere and right lateral ventricle with 1.4 cm midline shift to left. Right frontal ventriculostomy shunt catheter within right frontal horn.    HEAD CT 11/10 - Expected postoperative changes status post evacuation of right-sided subdural hematoma, which is decreased in size and density compared with prior. Decreased mass effect with decreasing leftward midline shift, now measuring up to 7 mm. Grossly stable position of the articular catheter. Slightly increased caliber of the ventricles is likely related to decreasing mass effect, recommend attention on follow-up to exclude developing hydrocephalus.    HEAD CT 11/11 -  Decreased right convexity subdural collection.    HEAD CT 11/14 - Right convexity subdural collection of air, blood and fluid continues to decrease in size.  ----------------------------------------------------------------------------------------  PHYSICAL EXAM  Constitutional - NAD, Comfortable  HEENT - Right cranial incisions - CDI    Extremities - No edema  Neurologic Exam -                    Cognitive - AAOx self, NOT location/situation      FUNCTIONAL MOTOR EXAM - No focal deficits  Psychiatric - Calm, Fatigued  ----------------------------------------------------------------------------------------  ASSESSMENT/PLAN  83yFemale with functional deficits after a fall sustaining a SDH   Traumatic SDH s/p reilly holes/Right MMA embolization - Keppra  CAD - Atorvastatin   HTN - Metoprolol, Hydralazine  Memory/Language - Namenda 5mg daily (11/13)  Pain - Tylenol  Sleep - Melatonin 5mg (11/13)  DVT PPX - SCDs, Lovenox   Rehab/Impaired mobility and function - Patient continues to require hospitalization for the above diagnoses and ongoing active management of comorbid complications that are substantially impairing functional ability and impairing quality of life necessitating ongoing medical management of these complications necessitating acute rehab.     When medically optimized, based on the patient's diagnosis, current functional status and potential for progress, recommend ACUTE inpatient rehabilitation for the functional deficits consisting of 3 hours of therapy/day & 24 hour RN/daily PMR physician for comorbid medical management. Patient will be able to tolerate 3 hours a day.     Will continue to follow. Rehab recommendations are dependent on how functional progress changes as well as how patient continues to participate and tolerate therapeutic interventions, which may change. Recommend ongoing mobilization by staff to maintain cardiopulmonary function and prevention of secondary complications related to debility. Discussed the specific management and recommendations above with rehab clinical care team/rehab liaison.

## 2023-11-14 NOTE — PROGRESS NOTE ADULT - ASSESSMENT
Assessment:  84yo F PMH HTN, HLD, arthritis and NPH s/p  shunt 3/23 presented with acute on chronic SDH s/p mechanical fall. Underwent R sided reilly holes for SDH evacuation on 11/9 and R MMA embo on 11/10. [place] VPS reprogrammed from 140 to 200 on 11/12.    Plan:  - Q4h neuro checks  - Shunt series reviewed confirming [place] VPS set to 200  - Repeat CTH done overnight reviewed; decreased subdural collection  - PMnR consult appreciated; Started Namenda 5mg qd, melatonin 5mg qhs, delirium precautions  - Cardio recs appreciated for new onset Afib; Metoprolol 25mg BID, outpatient f/u for possible watchman, K > 4, Mg > 2  - SBP < 160  - Keppra 500 BID  - Pain control; Oxy, Tylenol  - Bowel regimen; Senna, Miralax, dulcolax  - DVT ppx; SCDs, SQL  - electrolytes supplemented as recommended by cardio   - Dispo acute inpatient rehab  - Case and plan discussed with Dr. Rodriguez

## 2023-11-14 NOTE — CHART NOTE - NSCHARTNOTEFT_GEN_A_CORE
EEG preliminary read (not final) on the initial recording hour(s) = x 2.5    No seizures recorded.    Final report to follow tomorrow morning after completion of study.    [facility] EEG Reading Room Ph#: [phone]  Epilepsy Answering Service after 5PM and before 8:30AM: Ph#: [phone]

## 2023-11-14 NOTE — CHART NOTE - NSCHARTNOTEFT_GEN_A_CORE
Second rapid response called ~14:15 after patient was not responsive to moderate sternal rub. On arrival, patient opened eyes and said "ow" to noxious stimuli. On exam, patient AOx1 and intermittently following commands. STAT CTH is stable. Patient upgraded to SDU with Q2H neuro checks, EEG ordered, Namenda/Melatonin D/C'd.

## 2023-11-15 LAB
ANION GAP SERPL CALC-SCNC: 12 MMOL/L — SIGNIFICANT CHANGE UP (ref 5–17)
ANION GAP SERPL CALC-SCNC: 12 MMOL/L — SIGNIFICANT CHANGE UP (ref 5–17)
BUN SERPL-MCNC: 17.3 MG/DL — SIGNIFICANT CHANGE UP (ref 8–20)
BUN SERPL-MCNC: 17.3 MG/DL — SIGNIFICANT CHANGE UP (ref 8–20)
CALCIUM SERPL-MCNC: 10.4 MG/DL — SIGNIFICANT CHANGE UP (ref 8.4–10.5)
CALCIUM SERPL-MCNC: 10.4 MG/DL — SIGNIFICANT CHANGE UP (ref 8.4–10.5)
CHLORIDE SERPL-SCNC: 104 MMOL/L — SIGNIFICANT CHANGE UP (ref 96–108)
CHLORIDE SERPL-SCNC: 104 MMOL/L — SIGNIFICANT CHANGE UP (ref 96–108)
CO2 SERPL-SCNC: 24 MMOL/L — SIGNIFICANT CHANGE UP (ref 22–29)
CO2 SERPL-SCNC: 24 MMOL/L — SIGNIFICANT CHANGE UP (ref 22–29)
CREAT SERPL-MCNC: 0.63 MG/DL — SIGNIFICANT CHANGE UP (ref 0.5–1.3)
CREAT SERPL-MCNC: 0.63 MG/DL — SIGNIFICANT CHANGE UP (ref 0.5–1.3)
EGFR: 87 ML/MIN/1.73M2 — SIGNIFICANT CHANGE UP
EGFR: 87 ML/MIN/1.73M2 — SIGNIFICANT CHANGE UP
GLUCOSE BLDC GLUCOMTR-MCNC: 103 MG/DL — HIGH (ref 70–99)
GLUCOSE BLDC GLUCOMTR-MCNC: 103 MG/DL — HIGH (ref 70–99)
GLUCOSE BLDC GLUCOMTR-MCNC: 105 MG/DL — HIGH (ref 70–99)
GLUCOSE BLDC GLUCOMTR-MCNC: 105 MG/DL — HIGH (ref 70–99)
GLUCOSE BLDC GLUCOMTR-MCNC: 117 MG/DL — HIGH (ref 70–99)
GLUCOSE BLDC GLUCOMTR-MCNC: 117 MG/DL — HIGH (ref 70–99)
GLUCOSE BLDC GLUCOMTR-MCNC: 73 MG/DL — SIGNIFICANT CHANGE UP (ref 70–99)
GLUCOSE BLDC GLUCOMTR-MCNC: 73 MG/DL — SIGNIFICANT CHANGE UP (ref 70–99)
GLUCOSE BLDC GLUCOMTR-MCNC: 95 MG/DL — SIGNIFICANT CHANGE UP (ref 70–99)
GLUCOSE BLDC GLUCOMTR-MCNC: 95 MG/DL — SIGNIFICANT CHANGE UP (ref 70–99)
GLUCOSE SERPL-MCNC: 100 MG/DL — HIGH (ref 70–99)
GLUCOSE SERPL-MCNC: 100 MG/DL — HIGH (ref 70–99)
HCT VFR BLD CALC: 32.6 % — LOW (ref 34.5–45)
HCT VFR BLD CALC: 32.6 % — LOW (ref 34.5–45)
HGB BLD-MCNC: 10.5 G/DL — LOW (ref 11.5–15.5)
HGB BLD-MCNC: 10.5 G/DL — LOW (ref 11.5–15.5)
MAGNESIUM SERPL-MCNC: 2.1 MG/DL — SIGNIFICANT CHANGE UP (ref 1.6–2.6)
MAGNESIUM SERPL-MCNC: 2.1 MG/DL — SIGNIFICANT CHANGE UP (ref 1.6–2.6)
MCHC RBC-ENTMCNC: 28.8 PG — SIGNIFICANT CHANGE UP (ref 27–34)
MCHC RBC-ENTMCNC: 28.8 PG — SIGNIFICANT CHANGE UP (ref 27–34)
MCHC RBC-ENTMCNC: 32.2 GM/DL — SIGNIFICANT CHANGE UP (ref 32–36)
MCHC RBC-ENTMCNC: 32.2 GM/DL — SIGNIFICANT CHANGE UP (ref 32–36)
MCV RBC AUTO: 89.3 FL — SIGNIFICANT CHANGE UP (ref 80–100)
MCV RBC AUTO: 89.3 FL — SIGNIFICANT CHANGE UP (ref 80–100)
PHOSPHATE SERPL-MCNC: 2.5 MG/DL — SIGNIFICANT CHANGE UP (ref 2.4–4.7)
PHOSPHATE SERPL-MCNC: 2.5 MG/DL — SIGNIFICANT CHANGE UP (ref 2.4–4.7)
PLATELET # BLD AUTO: 370 K/UL — SIGNIFICANT CHANGE UP (ref 150–400)
PLATELET # BLD AUTO: 370 K/UL — SIGNIFICANT CHANGE UP (ref 150–400)
POTASSIUM SERPL-MCNC: 4.3 MMOL/L — SIGNIFICANT CHANGE UP (ref 3.5–5.3)
POTASSIUM SERPL-MCNC: 4.3 MMOL/L — SIGNIFICANT CHANGE UP (ref 3.5–5.3)
POTASSIUM SERPL-SCNC: 4.3 MMOL/L — SIGNIFICANT CHANGE UP (ref 3.5–5.3)
POTASSIUM SERPL-SCNC: 4.3 MMOL/L — SIGNIFICANT CHANGE UP (ref 3.5–5.3)
RBC # BLD: 3.65 M/UL — LOW (ref 3.8–5.2)
RBC # BLD: 3.65 M/UL — LOW (ref 3.8–5.2)
RBC # FLD: 15.5 % — HIGH (ref 10.3–14.5)
RBC # FLD: 15.5 % — HIGH (ref 10.3–14.5)
SODIUM SERPL-SCNC: 140 MMOL/L — SIGNIFICANT CHANGE UP (ref 135–145)
SODIUM SERPL-SCNC: 140 MMOL/L — SIGNIFICANT CHANGE UP (ref 135–145)
WBC # BLD: 9.28 K/UL — SIGNIFICANT CHANGE UP (ref 3.8–10.5)
WBC # BLD: 9.28 K/UL — SIGNIFICANT CHANGE UP (ref 3.8–10.5)
WBC # FLD AUTO: 9.28 K/UL — SIGNIFICANT CHANGE UP (ref 3.8–10.5)
WBC # FLD AUTO: 9.28 K/UL — SIGNIFICANT CHANGE UP (ref 3.8–10.5)

## 2023-11-15 PROCEDURE — 95813 EEG EXTND MNTR 61-119 MIN: CPT | Mod: 26

## 2023-11-15 PROCEDURE — 99232 SBSQ HOSP IP/OBS MODERATE 35: CPT

## 2023-11-15 RX ADMIN — Medication 25 MILLIGRAM(S): at 05:13

## 2023-11-15 RX ADMIN — CHLORHEXIDINE GLUCONATE 1 APPLICATION(S): 213 SOLUTION TOPICAL at 12:38

## 2023-11-15 RX ADMIN — LEVETIRACETAM 500 MILLIGRAM(S): 250 TABLET, FILM COATED ORAL at 05:13

## 2023-11-15 RX ADMIN — ENOXAPARIN SODIUM 40 MILLIGRAM(S): 100 INJECTION SUBCUTANEOUS at 17:03

## 2023-11-15 RX ADMIN — NYSTATIN CREAM 1 APPLICATION(S): 100000 CREAM TOPICAL at 05:13

## 2023-11-15 RX ADMIN — NYSTATIN CREAM 1 APPLICATION(S): 100000 CREAM TOPICAL at 17:02

## 2023-11-15 RX ADMIN — POLYETHYLENE GLYCOL 3350 17 GRAM(S): 17 POWDER, FOR SOLUTION ORAL at 05:13

## 2023-11-15 RX ADMIN — Medication 10 MILLIEQUIVALENT(S): at 12:38

## 2023-11-15 RX ADMIN — Medication 25 MILLIGRAM(S): at 17:07

## 2023-11-15 RX ADMIN — FAMOTIDINE 20 MILLIGRAM(S): 10 INJECTION INTRAVENOUS at 17:02

## 2023-11-15 RX ADMIN — SENNA PLUS 2 TABLET(S): 8.6 TABLET ORAL at 22:13

## 2023-11-15 RX ADMIN — MAGNESIUM OXIDE 400 MG ORAL TABLET 400 MILLIGRAM(S): 241.3 TABLET ORAL at 22:13

## 2023-11-15 RX ADMIN — FAMOTIDINE 20 MILLIGRAM(S): 10 INJECTION INTRAVENOUS at 05:13

## 2023-11-15 RX ADMIN — ATORVASTATIN CALCIUM 40 MILLIGRAM(S): 80 TABLET, FILM COATED ORAL at 22:14

## 2023-11-15 RX ADMIN — LEVETIRACETAM 500 MILLIGRAM(S): 250 TABLET, FILM COATED ORAL at 17:02

## 2023-11-15 NOTE — PROGRESS NOTE ADULT - SUBJECTIVE AND OBJECTIVE BOX
Patient seen and examined at bedside. Feels well. Had RRT last night for AMS, repeat CT head negative, back to baseline now. Denies any complaints.     REVIEW OF SYSTEMS  Constitutional - No fever,  No fatigue  HEENT - No vertigo, No neck pain  Neurological - No headaches, No memory loss, No loss of strength, No numbness, No tremors  Musculoskeletal - No joint pain, No joint swelling, No muscle pain  Psychiatric - No depression, No anxiety    FUNCTIONAL PROGRESS  PT 11/15/23    Transfer: Sit to Stand:     · Level of [place]	maximum assist (25% patients effort)  · Physical Assist/Nonphysical Assist	1 person assist; nonverbal cues (demo/gestures); verbal cues  · Weight-Bearing Restrictions	full weight-bearing  · Assistive Device	rolling walker    Transfer: Stand to Sit:     · Level of [place]	maximum assist (25% patients effort)  · Physical Assist/Nonphysical Assist	1 person assist; nonverbal cues (demo/gestures); verbal cues  · Weight-Bearing Restrictions	full weight-bearing  · Assistive Device	rolling walker    Sit/Stand Transfer Safety Analysis:     · Transfer Safety Concerns Noted	losing balance  · Impairments Contributing to Impaired Transfers	decreased strength; pain; impaired balance    Gait Skills:     · Level of [place]	unable to perform; Pt with dec balance/ BM    Balance Skills Assessment:     · Sitting Balance: Static	fair balance  · Sitting Balance: Dynamic	fair balance  · Sit-to-Stand Balance	poor balance  · Standing Balance: Static	poor balance  · Standing Balance: Dynamic	poor minus  · Systems Impairment Contributing to Balance Disturbance	musculoskeletal  · Identified Impairments Contributing to Balance Disturbance	decreased strength        VITALS  T(C): 36.9 (11-15-23 @ 20:00), Max: 37.5 (11-15-23 @ 04:20)  HR: 75 (11-15-23 @ 20:00) (60 - 92)  BP: 121/61 (11-15-23 @ 20:00) (95/77 - 137/75)  RR: 18 (11-15-23 @ 20:00) (16 - 20)  SpO2: 100% (11-15-23 @ 20:00) (95% - 100%)  Wt(kg): --    MEDICATIONS   acetaminophen     Tablet .. 975 milliGRAM(s) every 8 hours PRN  atorvastatin 40 milliGRAM(s) at bedtime  bisacodyl 5 milliGRAM(s) every 12 hours PRN  chlorhexidine 2% Cloths 1 Application(s) daily  dextrose 5%. 1000 milliLiter(s) <Continuous>  dextrose 5%. 1000 milliLiter(s) <Continuous>  dextrose 50% Injectable 25 Gram(s) once  dextrose 50% Injectable 25 Gram(s) once  dextrose 50% Injectable 12.5 Gram(s) once  dextrose Oral Gel 15 Gram(s) once PRN  enoxaparin Injectable 40 milliGRAM(s) <User Schedule>  famotidine Injectable 20 milliGRAM(s) every 12 hours  glucagon  Injectable 1 milliGRAM(s) once  hydrALAZINE Injectable 10 milliGRAM(s) every 2 hours PRN  levETIRAcetam 500 milliGRAM(s) two times a day  magnesium oxide 400 milliGRAM(s) at bedtime  metoprolol tartrate 25 milliGRAM(s) every 12 hours  nystatin Powder 1 Application(s) two times a day  polyethylene glycol 3350 17 Gram(s) two times a day  potassium chloride    Tablet ER 10 milliEquivalent(s) daily  senna 2 Tablet(s) at bedtime      RECENT LABS/IMAGING                          10.5   9.28  )-----------( 370      ( 15 Nov 2023 13:15 )             32.6     11-15    140  |  104  |  17.3  ----------------------------<  100<H>  4.3   |  24.0  |  0.63    Ca    10.4      15 Nov 2023 13:15  Phos  2.5     11-15  Mg     2.1     11-15        Urinalysis Basic - ( 15 Nov 2023 13:15 )    Color: x / Appearance: x / SG: x / pH: x  Gluc: 100 mg/dL / Ketone: x  / Bili: x / Urobili: x   Blood: x / Protein: x / Nitrite: x   Leuk Esterase: x / RBC: x / WBC x   Sq Epi: x / Non Sq Epi: x / Bacteria: x    PHYSICAL EXAM  Constitutional - NAD, Comfortable  HEENT - Right cranial incisions - CDI    Extremities - No edema  Neurologic Exam -                    Cognitive - AAOx 1     FUNCTIONAL MOTOR EXAM - No focal deficits  Psychiatric - Calm, appropriate affects  ----------------------------------------------------------------------------------------  ASSESSMENT/PLAN  83yFemale with functional deficits after a fall sustaining a SDH   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     When medically optimized, based on the patient's diagnosis, current functional status and potential for progress, recommend ACUTE inpatient rehabilitation for the functional deficits consisting of 3 hours of therapy/day & 24 hour RN/daily PMR physician for comorbid medical management. Patient will be able to tolerate 3 hours a day.     Will continue to follow. Rehab recommendations are dependent on how functional progress changes as well as how patient continues to participate and tolerate therapeutic interventions, which may change. Recommend ongoing mobilization by staff to maintain cardiopulmonary function and prevention of secondary complications related to debility. Discussed the specific management and recommendations above with rehab clinical care team/rehab liaison.

## 2023-11-15 NOTE — CHART NOTE - NSCHARTNOTEFT_GEN_A_CORE
Source: Patient [ ]  Family [ ]   other [ x]    Current Diet: Diet, NPO:   Except Medications (11-14-23 @ 14:32)    Current Weight:   (11/14) 166 lbs  (11/9) 168.8 lbs  (11/8) 174.3 lbs  ? accuracy of weights, continue to trend and maintain strict Is&Os     Pertinent Medications: MEDICATIONS  (STANDING):  atorvastatin 40 milliGRAM(s) Oral at bedtime  famotidine Injectable 20 milliGRAM(s) IV Push every 12 hours  magnesium oxide 400 milliGRAM(s) Oral at bedtime  polyethylene glycol 3350 17 Gram(s) Oral two times a day  potassium chloride    Tablet ER 10 milliEquivalent(s) Oral daily  senna 2 Tablet(s) Oral at bedtime    MEDICATIONS  (PRN):  bisacodyl 5 milliGRAM(s) Oral every 12 hours PRN Constipation        Pertinent Labs: CBC Full  -  ( 14 Nov 2023 06:01 )  WBC Count : 9.50 K/uL  RBC Count : 3.54 M/uL  Hemoglobin : 10.0 g/dL  Hematocrit : 31.6 %    Skin: Stage I pressure injury to coccyx, moisture associated dermatitis, and IAD per documentation   Charles: 15    Nutrition focused physical exam conducted - found signs of malnutrition [ ]absent [ x]present    Subcutaneous fat loss: [x ] Orbital fat pads region, [ ]Buccal fat region, [ ]Triceps region,  [ ]Ribs region    Muscle wasting: [ ]Temples region, [x ]Clavicle region, [x ]Shoulder region, [ ]Scapula region, [ ]Interosseous region,  [ ]thigh region, [ ]Calf region    Estimated Needs:   [ x] no change since previous assessment  [ ] recalculated:     Current Nutrition Diagnosis: Pt remains at high nutrition risk secondary to malnutrition (moderate, acute) related to inability to meet sufficient protein-energy in setting of SDH s/p R sided reilly holes for SDH evacuation and R MMA embo and lethargy as evidenced by meeting <50% nutrient needs >5 days, mild muscle loss of clavicles and shoulders, mild fat loss of orbitals. Pt AxOx1, very lethargic, unable to provide nutrition hx. No family present at bedside at this time. Per documentation, pt had been with poor po intake prior to admission. Now NPO due to mental status. EEG in place. Last BM 11/13. RD to follow up.     Recommendations:   1) SLP swallow evaluation as feasible/appropriate; if pt deemed unsafe for PO intake, suggest initiation of enteral nutrition (if consistent with GOC)- RD to follow up with recommendations if warranted.   2) Rx: MVI and vitamin C 500mg daily.   3) Continue bowel regimen PRN.  4) Obtain daily weights to monitor trends.     Monitoring and Evaluation:   [ ] PO intake [ ] Tolerance to diet prescription [X] Weights  [X] Follow up per protocol [X] Labs: chem 8, mg, phos, H/H

## 2023-11-15 NOTE — PHYSICAL THERAPY INITIAL EVALUATION ADULT - IMPAIRMENTS FOUND, PT EVAL
gait, locomotion, and balance/gross motor/muscle strength/ROM
gait, locomotion, and balance/gross motor/muscle strength

## 2023-11-15 NOTE — PHYSICAL THERAPY INITIAL EVALUATION ADULT - SIT-TO-STAND BALANCE
Nonstress Test   Patient: Florencia Valladares    Gestation: 38w4d    NST: Patient here for weekly NST for AMA. Khushi Billingsley called with results. Patient scheduled next NST for Monday June 11th.        Variability: Moderate           Accelerations: Yes           D
poor balance
fair minus

## 2023-11-15 NOTE — PHYSICAL THERAPY INITIAL EVALUATION ADULT - PERTINENT HX OF CURRENT PROBLEM, REHAB EVAL
As per MD note: Patient is an 84yo F with a HTN, HLD, Arthritis, and NPH s/p  shunt in 03/23, at [facility], who presented to the [place] ED with an acute on chronic SDH and was transferred to [facility] for further evaluation and management.
As per MD note: Patient is an 82yo F with a HTN, HLD, Arthritis, and NPH s/p  shunt in 03/23, at [place], who presented to the [place] ED with an acute on chronic SDH and was transferred to [facility] for further evaluation and management.

## 2023-11-15 NOTE — PROGRESS NOTE ADULT - ASSESSMENT
Patient is an 82yo F with a PMH of HTN, HLD, Arthritis, and NPH s/p  shunt in 03/23, at [place], who presented to the [place] ED with increased weakness and confusion and decreased PO intake. Patient follows with Neurologist Dr. Simon for her NPH. Per family, patient has been more confused and unable to walk over the past 4 days prior admission . Normally able to walk with a walker. Found to have mixed density SDH with 1.4cm midline shift, and transferred to [facility] for neurosurgical evaluation.  S/p R sided reilly holes for SDH evacuation on 11/9 and R MMA embo on 11/10. VPS reprogrammed from 140 to 200 on 11/12.  Repeat CT head done overnight due to concerns of lethargy, imaging stable.     1. SDH   S/p R sided reilly holes for SDH evacuation on 11/9 and R MMA embo on 11/10.  Neurochecks as per NS   Keppra as per NS     2. Hx  VPS-  reprogrammed from 140 to 200 on 11/12.  Repeat CT head done overnight due to concerns of lethargy, imaging stable.     3. Lethargic, CT head - no CVA, EEG - no seizure , patient today AAOX2 , no complaints .   Lethargy likely due to Melatonin / Namenda - now off - observe       4. HTN-yesterday noted with hypertensive episodes - today BP well controlled - continue Metoprolol with parameters ,   Hydralazine PRN for SBP > 160    5. HLD - continue statins     6. DVT prophylaxis  - as per ortho protocol  Opioid induced constipation  prophylaxis - bowel regimen   Postoperative infection  prophylaxis in high risk patient.      DVT prophylaxis  - as per primary team - on Lovenox    Dispo planning .     Patient is medically optimized ,   will sign off , please recall if needed .

## 2023-11-15 NOTE — PROGRESS NOTE ADULT - ASSESSMENT
Assessment:  84yo F PMH HTN, HLD, arthritis and NPH s/p  shunt 3/23 presented with acute on chronic SDH s/p mechanical fall. Underwent R sided reilly holes for SDH evacuation on 11/9 and R MMA embo on 11/10. Hakim VPS reprogrammed from 140 to 200 on 11/12. Rapid response x 2 for unresponsiveness on 11/14, STAT CTH stable, EEG neg. Waxing and waning mental status likely delirium and is cleared from a neurosurgical standpoint to be discharged to acute rehabilitation.     Plan:  - Upgraded to SDU with Q2 neuro checks s/p rapid response x 2 on 11/14  - STAT CTH stable, EEG negative  - Delirium precautions, protected sleep  - D/c'd Namenda, melatonin  - Cardio recs appreciated for new onset Afib; Metoprolol 25mg BID, outpatient f/u for possible watchman, K > 4, Mg > 2  - SBP < 160  - Keppra 500 BID  - Pain control; Oxy, Tylenol  - Bowel regimen; Senna, Miralax, dulcolax  - DVT ppx; SCDs, SQL  - electrolytes supplemented as recommended by cardio   - Dispo acute inpatient rehab  - Case and plan discussed with Dr. Rodriguez

## 2023-11-15 NOTE — EEG REPORT - NS EEG TEXT BOX
[facility]   COMPREHENSIVE EPILEPSY CENTER   REPORT OF CONTINUOUS VIDEO EEG     [facility]: [address], [place] [address], Ph#: [phone]  [facility]: [id number] [place] [address], Ph#: [phone]  [facility]: [address] [place] [address], Ph#: [phone]    Patient Name: STEVEN LYON  Age and : 83y ([birth year])  MRN #: [id number]  Location: [address] [address]   Referring Physician: Sierra Rodriguez    Start Time/Date: 1700 on 2023  End Time/Date: 08:00 on 11-15-23  Duration: 15H    _____________________________________________________________  STUDY INFORMATION    EEG Recording Technique:  The patient underwent continuous Video-EEG monitoring, using Telemetry System hardware on the XLTek Digital System. EEG and video data were stored on a computer hard drive with important events saved in digital archive files. The material was reviewed by a physician (electroencephalographer / epileptologist) on a daily basis. John and seizure detection algorithms were utilized and reviewed. An EEG Technician attended to the patient, and was available throughout daytime work hours.  The epilepsy center neurologist was available in person or on call 24-hours per day.    EEG Placement and Labeling of Electrodes:  The EEG was performed utilizing 20 channel referential EEG connections (coronal over temporal over parasagittal montage) using all standard 10-20 electrode placements with EKG, with additional electrodes placed in the inferior temporal region using the modified 10-10 montage electrode placements for elective admissions, or if deemed necessary. Recording was at a sampling rate of 256 samples per second per channel. Time synchronized digital video recording was done simultaneously with EEG recording. A low light infrared camera was used for low light recording.     _____________________________________________________________  HISTORY    Patient is a 83y old  Female who presents with a chief complaint of SDH (2023 14:36)      PERTINENT MEDICATION:  MEDICATIONS  (STANDING):  atorvastatin 40 milliGRAM(s) Oral at bedtime  chlorhexidine 2% Cloths 1 Application(s) Topical daily  dextrose 5%. 1000 milliLiter(s) (50 mL/Hr) IV Continuous <Continuous>  dextrose 5%. 1000 milliLiter(s) (100 mL/Hr) IV Continuous <Continuous>  dextrose 50% Injectable 25 Gram(s) IV Push once  dextrose 50% Injectable 25 Gram(s) IV Push once  dextrose 50% Injectable 12.5 Gram(s) IV Push once  enoxaparin Injectable 40 milliGRAM(s) SubCutaneous <User Schedule>  famotidine Injectable 20 milliGRAM(s) IV Push every 12 hours  glucagon  Injectable 1 milliGRAM(s) IntraMuscular once  levETIRAcetam 500 milliGRAM(s) Oral two times a day  magnesium oxide 400 milliGRAM(s) Oral at bedtime  metoprolol tartrate 25 milliGRAM(s) Oral every 12 hours  nystatin Powder 1 Application(s) Topical two times a day  polyethylene glycol 3350 17 Gram(s) Oral two times a day  potassium chloride    Tablet ER 10 milliEquivalent(s) Oral daily  senna 2 Tablet(s) Oral at bedtime    _____________________________________________________________  STUDY INTERPRETATION    Findings: The background was continuous, spontaneously variable and reactive.   No posterior dominant rhythm seen.  Background predominantly consisted of theta, delta and faster activities.    Focal Slowing:   None were present.    Sleep Background:  Drowsiness was characterized by fragmentation, attenuation, and slowing of the background activity.    Sleep was characterized by the presence of vertex waves, symmetric sleep spindles and K-complexes.    Other Non-Epileptiform Findings:  None were present.    Interictal Epileptiform Activity:   None were present.    Events:  Clinical events: None recorded.  Seizures: None recorded.    Activation Procedures:   Hyperventilation was not performed.    Photic stimulation was not performed.     Artifacts:  Intermittent myogenic and movement artifacts were noted.    _____________________________________________________________  EEG SUMMARY/CLASSIFICATION    Abnormal EEG   - Moderate generalized slowing.    _____________________________________________________________  EEG IMPRESSION/CLINICAL CORRELATE    Abnormal EEG study.  Moderate nonspecific diffuse or multifocal cerebral dysfunction.   No epileptiform pattern or seizure seen.    Benito Puente MD   of Neurology  Epilepsy/EEG Attending   [facility]   COMPREHENSIVE EPILEPSY CENTER   REPORT OF CONTINUOUS VIDEO EEG     [facility]: [address], [place] [address], Ph#: [phone]  [facility]: [id number] [place] [address], Ph#: [phone]  [facility]: [address] [place] [address], Ph#: [phone]    Patient Name: STEVEN LYON  Age and : 83y (38)  MRN #: [id number]  Location: [address]   Referring Physician: Sierra Rodriguez    Start Time/Date: 1700 on 2023  End Time/Date: 10:00 on 11-15-23  Duration: 17H    _____________________________________________________________  STUDY INFORMATION    EEG Recording Technique:  The patient underwent continuous Video-EEG monitoring, using Telemetry System hardware on the XLTek Digital System. EEG and video data were stored on a computer hard drive with important events saved in digital archive files. The material was reviewed by a physician (electroencephalographer / epileptologist) on a daily basis. John and seizure detection algorithms were utilized and reviewed. An EEG Technician attended to the patient, and was available throughout daytime work hours.  The epilepsy center neurologist was available in person or on call 24-hours per day.    EEG Placement and Labeling of Electrodes:  The EEG was performed utilizing 20 channel referential EEG connections (coronal over temporal over parasagittal montage) using all standard 10-20 electrode placements with EKG, with additional electrodes placed in the inferior temporal region using the modified 10-10 montage electrode placements for elective admissions, or if deemed necessary. Recording was at a sampling rate of 256 samples per second per channel. Time synchronized digital video recording was done simultaneously with EEG recording. A low light infrared camera was used for low light recording.     _____________________________________________________________  HISTORY    Patient is a 83y old  Female who presents with a chief complaint of SDH (2023 14:36)      PERTINENT MEDICATION:  MEDICATIONS  (STANDING):  atorvastatin 40 milliGRAM(s) Oral at bedtime  chlorhexidine 2% Cloths 1 Application(s) Topical daily  dextrose 5%. 1000 milliLiter(s) (50 mL/Hr) IV Continuous <Continuous>  dextrose 5%. 1000 milliLiter(s) (100 mL/Hr) IV Continuous <Continuous>  dextrose 50% Injectable 25 Gram(s) IV Push once  dextrose 50% Injectable 25 Gram(s) IV Push once  dextrose 50% Injectable 12.5 Gram(s) IV Push once  enoxaparin Injectable 40 milliGRAM(s) SubCutaneous <User Schedule>  famotidine Injectable 20 milliGRAM(s) IV Push every 12 hours  glucagon  Injectable 1 milliGRAM(s) IntraMuscular once  levETIRAcetam 500 milliGRAM(s) Oral two times a day  magnesium oxide 400 milliGRAM(s) Oral at bedtime  metoprolol tartrate 25 milliGRAM(s) Oral every 12 hours  nystatin Powder 1 Application(s) Topical two times a day  polyethylene glycol 3350 17 Gram(s) Oral two times a day  potassium chloride    Tablet ER 10 milliEquivalent(s) Oral daily  senna 2 Tablet(s) Oral at bedtime    _____________________________________________________________  STUDY INTERPRETATION    Findings: The background was continuous, spontaneously variable and reactive.   No posterior dominant rhythm seen.  Background predominantly consisted of theta, delta and faster activities.    Focal Slowing:   None were present.    Sleep Background:  Drowsiness was characterized by fragmentation, attenuation, and slowing of the background activity.    Sleep was characterized by the presence of vertex waves, symmetric sleep spindles and K-complexes.    Other Non-Epileptiform Findings:  None were present.    Interictal Epileptiform Activity:   None were present.    Events:  Clinical events: None recorded.  Seizures: None recorded.    Activation Procedures:   Hyperventilation was not performed.    Photic stimulation was not performed.     Artifacts:  Intermittent myogenic and movement artifacts were noted.    _____________________________________________________________  EEG SUMMARY/CLASSIFICATION    Abnormal EEG   - Moderate generalized slowing.    _____________________________________________________________  EEG IMPRESSION/CLINICAL CORRELATE    Abnormal EEG study.  Moderate nonspecific diffuse or multifocal cerebral dysfunction.   No epileptiform pattern or seizure seen.    Benito Puente MD   of Neurology  Epilepsy/EEG Attending

## 2023-11-15 NOTE — CHART NOTE - NSCHARTNOTESELECT_GEN_ALL_CORE
Event Note
EEG prelim
Neurointerventional Surgery Post Procedure Note/Event Note
Neurointerventional Surgery Pre Procedure Note/Event Note
Nutrition Services
Rapid Response
Rapid Response
Transfer Note
removal of SG HMV/Event Note

## 2023-11-15 NOTE — PROGRESS NOTE ADULT - SUBJECTIVE AND OBJECTIVE BOX
Patient seen and examined . NAD , sitting in the chair , awake , alert and oriented x2 to self and place nopt to year , following commands,   denies n/v , voiding , had BM 2 days ago .  at bed side .      CC : no complaints     Medications :  atorvastatin 40 milliGRAM(s) Oral at bedtime  chlorhexidine 2% Cloths 1 Application(s) Topical daily  dextrose 5%. 1000 milliLiter(s) (50 mL/Hr) IV Continuous <Continuous>  dextrose 5%. 1000 milliLiter(s) (100 mL/Hr) IV Continuous <Continuous>  dextrose 50% Injectable 25 Gram(s) IV Push once  dextrose 50% Injectable 25 Gram(s) IV Push once  dextrose 50% Injectable 12.5 Gram(s) IV Push once  enoxaparin Injectable 40 milliGRAM(s) SubCutaneous <User Schedule>  famotidine Injectable 20 milliGRAM(s) IV Push every 12 hours  glucagon  Injectable 1 milliGRAM(s) IntraMuscular once  levETIRAcetam 500 milliGRAM(s) Oral two times a day  magnesium oxide 400 milliGRAM(s) Oral at bedtime  metoprolol tartrate 25 milliGRAM(s) Oral every 12 hours  nystatin Powder 1 Application(s) Topical two times a day  polyethylene glycol 3350 17 Gram(s) Oral two times a day  potassium chloride    Tablet ER 10 milliEquivalent(s) Oral daily  senna 2 Tablet(s) Oral at bedtime    MEDICATIONS  (PRN):  acetaminophen     Tablet .. 975 milliGRAM(s) Oral every 8 hours PRN Mild Pain (1 - 3)  bisacodyl 5 milliGRAM(s) Oral every 12 hours PRN Constipation  dextrose Oral Gel 15 Gram(s) Oral once PRN Blood Glucose LESS THAN 70 milliGRAM(s)/deciliter  hydrALAZINE Injectable 10 milliGRAM(s) IV Push every 2 hours PRN SBP > 160      LABS:                          10.5   9.28  )-----------( 370      ( 15 Nov 2023 13:15 )             32.6     11-14    138  |  102  |  11.3  ----------------------------<  77  4.4   |  22.0  |  0.49<L>    Ca    10.2      14 Nov 2023 06:01  Phos  2.7     11-14  Mg     2.0     11-14          RADIOLOGY & ADDITIONAL TESTS:    < from: CT Head No Cont (11.14.23 @ 15:14) >    ACC: [id number] EXAM:  CT BRAIN   ORDERED BY: JUAN MANUEL ALARCON     PROCEDURE DATE:  11/14/2023          INTERPRETATION:  Clinical Indication: Post drainage of right-sided   subdural hematoma, follow-up    5mm axial sections of the brain were obtainedfrom base to vertex,   without the intravenous administration of contrast material.Coronal and   sagittal computer generated reconstructed views are available.    Comparison is made with the prior CT of 2:17 AM.    The examination is limited by motion artifact.    There is a right frontal and parietal reilly hole. A right-sided  shunt   catheter has its tip in the frontal horn. There is a small amount of   low-density subdural fluid present with post operative extra-axial air.   There is sulcal effacement and mild effacement of the right lateral   ventricle without midline shift. There is no acute hemorrhage. There is   no change since the prior exam. Ventricular and sulcal prominence is   consistent with age-appropriate involutional changes.Small vessel white   matter ischemic changes are noted.    IMPRESSION: No change since 2:17 AM. Small residual right frontal   parietal low-density subdural collection with postoperative air.    --- End of Report ---    < end of copied text >      EEG REPORT:   EEG Report:  · EEG Report	  [facility]   REPORT OF CONTINUOUS VIDEO EEG EEG SUMMARY/CLASSIFICATION    Abnormal EEG   - Moderate generalized slowing.    _____________________________________________________________  EEG IMPRESSION/CLINICAL CORRELATE    Abnormal EEG study.  Moderate nonspecific diffuse or multifocal cerebral dysfunction.   No epileptiform pattern or seizure seen.    Benito Puente MD   of Neurology  Epilepsy/EEG Attending        REVIEW OF SYSTEMS:    All systems are reviewed and are negative .     Vital Signs Last 24 Hrs  T(C): 36.7 (15 Nov 2023 07:57), Max: 37.5 (15 Nov 2023 04:20)  T(F): 98.1 (15 Nov 2023 07:57), Max: 99.5 (15 Nov 2023 04:20)  HR: 76 (15 Nov 2023 12:00) (60 - 97)  BP: 100/66 (15 Nov 2023 12:00) (95/77 - 175/78)  BP(mean): 74 (15 Nov 2023 12:00) (62 - 91)  RR: 20 (15 Nov 2023 12:00) (16 - 20)  SpO2: 100% (15 Nov 2023 12:00) (96% - 100%)    Parameters below as of 15 Nov 2023 12:00  Patient On (Oxygen Delivery Method): room air      PHYSICAL EXAM:    GENERAL: NAD, well-groomed, well-developed  HEAD: R side staples + , surgical site clean and dry   EYES: EOMI, PERRLA, conjunctiva and sclera clear  NECK: Supple, No JVD, Normal thyroid  NERVOUS SYSTEM:  Alert & Oriented X2 to self and place , not to year , no focal deficit,   follows simple commands   CHEST/LUNG: CTA b/l ,  no  rales, rhonchi, wheezing, or rubs  HEART: Regular rate and rhythm; No murmurs, rubs, or gallops  ABDOMEN: Soft, Nontender, Nondistended; Bowel sounds present  EXTREMITIES:  2+ Peripheral Pulses, No clubbing, cyanosis, or edema  LYMPH: No lymphadenopathy noted  SKIN: No rashes or lesions

## 2023-11-15 NOTE — PROGRESS NOTE ADULT - SUBJECTIVE AND OBJECTIVE BOX
HPI:  Patient is an 84yo F with a PMH of HTN, HLD, Arthritis, and NPH s/p  shunt in 03/23, at [place], who presented to the [place] ED with increased weakness and confusion and decreased PO intake. Patient follows with Neurologist Dr. Simon for her NPH. Per family, patient has been more confused and unable to walk over the past 4 days. Normally able to walk with a walker. Found to have mixed density SDH with 1.4cm midline shift, and transferred to [facility] for neurosurgical evaluation. Pt underwent a reilly hole placement for SDH evacuation on 11/9 and R MMA embo on 11/10.     INTERVAL HPI/OVERNIGHT EVENTS:  83y Female s/p R sided reilly holes for SDH evacuation on 11/9 and R MMA embo on 11/10. Hakim VPS reprogrammed from 140 to 200 on 11/12.  Patient resting comfortably in bed speaking with  and friend at the bedside. Rapid response x 2 yesterday for unresponsiveness, STAT CTH was stable, EEG negative. Patient better oriented per  and on exam today.       Vital Signs Last 24 Hrs  T(C): 36.7 (15 Nov 2023 07:57), Max: 37.5 (15 Nov 2023 04:20)  T(F): 98.1 (15 Nov 2023 07:57), Max: 99.5 (15 Nov 2023 04:20)  HR: 76 (15 Nov 2023 12:00) (60 - 97)  BP: 100/66 (15 Nov 2023 12:00) (95/77 - 175/78)  BP(mean): 74 (15 Nov 2023 12:00) (62 - 91)  RR: 20 (15 Nov 2023 12:00) (16 - 20)  SpO2: 100% (15 Nov 2023 12:00) (96% - 100%)    Parameters below as of 15 Nov 2023 12:00  Patient On (Oxygen Delivery Method): room air      PHYSICAL EXAM:  GENERAL: NAD  HEAD: normocephalic  WOUND: clean dry intact  MENTAL STATUS: AAO x 2 (self and place); Awake; Opens eyes spontaneously; Appropriately conversant without aphasia; following simple commands  CRANIAL NERVES:  PERRL. EOMI without nystagmus. Face symmetric w/ normal eye closure and smile, tongue midline. Hearing grossly intact. Speech clear.   MOTOR: RUE 5/5, LUE 4+/5 w drift, b/l HF/KE 5/5, b/l DF/PF 4+/5  SENSATION: grossly intact to light touch all extremities  SKIN: Warm, dry    LABS:                        10.0   9.50  )-----------( 249      ( 14 Nov 2023 06:01 )             31.6     11-14    138  |  102  |  11.3  ----------------------------<  77  4.4   |  22.0  |  0.49<L>    Ca    10.2      14 Nov 2023 06:01  Phos  2.7     11-14  Mg     2.0     11-14        Urinalysis Basic - ( 14 Nov 2023 06:01 )    Color: x / Appearance: x / SG: x / pH: x  Gluc: 77 mg/dL / Ketone: x  / Bili: x / Urobili: x   Blood: x / Protein: x / Nitrite: x   Leuk Esterase: x / RBC: x / WBC x   Sq Epi: x / Non Sq Epi: x / Bacteria: x        11-14 @ 07:01  -  11-15 @ 07:00  --------------------------------------------------------  IN: 0 mL / OUT: 250 mL / NET: -250 mL        RADIOLOGY & ADDITIONAL TESTS:  < from: CT Head No Cont (11.08.23 @ 14:13) >  IMPRESSION:    Moderate to large mixed attenuation right hemispheric convexity subdural   hematoma compressing right hemisphere and right lateral ventricle with   1.4 cm midline shift to left.    Right frontal ventriculostomy shunt catheter within right frontal horn.      < from: CT Head No Cont (11.10.23 @ 00:32) >  Impression:    Expected postoperative changes status post evacuation of right-sided   subdural hematoma, which is decreased in size and density compared with   prior. Decreased mass effect with decreasing leftward midline shift, now   measuring up to 7 mm.    Grossly stable position of the articular catheter. Slightly increased   caliber of the ventricles is likely related to decreasing mass effect,   recommend attention on follow-up to exclude developing hydrocephalus.      < from: CT Head No Cont (11.14.23 @ 02:22) >  IMPRESSION:  Right convexity subdural collection of air, blood and fluid continues to   decrease in size.    < from: CT Head No Cont (11.14.23 @ 15:14) >  IMPRESSION: No change since 2:17 AM. Small residual right frontal   parietal low-density subdural collection with postoperative air.

## 2023-11-15 NOTE — DIETITIAN NUTRITION RISK NOTIFICATION - ADDITIONAL COMMENTS/DIETITIAN RECOMMENDATIONS
SLP swallow evaluation as feasible/appropriate; if pt deemed unsafe for PO intake, suggest initiation of enteral nutrition (if consistent with GOC)- RD to follow up with recommendations if warranted.   Rx: MVI and vitamin C 500mg daily.   Continue bowel regimen PRN.  Obtain daily weights to monitor trends.

## 2023-11-15 NOTE — PHYSICAL THERAPY INITIAL EVALUATION ADULT - DIAGNOSIS, PT EVAL
Pt with dec functional mobility 2*2 dec strength/ balance
Pt with dec functional mobility 2*2 dec balance

## 2023-11-15 NOTE — PHYSICAL THERAPY INITIAL EVALUATION ADULT - GENERAL OBSERVATIONS, REHAB EVAL
Pt received in  chair with monitor,  present, pleasant and agreeable to PT without pain.
Pt received in semifowler position with

## 2023-11-15 NOTE — PHYSICAL THERAPY INITIAL EVALUATION ADULT - ADDITIONAL COMMENTS
Pt AxOx3 states she lives at home with  in multi level home with 1STE through back door and 0 MKAENNA through garage. Pt was independent PTA with use of RW, owns WC,  home for  24/7 assist.
Pt AxOx3 states she lives at home with  in multi level home with 1STE through back door and 0 MAKENNA through garage. Pt was independent PTA with use of RW, owns WC,  home for  24/7 assist.

## 2023-11-15 NOTE — PROGRESS NOTE ADULT - NUTRITIONAL ASSESSMENT
This patient has been assessed with a concern for Malnutrition and has been determined to have a diagnosis/diagnoses of Moderate protein-calorie malnutrition.    This patient is being managed with:   Diet Regular-  Entered: Nov 15 2023 11:45AM

## 2023-11-16 LAB
GLUCOSE BLDC GLUCOMTR-MCNC: 103 MG/DL — HIGH (ref 70–99)
GLUCOSE BLDC GLUCOMTR-MCNC: 103 MG/DL — HIGH (ref 70–99)
GLUCOSE BLDC GLUCOMTR-MCNC: 128 MG/DL — HIGH (ref 70–99)
GLUCOSE BLDC GLUCOMTR-MCNC: 128 MG/DL — HIGH (ref 70–99)

## 2023-11-16 PROCEDURE — 99232 SBSQ HOSP IP/OBS MODERATE 35: CPT

## 2023-11-16 RX ADMIN — FAMOTIDINE 20 MILLIGRAM(S): 10 INJECTION INTRAVENOUS at 18:38

## 2023-11-16 RX ADMIN — Medication 25 MILLIGRAM(S): at 18:38

## 2023-11-16 RX ADMIN — ATORVASTATIN CALCIUM 40 MILLIGRAM(S): 80 TABLET, FILM COATED ORAL at 21:31

## 2023-11-16 RX ADMIN — Medication 25 MILLIGRAM(S): at 05:08

## 2023-11-16 RX ADMIN — CHLORHEXIDINE GLUCONATE 1 APPLICATION(S): 213 SOLUTION TOPICAL at 12:18

## 2023-11-16 RX ADMIN — ENOXAPARIN SODIUM 40 MILLIGRAM(S): 100 INJECTION SUBCUTANEOUS at 18:38

## 2023-11-16 RX ADMIN — SENNA PLUS 2 TABLET(S): 8.6 TABLET ORAL at 21:32

## 2023-11-16 RX ADMIN — MAGNESIUM OXIDE 400 MG ORAL TABLET 400 MILLIGRAM(S): 241.3 TABLET ORAL at 21:32

## 2023-11-16 RX ADMIN — Medication 10 MILLIEQUIVALENT(S): at 12:19

## 2023-11-16 RX ADMIN — FAMOTIDINE 20 MILLIGRAM(S): 10 INJECTION INTRAVENOUS at 05:18

## 2023-11-16 RX ADMIN — LEVETIRACETAM 500 MILLIGRAM(S): 250 TABLET, FILM COATED ORAL at 05:08

## 2023-11-16 RX ADMIN — NYSTATIN CREAM 1 APPLICATION(S): 100000 CREAM TOPICAL at 05:18

## 2023-11-16 RX ADMIN — POLYETHYLENE GLYCOL 3350 17 GRAM(S): 17 POWDER, FOR SOLUTION ORAL at 18:39

## 2023-11-16 RX ADMIN — NYSTATIN CREAM 1 APPLICATION(S): 100000 CREAM TOPICAL at 18:38

## 2023-11-16 RX ADMIN — LEVETIRACETAM 500 MILLIGRAM(S): 250 TABLET, FILM COATED ORAL at 18:38

## 2023-11-16 NOTE — OCCUPATIONAL THERAPY INITIAL EVALUATION ADULT - ADDITIONAL COMMENTS
Pt has a W/C, RW, shower chair and raised toilet seat  Pt has a walk in shower   available to assist as needed
right handed  Pt has a W/C, RW, shower chair and raised toilet seat  Pt has a walk in shower   available to assist as needed

## 2023-11-16 NOTE — PROGRESS NOTE ADULT - SUBJECTIVE AND OBJECTIVE BOX
HPI: Patient seen and examined at bedside. Calm, no acute complaints. Excited to go to rehab.     REVIEW OF SYSTEMS  Constitutional - No fever,  No fatigue  HEENT - No vertigo, No neck pain  Neurological - No headaches, No memory loss, No loss of strength, No numbness, No tremors  Musculoskeletal - No joint pain, No joint swelling, No muscle pain  Psychiatric - No depression, No anxiety    FUNCTIONAL PROGRESS  OT 11/16/23    Bed Mobility: Sit to Supine:     · Level of [place]	moderate assist (50% patients effort)  · Physical Assist/Nonphysical Assist	1 person assist    Bed Mobility: Supine to Sit:     · Level of [place]	moderate assist (50% patients effort)  · Physical Assist/Nonphysical Assist	1 person assist    Bed Mobility Analysis:     · Impairments Contributing to Impaired Bed Mobility	impaired motor control; cognition    Transfer: Bed to Chair:    Bed to Chair Transfer:    Transfer Skill: Bed to Chair   · Level of [place]	unable to perform; to be assessed further    Transfer: Chair to Bed:     · Level of [place]	unable to perform    Transfer: Sit to Stand:     · Level of [place]	moderate assist (50% patients effort); maximum assist (25% patients effort)  · Physical Assist/Nonphysical Assist	1 person assist    Transfer: Stand to Sit:     · Level of [place]	moderate assist (50% patients effort); maximum assist (25% patients effort)  · Physical Assist/Nonphysical Assist	1 person assist    Sit/Stand Transfer Safety Analysis:     · Impairments Contributing to Impaired Transfers	cognition; impaired motor control        VITALS  T(C): 36.7 (11-16-23 @ 15:41), Max: 37 (11-16-23 @ 08:04)  HR: 75 (11-16-23 @ 18:00) (65 - 76)  BP: 140/86 (11-16-23 @ 18:00) (101/42 - 153/68)  RR: 20 (11-16-23 @ 18:00) (18 - 20)  SpO2: 97% (11-16-23 @ 18:00) (94% - 100%)  Wt(kg): --    MEDICATIONS   acetaminophen     Tablet .. 975 milliGRAM(s) every 8 hours PRN  atorvastatin 40 milliGRAM(s) at bedtime  bisacodyl 5 milliGRAM(s) every 12 hours PRN  chlorhexidine 2% Cloths 1 Application(s) daily  dextrose 5%. 1000 milliLiter(s) <Continuous>  dextrose 5%. 1000 milliLiter(s) <Continuous>  dextrose 50% Injectable 25 Gram(s) once  dextrose 50% Injectable 12.5 Gram(s) once  dextrose 50% Injectable 25 Gram(s) once  dextrose Oral Gel 15 Gram(s) once PRN  enoxaparin Injectable 40 milliGRAM(s) <User Schedule>  famotidine Injectable 20 milliGRAM(s) every 12 hours  glucagon  Injectable 1 milliGRAM(s) once  hydrALAZINE Injectable 10 milliGRAM(s) every 2 hours PRN  levETIRAcetam 500 milliGRAM(s) two times a day  magnesium oxide 400 milliGRAM(s) at bedtime  metoprolol tartrate 25 milliGRAM(s) every 12 hours  nystatin Powder 1 Application(s) two times a day  polyethylene glycol 3350 17 Gram(s) two times a day  potassium chloride    Tablet ER 10 milliEquivalent(s) daily  senna 2 Tablet(s) at bedtime      RECENT LABS/IMAGING                          10.5   9.28  )-----------( 370      ( 15 Nov 2023 13:15 )             32.6     11-15    140  |  104  |  17.3  ----------------------------<  100<H>  4.3   |  24.0  |  0.63    Ca    10.4      15 Nov 2023 13:15  Phos  2.5     11-15  Mg     2.1     11-15        Urinalysis Basic - ( 15 Nov 2023 13:15 )    Color: x / Appearance: x / SG: x / pH: x  Gluc: 100 mg/dL / Ketone: x  / Bili: x / Urobili: x   Blood: x / Protein: x / Nitrite: x   Leuk Esterase: x / RBC: x / WBC x   Sq Epi: x / Non Sq Epi: x / Bacteria: x      CT Head 11/14/23 reviewed and interpreted by me:  Small residual right frontal parietal low-density subdural collection seen      ACC: [id number] EXAM: CT BRAIN ORDERED BY: JUAN MANUEL ALARCON    PROCEDURE DATE: 11/14/2023        INTERPRETATION: Clinical Indication: Post drainage of right-sided subdural hematoma, follow-up    5mm axial sections of the brain were obtained from base to vertex, without the intravenous administration of contrast material.Coronal and sagittal computer generated reconstructed views are available.    Comparison is made with the prior CT of 2:17 AM.    The examination is limited by motion artifact.    There is a right frontal and parietal reilly hole. A right-sided  shunt catheter has its tip in the frontal horn. There is a small amount of low-density subdural fluid present with post operative extra-axial air. There is sulcal effacement and mild effacement of the right lateral ventricle without midline shift. There is no acute hemorrhage. There is no change since the prior exam. Ventricular and sulcal prominence is consistent with age-appropriate involutional changes. Small vessel white matter ischemic changes are noted.    IMPRESSION: No change since 2:17 AM. Small residual right frontal parietal low-density subdural collection with postoperative air.    --- End of Report ---            ISAIAS POLK MD; Attending Radiologist  This document has been electronically signed. 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     When medically optimized, based on the patient's diagnosis, current functional status and potential for progress, still recommend ACUTE inpatient rehabilitation for the functional deficits consisting of 3 hours of therapy/day & 24 hour RN/daily PMR physician for comorbid medical management. Patient will be able to tolerate 3 hours a day.     Will continue to follow. Rehab recommendations are dependent on how functional progress changes as well as how patient continues to participate and tolerate therapeutic interventions, which may change. Recommend ongoing mobilization by staff to maintain cardiopulmonary function and prevention of secondary complications related to debility. Discussed the specific management and recommendations above with rehab clinical care team/rehab liaison.

## 2023-11-16 NOTE — OCCUPATIONAL THERAPY INITIAL EVALUATION ADULT - PERTINENT HX OF CURRENT PROBLEM, REHAB EVAL
Pt was transferred from [place] s/p fall; now decreased ability to ambulate
As per MD note: Patient is an 82yo F with a HTN, HLD, Arthritis, and NPH s/p  shunt in 03/23, at [place], who presented to the [place] ED with an acute on chronic SDH and was transferred to [facility] for further evaluation and management.

## 2023-11-16 NOTE — OCCUPATIONAL THERAPY INITIAL EVALUATION ADULT - PLANNED THERAPY INTERVENTIONS, OT EVAL
ADL retraining/balance training/bed mobility training/cognitive, visual perceptual/fine motor coordination training/motor coordination training/neuromuscular re-education/ROM/strengthening/transfer training
ADL retraining/balance training/bed mobility training/transfer training

## 2023-11-16 NOTE — OCCUPATIONAL THERAPY INITIAL EVALUATION ADULT - LEVEL OF INDEPENDENCE:TOILET, OT EVAL
minimum assist (75% patients effort)
secondary to prima fit/dependent (less than 25% patients effort)

## 2023-11-16 NOTE — OCCUPATIONAL THERAPY INITIAL EVALUATION ADULT - DIAGNOSIS, OT EVAL
Mixed density right SDH with midline shift; s/p burrhole (11/9/23); 11/10/23- right MMA embolization
SDH

## 2023-11-16 NOTE — OCCUPATIONAL THERAPY INITIAL EVALUATION ADULT - LIVES WITH, PROFILE
Pt lives with spouse in multi level home, bed/bath on main level with 1 MAKENNA through backdoor, no steps through garage
Pt lives with spouse in multi level home, bed/bath on main level with 1 MAKENNA through backdoor, no steps through garage

## 2023-11-16 NOTE — OCCUPATIONAL THERAPY INITIAL EVALUATION ADULT - RANGE OF MOTION EXAMINATION, UPPER EXTREMITY
except bilat shoulders to 1/2 flexion/bilateral UE Active ROM was WFL  (within functional limits)
bilateral UE Active ROM was WFL  (within functional limits)

## 2023-11-16 NOTE — PROGRESS NOTE ADULT - THIS PATIENT HAS THE FOLLOWING CONDITION(S)/DIAGNOSES ON THIS ADMISSION:
Brain Compression / Herniation
None
SDH, prior VPS
None
None
Encephalopathy
Encephalopathy
None
Encephalopathy
None
Encephalopathy
None
None

## 2023-11-16 NOTE — PROGRESS NOTE ADULT - SUBJECTIVE AND OBJECTIVE BOX
HPI: Patient is an 82yo F with a PMH of HTN, HLD, Arthritis, and NPH s/p  shunt in 03/23, at [place], who presented to the [place] ED with increased weakness and confusion and decreased PO intake. Patient follows with Neurologist Dr. Simon for her NPH. Per family, patient has been more confused and unable to walk over the past 4 days. Normally able to walk with a walker. Found to have mixed density SDH with 1.4cm midline shift, and transferred to [facility] for neurosurgical evaluation. Pt underwent a reilly hole placement for SDH evacuation on 11/9 and R MMA embo on 11/10.     INTERVAL HPI/OVERNIGHT EVENTS:  83y Female s/p R sided reilly holes for SDH evacuation on 11/9 and R MMA embo on 11/10. Hakim VPS reprogrammed from 140 to 200 on 11/12.  Patient resting comfortably in bed speaking. Rapid response 11/14 for unresponsiveness, STAT CTH was stable, EEG negative. Patient better on exam yesterday and today, although confused about her location today as opposed to yesterday, otherwise improved.    Vital Signs Last 24 Hrs  T(C): 37 (16 Nov 2023 08:04), Max: 37 (16 Nov 2023 08:04)  T(F): 98.6 (16 Nov 2023 08:04), Max: 98.6 (16 Nov 2023 08:04)  HR: 67 (16 Nov 2023 08:00) (65 - 92)  BP: 151/66 (16 Nov 2023 08:00) (100/66 - 153/68)  BP(mean): 86 (16 Nov 2023 08:00) (58 - 94)  RR: 18 (16 Nov 2023 08:00) (16 - 20)  SpO2: 98% (16 Nov 2023 08:00) (94% - 100%)    Parameters below as of 16 Nov 2023 08:00  Patient On (Oxygen Delivery Method): room air    PHYSICAL EXAM:  GENERAL: NAD  HEAD: normocephalic  WOUND: clean dry intact  MENTAL STATUS: AAO x 1(self); Awake; Opens eyes spontaneously; Appropriately conversant without aphasia; following simple commands  CRANIAL NERVES:  PERRL. EOMI without nystagmus. Face symmetric w/ normal eye closure and smile, tongue midline. Hearing grossly intact. Speech clear.   MOTOR: RUE 5/5, LUE 4+/5 w drift, b/l HF/KE 5/5, b/l DF/PF 4+/5  SENSATION: grossly intact to light touch all extremities  SKIN: Warm, dry    LABS:                        10.5   9.28  )-----------( 370      ( 15 Nov 2023 13:15 )             32.6     11-15    140  |  104  |  17.3  ----------------------------<  100<H>  4.3   |  24.0  |  0.63    Ca    10.4      15 Nov 2023 13:15  Phos  2.5     11-15  Mg     2.1     11-15    Urinalysis Basic - ( 15 Nov 2023 13:15 )    Color: x / Appearance: x / SG: x / pH: x  Gluc: 100 mg/dL / Ketone: x  / Bili: x / Urobili: x   Blood: x / Protein: x / Nitrite: x   Leuk Esterase: x / RBC: x / WBC x   Sq Epi: x / Non Sq Epi: x / Bacteria: x    11-15 @ 07:01  -  11-16 @ 07:00  --------------------------------------------------------  IN: 779 mL / OUT: 675 mL / NET: 104 mL    RADIOLOGY & ADDITIONAL TESTS:    < from: CT Head No Cont (11.08.23 @ 14:13) >  IMPRESSION:  Moderate to large mixed attenuation right hemispheric convexity subdural   hematoma compressing right hemisphere and right lateral ventricle with   1.4 cm midline shift to left.    Right frontal ventriculostomy shunt catheter within right frontal horn.    < from: CT Head No Cont (11.10.23 @ 00:32) >  IMPRESSION:  Expected postoperative changes status post evacuation of right-sided   subdural hematoma, which is decreased in size and density compared with   prior. Decreased mass effect with decreasing leftward midline shift, now   measuring up to 7 mm.    Grossly stable position of the articular catheter. Slightly increased   caliber of the ventricles is likely related to decreasing mass effect,   recommend attention on follow-up to exclude developing hydrocephalus.    < from: CT Head No Cont (11.14.23 @ 02:22) >  IMPRESSION:  Right convexity subdural collection of air, blood and fluid continues to   decrease in size.    < from: CT Head No Cont (11.14.23 @ 15:14) >  IMPRESSION: No change since 2:17 AM. Small residual right frontal   parietal low-density subdural collection with postoperative air.

## 2023-11-16 NOTE — PROGRESS NOTE ADULT - ASSESSMENT
84yo F PMH HTN, HLD, arthritis and NPH s/p  shunt 3/23 presented with acute on chronic SDH s/p mechanical fall. Underwent R sided reilly holes for SDH evacuation on 11/9 and R MMA embo on 11/10. Hakim VPS reprogrammed from 140 to 200 on 11/12. Rapid response x 2 for unresponsiveness on 11/14, STAT CTH stable, EEG neg. Waxing and waning mental status likely delirium and is cleared from a neurosurgical standpoint to be discharged to acute rehabilitation.     Plan:    - Q2 neuro checks  - STAT CTH from 11/14 stable, EEG negative  - Delirium precautions, protected sleep  - Cardio recs appreciated for new onset Afib; Metoprolol 25mg BID, outpatient f/u for possible watchman, K > 4, Mg > 2  - SBP < 160  - Keppra 500 BID  - Pain control; Oxy, Tylenol  - Bowel regimen; Senna, Miralax, dulcolax  - DVT ppx; SCDs, SQL  - Electrolytes supplemented as recommended by cardio; levels improved yesterday, will continue to f/u  - Dispo acute inpatient rehab  - Case and plan discussed with Dr. Rodriguez

## 2023-11-17 ENCOUNTER — INPATIENT (INPATIENT)
Facility: HOSPITAL | Age: 83
LOS: 17 days | Discharge: NURSING, REHAB OR LONG-TERM CARE | DRG: 949 | End: 2023-12-05
Attending: STUDENT IN AN ORGANIZED HEALTH CARE EDUCATION/TRAINING PROGRAM | Admitting: STUDENT IN AN ORGANIZED HEALTH CARE EDUCATION/TRAINING PROGRAM
Payer: MEDICARE

## 2023-11-17 ENCOUNTER — TRANSCRIPTION ENCOUNTER (OUTPATIENT)
Age: 83
End: 2023-11-17

## 2023-11-17 VITALS
WEIGHT: 151.46 LBS | RESPIRATION RATE: 16 BRPM | DIASTOLIC BLOOD PRESSURE: 75 MMHG | SYSTOLIC BLOOD PRESSURE: 134 MMHG | HEART RATE: 64 BPM | HEIGHT: 68 IN | OXYGEN SATURATION: 98 % | TEMPERATURE: 98 F

## 2023-11-17 VITALS
OXYGEN SATURATION: 99 % | SYSTOLIC BLOOD PRESSURE: 118 MMHG | RESPIRATION RATE: 16 BRPM | HEART RATE: 80 BPM | DIASTOLIC BLOOD PRESSURE: 75 MMHG

## 2023-11-17 DIAGNOSIS — Z98.2 PRESENCE OF CEREBROSPINAL FLUID DRAINAGE DEVICE: Chronic | ICD-10-CM

## 2023-11-17 DIAGNOSIS — S06.5X9A TRAUMATIC SUBDURAL HEMORRHAGE WITH LOSS OF CONSCIOUSNESS OF UNSPECIFIED DURATION, INITIAL ENCOUNTER: ICD-10-CM

## 2023-11-17 LAB
SARS-COV-2 RNA SPEC QL NAA+PROBE: SIGNIFICANT CHANGE UP
SARS-COV-2 RNA SPEC QL NAA+PROBE: SIGNIFICANT CHANGE UP

## 2023-11-17 PROCEDURE — 99232 SBSQ HOSP IP/OBS MODERATE 35: CPT

## 2023-11-17 PROCEDURE — 99222 1ST HOSP IP/OBS MODERATE 55: CPT

## 2023-11-17 RX ORDER — SENNA PLUS 8.6 MG/1
2 TABLET ORAL AT BEDTIME
Refills: 0 | Status: DISCONTINUED | OUTPATIENT
Start: 2023-11-17 | End: 2023-12-05

## 2023-11-17 RX ORDER — LEVETIRACETAM 250 MG/1
500 TABLET, FILM COATED ORAL AT BEDTIME
Refills: 0 | Status: COMPLETED | OUTPATIENT
Start: 2023-11-17 | End: 2023-11-20

## 2023-11-17 RX ORDER — ENOXAPARIN SODIUM 100 MG/ML
40 INJECTION SUBCUTANEOUS
Qty: 0 | Refills: 0 | DISCHARGE
Start: 2023-11-17

## 2023-11-17 RX ORDER — POTASSIUM CHLORIDE 20 MEQ
1 PACKET (EA) ORAL
Qty: 0 | Refills: 0 | DISCHARGE
Start: 2023-11-17

## 2023-11-17 RX ORDER — POTASSIUM CHLORIDE 20 MEQ
10 PACKET (EA) ORAL DAILY
Refills: 0 | Status: DISCONTINUED | OUTPATIENT
Start: 2023-11-17 | End: 2023-12-05

## 2023-11-17 RX ORDER — FAMOTIDINE 10 MG/ML
20 INJECTION INTRAVENOUS
Refills: 0 | Status: DISCONTINUED | OUTPATIENT
Start: 2023-11-17 | End: 2023-12-05

## 2023-11-17 RX ORDER — DEXTROSE 50 % IN WATER 50 %
25 SYRINGE (ML) INTRAVENOUS ONCE
Refills: 0 | Status: DISCONTINUED | OUTPATIENT
Start: 2023-11-17 | End: 2023-12-05

## 2023-11-17 RX ORDER — SENNA PLUS 8.6 MG/1
2 TABLET ORAL
Qty: 0 | Refills: 0 | DISCHARGE
Start: 2023-11-17

## 2023-11-17 RX ORDER — ACETAMINOPHEN 500 MG
3 TABLET ORAL
Qty: 0 | Refills: 0 | DISCHARGE
Start: 2023-11-17

## 2023-11-17 RX ORDER — LEVETIRACETAM 250 MG/1
1 TABLET, FILM COATED ORAL
Qty: 0 | Refills: 0 | DISCHARGE
Start: 2023-11-17

## 2023-11-17 RX ORDER — POLYETHYLENE GLYCOL 3350 17 G/17G
17 POWDER, FOR SOLUTION ORAL
Refills: 0 | Status: DISCONTINUED | OUTPATIENT
Start: 2023-11-17 | End: 2023-12-05

## 2023-11-17 RX ORDER — NYSTATIN CREAM 100000 [USP'U]/G
1 CREAM TOPICAL
Refills: 0 | Status: DISCONTINUED | OUTPATIENT
Start: 2023-11-17 | End: 2023-11-29

## 2023-11-17 RX ORDER — GLUCAGON INJECTION, SOLUTION 0.5 MG/.1ML
1 INJECTION, SOLUTION SUBCUTANEOUS ONCE
Refills: 0 | Status: DISCONTINUED | OUTPATIENT
Start: 2023-11-17 | End: 2023-12-05

## 2023-11-17 RX ORDER — MAGNESIUM OXIDE 400 MG ORAL TABLET 241.3 MG
400 TABLET ORAL AT BEDTIME
Refills: 0 | Status: DISCONTINUED | OUTPATIENT
Start: 2023-11-17 | End: 2023-12-05

## 2023-11-17 RX ORDER — METOPROLOL TARTRATE 50 MG
25 TABLET ORAL EVERY 12 HOURS
Refills: 0 | Status: DISCONTINUED | OUTPATIENT
Start: 2023-11-17 | End: 2023-11-24

## 2023-11-17 RX ORDER — FAMOTIDINE 10 MG/ML
2 INJECTION INTRAVENOUS
Qty: 0 | Refills: 0 | DISCHARGE
Start: 2023-11-17

## 2023-11-17 RX ORDER — DEXTROSE 50 % IN WATER 50 %
15 SYRINGE (ML) INTRAVENOUS ONCE
Refills: 0 | Status: DISCONTINUED | OUTPATIENT
Start: 2023-11-17 | End: 2023-12-05

## 2023-11-17 RX ORDER — MAGNESIUM OXIDE 400 MG ORAL TABLET 241.3 MG
1 TABLET ORAL
Qty: 0 | Refills: 0 | DISCHARGE
Start: 2023-11-17

## 2023-11-17 RX ORDER — DEXTROSE 50 % IN WATER 50 %
12.5 SYRINGE (ML) INTRAVENOUS ONCE
Refills: 0 | Status: DISCONTINUED | OUTPATIENT
Start: 2023-11-17 | End: 2023-12-05

## 2023-11-17 RX ORDER — POLYETHYLENE GLYCOL 3350 17 G/17G
17 POWDER, FOR SOLUTION ORAL
Qty: 0 | Refills: 0 | DISCHARGE
Start: 2023-11-17

## 2023-11-17 RX ORDER — NYSTATIN CREAM 100000 [USP'U]/G
1 CREAM TOPICAL
Qty: 0 | Refills: 0 | DISCHARGE
Start: 2023-11-17

## 2023-11-17 RX ORDER — SODIUM CHLORIDE 9 MG/ML
1000 INJECTION, SOLUTION INTRAVENOUS
Refills: 0 | Status: DISCONTINUED | OUTPATIENT
Start: 2023-11-17 | End: 2023-12-05

## 2023-11-17 RX ORDER — LEVETIRACETAM 250 MG/1
500 TABLET, FILM COATED ORAL
Refills: 0 | Status: DISCONTINUED | OUTPATIENT
Start: 2023-11-17 | End: 2023-11-17

## 2023-11-17 RX ORDER — ENOXAPARIN SODIUM 100 MG/ML
40 INJECTION SUBCUTANEOUS
Refills: 0 | Status: DISCONTINUED | OUTPATIENT
Start: 2023-11-17 | End: 2023-12-05

## 2023-11-17 RX ORDER — ATORVASTATIN CALCIUM 80 MG/1
40 TABLET, FILM COATED ORAL AT BEDTIME
Refills: 0 | Status: DISCONTINUED | OUTPATIENT
Start: 2023-11-17 | End: 2023-12-05

## 2023-11-17 RX ORDER — ACETAMINOPHEN 500 MG
975 TABLET ORAL EVERY 8 HOURS
Refills: 0 | Status: DISCONTINUED | OUTPATIENT
Start: 2023-11-17 | End: 2023-12-05

## 2023-11-17 RX ADMIN — Medication 25 MILLIGRAM(S): at 18:00

## 2023-11-17 RX ADMIN — FAMOTIDINE 20 MILLIGRAM(S): 10 INJECTION INTRAVENOUS at 18:01

## 2023-11-17 RX ADMIN — ENOXAPARIN SODIUM 40 MILLIGRAM(S): 100 INJECTION SUBCUTANEOUS at 18:01

## 2023-11-17 RX ADMIN — MAGNESIUM OXIDE 400 MG ORAL TABLET 400 MILLIGRAM(S): 241.3 TABLET ORAL at 17:59

## 2023-11-17 RX ADMIN — SENNA PLUS 2 TABLET(S): 8.6 TABLET ORAL at 21:44

## 2023-11-17 RX ADMIN — NYSTATIN CREAM 1 APPLICATION(S): 100000 CREAM TOPICAL at 17:59

## 2023-11-17 RX ADMIN — ATORVASTATIN CALCIUM 40 MILLIGRAM(S): 80 TABLET, FILM COATED ORAL at 21:44

## 2023-11-17 RX ADMIN — LEVETIRACETAM 500 MILLIGRAM(S): 250 TABLET, FILM COATED ORAL at 05:27

## 2023-11-17 RX ADMIN — POLYETHYLENE GLYCOL 3350 17 GRAM(S): 17 POWDER, FOR SOLUTION ORAL at 05:27

## 2023-11-17 RX ADMIN — POLYETHYLENE GLYCOL 3350 17 GRAM(S): 17 POWDER, FOR SOLUTION ORAL at 18:02

## 2023-11-17 RX ADMIN — LEVETIRACETAM 500 MILLIGRAM(S): 250 TABLET, FILM COATED ORAL at 21:44

## 2023-11-17 RX ADMIN — Medication 25 MILLIGRAM(S): at 05:27

## 2023-11-17 RX ADMIN — NYSTATIN CREAM 1 APPLICATION(S): 100000 CREAM TOPICAL at 05:29

## 2023-11-17 RX ADMIN — FAMOTIDINE 20 MILLIGRAM(S): 10 INJECTION INTRAVENOUS at 05:27

## 2023-11-17 RX ADMIN — Medication 10 MILLIEQUIVALENT(S): at 11:34

## 2023-11-17 RX ADMIN — MAGNESIUM OXIDE 400 MG ORAL TABLET 400 MILLIGRAM(S): 241.3 TABLET ORAL at 22:06

## 2023-11-17 RX ADMIN — CHLORHEXIDINE GLUCONATE 1 APPLICATION(S): 213 SOLUTION TOPICAL at 11:35

## 2023-11-17 NOTE — H&P ADULT - NS ATTEND AMEND GEN_ALL_CORE FT
Pt. seen with resident.  Agree with documentation above as per NP and resident with amendments made as appropriate. Patient medically stable. Appropriate for acute rehabilitation.        83 year old female with PMH of HTN, HLD, Arthritis, and normal pressure hydrocephalus (s/p VPS- March 2023 at [facility]); who presented to [facility] on 11/8 for progressive weakness, confusion and decreased PO intake, and was found to have a mixed SDH with 1.4cm midline shift. She was transferred to [facility] later that day for further management. On 11/9, she underwent R sided reilly hole placement, followed by MMA embolization on 11/10 with Dr. Rodriguez. Her hospital course was complicated by 2 RRTs for unresponsiveness. Serial brain imaging was stable and she was found to be in rapid AFIB. 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 Patient now admitted for a multidisciplinary rehab program with cognitive deficits, gait and ADL impairments.    Seizure Prophylaxis  - Levetiracetam 500mg BID. Started on 11/8.   --No seizure events throughout hospital course and EEG neg for seizures  --completed 7 day seizure ppx  --Taper off Keppra-- Decrease Keppra to 500mg qhs for 3 days then stop

## 2023-11-17 NOTE — H&P ADULT - NSHPPHYSICALEXAM_GEN_ALL_CORE
Constitutional - NAD, Comfortable  HEENT - NCAT, EOMI  Neck - Supple, No limited ROM  Chest - good chest expansion, good respiratory effort  Cardio - warm and well perfused,   Abdomen -  Soft, NTND  Extremities - No peripheral edema, No calf tenderness   Neurologic Exam:                    Cognitive -             Orientation: Awake, A&O x2 (to self, month) States location is [facility] and year is 1980. States she had brain surgery.             Attention:  Able to state days of week forwardly. Unable to do it in reverse. Unable to recalls 3 objects with cuing.             Memory: Recent/ remote memory impaired. Able to name president with cuing.             Thought: process, content appropriate     Speech - Fluent intact. Questionable comprehension (slowed cognition with lots of cues). Repeitition and naming intact.  No dysarthria, No aphasia      Cranial Nerves - No facial asymmetry, Tongue midline, EOMI, Shoulder shrug intact on the left.      Motor -                      LEFT    UE - ShAB 4/5, EF 4/5, EE 4/5, WE 3/5,  impaired                    RIGHT UE - ShAB 5/5, EF 5/5, EE 5/5, WE 5/5,  WNL                    LEFT    LE - HF 4/5, KE 4/5, DF 3/5, PF 4/5                    RIGHT LE - HF 5/5, KE 5/5, DF 4/5, PF 5/5        Sensory - Intact to LT bilateral face, arms, legs.      Reflexes - DTR 2 / 4 in the biceps b/l. +1/4 in patella and achilles b/l.      Coordination - FTN intact on the right. Neglects left arm during FTN. HTS intact on the right and mildly impaired on the leg.      OculoVestibular -  No nystagmus  Psychiatric - Patient is tired from the transportation. Mood stable, Affect WNL  Skin on admission: Right sided surgical incision intact with staples. Dry skin over gluteal region. Skin tags throughout body. Constitutional - NAD, Comfortable  HEENT - NCAT, EOMI  Neck - Supple, No limited ROM  Chest - good chest expansion, good respiratory effort  Cardio - warm and well perfused,   Abdomen -  Soft, NTND  Extremities - No peripheral edema, No calf tenderness   Neurologic Exam:                    Cognitive -             Orientation: Awake, A&O x2 (to self, month) States location is [facility] and year is 1980. States she had brain surgery.             Attention:  Able to state days of week forwardly. Unable to do it in reverse.             Memory: Recent/ remote memory impaired. Able to name president with cuing. Unable to recalls 3 objects with cuing.             Thought: process, content appropriate     Speech - Fluent intact. Questionable comprehension (slowed cognition with lots of cues). Repeitition and naming intact.  No dysarthria, No aphasia      Cranial Nerves - VF deficit on right., No facial asymmetry, Tongue midline, EOMI, Shoulder shrug intact on the left.      Motor -                      LEFT    UE - ShAB 4/5, EF 4/5, EE 4/5, WE 3/5,  impaired                    RIGHT UE - ShAB 5/5, EF 5/5, EE 5/5, WE 5/5,  WNL                    LEFT    LE - HF 4/5, KE 4/5, DF 3/5, PF 4/5                    RIGHT LE - HF 5/5, KE 5/5, DF 4/5, PF 5/5        Sensory - Intact to LT bilateral face, arms, legs.      Reflexes - DTR 2 / 4 in the biceps b/l. +1/4 in patella and achilles b/l.      Coordination - FTN intact on the right. Neglects left arm during FTN. HTS intact on the right and mildly impaired on the leg.      OculoVestibular -  No nystagmus  Psychiatric - Patient is tired from the transportation. Mood stable, Affect WNL  Skin on admission: Right sided surgical incision intact with staples. Dry skin over gluteal region. Skin tags throughout body.

## 2023-11-17 NOTE — H&P ADULT - NSHPREVIEWOFSYSTEMS_GEN_ALL_CORE
REVIEW OF SYSTEMS  Constitutional: No fever, No Chills, No fatigue  HEENT: No eye pain, No visual disturbances, No difficulty hearing  Pulm: No cough,  No shortness of breath  Cardio: No chest pain, No palpitations  GI:  No abdominal pain, No n/v/d. + constipation. Last bowel movement unknown.   : No dysuria  Neuro: + memory loss. + loss of strength. No headaches, No numbness, No tremors  Skin: No itching, No rashes, No lesions   Endo: No temperature intolerance  MSK: No joint pain, No joint swelling, No muscle pain, No Neck or back pain  Psych:  No depression, No anxiety

## 2023-11-17 NOTE — DISCHARGE NOTE PROVIDER - CARE PROVIDER_API CALL
Sierra Rodriguez  Neurosurgery  [address] [place] [phone]  Phone: [phone]  Fax: [phone]  Follow Up Time: 2 weeks

## 2023-11-17 NOTE — PROGRESS NOTE ADULT - SUBJECTIVE AND OBJECTIVE BOX
HPI: Patient seen and examined at bedside. Calm, no acute complaints. Denies any new pain or discomfort. Reminded that she will be going to rehab today.     REVIEW OF SYSTEMS  Constitutional - No fever,  No fatigue  HEENT - No vertigo, No neck pain  Neurological - No headaches, +memory loss, +loss of strength, No numbness, No tremors  Musculoskeletal - No joint pain, No joint swelling, No muscle pain  Psychiatric - No depression, No anxiety    FUNCTIONAL PROGRESS  OT 11/16/23    Bed Mobility: Sit to Supine:     · Level of [place]	moderate assist (50% patients effort)  · Physical Assist/Nonphysical Assist	1 person assist    Bed Mobility: Supine to Sit:     · Level of [place]	moderate assist (50% patients effort)  · Physical Assist/Nonphysical Assist	1 person assist    Bed Mobility Analysis:     · Impairments Contributing to Impaired Bed Mobility	impaired motor control; cognition    Transfer: Bed to Chair:    Bed to Chair Transfer:    Transfer Skill: Bed to Chair   · Level of [place]	unable to perform; to be assessed further    Transfer: Chair to Bed:     · Level of [place]	unable to perform    Transfer: Sit to Stand:     · Level of [place]	moderate assist (50% patients effort); maximum assist (25% patients effort)  · Physical Assist/Nonphysical Assist	1 person assist    Transfer: Stand to Sit:     · Level of [place]	moderate assist (50% patients effort); maximum assist (25% patients effort)  · Physical Assist/Nonphysical Assist	1 person assist    Sit/Stand Transfer Safety Analysis:     · Impairments Contributing to Impaired Transfers	cognition; impaired motor control        VITALS  T(C): 36.7 (11-16-23 @ 15:41), Max: 37 (11-16-23 @ 08:04)  HR: 75 (11-16-23 @ 18:00) (65 - 76)  BP: 140/86 (11-16-23 @ 18:00) (101/42 - 153/68)  RR: 20 (11-16-23 @ 18:00) (18 - 20)  SpO2: 97% (11-16-23 @ 18:00) (94% - 100%)  Wt(kg): --    MEDICATIONS   acetaminophen     Tablet .. 975 milliGRAM(s) every 8 hours PRN  atorvastatin 40 milliGRAM(s) at bedtime  bisacodyl 5 milliGRAM(s) every 12 hours PRN  chlorhexidine 2% Cloths 1 Application(s) daily  dextrose 5%. 1000 milliLiter(s) <Continuous>  dextrose 5%. 1000 milliLiter(s) <Continuous>  dextrose 50% Injectable 25 Gram(s) once  dextrose 50% Injectable 12.5 Gram(s) once  dextrose 50% Injectable 25 Gram(s) once  dextrose Oral Gel 15 Gram(s) once PRN  enoxaparin Injectable 40 milliGRAM(s) <User Schedule>  famotidine Injectable 20 milliGRAM(s) every 12 hours  glucagon  Injectable 1 milliGRAM(s) once  hydrALAZINE Injectable 10 milliGRAM(s) every 2 hours PRN  levETIRAcetam 500 milliGRAM(s) two times a day  magnesium oxide 400 milliGRAM(s) at bedtime  metoprolol tartrate 25 milliGRAM(s) every 12 hours  nystatin Powder 1 Application(s) two times a day  polyethylene glycol 3350 17 Gram(s) two times a day  potassium chloride    Tablet ER 10 milliEquivalent(s) daily  senna 2 Tablet(s) at bedtime      RECENT LABS/IMAGING                          10.5   9.28  )-----------( 370      ( 15 Nov 2023 13:15 )             32.6     11-15    140  |  104  |  17.3  ----------------------------<  100<H>  4.3   |  24.0  |  0.63    Ca    10.4      15 Nov 2023 13:15  Phos  2.5     11-15  Mg     2.1     11-15        Urinalysis Basic - ( 15 Nov 2023 13:15 )    Color: x / Appearance: x / SG: x / pH: x  Gluc: 100 mg/dL / Ketone: x  / Bili: x / Urobili: x   Blood: x / Protein: x / Nitrite: x   Leuk Esterase: x / RBC: x / WBC x   Sq Epi: x / Non Sq Epi: x / Bacteria: x      CT Head 11/14/23 reviewed and interpreted by me:  Small residual right frontal parietal low-density subdural collection seen, Right  shunt catheter seen.       ACC: [id number] EXAM: CT BRAIN ORDERED BY: JUAN MANUEL ALARCON    PROCEDURE DATE: 11/14/2023        INTERPRETATION: Clinical Indication: Post drainage of right-sided subdural hematoma, follow-up    5mm axial sections of the brain were obtained from base to vertex, without the intravenous administration of contrast material. Coronal and sagittal computer generated reconstructed views are available.    Comparison is made with the prior CT of 2:17 AM.    The examination is limited by motion artifact.    There is a right frontal and parietal reilly hole. A right-sided  shunt catheter has its tip in the frontal horn. There is a small amount of low-density subdural fluid present with post operative extra-axial air. There is sulcal effacement and mild effacement of the right lateral ventricle without midline shift. There is no acute hemorrhage. There is no change since the prior exam. Ventricular and sulcal prominence is consistent with age-appropriate involutional changes. Small vessel white matter ischemic changes are noted.    IMPRESSION: No change since 2:17 AM. Small residual right frontal parietal low-density subdural collection with postoperative air.    --- End of Report ---            ISAIAS POLK MD; Attending Radiologist  This document has been electronically signed. Nov 14 2023 3:33PM    PHYSICAL EXAM  Constitutional - NAD, Comfortable  HEENT - Right cranial incision, staples intact, skin well approximated, no surrounding erythema, swelling, no discharge - CDI    Extremities - No edema  Neurologic Exam -                    Cognitive - AAOx 1 to person,      FUNCTIONAL MOTOR EXAM - No focal deficits, follows simple commands, requires repetition   Psychiatric - Calm, appropriate affect  ----------------------------------------------------------------------------------------  ASSESSMENT/PLAN  83yFemale with functional deficits after a fall sustaining a SDH   Traumatic SDH s/p reilly hole evacuation, Right MMA embolization - Continue Keppra  Memory/Language - Continue Namenda 5mg daily (11/13)  Pain - Tylenol  Sleep - Melatonin 5mg (11/13)  DVT PPX - SCDs, Lovenox   Rehab/Impaired mobility and function - Patient continues to require hospitalization for the above diagnoses and ongoing active management of comorbid complications that are substantially impairing functional ability and impairing quality of life necessitating ongoing medical management of these complications necessitating acute rehab.     When medically optimized, based on the patient's diagnosis, current functional status and potential for progress, still recommend ACUTE inpatient rehabilitation for the functional deficits consisting of 3 hours of therapy/day & 24 hour RN/daily PMR physician for comorbid medical management. Patient will be able to tolerate 3 hours a day.     Patient is stable for discharge to [facility] today

## 2023-11-17 NOTE — H&P ADULT - ASSESSMENT
Assessment/Plan:  STEVEN LYON is an 83 year old female with PMH of HTN, HLD, Arthritis, and normal pressure hydrocephalus (s/p VPS- March 2023 at [place]); who presented to [facility] on 11/8 for progressive weakness, confusion and decreased PO intake, and was found to have a mixed SDH with 1.4cm midline shift. She was transferred to [facility] later that day for further management. On 11/9, she underwent R sided reilly hole placement, followed by MMA embolization on 11/10 with Dr. Rodriguez. Her hospital course was complicated by 2 RRTs for unresponsiveness. Serial brain imaging was stable and she was found to be in rapid AFIB. 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 Patient now admitted for a multidisciplinary rehab program. 11-17-23 @ 11:37  -------------  Rehab Management/MEDICAL MANAGEMENT     #Non-traumatic Subdural Hemorrhage   - Gait Instability, ADL impairments and Functional impairments: start Comprehensive Rehab Program of PT/OT/SLP  - 3 hours a day, 5 days a week  - P&O as needed   -S/p R sided reilly hole placement (11/9), followed by MMA embolization (11/10)  - Outpatient follow up with Dr. Rodriguez     #Normal Pressure Hydrocephalus  - S/p VPS March 2023  - Dr. Simon ([place])     #Seizure Prophylaxis  - Levetiracetam 500mg BID     #Rapid AFIB  - Metoprolol 25mg BID  - No AC  - Evaluation for Watchmann device outpatient     #HLD  - Atorvastatin 40mg at HS     #Arthritis  - Plaquenil 200mg BID?- home regimen    #Skin  - Skin on admission:  - Nystatin powder BID   - Pressure injury/Skin: OOB to Chair, PT/OT     #Pain Mgmt   - Tylenol PRN    #GI/Bowel Mgmt   - Famotidine 20mg BID   - Miralax  - Senna  - PRN: Bisacodyl 5mg at HS     #/Bladder Mgmt   -  PVR q8h, CIC>400cc    #FEN   - Diet - Regular + Thins    - Dysphagia  SLP - evaluation and treatment    #Health Maintenance  - KCL 10mEq daily   - Magnesium Oxide 400mg at HS  - Fosamax 70mg weekly- home regimen    #Precautions / PROPHYLAXIS:   - Falls, Seizure   - ortho: Weight bearing status: WBAT   - Lungs: Aspiration, Incentive Spirometer   - DVT: Lovenox  ------------------------------------------------------  OUTPATIENT/FOLLOW UP:    Sierra Rodriguez  Neurosurgery  [address] [place] [id number]  Phone: [phone]  Fax: [phone]  Follow Up Time: 2 weeks    Neurologist- managing VPS   Dr. Aurora Webb   ------------------------------------------------------  MEDICAL PROGNOSIS: GOOD                                   REHAB POTENTIAL: GOOD  ESTIMATED DISPOSITION: HOME                             ELOS: 10-14 Days   EXPECTED THERAPY:     P.T. 1hr/day       O.T. 1hr/day      S.L.P. 1hr/day      EXP FREQUENCY: 5 days per 7 day period     PRESCREEN COMPARISON: I have reviewed the prescreen information and I have found no relevant changes between the preadmission screening and my post admission evaluation     RATIONALE FOR INPATIENT ADMISSION - Patient demonstrates the following: (check all that apply)  [X] Medically appropriate for rehabilitation admission  [X] Has attainable rehab goals with an appropriate initial discharge plan  [X] Has rehabilitation potential (expected to make a significant improvement within a reasonable period of time)   [X] Requires close medical managment by a rehab physician, rehab nursing care, Hospitalist and comprehensive interdisciplinary team (including PT, OT, & or SLP, Prosthetics and Orthotics)     Assessment/Plan:  STEVEN LYON is an 83 year old female with PMH of HTN, HLD, Arthritis, and normal pressure hydrocephalus (s/p VPS- March 2023 at [place]); who presented to [facility] on 11/8 for progressive weakness, confusion and decreased PO intake, and was found to have a mixed SDH with 1.4cm midline shift. She was transferred to [facility] later that day for further management. On 11/9, she underwent R sided reilly hole placement, followed by MMA embolization on 11/10 with Dr. Rodriguez. Her hospital course was complicated by 2 RRTs for unresponsiveness. Serial brain imaging was stable and she was found to be in rapid AFIB. 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 Patient now admitted for a multidisciplinary rehab program. 11-17-23 @ 11:37  -------------  Rehab Management/MEDICAL MANAGEMENT     #Subdural Hemorrhage   - Gait Instability, ADL impairments and Functional impairments: start Comprehensive Rehab Program of PT/OT/SLP  - 3 hours a day, 5 days a week  - P&O as needed   -S/p R sided reilly hole placement (11/9), followed by MMA embolization (11/10)  - Outpatient follow up with Dr. Rodriguez     #Normal Pressure Hydrocephalus  - S/p VPS March 2023  - Dr. Simon ([place])     #Seizure Prophylaxis  - Levetiracetam 500mg BID     #Rapid AFIB  - Metoprolol 25mg BID  - No AC  - Evaluation for Watchmann device outpatient     #HLD  - Atorvastatin 40mg at HS     #Arthritis  - Plaquenil 200mg BID?- home regimen    #Skin  - Skin on admission:  - Nystatin powder BID   - Pressure injury/Skin: OOB to Chair, PT/OT     #Pain Mgmt   - Tylenol PRN    #GI/Bowel Mgmt   - Famotidine 20mg BID   - Miralax  - Senna  - PRN: Bisacodyl 5mg at HS     #/Bladder Mgmt   -  PVR q8h, CIC>400cc    #FEN   - Diet - Regular + Thins    - Dysphagia  SLP - evaluation and treatment    #Health Maintenance  - KCL 10mEq daily   - Magnesium Oxide 400mg at HS  - Fosamax 70mg weekly- home regimen    #Precautions / PROPHYLAXIS:   - Falls, Seizure   - ortho: Weight bearing status: WBAT   - Lungs: Aspiration, Incentive Spirometer   - DVT: Lovenox  ------------------------------------------------------  OUTPATIENT/FOLLOW UP:    Sierra Rodriguez  Neurosurgery  [address] [place] [address]  Phone: [phone]  Fax: [phone]  Follow Up Time: 2 weeks    Neurologist- managing VPS   Dr. Aurora Webb   ------------------------------------------------------  MEDICAL PROGNOSIS: GOOD                                   REHAB POTENTIAL: GOOD  ESTIMATED DISPOSITION: HOME                             ELOS: 10-14 Days   EXPECTED THERAPY:     P.T. 1hr/day       O.T. 1hr/day      S.L.P. 1hr/day      EXP FREQUENCY: 5 days per 7 day period     PRESCREEN COMPARISON: I have reviewed the prescreen information and I have found no relevant changes between the preadmission screening and my post admission evaluation     RATIONALE FOR INPATIENT ADMISSION - Patient demonstrates the following: (check all that apply)  [X] Medically appropriate for rehabilitation admission  [X] Has attainable rehab goals with an appropriate initial discharge plan  [X] Has rehabilitation potential (expected to make a significant improvement within a reasonable period of time)   [X] Requires close medical managment by a rehab physician, rehab nursing care, Hospitalist and comprehensive interdisciplinary team (including PT, OT, & or SLP, Prosthetics and Orthotics)     STEVEN LYON is an 83 year old female with PMH of HTN, HLD, Arthritis, and normal pressure hydrocephalus (s/p VPS- March 2023 at [place]); who presented to [facility] on 11/8 for progressive weakness, confusion and decreased PO intake, and was found to have a mixed SDH with 1.4cm midline shift. She was transferred to [facility] later that day for further management. On 11/9, she underwent R sided reilly hole placement, followed by MMA embolization on 11/10 with Dr. Rodriguez. Her hospital course was complicated by 2 RRTs for unresponsiveness. Serial brain imaging was stable and she was found to be in rapid AFIB. 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 Patient now admitted for a multidisciplinary rehab program. 11-17-23 @ 11:37  -------------  Rehab Management/MEDICAL MANAGEMENT     #Subdural Hemorrhage   - CTH 11/8: Moderate to large mixed attenuation right hemispheric convexity SDH compressing right hemisphere and right lateral ventricle with 1.4 cm midline shift to left. Right frontal ventriculostomy shunt catheter within right frontal horn.  - CTH 11/14:Right convexity subdural collection of air, blood and fluid continues to decrease in size.  - Gait Instability, ADL impairments and Functional impairments: start Comprehensive Rehab Program of PT/OT/SLP  - 3 hours a day, 5 days a week  - P&O as needed   - S/p R sided reilly hole placement (11/9), followed by MMA embolization (11/10)  - Outpatient follow up with Dr. Rodriguez     #Normal Pressure Hydrocephalus  - S/p VPS March 2023  - Dr. Simon ([place])     #Seizure Prophylaxis  - Levetiracetam 500mg BID. Started on 11/8. Transition to PO on 11/11.     #Rapid AFIB  - Metoprolol 25mg BID  - No AC  - Evaluation for Watchmann device outpatient with cardiology      #HLD  - Atorvastatin 40mg at HS     #Arthritis  - Plaquenil 200mg BID?- home regimen    #Skin  - Skin on admission: Right sided surgical incision intact with staples. Dry skin over gluteal region. Skin tags throughout body.   - Nystatin powder BID   - Pressure injury/Skin: OOB to Chair, PT/OT     #Pain Mgmt   - Tylenol PRN    #GI/Bowel Mgmt   - Famotidine 20mg BID   - Miralax BID  - Senna   - PRN: Bisacodyl 5mg at HS     #/Bladder Mgmt   -  PVR q8h, CIC>400cc    #FEN   - Diet - Regular + Thins    - Dysphagia  SLP - evaluation and treatment    #Health Maintenance  - KCL 10mEq daily   - Magnesium Oxide 400mg at HS  - Fosamax 70mg weekly- home regimen    #Precautions / PROPHYLAXIS:   - Falls, Seizure   - ortho: Weight bearing status: WBAT   - Lungs: Aspiration, Incentive Spirometer   - DVT: Lovenox  ------------------------------------------------------  OUTPATIENT/FOLLOW UP:    Sierra Rodriguez  Neurosurgery  [address] [place] [id number]  Phone: [phone]  Fax: [phone]  Follow Up Time: 2 weeks    Neurologist- managing VPS   Dr. Aurora Earlmann device outpatient with cardiology        ------------------------------------------------------  MEDICAL PROGNOSIS: GOOD                                   REHAB POTENTIAL: GOOD  ESTIMATED DISPOSITION: HOME                             ELOS: 10-14 Days   EXPECTED THERAPY:     P.T. 1hr/day       O.T. 1hr/day      S.L.P. 1hr/day      EXP FREQUENCY: 5 days per 7 day period     PRESCREEN COMPARISON: I have reviewed the prescreen information and I have found no relevant changes between the preadmission screening and my post admission evaluation     RATIONALE FOR INPATIENT ADMISSION - Patient demonstrates the following: (check all that apply)  [X] Medically appropriate for rehabilitation admission  [X] Has attainable rehab goals with an appropriate initial discharge plan  [X] Has rehabilitation potential (expected to make a significant improvement within a reasonable period of time)   [X] Requires close medical managment by a rehab physician, rehab nursing care, Hospitalist and comprehensive interdisciplinary team (including PT, OT, & or SLP, Prosthetics and Orthotics)     STEVEN LYON is an 83 year old female with PMH of HTN, HLD, Arthritis, and normal pressure hydrocephalus (s/p VPS- March 2023 at [place]); who presented to [facility] on 11/8 for progressive weakness, confusion and decreased PO intake, and was found to have a mixed SDH with 1.4cm midline shift. She was transferred to [facility] later that day for further management. On 11/9, she underwent R sided reilly hole placement, followed by MMA embolization on 11/10 with Dr. Rodriguez. Her hospital course was complicated by 2 RRTs for unresponsiveness. Serial brain imaging was stable and she was found to be in rapid AFIB. 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 Patient now admitted for a multidisciplinary rehab program with cognitive deficits, gait and ADL impairments.   -------------  Rehab Management/MEDICAL MANAGEMENT     #Subdural Hemorrhage   - CTH 11/8: Moderate to large mixed attenuation right hemispheric convexity SDH compressing right hemisphere and right lateral ventricle with 1.4 cm midline shift to left. Right frontal ventriculostomy shunt catheter within right frontal horn.  - CTH 11/14:Right convexity subdural collection of air, blood and fluid continues to decrease in size.  - Gait Instability, ADL impairments and Functional impairments: start Comprehensive Rehab Program of PT/OT/SLP  - 3 hours a day, 5 days a week  - P&O as needed   - S/p R sided reilly hole placement (11/9), followed by MMA embolization (11/10)  - Outpatient follow up with Dr. Rodriguez     #Normal Pressure Hydrocephalus  - S/p VPS March 2023  - Dr. Simon ([place])     #Seizure Prophylaxis  - Levetiracetam 500mg BID. Started on 11/8.   --No seizure events throughout hospital course and EEG neg for seizures  --completed 7 day seizure ppx  --Taper off Keppra-- Decrease Keppra to 500mg qhs for 3 days then stop    #Rapid AFIB  - Metoprolol 25mg BID  - No AC  - Evaluation for Watchmann device outpatient with cardiology      #HLD  - Atorvastatin 40mg at HS     #Arthritis  - Plaquenil 200mg BID?- home regimen    #Skin  - Skin on admission: Right sided surgical incision intact with staples. Dry skin over gluteal region. Skin tags throughout body.   - Nystatin powder BID   - Pressure injury/Skin: OOB to Chair, PT/OT     #Pain Mgmt   - Tylenol PRN    #GI/Bowel Mgmt   - Famotidine 20mg BID   - Miralax BID  - Senna   - PRN: Bisacodyl 5mg at HS     #/Bladder Mgmt   -  PVR q8h, CIC>400cc    #FEN   - Diet - Regular + Thins    - Dysphagia  SLP - evaluation and treatment    #Health Maintenance  - KCL 10mEq daily   - Magnesium Oxide 400mg at HS  - Fosamax 70mg weekly- home regimen    #Precautions / PROPHYLAXIS:   - Falls, Seizure   - ortho: Weight bearing status: WBAT   - Lungs: Aspiration, Incentive Spirometer   - DVT: Lovenox  ------------------------------------------------------  OUTPATIENT/FOLLOW UP:    Sierra Rodriguez  Neurosurgery  [address] [place] [id number]  Phone: [phone]  Fax: [phone]  Follow Up Time: 2 weeks    Neurologist- managing VPS   Dr. Aurora Webb   Watchmann device outpatient with cardiology        ------------------------------------------------------  MEDICAL PROGNOSIS: GOOD                                   REHAB POTENTIAL: GOOD  ESTIMATED DISPOSITION: HOME                             ELOS: 10-14 Days   EXPECTED THERAPY:     P.T. 1hr/day       O.T. 1hr/day      S.L.P. 1hr/day      EXP FREQUENCY: 5 days per 7 day period     PRESCREEN COMPARISON: I have reviewed the prescreen information and I have found no relevant changes between the preadmission screening and my post admission evaluation     RATIONALE FOR INPATIENT ADMISSION - Patient demonstrates the following: (check all that apply)  [X] Medically appropriate for rehabilitation admission  [X] Has attainable rehab goals with an appropriate initial discharge plan  [X] Has rehabilitation potential (expected to make a significant improvement within a reasonable period of time)   [X] Requires close medical managment by a rehab physician, rehab nursing care, Hospitalist and comprehensive interdisciplinary team (including PT, OT, & or SLP, Prosthetics and Orthotics)

## 2023-11-17 NOTE — DISCHARGE NOTE PROVIDER - NSDCCPCAREPLAN_GEN_ALL_CORE_FT
PRINCIPAL DISCHARGE DIAGNOSIS  Diagnosis: SDH (subdural hematoma)  Assessment and Plan of Treatment:

## 2023-11-17 NOTE — H&P ADULT - HISTORY OF PRESENT ILLNESS
Ginna Vergara is an 83 year old female with PMH of HTN, HLD, Arthritis, and normal pressure hydrocephalus (s/p VPS- March 2023 at [place]); who presented to [facility] on 11/8 for progressive weakness, confusion and decreased PO intake, and was found to have a mixed SDH with 1.4cm midline shift. She was transferred to [facility] later that day for further management. On 11/9, she underwent R sided reilly hole placement followed by MMA embolization on 11/10 with Dr. Rodriguez. Her hospital course was complicated by 2 RRTs for unresponsiveness. Serial brain imaging was stable and she was found to be in rapid AFIB. 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 PM&R was consulted and deemed her an appropriate candidate for IRF. She was medically stabilized and cleared for discharge to [facility].

## 2023-11-17 NOTE — DISCHARGE NOTE PROVIDER - HOSPITAL COURSE
Patient is a 82 yo female with PMHX of HTN, HLD and arthritis Patient is a 82 yo female with PMHX of HTN, HLD and arthritis who presented on 11/8, tx from [place], with a R SDH , s/p VPS shunt placement at [place] 3/23 for NPH. Patient was brought for right sided reilly holes on 11/9 and on 11/10 went for MMA embolization. Hospital course was mostly uncomplicated. Patient did have 2 rapid responses for unresponsiveness and cardio was consulted for rapid afib. She was cleared from their svc to see outpt for a watchman device. Patient known to sundown at night, but has been stable oriented to all but time, NAVARRO in bed. She is stable for discharge to rehab. Patient is a 82 yo female with PMHX of HTN, HLD and arthritis who presented on 11/8, tx from [place], with a R SDH , s/p VPS shunt placement at [place] 3/23 for NPH. Patient was brought for right sided reilly holes on 11/9 and on 11/10 went for MMA embolization. Hospital course was mostly uncomplicated. Patient did have 2 rapid responses for unresponsiveness and cardio was consulted for rapid afib. She was cleared from their svc to see outpt for a watchman device. Patient known to sundown at night, but has been stable oriented to all but time, NAVARRO in bed. She is stable for discharge to rehab.

## 2023-11-17 NOTE — H&P ADULT - NSHPSOCIALHISTORY_GEN_ALL_CORE
SOCIAL HISTORY  Smoking -   EtOH -   Drugs -     FUNCTIONAL HISTORY  She lives at home with  in multi level home with 1STE through back door and 0 MAKENNA through garage. Pt was independent PTA with use of RW, owns WC,  home for  24/7 assist.    CURRENT FUNCTIONAL STATUS  - Bed Mobility: Mod A  - Transfers: Mod A   - Gait: Unable to perform  - ADLs: SOCIAL HISTORY  Smoking - No  EtOH - Socially  Drugs - denied    FUNCTIONAL HISTORY  She lives at home with  in 2 level home with unknown amount of MAKENNA. Pt was independent PTA with use of RW, owns WC that was used when traveling to doctor appointment or shopping.  home for  24/7 assist.     CURRENT FUNCTIONAL STATUS  - Bed Mobility: Mod A  - Transfers: Mod A   - Gait: Unable to perform SOCIAL HISTORY  Smoking - No  EtOH - Socially  Drugs - denied  Education - Masters  Occupation - Interior design    FUNCTIONAL HISTORY  She lives at home with  in 2 level home with unknown amount of MAKENNA. Pt was independent PTA with use of RW, owns WC that was used when traveling to doctor appointment or shopping.  home for  24/7 assist.     CURRENT FUNCTIONAL STATUS  - Bed Mobility: Mod A  - Transfers: Mod A   - Gait: Unable to perform

## 2023-11-17 NOTE — DISCHARGE NOTE PROVIDER - NSDCMRMEDTOKEN_GEN_ALL_CORE_FT
Fosamax 70 mg oral tablet: 1 tab(s) orally once a week  Lipitor 40 mg oral tablet: 1 tab(s) orally once a day  Plaquenil 200 mg oral tablet: 1 tab(s) orally 2 times a day  Toprol-XL 25 mg oral tablet, extended release: 1 tab(s) orally once a day   acetaminophen 325 mg oral tablet: 3 tab(s) orally every 8 hours As needed Mild Pain (1 - 3)  bisacodyl 5 mg oral delayed release tablet: 1 tab(s) orally every 12 hours As needed Constipation  enoxaparin: 40 milligram(s) orally once a day 40mg  daily SC  famotidine 10 mg/mL intravenous solution: 2 milliliter(s) intravenous every 12 hours  Fosamax 70 mg oral tablet: 1 tab(s) orally once a week  levETIRAcetam 500 mg oral tablet: 1 tab(s) orally 2 times a day  Lipitor 40 mg oral tablet: 1 tab(s) orally once a day  magnesium oxide 400 mg oral tablet: 1 tab(s) orally once a day (at bedtime)  nystatin 100,000 units/g topical powder: 1 Apply topically to affected area 2 times a day  Plaquenil 200 mg oral tablet: 1 tab(s) orally 2 times a day  polyethylene glycol 3350 oral powder for reconstitution: 17 gram(s) orally 2 times a day  potassium chloride 10 mEq oral tablet, extended release: 1 tab(s) orally once a day  senna leaf extract oral tablet: 2 tab(s) orally once a day (at bedtime)  Toprol-XL 25 mg oral tablet, extended release: 1 tab(s) orally once a day

## 2023-11-17 NOTE — H&P ADULT - NSHPLABSRESULTS_GEN_ALL_CORE
LABS:                        10.5   9.28  )-----------( 370      ( 15 Nov 2023 13:15 )             32.6     11-15    140  |  104  |  17.3  ----------------------------<  100<H>  4.3   |  24.0  |  0.63    Ca    10.4      15 Nov 2023 13:15  Phos  2.5     11-15  Mg     2.1     11-15    Urinalysis Basic - ( 15 Nov 2023 13:15 )    Color: x / Appearance: x / SG: x / pH: x  Gluc: 100 mg/dL / Ketone: x  / Bili: x / Urobili: x   Blood: x / Protein: x / Nitrite: x   Leuk Esterase: x / RBC: x / WBC x   Sq Epi: x / Non Sq Epi: x / Bacteria: x        IMAGING/RADIOLOGY:  CT HEAD (11/14)  IMPRESSION:  Right convexity subdural collection of air, blood and fluid continues to   decrease in size.    CT HEAD (11/1)  Impression:  Expected postoperative changes status post evacuation of right-sided   subdural hematoma, which is decreased in size and density compared with   prior. Decreased mass effect with decreasing leftward midline shift, now   measuring up to 7 mm.  Grossly stable position of the articular catheter. Slightly increased   caliber of the ventricles is likely related to decreasing mass effect,   recommend attention on follow-up to exclude developing hydrocephalus.    CT HEAD (11/8)  IMPRESSION:  Stable moderate to large heterogeneous subdural hematoma right frontal   parietal convexity with associated stable mass effect and midline shift.    CT HEAD (11/8)  IMPRESSION:  Moderate to large mixed attenuation right hemispheric convexity subdural   hematoma compressing right hemisphere and right lateral ventricle with   1.4 cm midline shift to left.  Right frontal ventriculostomy shunt catheter within right frontal horn.      CARDIOLOGY:  TTE (11/9)  Summary:   1. Normal global left ventricular systolic function.   2. There is mild concentric left ventricular hypertrophy.   3. Left ventricular ejection fraction, by visual estimation, is 60 to   65%.   4. Spectral Doppler shows impaired relaxation pattern of left   ventricular myocardial filling (Grade I diastolic dysfunction).   5. Elevated mean left atrial pressure.   6. Normal right ventricular size and function.   7. The left atrium is normal in size.   8. The right atrium is normal in size.   9. Sclerotic aortic valve with normal opening.  10. Mild mitral annular calcification.  11. Mild thickening and calcification of the anterior and posterior   mitral valve leaflets.  12. Mild mitral valve regurgitation.  13. Mild tricuspid regurgitation.  14. There is no evidence of pericardial effusion. LABS:                        10.5   9.28  )-----------( 370      ( 15 Nov 2023 13:15 )             32.6     11-15    140  |  104  |  17.3  ----------------------------<  100<H>  4.3   |  24.0  |  0.63    Ca    10.4      15 Nov 2023 13:15  Phos  2.5     11-15  Mg     2.1     11-15    Urinalysis Basic - ( 15 Nov 2023 13:15 )    Color: x / Appearance: x / SG: x / pH: x  Gluc: 100 mg/dL / Ketone: x  / Bili: x / Urobili: x   Blood: x / Protein: x / Nitrite: x   Leuk Esterase: x / RBC: x / WBC x   Sq Epi: x / Non Sq Epi: x / Bacteria: x        IMAGING/RADIOLOGY:  CT HEAD (11/14)  IMPRESSION:  Right convexity subdural collection of air, blood and fluid continues to decrease in size.    CT HEAD (11/1)  Impression:  Expected postoperative changes status post evacuation of right-sided   subdural hematoma, which is decreased in size and density compared with   prior. Decreased mass effect with decreasing leftward midline shift, now   measuring up to 7 mm.  Grossly stable position of the articular catheter. Slightly increased   caliber of the ventricles is likely related to decreasing mass effect,   recommend attention on follow-up to exclude developing hydrocephalus.    CT HEAD (11/8)  IMPRESSION:  Stable moderate to large heterogeneous subdural hematoma right frontal   parietal convexity with associated stable mass effect and midline shift.    CT HEAD (11/8)  IMPRESSION:  Moderate to large mixed attenuation right hemispheric convexity subdural   hematoma compressing right hemisphere and right lateral ventricle with   1.4 cm midline shift to left.  Right frontal ventriculostomy shunt catheter within right frontal horn.      CARDIOLOGY:  TTE (11/9)  Summary:   1. Normal global left ventricular systolic function.   2. There is mild concentric left ventricular hypertrophy.   3. Left ventricular ejection fraction, by visual estimation, is 60 to   65%.   4. Spectral Doppler shows impaired relaxation pattern of left   ventricular myocardial filling (Grade I diastolic dysfunction).   5. Elevated mean left atrial pressure.   6. Normal right ventricular size and function.   7. The left atrium is normal in size.   8. The right atrium is normal in size.   9. Sclerotic aortic valve with normal opening.  10. Mild mitral annular calcification.  11. Mild thickening and calcification of the anterior and posterior   mitral valve leaflets.  12. Mild mitral valve regurgitation.  13. Mild tricuspid regurgitation.  14. There is no evidence of pericardial effusion.

## 2023-11-17 NOTE — DISCHARGE NOTE PROVIDER - DETAILS OF MALNUTRITION DIAGNOSIS/DIAGNOSES
This patient has been assessed with a concern for Malnutrition and was treated during this hospitalization for the following Nutrition diagnosis/diagnoses:     -  11/15/2023: Moderate protein-calorie malnutrition

## 2023-11-18 LAB
ALBUMIN SERPL ELPH-MCNC: 2.5 G/DL — LOW (ref 3.3–5)
ALBUMIN SERPL ELPH-MCNC: 2.5 G/DL — LOW (ref 3.3–5)
ALP SERPL-CCNC: 64 U/L — SIGNIFICANT CHANGE UP (ref 40–120)
ALP SERPL-CCNC: 64 U/L — SIGNIFICANT CHANGE UP (ref 40–120)
ALT FLD-CCNC: 18 U/L — SIGNIFICANT CHANGE UP (ref 10–45)
ALT FLD-CCNC: 18 U/L — SIGNIFICANT CHANGE UP (ref 10–45)
ANION GAP SERPL CALC-SCNC: 8 MMOL/L — SIGNIFICANT CHANGE UP (ref 5–17)
ANION GAP SERPL CALC-SCNC: 8 MMOL/L — SIGNIFICANT CHANGE UP (ref 5–17)
AST SERPL-CCNC: 21 U/L — SIGNIFICANT CHANGE UP (ref 10–40)
AST SERPL-CCNC: 21 U/L — SIGNIFICANT CHANGE UP (ref 10–40)
BASOPHILS # BLD AUTO: 0.03 K/UL — SIGNIFICANT CHANGE UP (ref 0–0.2)
BASOPHILS # BLD AUTO: 0.03 K/UL — SIGNIFICANT CHANGE UP (ref 0–0.2)
BASOPHILS NFR BLD AUTO: 0.4 % — SIGNIFICANT CHANGE UP (ref 0–2)
BASOPHILS NFR BLD AUTO: 0.4 % — SIGNIFICANT CHANGE UP (ref 0–2)
BILIRUB SERPL-MCNC: 0.4 MG/DL — SIGNIFICANT CHANGE UP (ref 0.2–1.2)
BILIRUB SERPL-MCNC: 0.4 MG/DL — SIGNIFICANT CHANGE UP (ref 0.2–1.2)
BUN SERPL-MCNC: 19 MG/DL — SIGNIFICANT CHANGE UP (ref 7–23)
BUN SERPL-MCNC: 19 MG/DL — SIGNIFICANT CHANGE UP (ref 7–23)
CALCIUM SERPL-MCNC: 10 MG/DL — SIGNIFICANT CHANGE UP (ref 8.4–10.5)
CALCIUM SERPL-MCNC: 10 MG/DL — SIGNIFICANT CHANGE UP (ref 8.4–10.5)
CHLORIDE SERPL-SCNC: 107 MMOL/L — SIGNIFICANT CHANGE UP (ref 96–108)
CHLORIDE SERPL-SCNC: 107 MMOL/L — SIGNIFICANT CHANGE UP (ref 96–108)
CO2 SERPL-SCNC: 26 MMOL/L — SIGNIFICANT CHANGE UP (ref 22–31)
CO2 SERPL-SCNC: 26 MMOL/L — SIGNIFICANT CHANGE UP (ref 22–31)
CREAT SERPL-MCNC: 0.86 MG/DL — SIGNIFICANT CHANGE UP (ref 0.5–1.3)
CREAT SERPL-MCNC: 0.86 MG/DL — SIGNIFICANT CHANGE UP (ref 0.5–1.3)
EGFR: 67 ML/MIN/1.73M2 — SIGNIFICANT CHANGE UP
EGFR: 67 ML/MIN/1.73M2 — SIGNIFICANT CHANGE UP
EOSINOPHIL # BLD AUTO: 0.18 K/UL — SIGNIFICANT CHANGE UP (ref 0–0.5)
EOSINOPHIL # BLD AUTO: 0.18 K/UL — SIGNIFICANT CHANGE UP (ref 0–0.5)
EOSINOPHIL NFR BLD AUTO: 2.6 % — SIGNIFICANT CHANGE UP (ref 0–6)
EOSINOPHIL NFR BLD AUTO: 2.6 % — SIGNIFICANT CHANGE UP (ref 0–6)
GLUCOSE SERPL-MCNC: 82 MG/DL — SIGNIFICANT CHANGE UP (ref 70–99)
GLUCOSE SERPL-MCNC: 82 MG/DL — SIGNIFICANT CHANGE UP (ref 70–99)
HCT VFR BLD CALC: 33.9 % — LOW (ref 34.5–45)
HCT VFR BLD CALC: 33.9 % — LOW (ref 34.5–45)
HGB BLD-MCNC: 10.4 G/DL — LOW (ref 11.5–15.5)
HGB BLD-MCNC: 10.4 G/DL — LOW (ref 11.5–15.5)
IMM GRANULOCYTES NFR BLD AUTO: 0.1 % — SIGNIFICANT CHANGE UP (ref 0–0.9)
IMM GRANULOCYTES NFR BLD AUTO: 0.1 % — SIGNIFICANT CHANGE UP (ref 0–0.9)
LYMPHOCYTES # BLD AUTO: 1.67 K/UL — SIGNIFICANT CHANGE UP (ref 1–3.3)
LYMPHOCYTES # BLD AUTO: 1.67 K/UL — SIGNIFICANT CHANGE UP (ref 1–3.3)
LYMPHOCYTES # BLD AUTO: 24.5 % — SIGNIFICANT CHANGE UP (ref 13–44)
LYMPHOCYTES # BLD AUTO: 24.5 % — SIGNIFICANT CHANGE UP (ref 13–44)
MCHC RBC-ENTMCNC: 27.7 PG — SIGNIFICANT CHANGE UP (ref 27–34)
MCHC RBC-ENTMCNC: 27.7 PG — SIGNIFICANT CHANGE UP (ref 27–34)
MCHC RBC-ENTMCNC: 30.7 GM/DL — LOW (ref 32–36)
MCHC RBC-ENTMCNC: 30.7 GM/DL — LOW (ref 32–36)
MCV RBC AUTO: 90.2 FL — SIGNIFICANT CHANGE UP (ref 80–100)
MCV RBC AUTO: 90.2 FL — SIGNIFICANT CHANGE UP (ref 80–100)
MONOCYTES # BLD AUTO: 0.98 K/UL — HIGH (ref 0–0.9)
MONOCYTES # BLD AUTO: 0.98 K/UL — HIGH (ref 0–0.9)
MONOCYTES NFR BLD AUTO: 14.3 % — HIGH (ref 2–14)
MONOCYTES NFR BLD AUTO: 14.3 % — HIGH (ref 2–14)
NEUTROPHILS # BLD AUTO: 3.96 K/UL — SIGNIFICANT CHANGE UP (ref 1.8–7.4)
NEUTROPHILS # BLD AUTO: 3.96 K/UL — SIGNIFICANT CHANGE UP (ref 1.8–7.4)
NEUTROPHILS NFR BLD AUTO: 58.1 % — SIGNIFICANT CHANGE UP (ref 43–77)
NEUTROPHILS NFR BLD AUTO: 58.1 % — SIGNIFICANT CHANGE UP (ref 43–77)
NRBC # BLD: 0 /100 WBCS — SIGNIFICANT CHANGE UP (ref 0–0)
NRBC # BLD: 0 /100 WBCS — SIGNIFICANT CHANGE UP (ref 0–0)
PLATELET # BLD AUTO: 360 K/UL — SIGNIFICANT CHANGE UP (ref 150–400)
PLATELET # BLD AUTO: 360 K/UL — SIGNIFICANT CHANGE UP (ref 150–400)
POTASSIUM SERPL-MCNC: 4.3 MMOL/L — SIGNIFICANT CHANGE UP (ref 3.5–5.3)
POTASSIUM SERPL-MCNC: 4.3 MMOL/L — SIGNIFICANT CHANGE UP (ref 3.5–5.3)
POTASSIUM SERPL-SCNC: 4.3 MMOL/L — SIGNIFICANT CHANGE UP (ref 3.5–5.3)
POTASSIUM SERPL-SCNC: 4.3 MMOL/L — SIGNIFICANT CHANGE UP (ref 3.5–5.3)
PROT SERPL-MCNC: 6.5 G/DL — SIGNIFICANT CHANGE UP (ref 6–8.3)
PROT SERPL-MCNC: 6.5 G/DL — SIGNIFICANT CHANGE UP (ref 6–8.3)
RBC # BLD: 3.76 M/UL — LOW (ref 3.8–5.2)
RBC # BLD: 3.76 M/UL — LOW (ref 3.8–5.2)
RBC # FLD: 15.2 % — HIGH (ref 10.3–14.5)
RBC # FLD: 15.2 % — HIGH (ref 10.3–14.5)
SODIUM SERPL-SCNC: 141 MMOL/L — SIGNIFICANT CHANGE UP (ref 135–145)
SODIUM SERPL-SCNC: 141 MMOL/L — SIGNIFICANT CHANGE UP (ref 135–145)
WBC # BLD: 6.83 K/UL — SIGNIFICANT CHANGE UP (ref 3.8–10.5)
WBC # BLD: 6.83 K/UL — SIGNIFICANT CHANGE UP (ref 3.8–10.5)
WBC # FLD AUTO: 6.83 K/UL — SIGNIFICANT CHANGE UP (ref 3.8–10.5)
WBC # FLD AUTO: 6.83 K/UL — SIGNIFICANT CHANGE UP (ref 3.8–10.5)

## 2023-11-18 PROCEDURE — 99232 SBSQ HOSP IP/OBS MODERATE 35: CPT

## 2023-11-18 PROCEDURE — 99223 1ST HOSP IP/OBS HIGH 75: CPT

## 2023-11-18 RX ADMIN — NYSTATIN CREAM 1 APPLICATION(S): 100000 CREAM TOPICAL at 17:10

## 2023-11-18 RX ADMIN — POLYETHYLENE GLYCOL 3350 17 GRAM(S): 17 POWDER, FOR SOLUTION ORAL at 05:19

## 2023-11-18 RX ADMIN — FAMOTIDINE 20 MILLIGRAM(S): 10 INJECTION INTRAVENOUS at 17:09

## 2023-11-18 RX ADMIN — MAGNESIUM OXIDE 400 MG ORAL TABLET 400 MILLIGRAM(S): 241.3 TABLET ORAL at 21:30

## 2023-11-18 RX ADMIN — FAMOTIDINE 20 MILLIGRAM(S): 10 INJECTION INTRAVENOUS at 05:19

## 2023-11-18 RX ADMIN — NYSTATIN CREAM 1 APPLICATION(S): 100000 CREAM TOPICAL at 05:22

## 2023-11-18 RX ADMIN — Medication 25 MILLIGRAM(S): at 05:19

## 2023-11-18 RX ADMIN — Medication 25 MILLIGRAM(S): at 17:09

## 2023-11-18 RX ADMIN — ENOXAPARIN SODIUM 40 MILLIGRAM(S): 100 INJECTION SUBCUTANEOUS at 17:09

## 2023-11-18 RX ADMIN — Medication 10 MILLIEQUIVALENT(S): at 12:31

## 2023-11-18 RX ADMIN — POLYETHYLENE GLYCOL 3350 17 GRAM(S): 17 POWDER, FOR SOLUTION ORAL at 17:09

## 2023-11-18 RX ADMIN — ATORVASTATIN CALCIUM 40 MILLIGRAM(S): 80 TABLET, FILM COATED ORAL at 21:30

## 2023-11-18 RX ADMIN — SENNA PLUS 2 TABLET(S): 8.6 TABLET ORAL at 21:30

## 2023-11-18 RX ADMIN — LEVETIRACETAM 500 MILLIGRAM(S): 250 TABLET, FILM COATED ORAL at 21:30

## 2023-11-18 NOTE — DIETITIAN INITIAL EVALUATION ADULT - ADD RECOMMEND
1. Continue Regular diet.   2. Recommend Ensure Plus High Protein Daily 8 oz (Provides 350 kcal, 20 grams of protein) BID to assist pt in meeting estimated protein energy needs.  3. Recommend Multivitamin, to ensure 100% RDA met   4. Monitor PO intake, GI tolerance, skin integrity, labs, weight, and bowel movement regularity.   5. Honor food preferences as feasible. Assist with meals PRN and encourage PO intake.  6. Provide ongoing diet education as needed  7. RD remains available upon request and will follow-up per protocol

## 2023-11-18 NOTE — PROGRESS NOTE ADULT - SUBJECTIVE AND OBJECTIVE BOX
Cc: Difficulty walking    HPI: Patient with no new medical issues today.  Pain controlled, no chest pain, no N/V, no Fevers/Chills. No other new ROS  Has been tolerating rehabilitation program.    acetaminophen     Tablet .. 975 milliGRAM(s) Oral every 8 hours PRN  atorvastatin 40 milliGRAM(s) Oral at bedtime  bisacodyl 5 milliGRAM(s) Oral every 12 hours PRN  dextrose 5%. 1000 milliLiter(s) IV Continuous <Continuous>  dextrose 5%. 1000 milliLiter(s) IV Continuous <Continuous>  dextrose 50% Injectable 25 Gram(s) IV Push once  dextrose 50% Injectable 12.5 Gram(s) IV Push once  dextrose 50% Injectable 25 Gram(s) IV Push once  dextrose Oral Gel 15 Gram(s) Oral once PRN  enoxaparin Injectable 40 milliGRAM(s) SubCutaneous <User Schedule>  famotidine    Tablet 20 milliGRAM(s) Oral two times a day  glucagon  Injectable 1 milliGRAM(s) IntraMuscular once  levETIRAcetam 500 milliGRAM(s) Oral at bedtime  magnesium oxide 400 milliGRAM(s) Oral at bedtime  metoprolol tartrate 25 milliGRAM(s) Oral every 12 hours  nystatin Powder 1 Application(s) Topical two times a day  polyethylene glycol 3350 17 Gram(s) Oral two times a day  potassium chloride    Tablet ER 10 milliEquivalent(s) Oral daily  senna 2 Tablet(s) Oral at bedtime    T(C): 36.8 (11-17-23 @ 20:09), Max: 36.9 (11-17-23 @ 08:20)  HR: 65 (11-18-23 @ 05:49) (64 - 80)  BP: 121/70 (11-18-23 @ 05:49) (95/72 - 134/75)  RR: 16 (11-18-23 @ 05:49) (15 - 16)  SpO2: 96% (11-18-23 @ 05:49) (95% - 99%)    In NAD  HEENT- EOMI  Heart- Well Perfused  Lungs- No resp distress, no use of accessory resp muscles  Abd- + BS, NT  Ext- No calf pain  Neuro- Exam unchanged  Psych- Affect wnl                          10.4   6.83  )-----------( 360      ( 18 Nov 2023 05:40 )             33.9     11-18    141  |  107  |  19  ----------------------------<  82  4.3   |  26  |  0.86    Ca    10.0      18 Nov 2023 05:40    TPro  6.5  /  Alb  2.5<L>  /  TBili  0.4  /  DBili  x   /  AST  21  /  ALT  18  /  AlkPhos  64  11-18    Urinalysis Basic - ( 18 Nov 2023 05:40 )    Color: x / Appearance: x / SG: x / pH: x  Gluc: 82 mg/dL / Ketone: x  / Bili: x / Urobili: x   Blood: x / Protein: x / Nitrite: x   Leuk Esterase: x / RBC: x / WBC x   Sq Epi: x / Non Sq Epi: x / Bacteria: x    Imp: Patient with diagnosis of CVA admitted for comprehensive acute rehabilitation.    Plan:  - Continue therapies  - DVT prophylaxis- Lovenox  - A.M. Labs look good.   - Skin- Turn q2h, check skin daily  - Continue current medications; patient medically stable.   - Patient is stable to continue current rehabilitation program- requires active and ongoing therapeutic interventions of multiple disciplines and can tolerate 3h/d of therapies. Can actively participate and benefit from an intensive rehabilitation program. Requires supervision of a rehabilitation physician and a coordinated interdisciplinary approach to providing rehabilitation.

## 2023-11-18 NOTE — CONSULT NOTE ADULT - ASSESSMENT
84 y/o woman with PMH of HTN, HLD, Arthritis, and normal pressure hydrocephalus (s/p VPS- March 2023 at [place]); who presented to [facility] on 11/8 for progressive weakness, confusion and decreased PO intake, and was found to have a mixed SDH with 1.4cm midline shift. She was transferred to [facility] later that day for further management. On 11/9, she underwent R sided reilly hole placement, followed by MMA embolization on 11/10 with Dr. Rodriguez. Her hospital course was complicated by 2 RRTs for unresponsiveness. Serial brain imaging was stable and she was found to be in rapid AFIB. 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 Patient now admitted for a multidisciplinary rehab program with cognitive deficits, gait and ADL impairments.     Rehab: Functional and gait instability:  - C/w PT/OT per primary team     Neuro: Subdural Hemorrhage, Normal Pressure Hydrocephalus, Seizure Prophylaxis  - S/p VPS March 2023  - CTH 11/8: Moderate to large mixed attenuation right hemispheric convexity SDH compressing right hemisphere and right lateral ventricle with 1.4 cm midline shift to left. Right frontal ventriculostomy shunt catheter within right frontal horn.  - CTH 11/14:Right convexity subdural collection of air, blood and fluid continues to decrease in size.  - Gait Instability, ADL impairments and Functional impairments: start Comprehensive Rehab Program of PT/OT/SLP  - 3 hours a day, 5 days a week  - S/p R sided reilly hole placement (11/9), followed by MMA embolization (11/10)  -  Seizure PPx per primary team    CVS: Rapid AFIB, HLD  - Metoprolol 25mg BID  - No AC  - Evaluation for Watchmann device outpatient with cardiology  - Atorvastatin 40mg at HS     Ortho: Arthritis  - Plaquenil 200mg BID    DVT PPx: Lovenox 40 SUbQ

## 2023-11-18 NOTE — DIETITIAN INITIAL EVALUATION ADULT - ORAL INTAKE PTA/DIET HISTORY
PTA patients reports fair appetite intake, reports her  does the meal preparations. Usually eats ham and cheese at breakfast and loves pizza. NKFA nor food intolerances reported. Per previous RD note 11/15 Patient NPO, meeting <50% nutrient needs >5 days

## 2023-11-18 NOTE — DIETITIAN INITIAL EVALUATION ADULT - PERTINENT LABORATORY DATA
11-18    141  |  107  |  19  ----------------------------<  82  4.3   |  26  |  0.86    Ca    10.0      18 Nov 2023 05:40    TPro  6.5  /  Alb  2.5<L>  /  TBili  0.4  /  DBili  x   /  AST  21  /  ALT  18  /  AlkPhos  64  11-18  A1C with Estimated Average Glucose Result: 5.0 % (11-08-23 @ 19:10)

## 2023-11-18 NOTE — DIETITIAN INITIAL EVALUATION ADULT - ETIOLOGY
related to increased physiological demands for nutrients  related to decreased ability to meet estimated energy needs

## 2023-11-18 NOTE — DIETITIAN INITIAL EVALUATION ADULT - PERTINENT MEDS FT
MEDICATIONS  (STANDING):  atorvastatin 40 milliGRAM(s) Oral at bedtime  dextrose 5%. 1000 milliLiter(s) (50 mL/Hr) IV Continuous <Continuous>  dextrose 5%. 1000 milliLiter(s) (100 mL/Hr) IV Continuous <Continuous>  dextrose 50% Injectable 25 Gram(s) IV Push once  dextrose 50% Injectable 12.5 Gram(s) IV Push once  dextrose 50% Injectable 25 Gram(s) IV Push once  enoxaparin Injectable 40 milliGRAM(s) SubCutaneous <User Schedule>  famotidine    Tablet 20 milliGRAM(s) Oral two times a day  glucagon  Injectable 1 milliGRAM(s) IntraMuscular once  levETIRAcetam 500 milliGRAM(s) Oral at bedtime  magnesium oxide 400 milliGRAM(s) Oral at bedtime  metoprolol tartrate 25 milliGRAM(s) Oral every 12 hours  nystatin Powder 1 Application(s) Topical two times a day  polyethylene glycol 3350 17 Gram(s) Oral two times a day  potassium chloride    Tablet ER 10 milliEquivalent(s) Oral daily  senna 2 Tablet(s) Oral at bedtime    MEDICATIONS  (PRN):  acetaminophen     Tablet .. 975 milliGRAM(s) Oral every 8 hours PRN Mild Pain (1 - 3)  bisacodyl 5 milliGRAM(s) Oral every 12 hours PRN Constipation  dextrose Oral Gel 15 Gram(s) Oral once PRN Blood Glucose LESS THAN 70 milliGRAM(s)/deciliter

## 2023-11-18 NOTE — DIETITIAN NUTRITION RISK NOTIFICATION - TREATMENT: THE FOLLOWING DIET HAS BEEN RECOMMENDED
Diet, Regular:   Supplement Feeding Modality:  Oral  Ensure Plus High Protein Cans or Servings Per Day:  1       Frequency:  Two Times a day (11-18-23 @ 11:16) [Pending Verification By Attending]  Diet, Regular (11-17-23 @ 11:29) [Active]      MVI

## 2023-11-18 NOTE — DIETITIAN INITIAL EVALUATION ADULT - OTHER INFO
Reason for Admission: Subdural Hemorrhage  History of Present Illness:   Ginna Vergara is an 83 year old female with PMH of HTN, HLD, Arthritis, and normal pressure hydrocephalus (s/p VPS- March 2023 at [place]); who presented to [facility] on 11/8 for progressive weakness, confusion and decreased PO intake, and was found to have a mixed SDH with 1.4cm midline shift. She was transferred to [facility] later that day for further management. On 11/9, she underwent R sided reilly hole placement followed by MMA embolization on 11/10 with Dr. Rodriguez. Her hospital course was complicated by 2 RRTs for unresponsiveness. Serial brain imaging was stable and she was found to be in rapid AFIB. 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 PM&R was consulted and deemed her an appropriate candidate for IRF. She was medically stabilized and cleared for discharge to [facility].     Per chart review w/ 18lb weight loss UBW 169lbs 11/9, CBW 151lb.   Reason for Admission: Subdural Hemorrhage  History of Present Illness:   Ginna Vergara is an 83 year old female with PMH of HTN, HLD, Arthritis, and normal pressure hydrocephalus (s/p VPS- March 2023 at [place]); who presented to [facility] on 11/8 for progressive weakness, confusion and decreased PO intake, and was found to have a mixed SDH with 1.4cm midline shift. She was transferred to [facility] later that day for further management. On 11/9, she underwent R sided reilly hole placement followed by MMA embolization on 11/10 with Dr. Rodriguez. Her hospital course was complicated by 2 RRTs for unresponsiveness. Serial brain imaging was stable and she was found to be in rapid AFIB. 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 PM&R was consulted and deemed her an appropriate candidate for IRF. She was medically stabilized and cleared for discharge to [facility].     At this time patient tolerating diet w/ varied appetite/intake consuming 26-75% of meals. Amenable to Ensure Plus High Protein Daily 8 oz (Provides 350 kcal, 20 grams of protein) BID to assist patient in meeting estimated energy requirements. Reviewed preferences and menu alternatives noted in [facility]ex. No issues w/ dentition or chewing and swallowing current diet texture. Denies N/V/C/D, last BM 11/16 Per nursing flowsheets.  Patient reports height 5' 6" now, she used to be 5'8". Per chart review w/ 18lb weight loss UBW 169lbs 11/9, CBW 151lb.  Physical appearance consistent with moderate malnutrition (muscle & subcutaneous fat loss observed).

## 2023-11-18 NOTE — DIETITIAN INITIAL EVALUATION ADULT - PERSON TAUGHT/METHOD
Optimal protein-energy intake at meals/verbal instruction/teach back - (Patient repeats in own words)/patient instructed

## 2023-11-18 NOTE — DIETITIAN INITIAL EVALUATION ADULT - SIGNS/SYMPTOMS
as evidenced by Traumatic subdural hemorrhage   as evidenced by <50% EEER >5 days, 18lb wt loss x 1 wk, subcutaneous wt loss, muscle depletion

## 2023-11-18 NOTE — CONSULT NOTE ADULT - SUBJECTIVE AND OBJECTIVE BOX
Patient is a 83y old  Female who presents with a chief complaint of Traumatic subdural hemorrhage with loss of consciousness of unspecified duration, initial encounter     (18 Nov 2023 10:41)    HPI:    84 y/o woman with PMH of HTN, HLD, Arthritis, and normal pressure hydrocephalus (s/p VPS- March 2023 at [place]); who presented to [facility] on 11/8 for progressive weakness, confusion and decreased PO intake, and was found to have a mixed SDH with 1.4cm midline shift. She was transferred to [facility] later that day for further management. On 11/9, she underwent R sided reilly hole placement followed by MMA embolization on 11/10 with Dr. Rodriguez. Her hospital course was complicated by 2 RRTs for unresponsiveness. Serial brain imaging was stable and she was found to be in rapid AFIB. 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 PM&R was consulted and deemed her an appropriate candidate for IRF. She was medically stabilized and cleared for discharge to [facility].  (17 Nov 2023 11:31)      PAST MEDICAL & SURGICAL HISTORY:  Normal pressure hydrocephalus      HTN (hypertension)      HLD (hyperlipidemia)      Arthritis      S/P  shunt      Allergies    No Known Allergies    Intolerances        levETIRAcetam 500 milliGRAM(s) Oral at bedtime      REVIEW OF SYSTEMS:  CONSTITUTIONAL: No fever, weight loss, or fatigue  EYES: No eye pain, visual disturbances, or discharge  ENMT:  No difficulty hearing, tinnitus, vertigo; No sinus or throat pain  NECK: No pain or stiffness  BREASTS: No pain, masses, or nipple discharge  RESPIRATORY: No cough, wheezing, chills or hemoptysis; No shortness of breath  CARDIOVASCULAR: No chest pain, palpitations, dizziness, or leg swelling  GASTROINTESTINAL: No abdominal or epigastric pain. No nausea, vomiting, or hematemesis; No diarrhea or constipation. No melena or hematochezia.  GENITOURINARY: No dysuria, frequency, hematuria, or incontinence  NEUROLOGICAL: No headaches, memory loss, loss of strength, numbness, or tremors  SKIN: No itching, burning, rashes, or lesions   LYMPH NODES: No enlarged glands  ENDOCRINE: No heat or cold intolerance; No hair loss  MUSCULOSKELETAL: No joint pain or swelling; No muscle, back, or extremity pain  PSYCHIATRIC: No depression, anxiety, mood swings, or difficulty sleeping  HEME/LYMPH: No easy bruising, or bleeding gums  ALLERY AND IMMUNOLOGIC: No hives or eczema    ALL ROS REVIEWED AND NORMAL EXCEPT AS STATED ABOVE    T(C): 36.3 (11-18-23 @ 09:30), Max: 36.8 (11-17-23 @ 20:09)  HR: 75 (11-18-23 @ 10:05) (64 - 83)  BP: 119/78 (11-18-23 @ 10:05) (105/66 - 134/75)  RR: 15 (11-18-23 @ 10:05) (15 - 16)  SpO2: 98% (11-18-23 @ 10:05) (94% - 98%)  Wt(kg): --Vital Signs Last 24 Hrs  T(C): 36.3 (18 Nov 2023 09:30), Max: 36.8 (17 Nov 2023 20:09)  T(F): 97.4 (18 Nov 2023 09:30), Max: 98.2 (17 Nov 2023 20:09)  HR: 75 (18 Nov 2023 10:05) (64 - 83)  BP: 119/78 (18 Nov 2023 10:05) (105/66 - 134/75)  BP(mean): --  RR: 15 (18 Nov 2023 10:05) (15 - 16)  SpO2: 98% (18 Nov 2023 10:05) (94% - 98%)    Parameters below as of 18 Nov 2023 10:05  Patient On (Oxygen Delivery Method): room air        PHYSICAL EXAM:  Constitutional - NAD, Comfortable  HEENT - NCAT, EOMI  Neck - Supple, No limited ROM  Chest - good chest expansion, good respiratory effort  Cardio - warm and well perfused,   Abdomen -  Soft, NTND  Extremities - No peripheral edema, No calf tenderness         LABS:                        10.4   6.83  )-----------( 360      ( 18 Nov 2023 05:40 )             33.9     11-18    141  |  107  |  19  ----------------------------<  82  4.3   |  26  |  0.86    Ca    10.0      18 Nov 2023 05:40    TPro  6.5  /  Alb  2.5<L>  /  TBili  0.4  /  DBili  x   /  AST  21  /  ALT  18  /  AlkPhos  64  11-18      Urinalysis Basic - ( 18 Nov 2023 05:40 )    Color: x / Appearance: x / SG: x / pH: x  Gluc: 82 mg/dL / Ketone: x  / Bili: x / Urobili: x   Blood: x / Protein: x / Nitrite: x   Leuk Esterase: x / RBC: x / WBC x   Sq Epi: x / Non Sq Epi: x / Bacteria: x        Urinalysis Basic - ( 18 Nov 2023 05:40 )    Color: x / Appearance: x / SG: x / pH: x  Gluc: 82 mg/dL / Ketone: x  / Bili: x / Urobili: x   Blood: x / Protein: x / Nitrite: x   Leuk Esterase: x / RBC: x / WBC x   Sq Epi: x / Non Sq Epi: x / Bacteria: x        RADIOLOGY & ADDITIONAL TESTS:    Consultant(s) Notes Reviewed:  [x ] YES  [ ] NO  Care Discussed with Consultants/Other Providers [ x] YES  [ ] NO  Imaging Personally Reviewed:  [ ] YES  [ ] NO

## 2023-11-19 PROCEDURE — 99232 SBSQ HOSP IP/OBS MODERATE 35: CPT

## 2023-11-19 RX ADMIN — Medication 25 MILLIGRAM(S): at 06:17

## 2023-11-19 RX ADMIN — SENNA PLUS 2 TABLET(S): 8.6 TABLET ORAL at 21:58

## 2023-11-19 RX ADMIN — NYSTATIN CREAM 1 APPLICATION(S): 100000 CREAM TOPICAL at 17:38

## 2023-11-19 RX ADMIN — ENOXAPARIN SODIUM 40 MILLIGRAM(S): 100 INJECTION SUBCUTANEOUS at 17:37

## 2023-11-19 RX ADMIN — LEVETIRACETAM 500 MILLIGRAM(S): 250 TABLET, FILM COATED ORAL at 21:57

## 2023-11-19 RX ADMIN — POLYETHYLENE GLYCOL 3350 17 GRAM(S): 17 POWDER, FOR SOLUTION ORAL at 17:37

## 2023-11-19 RX ADMIN — ATORVASTATIN CALCIUM 40 MILLIGRAM(S): 80 TABLET, FILM COATED ORAL at 21:57

## 2023-11-19 RX ADMIN — MAGNESIUM OXIDE 400 MG ORAL TABLET 400 MILLIGRAM(S): 241.3 TABLET ORAL at 21:58

## 2023-11-19 RX ADMIN — Medication 10 MILLIEQUIVALENT(S): at 12:44

## 2023-11-19 RX ADMIN — Medication 25 MILLIGRAM(S): at 17:37

## 2023-11-19 RX ADMIN — NYSTATIN CREAM 1 APPLICATION(S): 100000 CREAM TOPICAL at 06:22

## 2023-11-19 RX ADMIN — POLYETHYLENE GLYCOL 3350 17 GRAM(S): 17 POWDER, FOR SOLUTION ORAL at 06:17

## 2023-11-19 RX ADMIN — FAMOTIDINE 20 MILLIGRAM(S): 10 INJECTION INTRAVENOUS at 17:37

## 2023-11-19 RX ADMIN — FAMOTIDINE 20 MILLIGRAM(S): 10 INJECTION INTRAVENOUS at 06:17

## 2023-11-19 NOTE — PROGRESS NOTE ADULT - SUBJECTIVE AND OBJECTIVE BOX
Hospitalist: Benjamin Washington DO    CHIEF COMPLAINT: Patient is a 83y old  female who presents with a chief complaint of Subdural Hemorrhage (19 Nov 2023 09:08)      SUBJECTIVE / OVERNIGHT EVENTS: Patient seen and examined. No acute events overnight. Pain well controlled and patient without any complaints.    MEDICATIONS  (STANDING):  atorvastatin 40 milliGRAM(s) Oral at bedtime  dextrose 5%. 1000 milliLiter(s) (50 mL/Hr) IV Continuous <Continuous>  dextrose 5%. 1000 milliLiter(s) (100 mL/Hr) IV Continuous <Continuous>  dextrose 50% Injectable 25 Gram(s) IV Push once  dextrose 50% Injectable 12.5 Gram(s) IV Push once  dextrose 50% Injectable 25 Gram(s) IV Push once  enoxaparin Injectable 40 milliGRAM(s) SubCutaneous <User Schedule>  famotidine    Tablet 20 milliGRAM(s) Oral two times a day  glucagon  Injectable 1 milliGRAM(s) IntraMuscular once  levETIRAcetam 500 milliGRAM(s) Oral at bedtime  magnesium oxide 400 milliGRAM(s) Oral at bedtime  metoprolol tartrate 25 milliGRAM(s) Oral every 12 hours  nystatin Powder 1 Application(s) Topical two times a day  polyethylene glycol 3350 17 Gram(s) Oral two times a day  potassium chloride    Tablet ER 10 milliEquivalent(s) Oral daily  senna 2 Tablet(s) Oral at bedtime    MEDICATIONS  (PRN):  acetaminophen     Tablet .. 975 milliGRAM(s) Oral every 8 hours PRN Mild Pain (1 - 3)  bisacodyl 5 milliGRAM(s) Oral every 12 hours PRN Constipation  dextrose Oral Gel 15 Gram(s) Oral once PRN Blood Glucose LESS THAN 70 milliGRAM(s)/deciliter      VITALS:  T(F): 97.5 (11-19-23 @ 09:23), Max: 97.6 (11-18-23 @ 19:39)  HR: 62 (11-19-23 @ 09:23) (62 - 70)  BP: 109/75 (11-19-23 @ 09:23) (106/66 - 122/71)  RR: 16 (11-19-23 @ 09:23) (15 - 16)  SpO2: 95% (11-19-23 @ 09:23)      REVIEW OF SYSTEMS:  For ROV please refer back to H&P     PHYSICAL EXAM:  Constitutional - NAD, Comfortable  HEENT - NCAT, EOMI  Neck - Supple, No limited ROM  Chest - good chest expansion, good respiratory effort  Cardio - warm and well perfused,   Abdomen -  Soft, NTND  Extremities - No peripheral edema, No calf tenderness       LABS:              10.4                 141  | 26   | 19           6.83  >-----------< 360     ------------------------< 82                    33.9                 4.3  | 107  | 0.86                                         Ca 10.0  Mg x     Ph x           TPro  6.5  /  Alb  2.5      TBili  0.4  /  DBili  x         AST  21  /  ALT  18            AlkPhos  64          MICROBIOLOGY:  Urinalysis Basic - ( 18 Nov 2023 05:40 )    Color: x / Appearance: x / SG: x / pH: x  Gluc: 82 mg/dL / Ketone: x  / Bili: x / Urobili: x   Blood: x / Protein: x / Nitrite: x   Leuk Esterase: x / RBC: x / WBC x   Sq Epi: x / Non Sq Epi: x / Bacteria: x            RADIOLOGY & ADDITIONAL TESTS:    Imaging Personally Reviewed:    [X] Consultant(s) Notes Reviewed:  [X] Care Discussed with Consultants/Other Providers:

## 2023-11-19 NOTE — PROGRESS NOTE ADULT - SUBJECTIVE AND OBJECTIVE BOX
Cc: Difficulty walking    HPI: Patient with no new medical issues today.  Pain controlled, no chest pain, no N/V, no Fevers/Chills. No other new ROS  Has been tolerating rehabilitation program.    MEDICATIONS  (STANDING):  atorvastatin 40 milliGRAM(s) Oral at bedtime  dextrose 5%. 1000 milliLiter(s) (50 mL/Hr) IV Continuous <Continuous>  dextrose 5%. 1000 milliLiter(s) (100 mL/Hr) IV Continuous <Continuous>  dextrose 50% Injectable 25 Gram(s) IV Push once  dextrose 50% Injectable 12.5 Gram(s) IV Push once  dextrose 50% Injectable 25 Gram(s) IV Push once  enoxaparin Injectable 40 milliGRAM(s) SubCutaneous <User Schedule>  famotidine    Tablet 20 milliGRAM(s) Oral two times a day  glucagon  Injectable 1 milliGRAM(s) IntraMuscular once  levETIRAcetam 500 milliGRAM(s) Oral at bedtime  magnesium oxide 400 milliGRAM(s) Oral at bedtime  metoprolol tartrate 25 milliGRAM(s) Oral every 12 hours  nystatin Powder 1 Application(s) Topical two times a day  polyethylene glycol 3350 17 Gram(s) Oral two times a day  potassium chloride    Tablet ER 10 milliEquivalent(s) Oral daily  senna 2 Tablet(s) Oral at bedtime    MEDICATIONS  (PRN):  acetaminophen     Tablet .. 975 milliGRAM(s) Oral every 8 hours PRN Mild Pain (1 - 3)  bisacodyl 5 milliGRAM(s) Oral every 12 hours PRN Constipation  dextrose Oral Gel 15 Gram(s) Oral once PRN Blood Glucose LESS THAN 70 milliGRAM(s)/deciliter    Vital Signs Last 24 Hrs  T(C): 36.4 (18 Nov 2023 19:39), Max: 36.4 (18 Nov 2023 19:39)  T(F): 97.6 (18 Nov 2023 19:39), Max: 97.6 (18 Nov 2023 19:39)  HR: 62 (19 Nov 2023 06:15) (62 - 83)  BP: 117/75 (19 Nov 2023 06:15) (105/66 - 122/71)  BP(mean): --  RR: 15 (18 Nov 2023 19:39) (15 - 15)  SpO2: 96% (18 Nov 2023 19:39) (94% - 98%)    In NAD  HEENT- EOMI  Heart- Well Perfused  Lungs- No resp distress, no use of accessory resp muscles  Abd- + BS, NT  Ext- No calf pain  Neuro- Exam unchanged  Psych- Affect wnl    Imp: Patient with diagnosis of CVA admitted for comprehensive acute rehabilitation.    Plan:  - Continue therapies  - DVT prophylaxis- Lovenox  - Appreciate Hospitalist follow up.   - Skin- Turn q2h, check skin daily  - Continue current medications; patient medically stable.   - Patient is stable to continue current rehabilitation program- requires active and ongoing therapeutic interventions of multiple disciplines and can tolerate 3h/d of therapies. Can actively participate and benefit from an intensive rehabilitation program. Requires supervision of a rehabilitation physician and a coordinated interdisciplinary approach to providing rehabilitation.

## 2023-11-19 NOTE — PROGRESS NOTE ADULT - ASSESSMENT
82 y/o woman with PMH of HTN, HLD, Arthritis, and normal pressure hydrocephalus (s/p VPS- March 2023 at [place]); who presented to [facility] on 11/8 for progressive weakness, confusion and decreased PO intake, and was found to have a mixed SDH with 1.4cm midline shift. She was transferred to [facility] later that day for further management. On 11/9, she underwent R sided reilly hole placement, followed by MMA embolization on 11/10 with Dr. Rodriguez. Her hospital course was complicated by 2 RRTs for unresponsiveness. Serial brain imaging was stable and she was found to be in rapid AFIB. 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 Patient now admitted for a multidisciplinary rehab program with cognitive deficits, gait and ADL impairments.     Rehab: Functional and gait instability:  - C/w PT/OT per primary team     Neuro: Subdural Hemorrhage, Normal Pressure Hydrocephalus, Seizure Prophylaxis  - S/p VPS March 2023  - CTH 11/8: Moderate to large mixed attenuation right hemispheric convexity SDH compressing right hemisphere and right lateral ventricle with 1.4 cm midline shift to left. Right frontal ventriculostomy shunt catheter within right frontal horn.  - CTH 11/14:Right convexity subdural collection of air, blood and fluid continues to decrease in size.  - Gait Instability, ADL impairments and Functional impairments: start Comprehensive Rehab Program of PT/OT/SLP  - 3 hours a day, 5 days a week  - S/p R sided reilly hole placement (11/9), followed by MMA embolization (11/10)  -  Seizure PPx per primary team    CVS: Rapid AFIB, HLD  - Metoprolol 25mg BID  - No AC  - Evaluation for Watchmann device outpatient with cardiology  - Atorvastatin 40mg at HS     Ortho: Arthritis  - Plaquenil 200mg BID    DVT PPx: Lovenox 40 SUbQ

## 2023-11-20 LAB
ALBUMIN SERPL ELPH-MCNC: 2.8 G/DL — LOW (ref 3.3–5)
ALBUMIN SERPL ELPH-MCNC: 2.8 G/DL — LOW (ref 3.3–5)
ALP SERPL-CCNC: 72 U/L — SIGNIFICANT CHANGE UP (ref 40–120)
ALP SERPL-CCNC: 72 U/L — SIGNIFICANT CHANGE UP (ref 40–120)
ALT FLD-CCNC: 22 U/L — SIGNIFICANT CHANGE UP (ref 10–45)
ALT FLD-CCNC: 22 U/L — SIGNIFICANT CHANGE UP (ref 10–45)
ANION GAP SERPL CALC-SCNC: 8 MMOL/L — SIGNIFICANT CHANGE UP (ref 5–17)
ANION GAP SERPL CALC-SCNC: 8 MMOL/L — SIGNIFICANT CHANGE UP (ref 5–17)
AST SERPL-CCNC: 18 U/L — SIGNIFICANT CHANGE UP (ref 10–40)
AST SERPL-CCNC: 18 U/L — SIGNIFICANT CHANGE UP (ref 10–40)
BASOPHILS # BLD AUTO: 0.04 K/UL — SIGNIFICANT CHANGE UP (ref 0–0.2)
BASOPHILS # BLD AUTO: 0.04 K/UL — SIGNIFICANT CHANGE UP (ref 0–0.2)
BASOPHILS NFR BLD AUTO: 0.5 % — SIGNIFICANT CHANGE UP (ref 0–2)
BASOPHILS NFR BLD AUTO: 0.5 % — SIGNIFICANT CHANGE UP (ref 0–2)
BILIRUB SERPL-MCNC: 0.4 MG/DL — SIGNIFICANT CHANGE UP (ref 0.2–1.2)
BILIRUB SERPL-MCNC: 0.4 MG/DL — SIGNIFICANT CHANGE UP (ref 0.2–1.2)
BUN SERPL-MCNC: 25 MG/DL — HIGH (ref 7–23)
BUN SERPL-MCNC: 25 MG/DL — HIGH (ref 7–23)
CALCIUM SERPL-MCNC: 10.1 MG/DL — SIGNIFICANT CHANGE UP (ref 8.4–10.5)
CALCIUM SERPL-MCNC: 10.1 MG/DL — SIGNIFICANT CHANGE UP (ref 8.4–10.5)
CHLORIDE SERPL-SCNC: 108 MMOL/L — SIGNIFICANT CHANGE UP (ref 96–108)
CHLORIDE SERPL-SCNC: 108 MMOL/L — SIGNIFICANT CHANGE UP (ref 96–108)
CO2 SERPL-SCNC: 26 MMOL/L — SIGNIFICANT CHANGE UP (ref 22–31)
CO2 SERPL-SCNC: 26 MMOL/L — SIGNIFICANT CHANGE UP (ref 22–31)
CREAT SERPL-MCNC: 1.03 MG/DL — SIGNIFICANT CHANGE UP (ref 0.5–1.3)
CREAT SERPL-MCNC: 1.03 MG/DL — SIGNIFICANT CHANGE UP (ref 0.5–1.3)
EGFR: 54 ML/MIN/1.73M2 — LOW
EGFR: 54 ML/MIN/1.73M2 — LOW
EOSINOPHIL # BLD AUTO: 0.24 K/UL — SIGNIFICANT CHANGE UP (ref 0–0.5)
EOSINOPHIL # BLD AUTO: 0.24 K/UL — SIGNIFICANT CHANGE UP (ref 0–0.5)
EOSINOPHIL NFR BLD AUTO: 3 % — SIGNIFICANT CHANGE UP (ref 0–6)
EOSINOPHIL NFR BLD AUTO: 3 % — SIGNIFICANT CHANGE UP (ref 0–6)
GLUCOSE SERPL-MCNC: 101 MG/DL — HIGH (ref 70–99)
GLUCOSE SERPL-MCNC: 101 MG/DL — HIGH (ref 70–99)
HCT VFR BLD CALC: 34.1 % — LOW (ref 34.5–45)
HCT VFR BLD CALC: 34.1 % — LOW (ref 34.5–45)
HGB BLD-MCNC: 10.6 G/DL — LOW (ref 11.5–15.5)
HGB BLD-MCNC: 10.6 G/DL — LOW (ref 11.5–15.5)
IMM GRANULOCYTES NFR BLD AUTO: 0.4 % — SIGNIFICANT CHANGE UP (ref 0–0.9)
IMM GRANULOCYTES NFR BLD AUTO: 0.4 % — SIGNIFICANT CHANGE UP (ref 0–0.9)
LYMPHOCYTES # BLD AUTO: 1.96 K/UL — SIGNIFICANT CHANGE UP (ref 1–3.3)
LYMPHOCYTES # BLD AUTO: 1.96 K/UL — SIGNIFICANT CHANGE UP (ref 1–3.3)
LYMPHOCYTES # BLD AUTO: 24.4 % — SIGNIFICANT CHANGE UP (ref 13–44)
LYMPHOCYTES # BLD AUTO: 24.4 % — SIGNIFICANT CHANGE UP (ref 13–44)
MCHC RBC-ENTMCNC: 28.2 PG — SIGNIFICANT CHANGE UP (ref 27–34)
MCHC RBC-ENTMCNC: 28.2 PG — SIGNIFICANT CHANGE UP (ref 27–34)
MCHC RBC-ENTMCNC: 31.1 GM/DL — LOW (ref 32–36)
MCHC RBC-ENTMCNC: 31.1 GM/DL — LOW (ref 32–36)
MCV RBC AUTO: 90.7 FL — SIGNIFICANT CHANGE UP (ref 80–100)
MCV RBC AUTO: 90.7 FL — SIGNIFICANT CHANGE UP (ref 80–100)
MONOCYTES # BLD AUTO: 0.97 K/UL — HIGH (ref 0–0.9)
MONOCYTES # BLD AUTO: 0.97 K/UL — HIGH (ref 0–0.9)
MONOCYTES NFR BLD AUTO: 12.1 % — SIGNIFICANT CHANGE UP (ref 2–14)
MONOCYTES NFR BLD AUTO: 12.1 % — SIGNIFICANT CHANGE UP (ref 2–14)
NEUTROPHILS # BLD AUTO: 4.8 K/UL — SIGNIFICANT CHANGE UP (ref 1.8–7.4)
NEUTROPHILS # BLD AUTO: 4.8 K/UL — SIGNIFICANT CHANGE UP (ref 1.8–7.4)
NEUTROPHILS NFR BLD AUTO: 59.6 % — SIGNIFICANT CHANGE UP (ref 43–77)
NEUTROPHILS NFR BLD AUTO: 59.6 % — SIGNIFICANT CHANGE UP (ref 43–77)
NRBC # BLD: 0 /100 WBCS — SIGNIFICANT CHANGE UP (ref 0–0)
NRBC # BLD: 0 /100 WBCS — SIGNIFICANT CHANGE UP (ref 0–0)
PLATELET # BLD AUTO: 259 K/UL — SIGNIFICANT CHANGE UP (ref 150–400)
PLATELET # BLD AUTO: 259 K/UL — SIGNIFICANT CHANGE UP (ref 150–400)
POTASSIUM SERPL-MCNC: 4.5 MMOL/L — SIGNIFICANT CHANGE UP (ref 3.5–5.3)
POTASSIUM SERPL-MCNC: 4.5 MMOL/L — SIGNIFICANT CHANGE UP (ref 3.5–5.3)
POTASSIUM SERPL-SCNC: 4.5 MMOL/L — SIGNIFICANT CHANGE UP (ref 3.5–5.3)
POTASSIUM SERPL-SCNC: 4.5 MMOL/L — SIGNIFICANT CHANGE UP (ref 3.5–5.3)
PROT SERPL-MCNC: 6.6 G/DL — SIGNIFICANT CHANGE UP (ref 6–8.3)
PROT SERPL-MCNC: 6.6 G/DL — SIGNIFICANT CHANGE UP (ref 6–8.3)
RBC # BLD: 3.76 M/UL — LOW (ref 3.8–5.2)
RBC # BLD: 3.76 M/UL — LOW (ref 3.8–5.2)
RBC # FLD: 15.4 % — HIGH (ref 10.3–14.5)
RBC # FLD: 15.4 % — HIGH (ref 10.3–14.5)
SODIUM SERPL-SCNC: 142 MMOL/L — SIGNIFICANT CHANGE UP (ref 135–145)
SODIUM SERPL-SCNC: 142 MMOL/L — SIGNIFICANT CHANGE UP (ref 135–145)
WBC # BLD: 8.04 K/UL — SIGNIFICANT CHANGE UP (ref 3.8–10.5)
WBC # BLD: 8.04 K/UL — SIGNIFICANT CHANGE UP (ref 3.8–10.5)
WBC # FLD AUTO: 8.04 K/UL — SIGNIFICANT CHANGE UP (ref 3.8–10.5)
WBC # FLD AUTO: 8.04 K/UL — SIGNIFICANT CHANGE UP (ref 3.8–10.5)

## 2023-11-20 PROCEDURE — 70450 CT HEAD/BRAIN W/O DYE: CPT | Mod: 26

## 2023-11-20 PROCEDURE — 99233 SBSQ HOSP IP/OBS HIGH 50: CPT | Mod: GC

## 2023-11-20 RX ORDER — ESCITALOPRAM OXALATE 10 MG/1
5 TABLET, FILM COATED ORAL DAILY
Refills: 0 | Status: DISCONTINUED | OUTPATIENT
Start: 2023-11-20 | End: 2023-12-05

## 2023-11-20 RX ORDER — HYDROXYCHLOROQUINE SULFATE 200 MG
200 TABLET ORAL
Refills: 0 | Status: DISCONTINUED | OUTPATIENT
Start: 2023-11-20 | End: 2023-12-05

## 2023-11-20 RX ADMIN — NYSTATIN CREAM 1 APPLICATION(S): 100000 CREAM TOPICAL at 17:24

## 2023-11-20 RX ADMIN — Medication 25 MILLIGRAM(S): at 17:23

## 2023-11-20 RX ADMIN — ATORVASTATIN CALCIUM 40 MILLIGRAM(S): 80 TABLET, FILM COATED ORAL at 21:22

## 2023-11-20 RX ADMIN — FAMOTIDINE 20 MILLIGRAM(S): 10 INJECTION INTRAVENOUS at 05:16

## 2023-11-20 RX ADMIN — Medication 200 MILLIGRAM(S): at 17:23

## 2023-11-20 RX ADMIN — Medication 10 MILLIEQUIVALENT(S): at 11:25

## 2023-11-20 RX ADMIN — ENOXAPARIN SODIUM 40 MILLIGRAM(S): 100 INJECTION SUBCUTANEOUS at 17:23

## 2023-11-20 RX ADMIN — FAMOTIDINE 20 MILLIGRAM(S): 10 INJECTION INTRAVENOUS at 17:23

## 2023-11-20 RX ADMIN — POLYETHYLENE GLYCOL 3350 17 GRAM(S): 17 POWDER, FOR SOLUTION ORAL at 17:23

## 2023-11-20 RX ADMIN — MAGNESIUM OXIDE 400 MG ORAL TABLET 400 MILLIGRAM(S): 241.3 TABLET ORAL at 21:22

## 2023-11-20 RX ADMIN — LEVETIRACETAM 500 MILLIGRAM(S): 250 TABLET, FILM COATED ORAL at 21:22

## 2023-11-20 RX ADMIN — Medication 25 MILLIGRAM(S): at 05:16

## 2023-11-20 RX ADMIN — NYSTATIN CREAM 1 APPLICATION(S): 100000 CREAM TOPICAL at 05:16

## 2023-11-20 RX ADMIN — SENNA PLUS 2 TABLET(S): 8.6 TABLET ORAL at 21:22

## 2023-11-20 RX ADMIN — POLYETHYLENE GLYCOL 3350 17 GRAM(S): 17 POWDER, FOR SOLUTION ORAL at 05:19

## 2023-11-20 NOTE — PROGRESS NOTE ADULT - SUBJECTIVE AND OBJECTIVE BOX
Patient is a 83y old  Female who presents with a chief complaint of Subdural Hemorrhage (19 Nov 2023 14:22)    HPI:  Ginna Vergara is an 83 year old female with PMH of HTN, HLD, Arthritis, and normal pressure hydrocephalus (s/p VPS- March 2023 at [place]); who presented to [facility] on 11/8 for progressive weakness, confusion and decreased PO intake, and was found to have a mixed SDH with 1.4cm midline shift. She was transferred to [facility] later that day for further management. On 11/9, she underwent R sided reilly hole placement followed by MMA embolization on 11/10 with Dr. Rodriguez. Her hospital course was complicated by 2 RRTs for unresponsiveness. Serial brain imaging was stable and she was found to be in rapid AFIB. 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 PM&R was consulted and deemed her an appropriate candidate for IRF. She was medically stabilized and cleared for discharge to [facility].      PAST MEDICAL & SURGICAL HISTORY:  Normal pressure hydrocephalus  HTN (hypertension)  HLD (hyperlipidemia)  Arthritis  S/P  shunt    SUBJECTIVE/ROS: Patient seen and examined while resting in bed.  Uneventful weekend. Denies fever, chills, CP, SOB, HA, abd pain, or dysuria.  LBM 3 days ago.  Bilateral shoulder with weakness and limitation - worse to Left.  Denies prior injury, attributes condition to age. States she lives with her  in a house, [address]; bed and bath on 1 level, tub shower.  DME at home: RW and cane.    PHYSICAL EXAM  83y  Vital Signs Last 24 Hrs  T(C): 36.3 (20 Nov 2023 08:04), Max: 36.5 (19 Nov 2023 20:33)  T(F): 97.4 (20 Nov 2023 08:04), Max: 97.7 (19 Nov 2023 20:33)  HR: 84 (20 Nov 2023 08:04) (65 - 84)  BP: 145/79 (20 Nov 2023 08:04) (120/68 - 145/79)  RR: 16 (20 Nov 2023 08:04) (16 - 16)  SpO2: 97% (20 Nov 2023 08:04) (97% - 97%)    Constitutional - NAD, Comfortable  HEENT - NCAT, LILO  Neck - Supple, No limited ROM  Chest - shallow breaths  Cardio - RRR  Abdomen -  Soft, NTND, +BS  Extremities - RLE with 2+ pitting edema, No calf tenderness   Neurologic Exam:                    Cognitive -             Orientation: Awake, A&O x2 (to self, month) - keeps repeating 1940 which is birth year.  2023 with multiple choice.  Think she's in a nursing home.  Informed she's in rehab in a hospital - then thought she was at [facility]. Comprehension deficit? - doesn't fall all instructions            Attention:  easily distracted            Memory: Remote better than recent.  unable to recall 3 objects            Thought: process, content appropriate     Speech - Fluent intact.  Able to repeat.  Identify fork and funcution.  No dysarthria, No aphasia      Cranial Nerves - difficulty tracking (comprehension?), No facial asymmetry, Tongue midline, EOMI, Shoulder shrug equal     Motor -                      LEFT  UE - ShF 3/5 (not full, limited by pain), EF 4/5, EE 4/5, WE 3/5,  3/5                    RIGHT UE - ShF 3/5 (not full, limited by pain), EF 4/5, EE 4/5, WE 5/5,  5/5                    LEFT    LE - HF 4/5, KE 4/5, DF 3/5, PF 4/5                    RIGHT LE - HF 4/5, KE 5/5, DF 4/5, PF 5/5        Sensory - Intact to LT bilateral face, arms, legs.      Reflexes - DTR 2 / 4 in the biceps b/l. +1/4 in patella and achilles b/l.      Coordination - FTN intact on the right, not dysmetric per se - but doesn't complete task - bring towards finger but descends.     OculoVestibular -  No nystagmus  Psychiatric - Easily frustrated /agitated - sarcastic at timesSkin on admission: Right sided surgical incision intact with staples. Dry skin over gluteal region. Skin tags throughout body.    RECENT LABS:                        10.6   8.04  )-----------( 259      ( 20 Nov 2023 05:20 )             34.1     11-20    142  |  108  |  25<H>  ----------------------------<  101<H>  4.5   |  26  |  1.03    Ca    10.1      20 Nov 2023 05:20    TPro  6.6  /  Alb  2.8<L>  /  TBili  0.4  /  DBili  x   /  AST  18  /  ALT  22  /  AlkPhos  72  11-20    LIVER FUNCTIONS - ( 20 Nov 2023 05:20 )  Alb: 2.8 g/dL / Pro: 6.6 g/dL / ALK PHOS: 72 U/L / ALT: 22 U/L / AST: 18 U/L / GGT: x           Allergies  No Known Allergies    MEDICATIONS  (STANDING):  atorvastatin 40 milliGRAM(s) Oral at bedtime  dextrose 5%. 1000 milliLiter(s) (100 mL/Hr) IV Continuous <Continuous>  dextrose 5%. 1000 milliLiter(s) (50 mL/Hr) IV Continuous <Continuous>  dextrose 50% Injectable 25 Gram(s) IV Push once  dextrose 50% Injectable 12.5 Gram(s) IV Push once  dextrose 50% Injectable 25 Gram(s) IV Push once  enoxaparin Injectable 40 milliGRAM(s) SubCutaneous <User Schedule>  famotidine    Tablet 20 milliGRAM(s) Oral two times a day  glucagon  Injectable 1 milliGRAM(s) IntraMuscular once  levETIRAcetam 500 milliGRAM(s) Oral at bedtime  magnesium oxide 400 milliGRAM(s) Oral at bedtime  metoprolol tartrate 25 milliGRAM(s) Oral every 12 hours  nystatin Powder 1 Application(s) Topical two times a day  polyethylene glycol 3350 17 Gram(s) Oral two times a day  potassium chloride    Tablet ER 10 milliEquivalent(s) Oral daily  senna 2 Tablet(s) Oral at bedtime    MEDICATIONS  (PRN):  acetaminophen     Tablet .. 975 milliGRAM(s) Oral every 8 hours PRN Mild Pain (1 - 3)  bisacodyl 5 milliGRAM(s) Oral every 12 hours PRN Constipation  dextrose Oral Gel 15 Gram(s) Oral once PRN Blood Glucose LESS THAN 70 milliGRAM(s)/deciliter   Patient is a 83y old  Female who presents with a chief complaint of Subdural Hemorrhage (19 Nov 2023 14:22)    HPI:  Ginna Vergara is an 83 year old female with PMH of HTN, HLD, Arthritis, and normal pressure hydrocephalus (s/p VPS- March 2023 at [place]); who presented to [facility] on 11/8 for progressive weakness, confusion and decreased PO intake, and was found to have a mixed SDH with 1.4cm midline shift. She was transferred to [facility] later that day for further management. On 11/9, she underwent R sided reilly hole placement followed by MMA embolization on 11/10 with Dr. Rodriguez. Her hospital course was complicated by 2 RRTs for unresponsiveness. Serial brain imaging was stable and she was found to be in rapid AFIB. 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 PM&R was consulted and deemed her an appropriate candidate for IRF. She was medically stabilized and cleared for discharge to [facility].      PAST MEDICAL & SURGICAL HISTORY:  Normal pressure hydrocephalus  HTN (hypertension)  HLD (hyperlipidemia)  Arthritis  S/P  shunt    SUBJECTIVE/ROS: Patient seen and examined while resting in bed.  Uneventful weekend. Denies fever, chills, CP, SOB, HA, abd pain, or dysuria.  LBM 3 days ago.  Bilateral shoulder with weakness and limitation - worse to Left.  Denies prior injury, attributes condition to age. States she lives with her  in a house, [address]; bed and bath on 1 level, tub shower.  DME at home: RW and cane.    PHYSICAL EXAM  83y  Vital Signs Last 24 Hrs  T(C): 36.3 (20 Nov 2023 08:04), Max: 36.5 (19 Nov 2023 20:33)  T(F): 97.4 (20 Nov 2023 08:04), Max: 97.7 (19 Nov 2023 20:33)  HR: 84 (20 Nov 2023 08:04) (65 - 84)  BP: 145/79 (20 Nov 2023 08:04) (120/68 - 145/79)  RR: 16 (20 Nov 2023 08:04) (16 - 16)  SpO2: 97% (20 Nov 2023 08:04) (97% - 97%)    Constitutional - NAD, Comfortable, irritable  HEENT - NCAT, LILO  Neck - Supple, No limited ROM  Chest - shallow breaths  Cardio - RRR  Abdomen -  Soft, NTND, +BS  Extremities - RLE with 2+ pitting edema, No calf tenderness   Neurologic Exam:                    Cognitive -             Orientation: Awake, A&O x2 (to self, month) - keeps repeating 1940 which is birth year.  2023 with multiple choice.  Think she's in a nursing home.  Informed she's in rehab in a hospital - then thought she was at [facility]. Comprehension deficit? - doesn't fall all instructions            Attention:  easily distracted            Memory: Remote better than recent.  unable to recall 3 objects            Thought: process, content appropriate     Speech - Fluent intact.  Able to repeat.  Identify fork and funcution.  No dysarthria, No aphasia      Cranial Nerves - difficulty tracking (comprehension?), No facial asymmetry, Tongue midline, EOMI, Shoulder shrug equal     Motor -                      LEFT  UE - ShF 3/5 (not full, limited by pain), EF 4/5, EE 4/5, WE 3/5,  3/5                    RIGHT UE - ShF 3/5 (not full, limited by pain), EF 4/5, EE 4/5, WE 5/5,  5/5                    LEFT    LE - HF 4/5, KE 4/5, DF 3/5, PF 4/5                    RIGHT LE - HF 4/5, KE 5/5, DF 4/5, PF 5/5        Sensory - Intact to LT bilateral face, arms, legs.      Reflexes - DTR 2 / 4 in the biceps b/l. +1/4 in patella and achilles b/l.      Coordination - FTN intact on the right, not dysmetric per se - but doesn't complete task - bring towards finger but descends.     OculoVestibular -  No nystagmus  Psychiatric - Easily frustrated /agitated - sarcastic at times; Skin on admission: Right sided surgical incision intact with staples. Dry skin over gluteal region. Skin tags throughout body.    RECENT LABS:                        10.6   8.04  )-----------( 259      ( 20 Nov 2023 05:20 )             34.1     11-20    142  |  108  |  25<H>  ----------------------------<  101<H>  4.5   |  26  |  1.03    Ca    10.1      20 Nov 2023 05:20    TPro  6.6  /  Alb  2.8<L>  /  TBili  0.4  /  DBili  x   /  AST  18  /  ALT  22  /  AlkPhos  72  11-20    LIVER FUNCTIONS - ( 20 Nov 2023 05:20 )  Alb: 2.8 g/dL / Pro: 6.6 g/dL / ALK PHOS: 72 U/L / ALT: 22 U/L / AST: 18 U/L / GGT: x           Allergies  No Known Allergies    MEDICATIONS  (STANDING):  atorvastatin 40 milliGRAM(s) Oral at bedtime  dextrose 5%. 1000 milliLiter(s) (100 mL/Hr) IV Continuous <Continuous>  dextrose 5%. 1000 milliLiter(s) (50 mL/Hr) IV Continuous <Continuous>  dextrose 50% Injectable 25 Gram(s) IV Push once  dextrose 50% Injectable 12.5 Gram(s) IV Push once  dextrose 50% Injectable 25 Gram(s) IV Push once  enoxaparin Injectable 40 milliGRAM(s) SubCutaneous <User Schedule>  famotidine    Tablet 20 milliGRAM(s) Oral two times a day  glucagon  Injectable 1 milliGRAM(s) IntraMuscular once  levETIRAcetam 500 milliGRAM(s) Oral at bedtime  magnesium oxide 400 milliGRAM(s) Oral at bedtime  metoprolol tartrate 25 milliGRAM(s) Oral every 12 hours  nystatin Powder 1 Application(s) Topical two times a day  polyethylene glycol 3350 17 Gram(s) Oral two times a day  potassium chloride    Tablet ER 10 milliEquivalent(s) Oral daily  senna 2 Tablet(s) Oral at bedtime    MEDICATIONS  (PRN):  acetaminophen     Tablet .. 975 milliGRAM(s) Oral every 8 hours PRN Mild Pain (1 - 3)  bisacodyl 5 milliGRAM(s) Oral every 12 hours PRN Constipation  dextrose Oral Gel 15 Gram(s) Oral once PRN Blood Glucose LESS THAN 70 milliGRAM(s)/deciliter

## 2023-11-20 NOTE — PROGRESS NOTE ADULT - NS ATTEND AMEND GEN_ALL_CORE FT
I have personally seen and examined the patient.  I fully participated in the care of this patient.  I have made amendments to the documentation where necessary, and agree with the history, physical exam, and plan as documented above

## 2023-11-20 NOTE — PROGRESS NOTE ADULT - ASSESSMENT
83 year old female with PMH of HTN, HLD, Arthritis, and normal pressure hydrocephalus (s/p VPS- March 2023 at [place]); who presented to [facility] on 11/8 for progressive weakness, confusion and decreased PO intake, and was found to have a mixed SDH with 1.4cm midline shift. She was transferred to [facility] later that day for further management. On 11/9, she underwent R sided reilly hole placement, followed by MMA embolization on 11/10 with Dr. Rodriguez. Her hospital course was complicated by 2 RRTs for unresponsiveness. Serial brain imaging was stable and she was found to be in rapid AFIB. 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 Patient now admitted for a multidisciplinary rehab program with cognitive deficits, gait and ADL impairments.   -------------  Rehab Management/MEDICAL MANAGEMENT     #Subdural Hemorrhage   - CTH 11/8: Moderate to large mixed attenuation right hemispheric convexity SDH compressing right hemisphere and right lateral ventricle with 1.4 cm midline shift to left. Right frontal ventriculostomy shunt catheter within right frontal horn.  - CTH 11/14:Right convexity subdural collection of air, blood and fluid continues to decrease in size.  - Gait Instability, ADL impairments and Functional impairments: start Comprehensive Rehab Program of PT/OT/SLP  - 3 hours a day, 5 days a week  - S/p R sided reilly hole placement (11/9), followed by MMA embolization (11/10) - Staples intact  - Outpatient follow up with [facility], Dr. Rodriguez     #Normal Pressure Hydrocephalus  - S/p VPS March 2023  - Dr. Simon ([place])     #Seizure Prophylaxis  - Levetiracetam 500mg BID. Started on 11/8  --No seizure events throughout hospital course and EEG neg for seizures  --Taper off Keppra-- Decrease Keppra to 500mg qhs for 3 days then stop     #Rapid AFIB  - Metoprolol 25mg BID  - No AC d/t SDH  - Evaluation for Watchmann device outpatient with cardiology    #HLD  - Atorvastatin 40mg at HS     #Arthritis  - Restarted Plaquenil 200mg BID (home regimen)    #Skin  - Skin on admission: Right sided surgical incision intact with staples Dry skin over gluteal region. Skin tags throughout body.   - Nystatin powder BID   - Pressure injury/Skin: OOB to Chair, PT/OT     #Pain Mgmt   - Tylenol PRN    #GI/Bowel Mgmt   - Famotidine 20mg BID   - Miralax BID  - Senna   - PRN: Bisacodyl 5mg at HS     #/Bladder Mgmt   -  monitor PVR q8h, CIC>400cc  - Toileting programd q3-4 hrs during awake hours    #FEN   - Diet - Regular + Thins    - Dysphagia  SLP - evaluation and treatment    #Health Maintenance  - KCL 10mEq daily   - Magnesium Oxide 400mg at HS  - Fosamax 70mg weekly- home regimen  - K: 4.5; add mag level    #Precautions / PROPHYLAXIS:   - Falls, Seizure   - ortho: Weight bearing status: WBAT   - Lungs: Aspiration, Incentive Spirometer   - DVT: Lovenox  ------------------------------------------------------  OUTPATIENT/FOLLOW UP:    Sierra Rodriguez  Neurosurgery  [address] [place] [id number]  Phone: [phone]  Fax: [phone]  Follow Up Time: 2 weeks    Neurologist- managing VPS   Dr. Aurora Webb   Watchmann device outpatient with cardiology     83 year old female with PMH of HTN, HLD, Arthritis, and normal pressure hydrocephalus (s/p VPS- March 2023 at [place]); who presented to [facility] on 11/8 for progressive weakness, confusion and decreased PO intake, and was found to have a mixed SDH with 1.4cm midline shift. She was transferred to [facility] later that day for further management. On 11/9, she underwent R sided reilly hole placement, followed by MMA embolization on 11/10 with Dr. Rodriguez. Her hospital course was complicated by 2 RRTs for unresponsiveness. Serial brain imaging was stable and she was found to be in rapid AFIB. 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 Patient now admitted for a multidisciplinary rehab program with cognitive deficits, gait and ADL impairments.   -------------  Rehab Management/MEDICAL MANAGEMENT     #Traumatic Subdural Hemorrhage  - CTH 11/8: Moderate to large mixed attenuation right hemispheric convexity SDH compressing right hemisphere and right lateral ventricle with 1.4 cm midline shift to left. Right frontal ventriculostomy shunt catheter within right frontal horn.  - CTH 11/14:Right convexity subdural collection of air, blood and fluid continues to decrease in size.  - Gait Instability, ADL impairments and Functional impairments: start Comprehensive Rehab Program of PT/OT/SLP  - 3 hours a day, 5 days a week  - S/p R sided reilly hole placement (11/9), followed by MMA embolization (11/10) - Staples intact  - Outpatient follow up with [facility], Dr. Rodriguez     #Normal Pressure Hydrocephalus  - S/p VPS March 2023  - Dr. Simon ([place])     #KEITH  - Bun/Cr: 25/1.03 (11/20)  - Encourage oral hydration  - Repeat BMP 11/23    #Mood  - Irritable, start lexapro 5 mg po (11/20)    #Seizure Prophylaxis  - Levetiracetam 500mg BID. Started on 11/8  --No seizure events throughout hospital course and EEG neg for seizures  --Taper off Keppra-- Decrease Keppra to 500mg qhs for 3 days then stop     #Rapid AFIB  - Metoprolol 25mg BID  - No AC d/t SDH  - Evaluation for Watchmann device outpatient with cardiology    #HLD  - Atorvastatin 40mg at HS     #Arthritis  - Restarted Plaquenil 200mg BID (home regimen)    #Skin  - Skin on admission: Right sided surgical incision intact with staples Dry skin over gluteal region. Skin tags throughout body.   - Nystatin powder BID   - Pressure injury/Skin: OOB to Chair, PT/OT     #Pain Mgmt   - Tylenol PRN    #GI/Bowel Mgmt   - Famotidine 20mg BID   - Miralax BID  - Senna   - PRN: Bisacodyl 5mg at HS     #/Bladder Mgmt   -  monitor PVR q8h, CIC>400cc  - Toileting programed q3-4 hrs during awake hours    #FEN   - Diet - Regular + Thins    - Dysphagia  SLP - evaluation and treatment    #Health Maintenance  - KCL 10mEq daily   - Magnesium Oxide 400mg at HS  - Fosamax 70mg weekly- home regimen  - K: 4.5; add mag level    #Precautions / PROPHYLAXIS:   - Falls, Seizure   - ortho: Weight bearing status: WBAT   - Lungs: Aspiration, Incentive Spirometer   - DVT: Lovenox  ------------------------------------------------------  OUTPATIENT/FOLLOW UP:    Sierra Rodriguez  Neurosurgery  [address] [place] [id number]  Phone: [phone]  Fax: [phone]  Follow Up Time: 2 weeks    Neurologist- managing VPS   Dr. Aurora Webb   Watchmann device outpatient with cardiology

## 2023-11-21 LAB
APPEARANCE UR: ABNORMAL
APPEARANCE UR: ABNORMAL
BILIRUB UR-MCNC: NEGATIVE — SIGNIFICANT CHANGE UP
BILIRUB UR-MCNC: NEGATIVE — SIGNIFICANT CHANGE UP
COLOR SPEC: YELLOW — SIGNIFICANT CHANGE UP
COLOR SPEC: YELLOW — SIGNIFICANT CHANGE UP
DIFF PNL FLD: NEGATIVE — SIGNIFICANT CHANGE UP
DIFF PNL FLD: NEGATIVE — SIGNIFICANT CHANGE UP
GLUCOSE UR QL: NEGATIVE MG/DL — SIGNIFICANT CHANGE UP
GLUCOSE UR QL: NEGATIVE MG/DL — SIGNIFICANT CHANGE UP
KETONES UR-MCNC: ABNORMAL MG/DL
KETONES UR-MCNC: ABNORMAL MG/DL
LEUKOCYTE ESTERASE UR-ACNC: ABNORMAL
LEUKOCYTE ESTERASE UR-ACNC: ABNORMAL
NITRITE UR-MCNC: NEGATIVE — SIGNIFICANT CHANGE UP
NITRITE UR-MCNC: NEGATIVE — SIGNIFICANT CHANGE UP
PH UR: 7.5 — SIGNIFICANT CHANGE UP (ref 5–8)
PH UR: 7.5 — SIGNIFICANT CHANGE UP (ref 5–8)
PROT UR-MCNC: 30 MG/DL
PROT UR-MCNC: 30 MG/DL
RAPID RVP RESULT: SIGNIFICANT CHANGE UP
RAPID RVP RESULT: SIGNIFICANT CHANGE UP
SARS-COV-2 RNA SPEC QL NAA+PROBE: SIGNIFICANT CHANGE UP
SARS-COV-2 RNA SPEC QL NAA+PROBE: SIGNIFICANT CHANGE UP
SP GR SPEC: 1.02 — SIGNIFICANT CHANGE UP (ref 1–1.03)
SP GR SPEC: 1.02 — SIGNIFICANT CHANGE UP (ref 1–1.03)
UROBILINOGEN FLD QL: 1 E.U./DL — SIGNIFICANT CHANGE UP (ref 0.2–1)
UROBILINOGEN FLD QL: 1 E.U./DL — SIGNIFICANT CHANGE UP (ref 0.2–1)

## 2023-11-21 PROCEDURE — 99233 SBSQ HOSP IP/OBS HIGH 50: CPT | Mod: GC

## 2023-11-21 RX ORDER — MODAFINIL 200 MG/1
50 TABLET ORAL
Refills: 0 | Status: DISCONTINUED | OUTPATIENT
Start: 2023-11-21 | End: 2023-11-28

## 2023-11-21 RX ORDER — LORATADINE 10 MG/1
10 TABLET ORAL ONCE
Refills: 0 | Status: COMPLETED | OUTPATIENT
Start: 2023-11-21 | End: 2023-11-21

## 2023-11-21 RX ORDER — LORATADINE 10 MG/1
10 TABLET ORAL DAILY
Refills: 0 | Status: DISCONTINUED | OUTPATIENT
Start: 2023-11-22 | End: 2023-12-05

## 2023-11-21 RX ADMIN — MAGNESIUM OXIDE 400 MG ORAL TABLET 400 MILLIGRAM(S): 241.3 TABLET ORAL at 21:11

## 2023-11-21 RX ADMIN — Medication 25 MILLIGRAM(S): at 17:19

## 2023-11-21 RX ADMIN — Medication 25 MILLIGRAM(S): at 06:28

## 2023-11-21 RX ADMIN — Medication 200 MILLIGRAM(S): at 06:25

## 2023-11-21 RX ADMIN — LORATADINE 10 MILLIGRAM(S): 10 TABLET ORAL at 17:19

## 2023-11-21 RX ADMIN — ENOXAPARIN SODIUM 40 MILLIGRAM(S): 100 INJECTION SUBCUTANEOUS at 17:19

## 2023-11-21 RX ADMIN — Medication 200 MILLIGRAM(S): at 17:19

## 2023-11-21 RX ADMIN — FAMOTIDINE 20 MILLIGRAM(S): 10 INJECTION INTRAVENOUS at 06:25

## 2023-11-21 RX ADMIN — NYSTATIN CREAM 1 APPLICATION(S): 100000 CREAM TOPICAL at 06:31

## 2023-11-21 RX ADMIN — ESCITALOPRAM OXALATE 5 MILLIGRAM(S): 10 TABLET, FILM COATED ORAL at 11:26

## 2023-11-21 RX ADMIN — Medication 10 MILLIEQUIVALENT(S): at 11:26

## 2023-11-21 RX ADMIN — FAMOTIDINE 20 MILLIGRAM(S): 10 INJECTION INTRAVENOUS at 17:19

## 2023-11-21 RX ADMIN — NYSTATIN CREAM 1 APPLICATION(S): 100000 CREAM TOPICAL at 17:20

## 2023-11-21 RX ADMIN — ATORVASTATIN CALCIUM 40 MILLIGRAM(S): 80 TABLET, FILM COATED ORAL at 21:11

## 2023-11-21 RX ADMIN — SENNA PLUS 2 TABLET(S): 8.6 TABLET ORAL at 21:11

## 2023-11-21 NOTE — PROGRESS NOTE ADULT - ATTENDING COMMENTS
Patient lethargic yesterday afternoon. Repeat CT head done-mild ventriculomegaly. NP reached out to neurosurgery team, outpatient follow up for shunt adjustment, not urgent.   Start modafinil for participation with therapies  Lexapro 5 mg for mood

## 2023-11-21 NOTE — PROGRESS NOTE ADULT - SUBJECTIVE AND OBJECTIVE BOX
Patient is a 83y old  Female who presents with a chief complaint of Subdural Hemorrhage (19 Nov 2023 14:22)    HPI:  Ginna Vergara is an 83 year old female with PMH of HTN, HLD, Arthritis, and normal pressure hydrocephalus (s/p VPS- March 2023 at [facility]); who presented to [facility] on 11/8 for progressive weakness, confusion and decreased PO intake, and was found to have a mixed SDH with 1.4cm midline shift. She was transferred to [facility] later that day for further management. On 11/9, she underwent R sided reilly hole placement followed by MMA embolization on 11/10 with Dr. Rodriguez. Her hospital course was complicated by 2 RRTs for unresponsiveness. Serial brain imaging was stable and she was found to be in rapid AFIB. 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 PM&R was consulted and deemed her an appropriate candidate for IRF. She was medically stabilized and cleared for discharge to [facility].      PAST MEDICAL & SURGICAL HISTORY:  Normal pressure hydrocephalus  HTN (hypertension)  HLD (hyperlipidemia)  Arthritis  S/P  shunt    SUBJECTIVE/ROS: No acute overnight events. Patient seen and examined this AM. She has no new numbness tingling or weakness. She is less lethargic and more interactive than previous exams. She denies any HA/vision changes. She is sleeping well. She has been tapered off of keppra, no signs of seizure. CTH completed on 11/20, essentially unchanged. VSS. afebrile.     PHYSICAL EXAM    Vital Signs Last 24 Hrs  T(C): 36.3 (20 Nov 2023 08:04), Max: 36.5 (19 Nov 2023 20:33)  T(F): 97.4 (20 Nov 2023 08:04), Max: 97.7 (19 Nov 2023 20:33)  HR: 84 (20 Nov 2023 08:04) (65 - 84)  BP: 145/79 (20 Nov 2023 08:04) (120/68 - 145/79)  RR: 16 (20 Nov 2023 08:04) (16 - 16)  SpO2: 97% (20 Nov 2023 08:04) (97% - 97%)    T(C): 36.7 (11-20-23 @ 20:00), Max: 36.7 (11-20-23 @ 20:00)  HR: 72 (11-21-23 @ 06:23) (72 - 90)  BP: 143/78 (11-21-23 @ 06:23) (113/84 - 143/78)  RR: 16 (11-20-23 @ 20:00) (16 - 16)  SpO2: 95% (11-20-23 @ 20:00) (95% - 95%)    General: No acute distress  CV: RRR  Lungs: Breathing comfortably on RA   Abd: Soft, mildly distended   Ext:  Peripheral edema   Neuro:            Mental status: A&O to person, (-) month, (+) year             CN: No facial asymmetry             Strength: 4/5 UEs             Sensation: Intact throughout    RECENT LABS:                        10.6   8.04  )-----------( 259      ( 20 Nov 2023 05:20 )             34.1     11-20    142  |  108  |  25<H>  ----------------------------<  101<H>  4.5   |  26  |  1.03    Ca    10.1      20 Nov 2023 05:20    TPro  6.6  /  Alb  2.8<L>  /  TBili  0.4  /  DBili  x   /  AST  18  /  ALT  22  /  AlkPhos  72  11-20    LIVER FUNCTIONS - ( 20 Nov 2023 05:20 )  Alb: 2.8 g/dL / Pro: 6.6 g/dL / ALK PHOS: 72 U/L / ALT: 22 U/L / AST: 18 U/L / GGT: x           Allergies  No Known Allergies    MEDICATIONS  (STANDING):  atorvastatin 40 milliGRAM(s) Oral at bedtime  dextrose 5%. 1000 milliLiter(s) (100 mL/Hr) IV Continuous <Continuous>  dextrose 5%. 1000 milliLiter(s) (50 mL/Hr) IV Continuous <Continuous>  dextrose 50% Injectable 25 Gram(s) IV Push once  dextrose 50% Injectable 12.5 Gram(s) IV Push once  dextrose 50% Injectable 25 Gram(s) IV Push once  enoxaparin Injectable 40 milliGRAM(s) SubCutaneous <User Schedule>  famotidine    Tablet 20 milliGRAM(s) Oral two times a day  glucagon  Injectable 1 milliGRAM(s) IntraMuscular once  levETIRAcetam 500 milliGRAM(s) Oral at bedtime  magnesium oxide 400 milliGRAM(s) Oral at bedtime  metoprolol tartrate 25 milliGRAM(s) Oral every 12 hours  nystatin Powder 1 Application(s) Topical two times a day  polyethylene glycol 3350 17 Gram(s) Oral two times a day  potassium chloride    Tablet ER 10 milliEquivalent(s) Oral daily  senna 2 Tablet(s) Oral at bedtime    MEDICATIONS  (PRN):  acetaminophen     Tablet .. 975 milliGRAM(s) Oral every 8 hours PRN Mild Pain (1 - 3)  bisacodyl 5 milliGRAM(s) Oral every 12 hours PRN Constipation  dextrose Oral Gel 15 Gram(s) Oral once PRN Blood Glucose LESS THAN 70 milliGRAM(s)/deciliter    Assessment and Plan:   83 year old female with PMH of HTN, HLD, Arthritis, and normal pressure hydrocephalus (s/p VPS- March 2023 at [facility]); who presented to [facility] on 11/8 for progressive weakness, confusion and decreased PO intake, and was found to have a mixed SDH with 1.4cm midline shift. She was transferred to [facility] later that day for further management. On 11/9, she underwent R sided reilly hole placement, followed by MMA embolization on 11/10 with Dr. Rodriguez. Her hospital course was complicated by 2 RRTs for unresponsiveness. Serial brain imaging was stable and she was found to be in rapid AFIB. 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 Patient now admitted for a multidisciplinary rehab program with cognitive deficits, gait and ADL impairments.       #Traumatic Subdural Hemorrhage  - CTH 11/8: Moderate to large mixed attenuation right hemispheric convexity SDH compressing right hemisphere and right lateral ventricle with 1.4 cm midline shift to left. Right frontal ventriculostomy shunt catheter within right frontal horn.  - CTH 11/14:Right convexity subdural collection of air, blood and fluid continues to decrease in size.  - Gait Instability, ADL impairments and Functional impairments: start Comprehensive Rehab Program of PT/OT/SLP  - 3 hours a day, 5 days a week  - S/p R sided reilly hole placement (11/9), followed by MMA embolization (11/10) - Staples intact  - Outpatient follow up with [facility], Dr. Rodriguez  - Improving in therapies     #Normal Pressure Hydrocephalus  - S/p VPS March 2023  - Dr. Simon ([facility])   - CTH 11/20: deep subcortical white matter ischemia, unchanged. RIGHT frontal shunt catheter tip noted in the RIGHT lateral ventricle. Mild ventriculomegaly unchanged. Tiny residual subdural collection.  - 11/21: Will reach out to NSGY regarding CTH results and increased lethargy     #KEITH  - Bun/Cr: 25/1.03 (11/20)  - Encourage oral hydration  - Repeat BMP 11/23    #Mood  - Lexapro 5 mg po (11/20), for irritability     #Seizure Prophylaxis  - Levetiracetam 500mg BID. Started on 11/8  --No seizure events throughout hospital course and EEG neg for seizures  --Taper off Keppra-- Decrease Keppra to 500mg qhs for 3 days then stop   - 11/21: no evidence of seizures, tapered off of keppra     #Rapid AFIB  - Metoprolol 25mg BID  - No AC d/t SDH  - Evaluation for Watchmann device outpatient with cardiology    #HLD  - Atorvastatin 40mg at HS     #Arthritis  - Restarted Plaquenil 200mg BID (home regimen)    #Skin  - Skin on admission: Right sided surgical incision intact with staples Dry skin over gluteal region. Skin tags throughout body.   - Nystatin powder BID   - Pressure injury/Skin: OOB to Chair, PT/OT     #Pain Mgmt   - Tylenol PRN    #GI/Bowel Mgmt  - Diet: regular    - Famotidine 20mg BID   - Miralax BID  - Senna   - PRN: Bisacodyl 5mg at HS     #/Bladder Mgmt   -  monitor PVR q8h, CIC>400cc  - Toileting programed q3-4 hrs during awake hours    #FEN   - Diet - Regular + Thins    - Dysphagia  SLP - evaluation and treatment    #Health Maintenance  - KCL 10mEq daily   - Magnesium Oxide 400mg at HS  - Fosamax 70mg weekly- home regimen  - K: 4.5; add mag level    #Precautions / PROPHYLAXIS:   - Falls, Seizure   - ortho: Weight bearing status: WBAT   - Lungs: Aspiration, Incentive Spirometer   - DVT: Lovenox  ------------------------------------------------------  OUTPATIENT/FOLLOW UP:    Sierra Rodriguez  Neurosurgery  [address] [place] [address]  Phone: [phone]  Fax: [phone]  Follow Up Time: 2 weeks    Neurologist- managing VPS   Dr. Aurora Webb   Watchjamir device outpatient with cardiology         Patient is a 83y old  Female who presents with a chief complaint of Subdural Hemorrhage (19 Nov 2023 14:22)    HPI:  Ginna Vergara is an 83 year old female with PMH of HTN, HLD, Arthritis, and normal pressure hydrocephalus (s/p VPS- March 2023 at [place]); who presented to [facility] on 11/8 for progressive weakness, confusion and decreased PO intake, and was found to have a mixed SDH with 1.4cm midline shift. She was transferred to [facility] later that day for further management. On 11/9, she underwent R sided reilly hole placement followed by MMA embolization on 11/10 with Dr. Rodriguez. Her hospital course was complicated by 2 RRTs for unresponsiveness. Serial brain imaging was stable and she was found to be in rapid AFIB. 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 PM&R was consulted and deemed her an appropriate candidate for IRF. She was medically stabilized and cleared for discharge to [facility].      PAST MEDICAL & SURGICAL HISTORY:  Normal pressure hydrocephalus  HTN (hypertension)  HLD (hyperlipidemia)  Arthritis  S/P  shunt    SUBJECTIVE/ROS: No acute overnight events. Patient seen and examined this AM. She has no new numbness tingling or weakness. She is less lethargic and more interactive than previous exams. She denies any HA/vision changes. She is sleeping well. She has been tapered off of keppra, no signs of seizure. CTH completed on 11/20, essentially unchanged. VSS. afebrile.     PHYSICAL EXAM    Vital Signs Last 24 Hrs  T(C): 36.3 (20 Nov 2023 08:04), Max: 36.5 (19 Nov 2023 20:33)  T(F): 97.4 (20 Nov 2023 08:04), Max: 97.7 (19 Nov 2023 20:33)  HR: 84 (20 Nov 2023 08:04) (65 - 84)  BP: 145/79 (20 Nov 2023 08:04) (120/68 - 145/79)  RR: 16 (20 Nov 2023 08:04) (16 - 16)  SpO2: 97% (20 Nov 2023 08:04) (97% - 97%)    T(C): 36.7 (11-20-23 @ 20:00), Max: 36.7 (11-20-23 @ 20:00)  HR: 72 (11-21-23 @ 06:23) (72 - 90)  BP: 143/78 (11-21-23 @ 06:23) (113/84 - 143/78)  RR: 16 (11-20-23 @ 20:00) (16 - 16)  SpO2: 95% (11-20-23 @ 20:00) (95% - 95%)    General: No acute distress  HEENT +staples in place   CV: RRR  Lungs: Breathing comfortably on RA   Abd: Soft, mildly distended   Ext:  Peripheral edema   Neuro:            Mental status: A&O to person, (-) month, (+) year             CN: No facial asymmetry             Strength: 4/5 UEs             Sensation: Intact throughout    RECENT LABS:                        10.6   8.04  )-----------( 259      ( 20 Nov 2023 05:20 )             34.1     11-20    142  |  108  |  25<H>  ----------------------------<  101<H>  4.5   |  26  |  1.03    Ca    10.1      20 Nov 2023 05:20    TPro  6.6  /  Alb  2.8<L>  /  TBili  0.4  /  DBili  x   /  AST  18  /  ALT  22  /  AlkPhos  72  11-20    LIVER FUNCTIONS - ( 20 Nov 2023 05:20 )  Alb: 2.8 g/dL / Pro: 6.6 g/dL / ALK PHOS: 72 U/L / ALT: 22 U/L / AST: 18 U/L / GGT: x           Allergies  No Known Allergies    MEDICATIONS  (STANDING):  atorvastatin 40 milliGRAM(s) Oral at bedtime  dextrose 5%. 1000 milliLiter(s) (100 mL/Hr) IV Continuous <Continuous>  dextrose 5%. 1000 milliLiter(s) (50 mL/Hr) IV Continuous <Continuous>  dextrose 50% Injectable 25 Gram(s) IV Push once  dextrose 50% Injectable 12.5 Gram(s) IV Push once  dextrose 50% Injectable 25 Gram(s) IV Push once  enoxaparin Injectable 40 milliGRAM(s) SubCutaneous <User Schedule>  famotidine    Tablet 20 milliGRAM(s) Oral two times a day  glucagon  Injectable 1 milliGRAM(s) IntraMuscular once  levETIRAcetam 500 milliGRAM(s) Oral at bedtime  magnesium oxide 400 milliGRAM(s) Oral at bedtime  metoprolol tartrate 25 milliGRAM(s) Oral every 12 hours  nystatin Powder 1 Application(s) Topical two times a day  polyethylene glycol 3350 17 Gram(s) Oral two times a day  potassium chloride    Tablet ER 10 milliEquivalent(s) Oral daily  senna 2 Tablet(s) Oral at bedtime    MEDICATIONS  (PRN):  acetaminophen     Tablet .. 975 milliGRAM(s) Oral every 8 hours PRN Mild Pain (1 - 3)  bisacodyl 5 milliGRAM(s) Oral every 12 hours PRN Constipation  dextrose Oral Gel 15 Gram(s) Oral once PRN Blood Glucose LESS THAN 70 milliGRAM(s)/deciliter    Assessment and Plan:   83 year old female with PMH of HTN, HLD, Arthritis, and normal pressure hydrocephalus (s/p VPS- March 2023 at [place]); who presented to [facility] on 11/8 for progressive weakness, confusion and decreased PO intake, and was found to have a mixed SDH with 1.4cm midline shift. She was transferred to [facility] later that day for further management. On 11/9, she underwent R sided reilly hole placement, followed by MMA embolization on 11/10 with Dr. Rodriguez. Her hospital course was complicated by 2 RRTs for unresponsiveness. Serial brain imaging was stable and she was found to be in rapid AFIB. 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 Patient now admitted for a multidisciplinary rehab program with cognitive deficits, gait and ADL impairments.       #Traumatic Subdural Hemorrhage  - CTH 11/8: Moderate to large mixed attenuation right hemispheric convexity SDH compressing right hemisphere and right lateral ventricle with 1.4 cm midline shift to left. Right frontal ventriculostomy shunt catheter within right frontal horn.  - CTH 11/14:Right convexity subdural collection of air, blood and fluid continues to decrease in size.  - Gait Instability, ADL impairments and Functional impairments: start Comprehensive Rehab Program of PT/OT/SLP  - 3 hours a day, 5 days a week  - S/p R sided reilly hole placement (11/9), followed by MMA embolization (11/10) - Staples intact  - Outpatient follow up with [facility], Dr. Rodriguez  - Start modafinil 50 mg q8am-- alertness and participation  - Improving in therapies     #Normal Pressure Hydrocephalus  - S/p VPS March 2023  - Dr. Simon ([place])   - CTH 11/20: deep subcortical white matter ischemia, unchanged. RIGHT frontal shunt catheter tip noted in the RIGHT lateral ventricle. Mild ventriculomegaly unchanged. Tiny residual subdural collection.  - 11/21: Will reach out to NSGY regarding CTH results and increased lethargy     #KEITH  - Bun/Cr: 25/1.03 (11/20)  - Encourage oral hydration  - Repeat BMP 11/23    #Mood  - Lexapro 5 mg po (11/20), for irritability     #Seizure Prophylaxis  - Levetiracetam 500mg BID. Started on 11/8  --No seizure events throughout hospital course and EEG neg for seizures  --Taper off Keppra-- Decrease Keppra to 500mg qhs for 3 days then stop   - 11/21: no evidence of seizures, tapered off of keppra     #Rapid AFIB  - Metoprolol 25mg BID  - No AC d/t SDH  - Evaluation for Watchmann device outpatient with cardiology    #HLD  - Atorvastatin 40mg at HS     #Arthritis  - Restarted Plaquenil 200mg BID (home regimen)    #Skin  - Skin on admission: Right sided surgical incision intact with staples Dry skin over gluteal region. Skin tags throughout body.   - Nystatin powder BID   - Pressure injury/Skin: OOB to Chair, PT/OT     #Pain Mgmt   - Tylenol PRN    #GI/Bowel Mgmt  - Diet: regular    - Famotidine 20mg BID   - Miralax BID  - Senna   - PRN: Bisacodyl 5mg at HS     #/Bladder Mgmt   -  monitor PVR q8h, CIC>400cc  - Toileting programed q3-4 hrs during awake hours    #FEN   - Diet - Regular + Thins    - Dysphagia  SLP - evaluation and treatment    #Health Maintenance  - KCL 10mEq daily   - Magnesium Oxide 400mg at HS  - Fosamax 70mg weekly- home regimen  - K: 4.5; add mag level    #Precautions / PROPHYLAXIS:   - Falls, Seizure   - ortho: Weight bearing status: WBAT   - Lungs: Aspiration, Incentive Spirometer   - DVT: Lovenox  ------------------------------------------------------  OUTPATIENT/FOLLOW UP:    Sierra Rodriguez  Neurosurgery  [address] [place] [id number]  Phone: [phone]  Fax: [phone]  Follow Up Time: 2 weeks    Neurologist- managing VPS   Dr. Aurora Webb   Watchmann device outpatient with cardiology         Patient is a 83y old  Female who presents with a chief complaint of Subdural Hemorrhage (19 Nov 2023 14:22)    HPI:  Ginna Vergara is an 83 year old female with PMH of HTN, HLD, Arthritis, and normal pressure hydrocephalus (s/p VPS- March 2023 at [place]); who presented to [facility] on 11/8 for progressive weakness, confusion and decreased PO intake, and was found to have a mixed SDH with 1.4cm midline shift. She was transferred to [facility] later that day for further management. On 11/9, she underwent R sided reilly hole placement followed by MMA embolization on 11/10 with Dr. Rodriguez. Her hospital course was complicated by 2 RRTs for unresponsiveness. Serial brain imaging was stable and she was found to be in rapid AFIB. 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 PM&R was consulted and deemed her an appropriate candidate for IRF. She was medically stabilized and cleared for discharge to [facility].      PAST MEDICAL & SURGICAL HISTORY:  Normal pressure hydrocephalus  HTN (hypertension)  HLD (hyperlipidemia)  Arthritis  S/P  shunt    SUBJECTIVE/ROS: No acute overnight events. Patient seen and examined this AM. She has no new numbness tingling or weakness. She is less lethargic and more interactive than previous exams. She denies any HA/vision changes. She is sleeping well. She has been tapered off of keppra, no signs of seizure. CTH completed on 11/20, essentially unchanged. VSS. afebrile.     PHYSICAL EXAM    Vital Signs Last 24 Hrs  T(C): 36.3 (20 Nov 2023 08:04), Max: 36.5 (19 Nov 2023 20:33)  T(F): 97.4 (20 Nov 2023 08:04), Max: 97.7 (19 Nov 2023 20:33)  HR: 84 (20 Nov 2023 08:04) (65 - 84)  BP: 145/79 (20 Nov 2023 08:04) (120/68 - 145/79)  RR: 16 (20 Nov 2023 08:04) (16 - 16)  SpO2: 97% (20 Nov 2023 08:04) (97% - 97%)    T(C): 36.7 (11-20-23 @ 20:00), Max: 36.7 (11-20-23 @ 20:00)  HR: 72 (11-21-23 @ 06:23) (72 - 90)  BP: 143/78 (11-21-23 @ 06:23) (113/84 - 143/78)  RR: 16 (11-20-23 @ 20:00) (16 - 16)  SpO2: 95% (11-20-23 @ 20:00) (95% - 95%)    General: No acute distress  HEENT +staples in place   CV: RRR  Lungs: Breathing comfortably on RA   Abd: Soft, mildly distended   Ext:  Peripheral edema   Neuro:            Mental status: A&O to person, (-) month, (+) year             CN: No facial asymmetry             Strength: 4/5 UEs             Sensation: Intact throughout    RECENT LABS:                        10.6   8.04  )-----------( 259      ( 20 Nov 2023 05:20 )             34.1     11-20    142  |  108  |  25<H>  ----------------------------<  101<H>  4.5   |  26  |  1.03    Ca    10.1      20 Nov 2023 05:20    TPro  6.6  /  Alb  2.8<L>  /  TBili  0.4  /  DBili  x   /  AST  18  /  ALT  22  /  AlkPhos  72  11-20    LIVER FUNCTIONS - ( 20 Nov 2023 05:20 )  Alb: 2.8 g/dL / Pro: 6.6 g/dL / ALK PHOS: 72 U/L / ALT: 22 U/L / AST: 18 U/L / GGT: x           Allergies  No Known Allergies    MEDICATIONS  (STANDING):  atorvastatin 40 milliGRAM(s) Oral at bedtime  dextrose 5%. 1000 milliLiter(s) (100 mL/Hr) IV Continuous <Continuous>  dextrose 5%. 1000 milliLiter(s) (50 mL/Hr) IV Continuous <Continuous>  dextrose 50% Injectable 25 Gram(s) IV Push once  dextrose 50% Injectable 12.5 Gram(s) IV Push once  dextrose 50% Injectable 25 Gram(s) IV Push once  enoxaparin Injectable 40 milliGRAM(s) SubCutaneous <User Schedule>  famotidine    Tablet 20 milliGRAM(s) Oral two times a day  glucagon  Injectable 1 milliGRAM(s) IntraMuscular once  levETIRAcetam 500 milliGRAM(s) Oral at bedtime  magnesium oxide 400 milliGRAM(s) Oral at bedtime  metoprolol tartrate 25 milliGRAM(s) Oral every 12 hours  nystatin Powder 1 Application(s) Topical two times a day  polyethylene glycol 3350 17 Gram(s) Oral two times a day  potassium chloride    Tablet ER 10 milliEquivalent(s) Oral daily  senna 2 Tablet(s) Oral at bedtime    MEDICATIONS  (PRN):  acetaminophen     Tablet .. 975 milliGRAM(s) Oral every 8 hours PRN Mild Pain (1 - 3)  bisacodyl 5 milliGRAM(s) Oral every 12 hours PRN Constipation  dextrose Oral Gel 15 Gram(s) Oral once PRN Blood Glucose LESS THAN 70 milliGRAM(s)/deciliter    Assessment and Plan:   83 year old female with PMH of HTN, HLD, Arthritis, and normal pressure hydrocephalus (s/p VPS- March 2023 at [place]); who presented to [facility] on 11/8 for progressive weakness, confusion and decreased PO intake, and was found to have a mixed SDH with 1.4cm midline shift. She was transferred to [facility] later that day for further management. On 11/9, she underwent R sided reilly hole placement, followed by MMA embolization on 11/10 with Dr. Rodriguez. Her hospital course was complicated by 2 RRTs for unresponsiveness. Serial brain imaging was stable and she was found to be in rapid AFIB. 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 Patient now admitted for a multidisciplinary rehab program with cognitive deficits, gait and ADL impairments.       #Traumatic Subdural Hemorrhage  - CTH 11/8: Moderate to large mixed attenuation right hemispheric convexity SDH compressing right hemisphere and right lateral ventricle with 1.4 cm midline shift to left. Right frontal ventriculostomy shunt catheter within right frontal horn.  - CTH 11/14:Right convexity subdural collection of air, blood and fluid continues to decrease in size.  - Gait Instability, ADL impairments and Functional impairments: start Comprehensive Rehab Program of PT/OT/SLP  - 3 hours a day, 5 days a week  - S/p R sided reilly hole placement (11/9), followed by MMA embolization (11/10) - Staples intact  - Outpatient follow up with [facility], Dr. Rodriguez  - Start modafinil 50 mg q8am-- alertness and participation  - Improving in therapies     #Normal Pressure Hydrocephalus  - S/p VPS March 2023  - Dr. Simon ([place])   - CTH 11/20: deep subcortical white matter ischemia, unchanged. RIGHT frontal shunt catheter tip noted in the RIGHT lateral ventricle. Mild ventriculomegaly unchanged. Tiny residual subdural collection.  - 11/21: Will reach out to NSGY regarding CTH results and increased lethargy     #KEITH  - Bun/Cr: 25/1.03 (11/20)  - Encourage oral hydration  - Repeat BMP 11/23    #Mood  - Lexapro 5 mg po (11/20), for irritability     #Seizure Prophylaxis  - Levetiracetam 500mg BID. Started on 11/8  --No seizure events throughout hospital course and EEG neg for seizures  --Taper off Keppra-- Decrease Keppra to 500mg qhs for 3 days then stop   - 11/21: no evidence of seizures, tapered off of keppra     #Rapid AFIB  - Metoprolol 25mg BID  - No AC d/t SDH  - Evaluation for Watchmann device outpatient with cardiology    #HLD  - Atorvastatin 40mg at HS     #Arthritis  - Restarted Plaquenil 200mg BID (home regimen)    #Skin  - Skin on admission: Right sided surgical incision intact with staples Dry skin over gluteal region. Skin tags throughout body.   - Nystatin powder BID   - Pressure injury/Skin: OOB to Chair, PT/OT     #Pain Mgmt   - Tylenol PRN    #GI/Bowel Mgmt  - Diet: regular    - Famotidine 20mg BID   - Miralax BID  - Senna   - PRN: Bisacodyl 5mg at HS     #/Bladder Mgmt   -  monitor PVR q8h, CIC>400cc  - Toileting programed q3-4 hrs during awake hours    #FEN   - Diet - Regular + Thins    - Dysphagia  SLP - evaluation and treatment    #Health Maintenance  - KCL 10mEq daily   - Magnesium Oxide 400mg at HS  - Fosamax 70mg weekly- home regimen  - K: 4.5; add mag level    #Precautions / PROPHYLAXIS:   - Falls, Seizure   - ortho: Weight bearing status: WBAT   - Lungs: Aspiration, Incentive Spirometer   - DVT: Lovenox    #IDT 11/21   TDD 12/5/23  Nursing: incontinent bowel/bladder   Social: lives with , independent prior, retired    Preferences: zohreh laurent   SLP: reglar diet thin liquids, decreased memory/attention/left inattention   OT: supervision for eating/oral hygiene, dependent LBD/footwear   PT: dependent   Goals: Increase attention/focus within 10-15 intervals, ModA transfers/bedmobility    DC: Home/GIANLUCA, TBD     11/21 attempted to contact Kyle at [phone], generic VM reached. No message left. Will attempt again tomorrow.   ------------------------------------------------------  OUTPATIENT/FOLLOW UP:    Sierra Rodriguez  Neurosurgery  [address] [place] [address] [id number]  Phone: [phone]  Fax: [phone]  Follow Up Time: 2 weeks    Neurologist- managing VPS   Dr. Aurora Webb   Watchmann device outpatient with cardiology

## 2023-11-22 LAB
ALBUMIN SERPL ELPH-MCNC: 2.6 G/DL — LOW (ref 3.3–5)
ALBUMIN SERPL ELPH-MCNC: 2.6 G/DL — LOW (ref 3.3–5)
ALP SERPL-CCNC: 70 U/L — SIGNIFICANT CHANGE UP (ref 40–120)
ALP SERPL-CCNC: 70 U/L — SIGNIFICANT CHANGE UP (ref 40–120)
ALT FLD-CCNC: 20 U/L — SIGNIFICANT CHANGE UP (ref 10–45)
ALT FLD-CCNC: 20 U/L — SIGNIFICANT CHANGE UP (ref 10–45)
ANION GAP SERPL CALC-SCNC: 6 MMOL/L — SIGNIFICANT CHANGE UP (ref 5–17)
ANION GAP SERPL CALC-SCNC: 6 MMOL/L — SIGNIFICANT CHANGE UP (ref 5–17)
AST SERPL-CCNC: 19 U/L — SIGNIFICANT CHANGE UP (ref 10–40)
AST SERPL-CCNC: 19 U/L — SIGNIFICANT CHANGE UP (ref 10–40)
BILIRUB SERPL-MCNC: 0.4 MG/DL — SIGNIFICANT CHANGE UP (ref 0.2–1.2)
BILIRUB SERPL-MCNC: 0.4 MG/DL — SIGNIFICANT CHANGE UP (ref 0.2–1.2)
BUN SERPL-MCNC: 33 MG/DL — HIGH (ref 7–23)
BUN SERPL-MCNC: 33 MG/DL — HIGH (ref 7–23)
CALCIUM SERPL-MCNC: 10.4 MG/DL — SIGNIFICANT CHANGE UP (ref 8.4–10.5)
CALCIUM SERPL-MCNC: 10.4 MG/DL — SIGNIFICANT CHANGE UP (ref 8.4–10.5)
CHLORIDE SERPL-SCNC: 108 MMOL/L — SIGNIFICANT CHANGE UP (ref 96–108)
CHLORIDE SERPL-SCNC: 108 MMOL/L — SIGNIFICANT CHANGE UP (ref 96–108)
CO2 SERPL-SCNC: 27 MMOL/L — SIGNIFICANT CHANGE UP (ref 22–31)
CO2 SERPL-SCNC: 27 MMOL/L — SIGNIFICANT CHANGE UP (ref 22–31)
CREAT SERPL-MCNC: 1.01 MG/DL — SIGNIFICANT CHANGE UP (ref 0.5–1.3)
CREAT SERPL-MCNC: 1.01 MG/DL — SIGNIFICANT CHANGE UP (ref 0.5–1.3)
EGFR: 55 ML/MIN/1.73M2 — LOW
EGFR: 55 ML/MIN/1.73M2 — LOW
GLUCOSE SERPL-MCNC: 102 MG/DL — HIGH (ref 70–99)
GLUCOSE SERPL-MCNC: 102 MG/DL — HIGH (ref 70–99)
HCT VFR BLD CALC: 31.3 % — LOW (ref 34.5–45)
HCT VFR BLD CALC: 31.3 % — LOW (ref 34.5–45)
HGB BLD-MCNC: 9.7 G/DL — LOW (ref 11.5–15.5)
HGB BLD-MCNC: 9.7 G/DL — LOW (ref 11.5–15.5)
MCHC RBC-ENTMCNC: 28 PG — SIGNIFICANT CHANGE UP (ref 27–34)
MCHC RBC-ENTMCNC: 28 PG — SIGNIFICANT CHANGE UP (ref 27–34)
MCHC RBC-ENTMCNC: 31 GM/DL — LOW (ref 32–36)
MCHC RBC-ENTMCNC: 31 GM/DL — LOW (ref 32–36)
MCV RBC AUTO: 90.2 FL — SIGNIFICANT CHANGE UP (ref 80–100)
MCV RBC AUTO: 90.2 FL — SIGNIFICANT CHANGE UP (ref 80–100)
NRBC # BLD: 0 /100 WBCS — SIGNIFICANT CHANGE UP (ref 0–0)
NRBC # BLD: 0 /100 WBCS — SIGNIFICANT CHANGE UP (ref 0–0)
PLATELET # BLD AUTO: 355 K/UL — SIGNIFICANT CHANGE UP (ref 150–400)
PLATELET # BLD AUTO: 355 K/UL — SIGNIFICANT CHANGE UP (ref 150–400)
POTASSIUM SERPL-MCNC: 4.6 MMOL/L — SIGNIFICANT CHANGE UP (ref 3.5–5.3)
POTASSIUM SERPL-MCNC: 4.6 MMOL/L — SIGNIFICANT CHANGE UP (ref 3.5–5.3)
POTASSIUM SERPL-SCNC: 4.6 MMOL/L — SIGNIFICANT CHANGE UP (ref 3.5–5.3)
POTASSIUM SERPL-SCNC: 4.6 MMOL/L — SIGNIFICANT CHANGE UP (ref 3.5–5.3)
PROT SERPL-MCNC: 6.2 G/DL — SIGNIFICANT CHANGE UP (ref 6–8.3)
PROT SERPL-MCNC: 6.2 G/DL — SIGNIFICANT CHANGE UP (ref 6–8.3)
RBC # BLD: 3.47 M/UL — LOW (ref 3.8–5.2)
RBC # BLD: 3.47 M/UL — LOW (ref 3.8–5.2)
RBC # FLD: 15.5 % — HIGH (ref 10.3–14.5)
RBC # FLD: 15.5 % — HIGH (ref 10.3–14.5)
SODIUM SERPL-SCNC: 141 MMOL/L — SIGNIFICANT CHANGE UP (ref 135–145)
SODIUM SERPL-SCNC: 141 MMOL/L — SIGNIFICANT CHANGE UP (ref 135–145)
WBC # BLD: 6.85 K/UL — SIGNIFICANT CHANGE UP (ref 3.8–10.5)
WBC # BLD: 6.85 K/UL — SIGNIFICANT CHANGE UP (ref 3.8–10.5)
WBC # FLD AUTO: 6.85 K/UL — SIGNIFICANT CHANGE UP (ref 3.8–10.5)
WBC # FLD AUTO: 6.85 K/UL — SIGNIFICANT CHANGE UP (ref 3.8–10.5)

## 2023-11-22 PROCEDURE — 99232 SBSQ HOSP IP/OBS MODERATE 35: CPT

## 2023-11-22 PROCEDURE — 99233 SBSQ HOSP IP/OBS HIGH 50: CPT | Mod: GC

## 2023-11-22 RX ORDER — LACTOBACILLUS ACIDOPHILUS 100MM CELL
1 CAPSULE ORAL
Refills: 0 | Status: COMPLETED | OUTPATIENT
Start: 2023-11-22 | End: 2023-11-29

## 2023-11-22 RX ORDER — CEFPODOXIME PROXETIL 100 MG
200 TABLET ORAL EVERY 12 HOURS
Refills: 0 | Status: COMPLETED | OUTPATIENT
Start: 2023-11-22 | End: 2023-11-29

## 2023-11-22 RX ADMIN — NYSTATIN CREAM 1 APPLICATION(S): 100000 CREAM TOPICAL at 05:18

## 2023-11-22 RX ADMIN — Medication 200 MILLIGRAM(S): at 13:03

## 2023-11-22 RX ADMIN — Medication 1 TABLET(S): at 17:18

## 2023-11-22 RX ADMIN — ESCITALOPRAM OXALATE 5 MILLIGRAM(S): 10 TABLET, FILM COATED ORAL at 11:54

## 2023-11-22 RX ADMIN — POLYETHYLENE GLYCOL 3350 17 GRAM(S): 17 POWDER, FOR SOLUTION ORAL at 17:18

## 2023-11-22 RX ADMIN — Medication 200 MILLIGRAM(S): at 17:18

## 2023-11-22 RX ADMIN — FAMOTIDINE 20 MILLIGRAM(S): 10 INJECTION INTRAVENOUS at 17:19

## 2023-11-22 RX ADMIN — ENOXAPARIN SODIUM 40 MILLIGRAM(S): 100 INJECTION SUBCUTANEOUS at 17:19

## 2023-11-22 RX ADMIN — Medication 200 MILLIGRAM(S): at 05:15

## 2023-11-22 RX ADMIN — Medication 25 MILLIGRAM(S): at 17:24

## 2023-11-22 RX ADMIN — MODAFINIL 50 MILLIGRAM(S): 200 TABLET ORAL at 07:28

## 2023-11-22 RX ADMIN — POLYETHYLENE GLYCOL 3350 17 GRAM(S): 17 POWDER, FOR SOLUTION ORAL at 05:18

## 2023-11-22 RX ADMIN — ATORVASTATIN CALCIUM 40 MILLIGRAM(S): 80 TABLET, FILM COATED ORAL at 21:04

## 2023-11-22 RX ADMIN — LORATADINE 10 MILLIGRAM(S): 10 TABLET ORAL at 11:52

## 2023-11-22 RX ADMIN — NYSTATIN CREAM 1 APPLICATION(S): 100000 CREAM TOPICAL at 17:22

## 2023-11-22 RX ADMIN — Medication 10 MILLIEQUIVALENT(S): at 11:52

## 2023-11-22 RX ADMIN — SENNA PLUS 2 TABLET(S): 8.6 TABLET ORAL at 21:04

## 2023-11-22 RX ADMIN — MAGNESIUM OXIDE 400 MG ORAL TABLET 400 MILLIGRAM(S): 241.3 TABLET ORAL at 21:04

## 2023-11-22 RX ADMIN — Medication 200 MILLIGRAM(S): at 17:38

## 2023-11-22 RX ADMIN — FAMOTIDINE 20 MILLIGRAM(S): 10 INJECTION INTRAVENOUS at 05:14

## 2023-11-22 NOTE — PROGRESS NOTE ADULT - SUBJECTIVE AND OBJECTIVE BOX
PROGRESS NOTE:     Patient is a 83y old  Female who presents with a chief complaint of Subdural Hemorrhage (22 Nov 2023 09:54)          SUBJECTIVE & OBJECTIVE:   Pt seen and examined at bedside in AM    no overnight events.   no new complaints    REVIEW OF SYSTEMS: remaining ROS negative     PHYSICAL EXAM:  VITALS:  Vital Signs Last 24 Hrs  T(C): 36.4 (22 Nov 2023 07:29), Max: 36.9 (21 Nov 2023 19:11)  T(F): 97.5 (22 Nov 2023 07:29), Max: 98.4 (21 Nov 2023 19:11)  HR: 56 (22 Nov 2023 07:29) (53 - 71)  BP: 111/70 (22 Nov 2023 07:29) (101/61 - 114/73)  BP(mean): --  RR: 16 (22 Nov 2023 07:29) (16 - 16)  SpO2: 97% (22 Nov 2023 07:29) (97% - 98%)    Parameters below as of 22 Nov 2023 07:29  Patient On (Oxygen Delivery Method): room air          GENERAL: NAD,  no increased WOB  EYES: EOMI, conjunctiva and sclera clear  ENMT: Moist mucous membranes  NECK: Supple, No JVD  NERVOUS SYSTEM:  Alert    CHEST/LUNG: Clear to auscultation bilaterally; No rales, rhonchi, wheezing  HEART: Regular rate and rhythm; S1 S2  ABDOMEN: Soft, Nontender, Nondistended; Bowel sounds present  EXTREMITIES:  No clubbing, cyanosis, calf tenderness or edema b/l      MEDICATIONS  (STANDING):  atorvastatin 40 milliGRAM(s) Oral at bedtime  dextrose 5%. 1000 milliLiter(s) (100 mL/Hr) IV Continuous <Continuous>  dextrose 5%. 1000 milliLiter(s) (50 mL/Hr) IV Continuous <Continuous>  dextrose 50% Injectable 25 Gram(s) IV Push once  dextrose 50% Injectable 12.5 Gram(s) IV Push once  dextrose 50% Injectable 25 Gram(s) IV Push once  enoxaparin Injectable 40 milliGRAM(s) SubCutaneous <User Schedule>  escitalopram 5 milliGRAM(s) Oral daily  famotidine    Tablet 20 milliGRAM(s) Oral two times a day  glucagon  Injectable 1 milliGRAM(s) IntraMuscular once  hydroxychloroquine 200 milliGRAM(s) Oral two times a day  loratadine 10 milliGRAM(s) Oral daily  magnesium oxide 400 milliGRAM(s) Oral at bedtime  metoprolol tartrate 25 milliGRAM(s) Oral every 12 hours  modafinil 50 milliGRAM(s) Oral <User Schedule>  nystatin Powder 1 Application(s) Topical two times a day  polyethylene glycol 3350 17 Gram(s) Oral two times a day  potassium chloride    Tablet ER 10 milliEquivalent(s) Oral daily  senna 2 Tablet(s) Oral at bedtime    MEDICATIONS  (PRN):  acetaminophen     Tablet .. 975 milliGRAM(s) Oral every 8 hours PRN Mild Pain (1 - 3)  bisacodyl 5 milliGRAM(s) Oral every 12 hours PRN Constipation  dextrose Oral Gel 15 Gram(s) Oral once PRN Blood Glucose LESS THAN 70 milliGRAM(s)/deciliter      Allergies    No Known Allergies    Intolerances              LABS:                           9.7    6.85  )-----------( 355      ( 22 Nov 2023 05:58 )             31.3     11-22    141  |  108  |  33<H>  ----------------------------<  102<H>  4.6   |  27  |  1.01    Ca    10.4      22 Nov 2023 05:58    TPro  6.2  /  Alb  2.6<L>  /  TBili  0.4  /  DBili  x   /  AST  19  /  ALT  20  /  AlkPhos  70  11-22      Urinalysis Basic - ( 22 Nov 2023 05:58 )    Color: x / Appearance: x / SG: x / pH: x  Gluc: 102 mg/dL / Ketone: x  / Bili: x / Urobili: x   Blood: x / Protein: x / Nitrite: x   Leuk Esterase: x / RBC: x / WBC x   Sq Epi: x / Non Sq Epi: x / Bacteria: x      CAPILLARY BLOOD GLUCOSE                    RECENT CULTURES:          RADIOLOGY & ADDITIONAL TESTS:          [X] Consultant(s) Notes Reviewed:  [X] Care Discussed with Consultants/Other Providers:

## 2023-11-22 NOTE — PROGRESS NOTE ADULT - ASSESSMENT
Assessment and Plan:   83 year old female with PMH of HTN, HLD, Arthritis, and normal pressure hydrocephalus (s/p VPS- March 2023 at [place]); who presented to [facility] on 11/8 for progressive weakness, confusion and decreased PO intake, and was found to have a mixed SDH with 1.4cm midline shift. She was transferred to [facility] later that day for further management. On 11/9, she underwent R sided reilly hole placement, followed by MMA embolization on 11/10 with Dr. Rodriguez. Her hospital course was complicated by 2 RRTs for unresponsiveness. Serial brain imaging was stable and she was found to be in rapid AFIB. 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 Patient now admitted for a multidisciplinary rehab program with cognitive deficits, gait and ADL impairments.     #Traumatic Subdural Hemorrhage  - CTH 11/8: Moderate to large mixed attenuation right hemispheric convexity SDH compressing right hemisphere and right lateral ventricle with 1.4 cm midline shift to left. Right frontal ventriculostomy shunt catheter within right frontal horn.  - CTH 11/14: Right convexity subdural collection of air, blood and fluid continues to decrease in size.  - Gait Instability, ADL impairments and Functional impairments: start Comprehensive Rehab Program of PT/OT/SLP  - 3 hours a day, 5 days a week  - S/p R sided reilly hole placement (11/9), followed by MMA embolization (11/10) - Staples intact  - Outpatient follow up with NSGY, Dr. Rodriguez  - Modafinil 50 mg q 8am (11/21)    #Normal Pressure Hydrocephalus  - S/p VPS March 2023  - Dr. Simon ([place])   - CTH 11/20: deep subcortical white matter ischemia, unchanged. RIGHT frontal shunt catheter tip noted in the RIGHT lateral ventricle. Mild ventriculomegaly unchanged. Tiny residual subdural collection.  -No acute intervention from NSGY standpoint-- per NP discussion with neurosurgeon Dr Méndez 11/20 , follow up outpatient.     UTI--denies urinary frequency urgency, dysuria, however confused and unreliable  -UA +(+) Leuk esterase, (+) WBC, (-) nitrite  - pending culture  -Vantin 200 mg po q 12 for 7 days, discussed with hospitalist     #KEITH  - Bun/Cr: 25/1.03 (11/20)  - Encourage oral hydration  - Repeat BMP 11/23    #Mood  - Lexapro 5 mg po (11/20), for irritability     #Seizure Prophylaxis  - Levetiracetam 500mg BID-- tapered off  --No seizure events throughout hospital course and EEG neg for seizures    #Rapid AFIB  - Metoprolol 25mg BID  - No AC d/t SDH  - Evaluation for Watchmann device outpatient with cardiology    #HLD  - Atorvastatin 40mg at HS     #Arthritis  - Plaquenil 200mg BID (home regimen)    #Skin  - Skin on admission: Right sided surgical incision intact with staples Dry skin over gluteal region. Skin tags throughout body.   - Nystatin powder BID   - Pressure injury/Skin: OOB to Chair, PT/OT     #Pain Mgmt   - Tylenol PRN    #GI/Bowel Mgmt  - Diet: regular    - Famotidine 20mg BID   - Miralax BID  - Senna   - PRN: Bisacodyl 5mg at HS     #/Bladder Mgmt   -  monitor PVR q8h, CIC>400cc  - Toileting programed q3-4 hrs during awake hours    #FEN   - Diet - Regular + Thins    - Dysphagia  SLP - evaluation and treatment    #Health Maintenance  - KCL 10mEq daily   - Magnesium Oxide 400mg at HS  - Fosamax 70mg weekly- home regimen  - K: 4.5;  mag level 2.1    #Precautions / PROPHYLAXIS:   - Falls, Seizure   - ortho: Weight bearing status: WBAT   - Lungs: Aspiration, Incentive Spirometer   - DVT: Lovenox    #IDT 11/21   Nursing: incontinent bowel/bladder   Social: lives with , independent prior, retired    Preferences: wants graciea   SLP: reglar diet thin liquids, decreased memory/attention/left inattention   OT: supervision for eating/oral hygiene, dependent LBD/footwear   PT: dependent   Goals: Increase attention/focus within 10-15 intervals, ModA transfers/bedmobility    DC: Home/GIANLUCA, TBD   TDD 12/5/23  ------------------------------------------------------  OUTPATIENT/FOLLOW UP:    Sierra Rodriguez  Neurosurgery  [address] [place] [id number]  Phone: [phone]  Fax: [phone]  Follow Up Time: 2 weeks    Neurologist- managing VPS   Dr. Aurora Webb   Watchmann device outpatient with cardiology      -11/22: fellow attempted to call patient's , Kyle Vergara, to provide medical updates, but the call went to . Will try again.      Assessment and Plan:   83 year old female with PMH of HTN, HLD, Arthritis, and normal pressure hydrocephalus (s/p VPS- March 2023 at [place]); who presented to [facility] on 11/8 for progressive weakness, confusion and decreased PO intake, and was found to have a mixed SDH with 1.4cm midline shift. She was transferred to [facility] later that day for further management. On 11/9, she underwent R sided reilly hole placement, followed by MMA embolization on 11/10 with Dr. Rodriguez. Her hospital course was complicated by 2 RRTs for unresponsiveness. Serial brain imaging was stable and she was found to be in rapid AFIB. 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 Patient now admitted for a multidisciplinary rehab program with cognitive deficits, gait and ADL impairments.     #Traumatic Subdural Hemorrhage  - CTH 11/8: Moderate to large mixed attenuation right hemispheric convexity SDH compressing right hemisphere and right lateral ventricle with 1.4 cm midline shift to left. Right frontal ventriculostomy shunt catheter within right frontal horn.  - CTH 11/14: Right convexity subdural collection of air, blood and fluid continues to decrease in size.  - Gait Instability, ADL impairments and Functional impairments: start Comprehensive Rehab Program of PT/OT/SLP  - 3 hours a day, 5 days a week  - S/p R sided reilly hole placement (11/9), followed by MMA embolization (11/10) - Staples intact  - Outpatient follow up with NSGY, Dr. Rodriguez  - Modafinil 50 mg q 8am (11/21)    #Normal Pressure Hydrocephalus  - S/p VPS March 2023  - Dr. Simon ([place])   - CTH 11/20: deep subcortical white matter ischemia, unchanged. RIGHT frontal shunt catheter tip noted in the RIGHT lateral ventricle. Mild ventriculomegaly unchanged. Tiny residual subdural collection.  -No acute intervention from NSGY standpoint-- per NP discussion with neurosurgeon Dr Méndez 11/20 , follow up outpatient.     UTI--denies urinary frequency urgency, dysuria, however confused and unreliable  -UA +(+) Leuk esterase, (+) WBC, (-) nitrite  - pending culture  -Vantin 200 mg po q 12 for 7 days, discussed with hospitalist     #Runny nose-- reported in therapy 11/21 afternoon  - RVP and Covid swab negative  - afebrile, no elevated wbc count, continue to monitor   #KEITH  - Bun/Cr: 25/1.03 (11/20)  - Encourage oral hydration  - Repeat BMP 11/23    #Mood  - Lexapro 5 mg po (11/20), for irritability     #Seizure Prophylaxis  - Levetiracetam 500mg BID-- tapered off  --No seizure events throughout hospital course and EEG neg for seizures    #Rapid AFIB  - Metoprolol 25mg BID  - No AC d/t SDH  - Evaluation for Watchmann device outpatient with cardiology    #HLD  - Atorvastatin 40mg at HS     #Arthritis  - Plaquenil 200mg BID (home regimen)    #Skin  - Skin on admission: Right sided surgical incision intact with staples Dry skin over gluteal region. Skin tags throughout body.   - Nystatin powder BID   - Pressure injury/Skin: OOB to Chair, PT/OT     #Pain Mgmt   - Tylenol PRN    #GI/Bowel Mgmt  - Diet: regular    - Famotidine 20mg BID   - Miralax BID  - Senna   - PRN: Bisacodyl 5mg at HS     #/Bladder Mgmt   -  monitor PVR q8h, CIC>400cc  - Toileting programed q3-4 hrs during awake hours    #FEN   - Diet - Regular + Thins    - Dysphagia  SLP - evaluation and treatment    #Health Maintenance  - KCL 10mEq daily   - Magnesium Oxide 400mg at HS  - Fosamax 70mg weekly- home regimen  - K: 4.5;  mag level 2.1    #Precautions / PROPHYLAXIS:   - Falls, Seizure   - ortho: Weight bearing status: WBAT   - Lungs: Aspiration, Incentive Spirometer   - DVT: Lovenox    #IDT 11/21   Nursing: incontinent bowel/bladder   Social: lives with , independent prior, retired    Preferences: wants anastasiia   SLP: reglar diet thin liquids, decreased memory/attention/left inattention   OT: supervision for eating/oral hygiene, dependent LBD/footwear   PT: dependent   Goals: Increase attention/focus within 10-15 intervals, ModA transfers/bedmobility    DC: Home/GIANLUCA, TBD   TDD 12/5/23  ------------------------------------------------------  OUTPATIENT/FOLLOW UP:    Sierra Rodriguez  Neurosurgery  [address] [place] [address]  Phone: [phone]  Fax: [phone]  Follow Up Time: 2 weeks    Neurologist- managing VPS   Dr. Aurora Webb   Watchmann device outpatient with cardiology      -11/22: fellow attempted to call patient's , Kyle Vergara, to provide medical updates, but the call went to . Will try again.

## 2023-11-22 NOTE — PROGRESS NOTE ADULT - SUBJECTIVE AND OBJECTIVE BOX
Patient is a 83y old  Female who presents with a chief complaint of Subdural Hemorrhage (19 Nov 2023 14:22)    HPI:  Ginna Vergara is an 83 year old female with PMH of HTN, HLD, Arthritis, and normal pressure hydrocephalus (s/p VPS- March 2023 at [facility]); who presented to [facility] on 11/8 for progressive weakness, confusion and decreased PO intake, and was found to have a mixed SDH with 1.4cm midline shift. She was transferred to [facility] later that day for further management. On 11/9, she underwent R sided reilly hole placement followed by MMA embolization on 11/10 with Dr. Rodriguez. Her hospital course was complicated by 2 RRTs for unresponsiveness. Serial brain imaging was stable and she was found to be in rapid AFIB. 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 PM&R was consulted and deemed her an appropriate candidate for IRF. She was medically stabilized and cleared for discharge to [facility].      PAST MEDICAL & SURGICAL HISTORY:  Normal pressure hydrocephalus  HTN (hypertension)  HLD (hyperlipidemia)  Arthritis  S/P  shunt    SUBJECTIVE/ROS: No acute overnight events. Patient seen and examined in the AM. She is doing well today. Still waxing and waning mental status, but alert on examination. No complaints of pain. No new numbness/tingling/weakness. No HA, appetite is good. Has difficulty with stating the year, but knows that it is November. VSS. Afebrile.     PHYSICAL EXAM    Vital Signs Last 24 Hrs  T(C): 36.3 (20 Nov 2023 08:04), Max: 36.5 (19 Nov 2023 20:33)  T(F): 97.4 (20 Nov 2023 08:04), Max: 97.7 (19 Nov 2023 20:33)  HR: 84 (20 Nov 2023 08:04) (65 - 84)  BP: 145/79 (20 Nov 2023 08:04) (120/68 - 145/79)  RR: 16 (20 Nov 2023 08:04) (16 - 16)  SpO2: 97% (20 Nov 2023 08:04) (97% - 97%)    T(C): 36.7 (11-20-23 @ 20:00), Max: 36.7 (11-20-23 @ 20:00)  HR: 72 (11-21-23 @ 06:23) (72 - 90)  BP: 143/78 (11-21-23 @ 06:23) (113/84 - 143/78)  RR: 16 (11-20-23 @ 20:00) (16 - 16)  SpO2: 95% (11-20-23 @ 20:00) (95% - 95%)    General: No acute distress  HEENT +staples in place   CV: RRR  Lungs: Breathing comfortably on RA   Abd: Soft, mildly distended   Ext:  Peripheral edema   Neuro:            Mental status: A&O to person, (+) month, (-) year             CN: No facial asymmetry             Strength: 4/5 UEs             Sensation: Intact throughout    RECENT LABS:                        10.6   8.04  )-----------( 259      ( 20 Nov 2023 05:20 )             34.1     11-20    142  |  108  |  25<H>  ----------------------------<  101<H>  4.5   |  26  |  1.03    Ca    10.1      20 Nov 2023 05:20    TPro  6.6  /  Alb  2.8<L>  /  TBili  0.4  /  DBili  x   /  AST  18  /  ALT  22  /  AlkPhos  72  11-20    LIVER FUNCTIONS - ( 20 Nov 2023 05:20 )  Alb: 2.8 g/dL / Pro: 6.6 g/dL / ALK PHOS: 72 U/L / ALT: 22 U/L / AST: 18 U/L / GGT: x           Allergies  No Known Allergies    MEDICATIONS  (STANDING):  atorvastatin 40 milliGRAM(s) Oral at bedtime  dextrose 5%. 1000 milliLiter(s) (100 mL/Hr) IV Continuous <Continuous>  dextrose 5%. 1000 milliLiter(s) (50 mL/Hr) IV Continuous <Continuous>  dextrose 50% Injectable 25 Gram(s) IV Push once  dextrose 50% Injectable 12.5 Gram(s) IV Push once  dextrose 50% Injectable 25 Gram(s) IV Push once  enoxaparin Injectable 40 milliGRAM(s) SubCutaneous <User Schedule>  famotidine    Tablet 20 milliGRAM(s) Oral two times a day  glucagon  Injectable 1 milliGRAM(s) IntraMuscular once  levETIRAcetam 500 milliGRAM(s) Oral at bedtime  magnesium oxide 400 milliGRAM(s) Oral at bedtime  metoprolol tartrate 25 milliGRAM(s) Oral every 12 hours  nystatin Powder 1 Application(s) Topical two times a day  polyethylene glycol 3350 17 Gram(s) Oral two times a day  potassium chloride    Tablet ER 10 milliEquivalent(s) Oral daily  senna 2 Tablet(s) Oral at bedtime    MEDICATIONS  (PRN):  acetaminophen     Tablet .. 975 milliGRAM(s) Oral every 8 hours PRN Mild Pain (1 - 3)  bisacodyl 5 milliGRAM(s) Oral every 12 hours PRN Constipation  dextrose Oral Gel 15 Gram(s) Oral once PRN Blood Glucose LESS THAN 70 milliGRAM(s)/deciliter    Assessment and Plan:   83 year old female with PMH of HTN, HLD, Arthritis, and normal pressure hydrocephalus (s/p VPS- March 2023 at [facility]); who presented to [facility] on 11/8 for progressive weakness, confusion and decreased PO intake, and was found to have a mixed SDH with 1.4cm midline shift. She was transferred to [facility] later that day for further management. On 11/9, she underwent R sided reilly hole placement, followed by MMA embolization on 11/10 with Dr. Rodriguez. Her hospital course was complicated by 2 RRTs for unresponsiveness. Serial brain imaging was stable and she was found to be in rapid AFIB. 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 Patient now admitted for a multidisciplinary rehab program with cognitive deficits, gait and ADL impairments.       #Traumatic Subdural Hemorrhage  - CTH 11/8: Moderate to large mixed attenuation right hemispheric convexity SDH compressing right hemisphere and right lateral ventricle with 1.4 cm midline shift to left. Right frontal ventriculostomy shunt catheter within right frontal horn.  - CTH 11/14:Right convexity subdural collection of air, blood and fluid continues to decrease in size.  - Gait Instability, ADL impairments and Functional impairments: start Comprehensive Rehab Program of PT/OT/SLP  - 3 hours a day, 5 days a week  - S/p R sided reilly hole placement (11/9), followed by MMA embolization (11/10) - Staples intact  - Outpatient follow up with NSGY, Dr. Rodriguez  - Modafinil 50 mg daily   - Increased lethargy in therapies, will follow up      #Normal Pressure Hydrocephalus  - S/p VPS March 2023  - Dr. Simon ([facility])   - CTH 11/20: deep subcortical white matter ischemia, unchanged. RIGHT frontal shunt catheter tip noted in the RIGHT lateral ventricle. Mild ventriculomegaly unchanged. Tiny residual subdural collection.  -11/22: No acute intervention from NSGY standpoint. Will consider Neurology consult if symptoms of waxing and waning mental status persist.     #KEITH  - Bun/Cr: 25/1.03 (11/20)  - Encourage oral hydration  - Repeat BMP 11/23    #Mood  - Lexapro 5 mg po (11/20), for irritability     #Seizure Prophylaxis  - Levetiracetam 500mg BID. Started on 11/8  --No seizure events throughout hospital course and EEG neg for seizures  --Taper off Keppra-- Decrease Keppra to 500mg qhs for 3 days then stop     #Rapid AFIB  - Metoprolol 25mg BID  - No AC d/t SDH  - Evaluation for Watchmann device outpatient with cardiology    #HLD  - Atorvastatin 40mg at HS     #Arthritis  - Plaquenil 200mg BID (home regimen)    #Skin  - Skin on admission: Right sided surgical incision intact with staples Dry skin over gluteal region. Skin tags throughout body.   - Nystatin powder BID   - Pressure injury/Skin: OOB to Chair, PT/OT     #Pain Mgmt   - Tylenol PRN    #GI/Bowel Mgmt  - Diet: regular    - Famotidine 20mg BID   - Miralax BID  - Senna   - PRN: Bisacodyl 5mg at HS     #/Bladder Mgmt   -  monitor PVR q8h, CIC>400cc  - Toileting programed q3-4 hrs during awake hours    #UTI   - 11/21: (+) Leuk esterase, (+) WBC, (-) nitrite   - 11/22: nml WBC, nml platelets   - FU UCx, consider starting abx     #FEN   - Diet - Regular + Thins    - Dysphagia  SLP - evaluation and treatment    #Health Maintenance  - KCL 10mEq daily   - Magnesium Oxide 400mg at HS  - Fosamax 70mg weekly- home regimen  - K: 4.5; add mag level    #Precautions / PROPHYLAXIS:   - Falls, Seizure   - ortho: Weight bearing status: WBAT   - Lungs: Aspiration, Incentive Spirometer   - DVT: Lovenox    #IDT 11/21   TDD 12/5/23  Nursing: incontinent bowel/bladder   Social: lives with , independent prior, retired    Preferences: zohreh laurent   SLP: reglar diet thin liquids, decreased memory/attention/left inattention   OT: supervision for eating/oral hygiene, dependent LBD/footwear   PT: dependent   Goals: Increase attention/focus within 10-15 intervals, ModA transfers/bedmobility    DC: Home/GIANLUCA, TBD   ------------------------------------------------------  OUTPATIENT/FOLLOW UP:    Sierra Rodriguez  Neurosurgery  [address] [place] [id number]  Phone: [phone]  Fax: [phone]  Follow Up Time: 2 weeks    Neurologist- managing VPS   Dr. Aurora Webb   Watchmann device outpatient with cardiology         Patient is a 83y old  Female who presents with a chief complaint of Subdural Hemorrhage (19 Nov 2023 14:22)    HPI:  Ginna Vergara is an 83 year old female with PMH of HTN, HLD, Arthritis, and normal pressure hydrocephalus (s/p VPS- March 2023 at [place]); who presented to [facility] on 11/8 for progressive weakness, confusion and decreased PO intake, and was found to have a mixed SDH with 1.4cm midline shift. She was transferred to [facility] later that day for further management. On 11/9, she underwent R sided reilly hole placement followed by MMA embolization on 11/10 with Dr. Rodriguez. Her hospital course was complicated by 2 RRTs for unresponsiveness. Serial brain imaging was stable and she was found to be in rapid AFIB. 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 PM&R was consulted and deemed her an appropriate candidate for IRF. She was medically stabilized and cleared for discharge to [facility].      PAST MEDICAL & SURGICAL HISTORY:  Normal pressure hydrocephalus  HTN (hypertension)  HLD (hyperlipidemia)  Arthritis  S/P  shunt    SUBJECTIVE/ROS: No acute overnight events. Patient seen and examined in the AM. She is doing well today. Still waxing and waning mental status, but alert on examination. No complaints of pain. No new numbness/tingling/weakness. No HA, appetite is good. Has difficulty with stating the year, but knows that it is November. VSS. Afebrile.     PHYSICAL EXAM    Vital Signs Last 24 Hrs  T(C): 36.3 (20 Nov 2023 08:04), Max: 36.5 (19 Nov 2023 20:33)  T(F): 97.4 (20 Nov 2023 08:04), Max: 97.7 (19 Nov 2023 20:33)  HR: 84 (20 Nov 2023 08:04) (65 - 84)  BP: 145/79 (20 Nov 2023 08:04) (120/68 - 145/79)  RR: 16 (20 Nov 2023 08:04) (16 - 16)  SpO2: 97% (20 Nov 2023 08:04) (97% - 97%)    T(C): 36.7 (11-20-23 @ 20:00), Max: 36.7 (11-20-23 @ 20:00)  HR: 72 (11-21-23 @ 06:23) (72 - 90)  BP: 143/78 (11-21-23 @ 06:23) (113/84 - 143/78)  RR: 16 (11-20-23 @ 20:00) (16 - 16)  SpO2: 95% (11-20-23 @ 20:00) (95% - 95%)    General: No acute distress  HEENT +staples in place   CV: RRR  Lungs: Breathing comfortably on RA   Abd: Soft, mildly distended   Ext:  Peripheral edema   Neuro:            Mental status: A&O to person, (+) month, (-) year             CN: No facial asymmetry             Strength: 4/5 UEs             Sensation: Intact throughout    RECENT LABS:                        10.6   8.04  )-----------( 259      ( 20 Nov 2023 05:20 )             34.1     11-20    142  |  108  |  25<H>  ----------------------------<  101<H>  4.5   |  26  |  1.03    Ca    10.1      20 Nov 2023 05:20    TPro  6.6  /  Alb  2.8<L>  /  TBili  0.4  /  DBili  x   /  AST  18  /  ALT  22  /  AlkPhos  72  11-20    LIVER FUNCTIONS - ( 20 Nov 2023 05:20 )  Alb: 2.8 g/dL / Pro: 6.6 g/dL / ALK PHOS: 72 U/L / ALT: 22 U/L / AST: 18 U/L / GGT: x           Allergies  No Known Allergies    MEDICATIONS  (STANDING):  atorvastatin 40 milliGRAM(s) Oral at bedtime  dextrose 5%. 1000 milliLiter(s) (100 mL/Hr) IV Continuous <Continuous>  dextrose 5%. 1000 milliLiter(s) (50 mL/Hr) IV Continuous <Continuous>  dextrose 50% Injectable 25 Gram(s) IV Push once  dextrose 50% Injectable 12.5 Gram(s) IV Push once  dextrose 50% Injectable 25 Gram(s) IV Push once  enoxaparin Injectable 40 milliGRAM(s) SubCutaneous <User Schedule>  famotidine    Tablet 20 milliGRAM(s) Oral two times a day  glucagon  Injectable 1 milliGRAM(s) IntraMuscular once  levETIRAcetam 500 milliGRAM(s) Oral at bedtime  magnesium oxide 400 milliGRAM(s) Oral at bedtime  metoprolol tartrate 25 milliGRAM(s) Oral every 12 hours  nystatin Powder 1 Application(s) Topical two times a day  polyethylene glycol 3350 17 Gram(s) Oral two times a day  potassium chloride    Tablet ER 10 milliEquivalent(s) Oral daily  senna 2 Tablet(s) Oral at bedtime    MEDICATIONS  (PRN):  acetaminophen     Tablet .. 975 milliGRAM(s) Oral every 8 hours PRN Mild Pain (1 - 3)  bisacodyl 5 milliGRAM(s) Oral every 12 hours PRN Constipation  dextrose Oral Gel 15 Gram(s) Oral once PRN Blood Glucose LESS THAN 70 milliGRAM(s)/deciliter    Assessment and Plan:   83 year old female with PMH of HTN, HLD, Arthritis, and normal pressure hydrocephalus (s/p VPS- March 2023 at [place]); who presented to [facility] on 11/8 for progressive weakness, confusion and decreased PO intake, and was found to have a mixed SDH with 1.4cm midline shift. She was transferred to [facility] later that day for further management. On 11/9, she underwent R sided reilly hole placement, followed by MMA embolization on 11/10 with Dr. Rodriguez. Her hospital course was complicated by 2 RRTs for unresponsiveness. Serial brain imaging was stable and she was found to be in rapid AFIB. 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 Patient now admitted for a multidisciplinary rehab program with cognitive deficits, gait and ADL impairments.       #Traumatic Subdural Hemorrhage  - CTH 11/8: Moderate to large mixed attenuation right hemispheric convexity SDH compressing right hemisphere and right lateral ventricle with 1.4 cm midline shift to left. Right frontal ventriculostomy shunt catheter within right frontal horn.  - CTH 11/14:Right convexity subdural collection of air, blood and fluid continues to decrease in size.  - Gait Instability, ADL impairments and Functional impairments: start Comprehensive Rehab Program of PT/OT/SLP  - 3 hours a day, 5 days a week  - S/p R sided reilly hole placement (11/9), followed by MMA embolization (11/10) - Staples intact  - Outpatient follow up with NSGY, Dr. Rodriguez  - Modafinil 50 mg daily   - Increased lethargy in therapies, will follow up      #Normal Pressure Hydrocephalus  - S/p VPS March 2023  - Dr. Simon ([place])   - CTH 11/20: deep subcortical white matter ischemia, unchanged. RIGHT frontal shunt catheter tip noted in the RIGHT lateral ventricle. Mild ventriculomegaly unchanged. Tiny residual subdural collection.  -11/22: No acute intervention from NSGY standpoint. Will consider Neurology consult if symptoms of waxing and waning mental status persist.     #KEITH  - Bun/Cr: 25/1.03 (11/20)  - Encourage oral hydration  - Repeat BMP 11/23    #Mood  - Lexapro 5 mg po (11/20), for irritability     #Seizure Prophylaxis  - Levetiracetam 500mg BID. Started on 11/8  --No seizure events throughout hospital course and EEG neg for seizures  --Taper off Keppra-- Decrease Keppra to 500mg qhs for 3 days then stop     #Rapid AFIB  - Metoprolol 25mg BID  - No AC d/t SDH  - Evaluation for Watchmann device outpatient with cardiology    #HLD  - Atorvastatin 40mg at HS     #Arthritis  - Plaquenil 200mg BID (home regimen)    #Skin  - Skin on admission: Right sided surgical incision intact with staples Dry skin over gluteal region. Skin tags throughout body.   - Nystatin powder BID   - Pressure injury/Skin: OOB to Chair, PT/OT     #Pain Mgmt   - Tylenol PRN    #GI/Bowel Mgmt  - Diet: regular    - Famotidine 20mg BID   - Miralax BID  - Senna   - PRN: Bisacodyl 5mg at HS     #/Bladder Mgmt   -  monitor PVR q8h, CIC>400cc  - Toileting programed q3-4 hrs during awake hours    #UTI   - 11/21: (+) Leuk esterase, (+) WBC, (-) nitrite   - 11/22: nml WBC, nml platelets   - FU UCx, consider starting abx     #FEN   - Diet - Regular + Thins    - Dysphagia  SLP - evaluation and treatment    #Health Maintenance  - KCL 10mEq daily   - Magnesium Oxide 400mg at HS  - Fosamax 70mg weekly- home regimen  - K: 4.5; add mag level    #Precautions / PROPHYLAXIS:   - Falls, Seizure   - ortho: Weight bearing status: WBAT   - Lungs: Aspiration, Incentive Spirometer   - DVT: Lovenox    #IDT 11/21 -11/22: Attempted to call patient's , Kyle Vergara, to provide medical updates, but the call went to . Will try again.     TDD 12/5/23  Nursing: incontinent bowel/bladder   Social: lives with , independent prior, retired    Preferences: wants anastasiia   SLP: reglar diet thin liquids, decreased memory/attention/left inattention   OT: supervision for eating/oral hygiene, dependent LBD/footwear   PT: dependent   Goals: Increase attention/focus within 10-15 intervals, ModA transfers/bedmobility    DC: Home/GIANLUCA, TBD   ------------------------------------------------------  OUTPATIENT/FOLLOW UP:    Sierra Rodriguez  Neurosurgery  [address] [place] [id number]  Phone: [phone]  Fax: [phone]  Follow Up Time: 2 weeks    Neurologist- managing VPS   Dr. Aurora Webb   Watchmann device outpatient with cardiology         Patient is a 83y old  Female who presents with a chief complaint of Subdural Hemorrhage (19 Nov 2023 14:22)    HPI:  Ginna eVrgara is an 83 year old female with PMH of HTN, HLD, Arthritis, and normal pressure hydrocephalus (s/p VPS- March 2023 at [place]); who presented to [facility] on 11/8 for progressive weakness, confusion and decreased PO intake, and was found to have a mixed SDH with 1.4cm midline shift. She was transferred to [facility] later that day for further management. On 11/9, she underwent R sided reilly hole placement followed by MMA embolization on 11/10 with Dr. Rodriguez. Her hospital course was complicated by 2 RRTs for unresponsiveness. Serial brain imaging was stable and she was found to be in rapid AFIB. 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 PM&R was consulted and deemed her an appropriate candidate for IRF. She was medically stabilized and cleared for discharge to [facility].      PAST MEDICAL & SURGICAL HISTORY:  Normal pressure hydrocephalus  HTN (hypertension)  HLD (hyperlipidemia)  Arthritis  S/P  shunt    SUBJECTIVE/OBJECTIVE/ROS: No acute overnight events. Patient seen and examined in the AM. She is doing well today. Still waxing and waning mental status, but alert on examination. No complaints of pain. No new numbness/tingling/weakness. No HA, appetite is good. Has difficulty with stating the year, but knows that it is November. VSS. Afebrile. Denies urinary complaints     PHYSICAL EXAM    Vital Signs Last 24 Hrs  T(C): 36.3 (20 Nov 2023 08:04), Max: 36.5 (19 Nov 2023 20:33)  T(F): 97.4 (20 Nov 2023 08:04), Max: 97.7 (19 Nov 2023 20:33)  HR: 84 (20 Nov 2023 08:04) (65 - 84)  BP: 145/79 (20 Nov 2023 08:04) (120/68 - 145/79)  RR: 16 (20 Nov 2023 08:04) (16 - 16)  SpO2: 97% (20 Nov 2023 08:04) (97% - 97%)    T(C): 36.7 (11-20-23 @ 20:00), Max: 36.7 (11-20-23 @ 20:00)  HR: 72 (11-21-23 @ 06:23) (72 - 90)  BP: 143/78 (11-21-23 @ 06:23) (113/84 - 143/78)  RR: 16 (11-20-23 @ 20:00) (16 - 16)  SpO2: 95% (11-20-23 @ 20:00) (95% - 95%)    Constitutional - NAD, Comfortable  HEENT - NCAT, LILO  Neck - Supple, No limited ROM  Chest - shallow breaths  Cardio - RRR  Abdomen -  Soft, NTND, +BS  Extremities - RLE with 2+ pitting edema, No calf tenderness   Neurologic Exam:                    Cognitive - confused            Orientation: Awake, A&O x2-- thinks its 1948, unable to pick 2023 with multiple choice options, does not know month             Attention:  easily distracted            Memory: impaired      Speech - Fluent, no aphasia      Cranial Nerves - difficulty tracking, no facial droop      Motor -                      LEFT  UE - ShF 4/5, EF 4/5, EE 4/5, WE 4/5,  4/5                    RIGHT UE - ShF 4/5, EF 4/5, EE 4/5, WE 5/5,  5/5                    LEFT    LE - HF 4/5, KE 4/5, DF 4/5, PF 4/5                    RIGHT LE - HF 4/5, KE 5/5, DF 4/5, PF 5/5        Sensory - Intact to LT bilaterally  Psychiatric - Calm    RECENT LABS:                        10.6   8.04  )-----------( 259      ( 20 Nov 2023 05:20 )             34.1     11-20    142  |  108  |  25<H>  ----------------------------<  101<H>  4.5   |  26  |  1.03    Ca    10.1      20 Nov 2023 05:20    TPro  6.6  /  Alb  2.8<L>  /  TBili  0.4  /  DBili  x   /  AST  18  /  ALT  22  /  AlkPhos  72  11-20    LIVER FUNCTIONS - ( 20 Nov 2023 05:20 )  Alb: 2.8 g/dL / Pro: 6.6 g/dL / ALK PHOS: 72 U/L / ALT: 22 U/L / AST: 18 U/L / GGT: x           Allergies  No Known Allergies    MEDICATIONS  (STANDING):  atorvastatin 40 milliGRAM(s) Oral at bedtime  dextrose 5%. 1000 milliLiter(s) (100 mL/Hr) IV Continuous <Continuous>  dextrose 5%. 1000 milliLiter(s) (50 mL/Hr) IV Continuous <Continuous>  dextrose 50% Injectable 25 Gram(s) IV Push once  dextrose 50% Injectable 12.5 Gram(s) IV Push once  dextrose 50% Injectable 25 Gram(s) IV Push once  enoxaparin Injectable 40 milliGRAM(s) SubCutaneous <User Schedule>  famotidine    Tablet 20 milliGRAM(s) Oral two times a day  glucagon  Injectable 1 milliGRAM(s) IntraMuscular once  levETIRAcetam 500 milliGRAM(s) Oral at bedtime  magnesium oxide 400 milliGRAM(s) Oral at bedtime  metoprolol tartrate 25 milliGRAM(s) Oral every 12 hours  nystatin Powder 1 Application(s) Topical two times a day  polyethylene glycol 3350 17 Gram(s) Oral two times a day  potassium chloride    Tablet ER 10 milliEquivalent(s) Oral daily  senna 2 Tablet(s) Oral at bedtime    MEDICATIONS  (PRN):  acetaminophen     Tablet .. 975 milliGRAM(s) Oral every 8 hours PRN Mild Pain (1 - 3)  bisacodyl 5 milliGRAM(s) Oral every 12 hours PRN Constipation  dextrose Oral Gel 15 Gram(s) Oral once PRN Blood Glucose LESS THAN 70 milliGRAM(s)/deciliter

## 2023-11-22 NOTE — PROGRESS NOTE ADULT - ASSESSMENT
82 y/o woman with PMH of HTN, HLD, Arthritis, and normal pressure hydrocephalus (s/p VPS- March 2023 at [place]); who presented to [facility] on 11/8 for progressive weakness, confusion and decreased PO intake, and was found to have a mixed SDH with 1.4cm midline shift. She was transferred to [facility] later that day for further management. On 11/9, she underwent R sided reilly hole placement, followed by MMA embolization on 11/10 with Dr. Rodriguez. Her hospital course was complicated by 2 RRTs for unresponsiveness. Serial brain imaging was stable and she was found to be in rapid AFIB. 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 Patient now admitted for a multidisciplinary rehab program with cognitive deficits, gait and ADL impairments.     Rehab: Functional and gait instability:  - C/w PT/OT per primary team     Neuro: Subdural Hemorrhage, Normal Pressure Hydrocephalus, Seizure Prophylaxis  - S/p VPS March 2023  - CTH 11/8: Moderate to large mixed attenuation right hemispheric convexity SDH compressing right hemisphere and right lateral ventricle with 1.4 cm midline shift to left. Right frontal ventriculostomy shunt catheter within right frontal horn.  - CTH 11/14:Right convexity subdural collection of air, blood and fluid continues to decrease in size.  - Gait Instability, ADL impairments and Functional impairments: start Comprehensive Rehab Program of PT/OT/SLP  - 3 hours a day, 5 days a week  - S/p R sided reilly hole placement (11/9), followed by MMA embolization (11/10)  -  Seizure PPx per primary team    CVS: Rapid AFIB, HLD  - Metoprolol 25mg BID  - No AC  - Evaluation for Watchmann device outpatient with cardiology  - Atorvastatin 40mg at HS     : UTI  - UA positive for "triple phoshate crystal", wbc and LE  - Start on Vantin 200mg BID for 7 days    Ortho: Arthritis  - Plaquenil 200mg BID    DVT PPx: Lovenox 40 SUbQ

## 2023-11-23 PROCEDURE — 99232 SBSQ HOSP IP/OBS MODERATE 35: CPT | Mod: GC

## 2023-11-23 PROCEDURE — 99232 SBSQ HOSP IP/OBS MODERATE 35: CPT

## 2023-11-23 RX ADMIN — NYSTATIN CREAM 1 APPLICATION(S): 100000 CREAM TOPICAL at 17:48

## 2023-11-23 RX ADMIN — Medication 200 MILLIGRAM(S): at 05:24

## 2023-11-23 RX ADMIN — FAMOTIDINE 20 MILLIGRAM(S): 10 INJECTION INTRAVENOUS at 05:24

## 2023-11-23 RX ADMIN — ESCITALOPRAM OXALATE 5 MILLIGRAM(S): 10 TABLET, FILM COATED ORAL at 12:42

## 2023-11-23 RX ADMIN — NYSTATIN CREAM 1 APPLICATION(S): 100000 CREAM TOPICAL at 05:25

## 2023-11-23 RX ADMIN — Medication 10 MILLIEQUIVALENT(S): at 12:43

## 2023-11-23 RX ADMIN — POLYETHYLENE GLYCOL 3350 17 GRAM(S): 17 POWDER, FOR SOLUTION ORAL at 17:47

## 2023-11-23 RX ADMIN — Medication 25 MILLIGRAM(S): at 05:25

## 2023-11-23 RX ADMIN — Medication 25 MILLIGRAM(S): at 17:46

## 2023-11-23 RX ADMIN — Medication 200 MILLIGRAM(S): at 17:46

## 2023-11-23 RX ADMIN — Medication 1 TABLET(S): at 08:46

## 2023-11-23 RX ADMIN — ATORVASTATIN CALCIUM 40 MILLIGRAM(S): 80 TABLET, FILM COATED ORAL at 21:46

## 2023-11-23 RX ADMIN — LORATADINE 10 MILLIGRAM(S): 10 TABLET ORAL at 12:43

## 2023-11-23 RX ADMIN — POLYETHYLENE GLYCOL 3350 17 GRAM(S): 17 POWDER, FOR SOLUTION ORAL at 05:25

## 2023-11-23 RX ADMIN — Medication 1 TABLET(S): at 17:46

## 2023-11-23 RX ADMIN — ENOXAPARIN SODIUM 40 MILLIGRAM(S): 100 INJECTION SUBCUTANEOUS at 17:47

## 2023-11-23 RX ADMIN — FAMOTIDINE 20 MILLIGRAM(S): 10 INJECTION INTRAVENOUS at 17:46

## 2023-11-23 RX ADMIN — MAGNESIUM OXIDE 400 MG ORAL TABLET 400 MILLIGRAM(S): 241.3 TABLET ORAL at 21:46

## 2023-11-23 RX ADMIN — Medication 200 MILLIGRAM(S): at 17:47

## 2023-11-23 RX ADMIN — MODAFINIL 50 MILLIGRAM(S): 200 TABLET ORAL at 08:45

## 2023-11-23 RX ADMIN — SENNA PLUS 2 TABLET(S): 8.6 TABLET ORAL at 21:46

## 2023-11-23 NOTE — PROGRESS NOTE ADULT - SUBJECTIVE AND OBJECTIVE BOX
Patient is a 83y old  Female who presents with a chief complaint of Subdural Hemorrhage (22 Nov 2023 12:16)      Patient seen and examined at bedside. No events overnight. Patient without headache, changes in vision, nausea or vomiting, chest pain, sob, abd pain. Doing well and without complaints     ALLERGIES:  No Known Allergies    MEDICATIONS  (STANDING):  atorvastatin 40 milliGRAM(s) Oral at bedtime  cefpodoxime 200 milliGRAM(s) Oral every 12 hours  dextrose 5%. 1000 milliLiter(s) (50 mL/Hr) IV Continuous <Continuous>  dextrose 5%. 1000 milliLiter(s) (100 mL/Hr) IV Continuous <Continuous>  dextrose 50% Injectable 25 Gram(s) IV Push once  dextrose 50% Injectable 12.5 Gram(s) IV Push once  dextrose 50% Injectable 25 Gram(s) IV Push once  enoxaparin Injectable 40 milliGRAM(s) SubCutaneous <User Schedule>  escitalopram 5 milliGRAM(s) Oral daily  famotidine    Tablet 20 milliGRAM(s) Oral two times a day  glucagon  Injectable 1 milliGRAM(s) IntraMuscular once  hydroxychloroquine 200 milliGRAM(s) Oral two times a day  lactobacillus acidophilus 1 Tablet(s) Oral two times a day with meals  loratadine 10 milliGRAM(s) Oral daily  magnesium oxide 400 milliGRAM(s) Oral at bedtime  metoprolol tartrate 25 milliGRAM(s) Oral every 12 hours  modafinil 50 milliGRAM(s) Oral <User Schedule>  nystatin Powder 1 Application(s) Topical two times a day  polyethylene glycol 3350 17 Gram(s) Oral two times a day  potassium chloride    Tablet ER 10 milliEquivalent(s) Oral daily  senna 2 Tablet(s) Oral at bedtime    MEDICATIONS  (PRN):  acetaminophen     Tablet .. 975 milliGRAM(s) Oral every 8 hours PRN Mild Pain (1 - 3)  bisacodyl 5 milliGRAM(s) Oral every 12 hours PRN Constipation  dextrose Oral Gel 15 Gram(s) Oral once PRN Blood Glucose LESS THAN 70 milliGRAM(s)/deciliter    Vital Signs Last 24 Hrs  T(F): 98 (22 Nov 2023 19:42), Max: 98 (22 Nov 2023 19:42)  HR: 58 (23 Nov 2023 05:21) (58 - 76)  BP: 127/72 (23 Nov 2023 05:21) (109/70 - 127/72)  RR: 16 (22 Nov 2023 19:42) (16 - 16)  SpO2: 95% (22 Nov 2023 19:42) (95% - 98%)  I&O's Summary    22 Nov 2023 07:01  -  23 Nov 2023 07:00  --------------------------------------------------------  IN: 0 mL / OUT: 400 mL / NET: -400 mL        PHYSICAL EXAM:  GENERAL: NAD, laying in bed  EYES: PEERL, conjunctiva and sclera clear  ENMT: Moist mucous membranes, Supple, No JVD  CHEST/LUNG: Clear to auscultation bilaterally, good air entry, non-labored breathing  HEART: RRR; S1/S2, No murmur  ABDOMEN: Soft, Nontender, Nondistended; Bowel sounds present  EXTREMITIES: No calf tenderness, No cyanosis, No edema  SKIN: Warm, perfused  PSYCH: Normal mood, Normal affect    LABS:                        9.7    6.85  )-----------( 355      ( 22 Nov 2023 05:58 )             31.3     11-22    141  |  108  |  33  ----------------------------<  102  4.6   |  27  |  1.01    Ca    10.4      22 Nov 2023 05:58    TPro  6.2  /  Alb  2.6  /  TBili  0.4  /  DBili  x   /  AST  19  /  ALT  20  /  AlkPhos  70  11-22              11-08 Chol 144 mg/dL LDL -- HDL 65 mg/dL Trig 36 mg/dL                  Urinalysis Basic - ( 22 Nov 2023 05:58 )    Color: x / Appearance: x / SG: x / pH: x  Gluc: 102 mg/dL / Ketone: x  / Bili: x / Urobili: x   Blood: x / Protein: x / Nitrite: x   Leuk Esterase: x / RBC: x / WBC x   Sq Epi: x / Non Sq Epi: x / Bacteria: x        COVID-19 PCR: NotDetec (11-17-23 @ 19:55)      RADIOLOGY & ADDITIONAL TESTS:    Care Discussed with Consultants/Other Providers:

## 2023-11-23 NOTE — PROGRESS NOTE ADULT - ASSESSMENT
82 y/o woman with PMH of HTN, HLD, Arthritis, and normal pressure hydrocephalus (s/p VPS- March 2023 at [place]); who presented to [facility] on 11/8 for progressive weakness, confusion and decreased PO intake, and was found to have a mixed SDH with 1.4cm midline shift. She was transferred to [facility] later that day for further management. On 11/9, she underwent R sided reilly hole placement, followed by MMA embolization on 11/10 with Dr. Rodriguez. Her hospital course was complicated by 2 RRTs for unresponsiveness. Serial brain imaging was stable and she was found to be in rapid AFIB. 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 Patient now admitted for a multidisciplinary rehab program with cognitive deficits, gait and ADL impairments.     #Subdural Hemorrhage, Normal Pressure Hydrocephalus, Seizure Prophylaxis  - S/p VPS March 2023  - CTH 11/8: Moderate to large mixed attenuation right hemispheric convexity SDH compressing right hemisphere and right lateral ventricle with 1.4 cm midline shift to left. Right frontal ventriculostomy shunt catheter within right frontal horn.  - CTH 11/14:Right convexity subdural collection of air, blood and fluid continues to decrease in size.  - S/p R sided reilly hole placement (11/9), followed by MMA embolization (11/10)  - Seizure PPx per primary team  - continue comprehensive rehab program    #Rapid AFIB, HLD  - Metoprolol 25mg BID  - No AC  - Evaluation for Watchmann device outpatient with cardiology  - Atorvastatin 40mg at HS     #UTI  - UA positive for "triple phosphate crystal", wbc and LE  - Vantin 200mg BID for 7 days    #Arthritis  - Plaquenil 200mg BID    #DVT PPx  - Lovenox 40 SUbQ

## 2023-11-23 NOTE — PROGRESS NOTE ADULT - SUBJECTIVE AND OBJECTIVE BOX
Cc: Gait dysfunction    HPI: Patient with no new medical issues today.  Pain controlled, no chest pain, no N/V, no Fevers/Chills. No other new ROS  Has been tolerating rehabilitation program.    acetaminophen     Tablet .. 975 milliGRAM(s) Oral every 8 hours PRN  atorvastatin 40 milliGRAM(s) Oral at bedtime  bisacodyl 5 milliGRAM(s) Oral every 12 hours PRN  cefpodoxime 200 milliGRAM(s) Oral every 12 hours  dextrose 5%. 1000 milliLiter(s) IV Continuous <Continuous>  dextrose 5%. 1000 milliLiter(s) IV Continuous <Continuous>  dextrose 50% Injectable 25 Gram(s) IV Push once  dextrose 50% Injectable 12.5 Gram(s) IV Push once  dextrose 50% Injectable 25 Gram(s) IV Push once  dextrose Oral Gel 15 Gram(s) Oral once PRN  enoxaparin Injectable 40 milliGRAM(s) SubCutaneous <User Schedule>  escitalopram 5 milliGRAM(s) Oral daily  famotidine    Tablet 20 milliGRAM(s) Oral two times a day  glucagon  Injectable 1 milliGRAM(s) IntraMuscular once  hydroxychloroquine 200 milliGRAM(s) Oral two times a day  lactobacillus acidophilus 1 Tablet(s) Oral two times a day with meals  loratadine 10 milliGRAM(s) Oral daily  magnesium oxide 400 milliGRAM(s) Oral at bedtime  metoprolol tartrate 25 milliGRAM(s) Oral every 12 hours  modafinil 50 milliGRAM(s) Oral <User Schedule>  nystatin Powder 1 Application(s) Topical two times a day  polyethylene glycol 3350 17 Gram(s) Oral two times a day  potassium chloride    Tablet ER 10 milliEquivalent(s) Oral daily  senna 2 Tablet(s) Oral at bedtime      T(C): 36.7 (11-22-23 @ 19:42), Max: 36.7 (11-22-23 @ 19:42)  HR: 58 (11-23-23 @ 05:21) (58 - 76)  BP: 127/72 (11-23-23 @ 05:21) (109/70 - 127/72)  RR: 16 (11-22-23 @ 19:42) (16 - 16)  SpO2: 95% (11-22-23 @ 19:42) (95% - 98%)    In NAD  HEENT- EOMI  Heart- RRR, S1S2  Lungs- CTA bl.  Abd- + BS, NT  Ext- No calf pain  Neuro- Exam unchanged          Imp: Patient with diagnosis of SDH  admitted for comprehensive acute rehabilitation.    Plan:  - Continue PT/OT/SLP  - DVT prophylaxis  - Skin- Turn q2h, check skin daily  - Continue current medications; patient medically stable.   -Active issues- UTI- On Vantin, urine culture for sensitivity is pending   - Patient is stable to continue current rehabilitation program.

## 2023-11-24 LAB
ALBUMIN SERPL ELPH-MCNC: 2.8 G/DL — LOW (ref 3.3–5)
ALBUMIN SERPL ELPH-MCNC: 2.8 G/DL — LOW (ref 3.3–5)
ALP SERPL-CCNC: 73 U/L — SIGNIFICANT CHANGE UP (ref 40–120)
ALP SERPL-CCNC: 73 U/L — SIGNIFICANT CHANGE UP (ref 40–120)
ALT FLD-CCNC: 23 U/L — SIGNIFICANT CHANGE UP (ref 10–45)
ALT FLD-CCNC: 23 U/L — SIGNIFICANT CHANGE UP (ref 10–45)
ANION GAP SERPL CALC-SCNC: 7 MMOL/L — SIGNIFICANT CHANGE UP (ref 5–17)
ANION GAP SERPL CALC-SCNC: 7 MMOL/L — SIGNIFICANT CHANGE UP (ref 5–17)
AST SERPL-CCNC: 17 U/L — SIGNIFICANT CHANGE UP (ref 10–40)
AST SERPL-CCNC: 17 U/L — SIGNIFICANT CHANGE UP (ref 10–40)
BASOPHILS # BLD AUTO: 0.02 K/UL — SIGNIFICANT CHANGE UP (ref 0–0.2)
BASOPHILS # BLD AUTO: 0.02 K/UL — SIGNIFICANT CHANGE UP (ref 0–0.2)
BASOPHILS NFR BLD AUTO: 0.3 % — SIGNIFICANT CHANGE UP (ref 0–2)
BASOPHILS NFR BLD AUTO: 0.3 % — SIGNIFICANT CHANGE UP (ref 0–2)
BILIRUB SERPL-MCNC: 0.3 MG/DL — SIGNIFICANT CHANGE UP (ref 0.2–1.2)
BILIRUB SERPL-MCNC: 0.3 MG/DL — SIGNIFICANT CHANGE UP (ref 0.2–1.2)
BUN SERPL-MCNC: 28 MG/DL — HIGH (ref 7–23)
BUN SERPL-MCNC: 28 MG/DL — HIGH (ref 7–23)
CALCIUM SERPL-MCNC: 10.8 MG/DL — HIGH (ref 8.4–10.5)
CALCIUM SERPL-MCNC: 10.8 MG/DL — HIGH (ref 8.4–10.5)
CHLORIDE SERPL-SCNC: 105 MMOL/L — SIGNIFICANT CHANGE UP (ref 96–108)
CHLORIDE SERPL-SCNC: 105 MMOL/L — SIGNIFICANT CHANGE UP (ref 96–108)
CO2 SERPL-SCNC: 28 MMOL/L — SIGNIFICANT CHANGE UP (ref 22–31)
CO2 SERPL-SCNC: 28 MMOL/L — SIGNIFICANT CHANGE UP (ref 22–31)
CREAT SERPL-MCNC: 1.06 MG/DL — SIGNIFICANT CHANGE UP (ref 0.5–1.3)
CREAT SERPL-MCNC: 1.06 MG/DL — SIGNIFICANT CHANGE UP (ref 0.5–1.3)
EGFR: 51 ML/MIN/1.73M2 — LOW
EGFR: 51 ML/MIN/1.73M2 — LOW
EOSINOPHIL # BLD AUTO: 0.23 K/UL — SIGNIFICANT CHANGE UP (ref 0–0.5)
EOSINOPHIL # BLD AUTO: 0.23 K/UL — SIGNIFICANT CHANGE UP (ref 0–0.5)
EOSINOPHIL NFR BLD AUTO: 3.4 % — SIGNIFICANT CHANGE UP (ref 0–6)
EOSINOPHIL NFR BLD AUTO: 3.4 % — SIGNIFICANT CHANGE UP (ref 0–6)
GLUCOSE SERPL-MCNC: 96 MG/DL — SIGNIFICANT CHANGE UP (ref 70–99)
GLUCOSE SERPL-MCNC: 96 MG/DL — SIGNIFICANT CHANGE UP (ref 70–99)
HCT VFR BLD CALC: 31.8 % — LOW (ref 34.5–45)
HCT VFR BLD CALC: 31.8 % — LOW (ref 34.5–45)
HGB BLD-MCNC: 10.1 G/DL — LOW (ref 11.5–15.5)
HGB BLD-MCNC: 10.1 G/DL — LOW (ref 11.5–15.5)
IMM GRANULOCYTES NFR BLD AUTO: 0.3 % — SIGNIFICANT CHANGE UP (ref 0–0.9)
IMM GRANULOCYTES NFR BLD AUTO: 0.3 % — SIGNIFICANT CHANGE UP (ref 0–0.9)
LYMPHOCYTES # BLD AUTO: 1.75 K/UL — SIGNIFICANT CHANGE UP (ref 1–3.3)
LYMPHOCYTES # BLD AUTO: 1.75 K/UL — SIGNIFICANT CHANGE UP (ref 1–3.3)
LYMPHOCYTES # BLD AUTO: 25.6 % — SIGNIFICANT CHANGE UP (ref 13–44)
LYMPHOCYTES # BLD AUTO: 25.6 % — SIGNIFICANT CHANGE UP (ref 13–44)
MCHC RBC-ENTMCNC: 28.7 PG — SIGNIFICANT CHANGE UP (ref 27–34)
MCHC RBC-ENTMCNC: 28.7 PG — SIGNIFICANT CHANGE UP (ref 27–34)
MCHC RBC-ENTMCNC: 31.8 GM/DL — LOW (ref 32–36)
MCHC RBC-ENTMCNC: 31.8 GM/DL — LOW (ref 32–36)
MCV RBC AUTO: 90.3 FL — SIGNIFICANT CHANGE UP (ref 80–100)
MCV RBC AUTO: 90.3 FL — SIGNIFICANT CHANGE UP (ref 80–100)
MONOCYTES # BLD AUTO: 0.88 K/UL — SIGNIFICANT CHANGE UP (ref 0–0.9)
MONOCYTES # BLD AUTO: 0.88 K/UL — SIGNIFICANT CHANGE UP (ref 0–0.9)
MONOCYTES NFR BLD AUTO: 12.9 % — SIGNIFICANT CHANGE UP (ref 2–14)
MONOCYTES NFR BLD AUTO: 12.9 % — SIGNIFICANT CHANGE UP (ref 2–14)
NEUTROPHILS # BLD AUTO: 3.94 K/UL — SIGNIFICANT CHANGE UP (ref 1.8–7.4)
NEUTROPHILS # BLD AUTO: 3.94 K/UL — SIGNIFICANT CHANGE UP (ref 1.8–7.4)
NEUTROPHILS NFR BLD AUTO: 57.5 % — SIGNIFICANT CHANGE UP (ref 43–77)
NEUTROPHILS NFR BLD AUTO: 57.5 % — SIGNIFICANT CHANGE UP (ref 43–77)
NRBC # BLD: 0 /100 WBCS — SIGNIFICANT CHANGE UP (ref 0–0)
NRBC # BLD: 0 /100 WBCS — SIGNIFICANT CHANGE UP (ref 0–0)
PLATELET # BLD AUTO: 345 K/UL — SIGNIFICANT CHANGE UP (ref 150–400)
PLATELET # BLD AUTO: 345 K/UL — SIGNIFICANT CHANGE UP (ref 150–400)
POTASSIUM SERPL-MCNC: 4.8 MMOL/L — SIGNIFICANT CHANGE UP (ref 3.5–5.3)
POTASSIUM SERPL-MCNC: 4.8 MMOL/L — SIGNIFICANT CHANGE UP (ref 3.5–5.3)
POTASSIUM SERPL-SCNC: 4.8 MMOL/L — SIGNIFICANT CHANGE UP (ref 3.5–5.3)
POTASSIUM SERPL-SCNC: 4.8 MMOL/L — SIGNIFICANT CHANGE UP (ref 3.5–5.3)
PROT SERPL-MCNC: 6.4 G/DL — SIGNIFICANT CHANGE UP (ref 6–8.3)
PROT SERPL-MCNC: 6.4 G/DL — SIGNIFICANT CHANGE UP (ref 6–8.3)
RBC # BLD: 3.52 M/UL — LOW (ref 3.8–5.2)
RBC # BLD: 3.52 M/UL — LOW (ref 3.8–5.2)
RBC # FLD: 15.6 % — HIGH (ref 10.3–14.5)
RBC # FLD: 15.6 % — HIGH (ref 10.3–14.5)
SODIUM SERPL-SCNC: 140 MMOL/L — SIGNIFICANT CHANGE UP (ref 135–145)
SODIUM SERPL-SCNC: 140 MMOL/L — SIGNIFICANT CHANGE UP (ref 135–145)
WBC # BLD: 6.84 K/UL — SIGNIFICANT CHANGE UP (ref 3.8–10.5)
WBC # BLD: 6.84 K/UL — SIGNIFICANT CHANGE UP (ref 3.8–10.5)
WBC # FLD AUTO: 6.84 K/UL — SIGNIFICANT CHANGE UP (ref 3.8–10.5)
WBC # FLD AUTO: 6.84 K/UL — SIGNIFICANT CHANGE UP (ref 3.8–10.5)

## 2023-11-24 PROCEDURE — 99233 SBSQ HOSP IP/OBS HIGH 50: CPT

## 2023-11-24 PROCEDURE — 99233 SBSQ HOSP IP/OBS HIGH 50: CPT | Mod: GC

## 2023-11-24 RX ORDER — METOPROLOL TARTRATE 50 MG
12.5 TABLET ORAL EVERY 12 HOURS
Refills: 0 | Status: DISCONTINUED | OUTPATIENT
Start: 2023-11-24 | End: 2023-12-05

## 2023-11-24 RX ADMIN — NYSTATIN CREAM 1 APPLICATION(S): 100000 CREAM TOPICAL at 17:06

## 2023-11-24 RX ADMIN — MODAFINIL 50 MILLIGRAM(S): 200 TABLET ORAL at 08:05

## 2023-11-24 RX ADMIN — NYSTATIN CREAM 1 APPLICATION(S): 100000 CREAM TOPICAL at 05:28

## 2023-11-24 RX ADMIN — Medication 200 MILLIGRAM(S): at 05:29

## 2023-11-24 RX ADMIN — Medication 1 TABLET(S): at 14:47

## 2023-11-24 RX ADMIN — POLYETHYLENE GLYCOL 3350 17 GRAM(S): 17 POWDER, FOR SOLUTION ORAL at 17:04

## 2023-11-24 RX ADMIN — MAGNESIUM OXIDE 400 MG ORAL TABLET 400 MILLIGRAM(S): 241.3 TABLET ORAL at 21:17

## 2023-11-24 RX ADMIN — FAMOTIDINE 20 MILLIGRAM(S): 10 INJECTION INTRAVENOUS at 05:29

## 2023-11-24 RX ADMIN — SENNA PLUS 2 TABLET(S): 8.6 TABLET ORAL at 21:17

## 2023-11-24 RX ADMIN — ESCITALOPRAM OXALATE 5 MILLIGRAM(S): 10 TABLET, FILM COATED ORAL at 12:28

## 2023-11-24 RX ADMIN — Medication 10 MILLIEQUIVALENT(S): at 12:29

## 2023-11-24 RX ADMIN — ATORVASTATIN CALCIUM 40 MILLIGRAM(S): 80 TABLET, FILM COATED ORAL at 21:17

## 2023-11-24 RX ADMIN — Medication 200 MILLIGRAM(S): at 17:05

## 2023-11-24 RX ADMIN — FAMOTIDINE 20 MILLIGRAM(S): 10 INJECTION INTRAVENOUS at 17:05

## 2023-11-24 RX ADMIN — Medication 1 TABLET(S): at 08:06

## 2023-11-24 RX ADMIN — Medication 12.5 MILLIGRAM(S): at 17:05

## 2023-11-24 RX ADMIN — POLYETHYLENE GLYCOL 3350 17 GRAM(S): 17 POWDER, FOR SOLUTION ORAL at 05:29

## 2023-11-24 RX ADMIN — Medication 1 TABLET(S): at 17:05

## 2023-11-24 RX ADMIN — ENOXAPARIN SODIUM 40 MILLIGRAM(S): 100 INJECTION SUBCUTANEOUS at 17:05

## 2023-11-24 RX ADMIN — LORATADINE 10 MILLIGRAM(S): 10 TABLET ORAL at 12:29

## 2023-11-24 NOTE — PROGRESS NOTE ADULT - SUBJECTIVE AND OBJECTIVE BOX
Patient is a 83y old  Female who presents with a chief complaint of Subdural Hemorrhage (19 Nov 2023 14:22)    HPI:  Ginna Vergara is an 83 year old female with PMH of HTN, HLD, Arthritis, and normal pressure hydrocephalus (s/p VPS- March 2023 at [place]); who presented to [facility] on 11/8 for progressive weakness, confusion and decreased PO intake, and was found to have a mixed SDH with 1.4cm midline shift. She was transferred to [facility] later that day for further management. On 11/9, she underwent R sided reilly hole placement followed by MMA embolization on 11/10 with Dr. Rodriguez. Her hospital course was complicated by 2 RRTs for unresponsiveness. Serial brain imaging was stable and she was found to be in rapid AFIB. 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 PM&R was consulted and deemed her an appropriate candidate for IRF. She was medically stabilized and cleared for discharge to [facility].      PAST MEDICAL & SURGICAL HISTORY:  Normal pressure hydrocephalus  HTN (hypertension)  HLD (hyperlipidemia)  Arthritis  S/P  shunt    SUBJECTIVE/OBJECTIVE/ROS: No acute overnight events. Patient seen and examined in the AM. She is doing well today. Alert to self, hospital, year with multiple choice (guess 2017). Able to guess day when told yesterday was Thanksgiving.  Sitting in WC bedside. Half of breakfast eaten. Reports sleeping well but would like to get back into bed and sleep more. Spilled juice on her blanket and requests a new one -- generally cold and loves the heated blankets.   VSS. Afebrile. Denies urinary complaints.     ROS  +neurological deficits  +runny nose  denies cough    PHYSICAL EXAM    ICU Vital Signs Last 24 Hrs  T(C): 36.4 (24 Nov 2023 08:05), Max: 36.6 (23 Nov 2023 17:44)  T(F): 97.6 (24 Nov 2023 08:05), Max: 97.8 (23 Nov 2023 17:44)  HR: 64 (24 Nov 2023 08:05) (57 - 83)  BP: 95/62 (24 Nov 2023 08:05) (95/62 - 117/73)  RR: 15 (24 Nov 2023 08:05) (15 - 16)  SpO2: 98% (24 Nov 2023 08:05) (96% - 98%)    O2 Parameters below as of 24 Nov 2023 08:05  Patient On (Oxygen Delivery Method): room air    Constitutional - NAD, Comfortable  HEENT - NCAT, LILO  Neck - Supple, No limited ROM  Chest - shallow breaths, CTA BL  Cardio - RRR  Abdomen -  Soft, NTND, +BS  Extremities - RLE with 2+ pitting edema, No calf tenderness   Neurologic Exam:                    Cognitive - confused            Orientation: Awake, A&O x2-- thinks its 2017, able to pick 2023 with multiple choice options            Attention:  easily distracted            Memory: impaired      Speech - Fluent, no aphasia      Cranial Nerves - difficulty tracking, no facial droop      Motor -                      LEFT  UE - ShF 3/5, EF 4/5, EE 4/5, WE 4/5,  4/5                    RIGHT UE - ShF 4/5, EF 5/5, EE 5/5, WE 5/5,  4/5                    LEFT    LE - HF 4/5, KE 4/5, DF 4/5, PF 4/5                    RIGHT LE - HF 4/5, KE 5/5, DF 4/5, PF 5/5        Sensory - Intact to LT bilaterally  Psychiatric - Calm    RECENT LABS                        10.1   6.84  )-----------( 345      ( 24 Nov 2023 06:20 )             31.8     11-24    140  |  105  |  28<H>  ----------------------------<  96  4.8   |  28  |  1.06    Ca    10.8<H>      24 Nov 2023 06:20    TPro  6.4  /  Alb  2.8<L>  /  TBili  0.3  /  DBili  x   /  AST  17  /  ALT  23  /  AlkPhos  73  11-24      Culture - Urine (collected 21 Nov 2023 18:30)  Source: Catheterized Catheterized  Preliminary Report (23 Nov 2023 15:37):    >100,000 CFU/ml Gram Negative Rods      Urinalysis Basic - ( 24 Nov 2023 06:20 )    Color: x / Appearance: x / SG: x / pH: x  Gluc: 96 mg/dL / Ketone: x  / Bili: x / Urobili: x   Blood: x / Protein: x / Nitrite: x   Leuk Esterase: x / RBC: x / WBC x   Sq Epi: x / Non Sq Epi: x / Bacteria: x      CAPILLARY BLOOD GLUCOSE      Allergies  No Known Allergies    MEDICATIONS  (STANDING):  atorvastatin 40 milliGRAM(s) Oral at bedtime  cefpodoxime 200 milliGRAM(s) Oral every 12 hours  dextrose 5%. 1000 milliLiter(s) (100 mL/Hr) IV Continuous <Continuous>  dextrose 5%. 1000 milliLiter(s) (50 mL/Hr) IV Continuous <Continuous>  dextrose 50% Injectable 25 Gram(s) IV Push once  dextrose 50% Injectable 12.5 Gram(s) IV Push once  dextrose 50% Injectable 25 Gram(s) IV Push once  enoxaparin Injectable 40 milliGRAM(s) SubCutaneous <User Schedule>  escitalopram 5 milliGRAM(s) Oral daily  famotidine    Tablet 20 milliGRAM(s) Oral two times a day  glucagon  Injectable 1 milliGRAM(s) IntraMuscular once  hydroxychloroquine 200 milliGRAM(s) Oral two times a day  lactobacillus acidophilus 1 Tablet(s) Oral two times a day with meals  loratadine 10 milliGRAM(s) Oral daily  magnesium oxide 400 milliGRAM(s) Oral at bedtime  metoprolol tartrate 25 milliGRAM(s) Oral every 12 hours  modafinil 50 milliGRAM(s) Oral <User Schedule>  nystatin Powder 1 Application(s) Topical two times a day  polyethylene glycol 3350 17 Gram(s) Oral two times a day  potassium chloride    Tablet ER 10 milliEquivalent(s) Oral daily  senna 2 Tablet(s) Oral at bedtime    MEDICATIONS  (PRN):  acetaminophen     Tablet .. 975 milliGRAM(s) Oral every 8 hours PRN Mild Pain (1 - 3)  bisacodyl 5 milliGRAM(s) Oral every 12 hours PRN Constipation  dextrose Oral Gel 15 Gram(s) Oral once PRN Blood Glucose LESS THAN 70 milliGRAM(s)/deciliter       Patient is a 83y old  Female who presents with a chief complaint of Subdural Hemorrhage (19 Nov 2023 14:22)    HPI:  Ginna Vergara is an 83 year old female with PMH of HTN, HLD, Arthritis, and normal pressure hydrocephalus (s/p VPS- March 2023 at [place]); who presented to [facility] on 11/8 for progressive weakness, confusion and decreased PO intake, and was found to have a mixed SDH with 1.4cm midline shift. She was transferred to [facility] later that day for further management. On 11/9, she underwent R sided reilly hole placement followed by MMA embolization on 11/10 with Dr. Rodriguez. Her hospital course was complicated by 2 RRTs for unresponsiveness. Serial brain imaging was stable and she was found to be in rapid AFIB. 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 PM&R was consulted and deemed her an appropriate candidate for IRF. She was medically stabilized and cleared for discharge to [facility].      PAST MEDICAL & SURGICAL HISTORY:  Normal pressure hydrocephalus  HTN (hypertension)  HLD (hyperlipidemia)  Arthritis  S/P  shunt    SUBJECTIVE/OBJECTIVE/ROS: No acute overnight events. Patient seen and examined in the AM. She is doing well today. Alert to self, hospital, year with multiple choice (guess 2017). Able to guess day when told yesterday was Thanksgiving.  Sitting in WC bedside. Half of breakfast eaten. Reports sleeping well but would like to get back into bed and sleep more. Spilled juice on her blanket and requests a new one -- generally cold and loves the heated blankets.   VSS. Afebrile. Denies urinary complaints.     ROS  +neurological deficits  +runny nose  denies cough    PHYSICAL EXAM    ICU Vital Signs Last 24 Hrs  T(C): 36.4 (24 Nov 2023 08:05), Max: 36.6 (23 Nov 2023 17:44)  T(F): 97.6 (24 Nov 2023 08:05), Max: 97.8 (23 Nov 2023 17:44)  HR: 64 (24 Nov 2023 08:05) (57 - 83)  BP: 95/62 (24 Nov 2023 08:05) (95/62 - 117/73)  RR: 15 (24 Nov 2023 08:05) (15 - 16)  SpO2: 98% (24 Nov 2023 08:05) (96% - 98%)    O2 Parameters below as of 24 Nov 2023 08:05  Patient On (Oxygen Delivery Method): room air    Constitutional - NAD, Comfortable  HEENT - s/p reilly hole with staples intact --incision c/d/i  Neck - Supple, No limited ROM  Chest - shallow breaths, CTA BL  Cardio - RRR  Abdomen -  Soft, NTND, +BS  Extremities - RLE with 2+ pitting edema, No calf tenderness   Neurologic Exam:                    Cognitive - confused            Orientation: Awake, A&O x2-- thinks its 2017, able to pick 2023 with multiple choice options, does not know name of hospital, able to figure out month when given hints            Attention:  easily distracted            Memory: impaired      Speech - Fluent, no aphasia , but delayed processing      Cranial Nerves - difficulty tracking, no facial droop      Motor -                      LEFT  UE - ShF 3/5, EF 4/5, EE 4/5, WE 4/5,  4/5                    RIGHT UE - ShF 4/5, EF 5/5, EE 5/5, WE 5/5,  4/5                    LEFT    LE - HF 4/5, KE 4/5, DF 4/5, PF 4/5                    RIGHT LE - HF 4/5, KE 5/5, DF 4/5, PF 5/5        Sensory - Intact to LT bilaterally  Psychiatric - Calm    RECENT LABS                        10.1   6.84  )-----------( 345      ( 24 Nov 2023 06:20 )             31.8     11-24    140  |  105  |  28<H>  ----------------------------<  96  4.8   |  28  |  1.06    Ca    10.8<H>      24 Nov 2023 06:20    TPro  6.4  /  Alb  2.8<L>  /  TBili  0.3  /  DBili  x   /  AST  17  /  ALT  23  /  AlkPhos  73  11-24      Culture - Urine (collected 21 Nov 2023 18:30)  Source: Catheterized Catheterized  Preliminary Report (23 Nov 2023 15:37):    >100,000 CFU/ml Gram Negative Rods      Urinalysis Basic - ( 24 Nov 2023 06:20 )    Color: x / Appearance: x / SG: x / pH: x  Gluc: 96 mg/dL / Ketone: x  / Bili: x / Urobili: x   Blood: x / Protein: x / Nitrite: x   Leuk Esterase: x / RBC: x / WBC x   Sq Epi: x / Non Sq Epi: x / Bacteria: x      CAPILLARY BLOOD GLUCOSE      Allergies  No Known Allergies    MEDICATIONS  (STANDING):  atorvastatin 40 milliGRAM(s) Oral at bedtime  cefpodoxime 200 milliGRAM(s) Oral every 12 hours  dextrose 5%. 1000 milliLiter(s) (100 mL/Hr) IV Continuous <Continuous>  dextrose 5%. 1000 milliLiter(s) (50 mL/Hr) IV Continuous <Continuous>  dextrose 50% Injectable 25 Gram(s) IV Push once  dextrose 50% Injectable 12.5 Gram(s) IV Push once  dextrose 50% Injectable 25 Gram(s) IV Push once  enoxaparin Injectable 40 milliGRAM(s) SubCutaneous <User Schedule>  escitalopram 5 milliGRAM(s) Oral daily  famotidine    Tablet 20 milliGRAM(s) Oral two times a day  glucagon  Injectable 1 milliGRAM(s) IntraMuscular once  hydroxychloroquine 200 milliGRAM(s) Oral two times a day  lactobacillus acidophilus 1 Tablet(s) Oral two times a day with meals  loratadine 10 milliGRAM(s) Oral daily  magnesium oxide 400 milliGRAM(s) Oral at bedtime  metoprolol tartrate 25 milliGRAM(s) Oral every 12 hours  modafinil 50 milliGRAM(s) Oral <User Schedule>  nystatin Powder 1 Application(s) Topical two times a day  polyethylene glycol 3350 17 Gram(s) Oral two times a day  potassium chloride    Tablet ER 10 milliEquivalent(s) Oral daily  senna 2 Tablet(s) Oral at bedtime    MEDICATIONS  (PRN):  acetaminophen     Tablet .. 975 milliGRAM(s) Oral every 8 hours PRN Mild Pain (1 - 3)  bisacodyl 5 milliGRAM(s) Oral every 12 hours PRN Constipation  dextrose Oral Gel 15 Gram(s) Oral once PRN Blood Glucose LESS THAN 70 milliGRAM(s)/deciliter

## 2023-11-24 NOTE — PROGRESS NOTE ADULT - ASSESSMENT
84 y/o woman with PMH of HTN, HLD, Arthritis, and normal pressure hydrocephalus (s/p VPS- March 2023 at [place]); who presented to [facility] on 11/8 for progressive weakness, confusion and decreased PO intake, and was found to have a mixed SDH with 1.4cm midline shift. She was transferred to [facility] later that day for further management. On 11/9, she underwent R sided reilly hole placement, followed by MMA embolization on 11/10 with Dr. Rodriguez. Her hospital course was complicated by 2 RRTs for unresponsiveness. Serial brain imaging was stable and she was found to be in rapid AFIB. 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 Patient now admitted for a multidisciplinary rehab program with cognitive deficits, gait and ADL impairments.     #Subdural Hemorrhage, Normal Pressure Hydrocephalus, Seizure Prophylaxis  - S/p VPS March 2023  - CTH 11/8: Moderate to large mixed attenuation right hemispheric convexity SDH compressing right hemisphere and right lateral ventricle with 1.4 cm midline shift to left. Right frontal ventriculostomy shunt catheter within right frontal horn.  - CTH 11/14:Right convexity subdural collection of air, blood and fluid continues to decrease in size.  - S/p R sided reilly hole placement (11/9), followed by MMA embolization (11/10)  - Seizure PPx per primary team  - continue comprehensive rehab program    #Rapid AFIB, HLD  - Metoprolol 25mg BID  - No AC  - Evaluation for Watchmann device outpatient with cardiology  - Atorvastatin 40mg at HS     #UTI  - UA positive for "triple phosphate crystal", wbc and LE  - Vantin 200mg BID for 7 days    #Arthritis  - Plaquenil 200mg BID    #DVT PPx  - Lovenox 40 SUbQ 82 y/o woman with PMH of HTN, HLD, Arthritis, and normal pressure hydrocephalus (s/p VPS- March 2023 at [place]); who presented to [facility] on 11/8 for progressive weakness, confusion and decreased PO intake, and was found to have a mixed SDH with 1.4cm midline shift. She was transferred to [facility] later that day for further management. On 11/9, she underwent R sided reilly hole placement, followed by MMA embolization on 11/10 with Dr. Rodriguez. Her hospital course was complicated by 2 RRTs for unresponsiveness. Serial brain imaging was stable and she was found to be in rapid AFIB. 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 Patient now admitted for a multidisciplinary rehab program with cognitive deficits, gait and ADL impairments.     #Subdural Hemorrhage, Normal Pressure Hydrocephalus, Seizure Prophylaxis  - S/p VPS March 2023  - CTH 11/8: Moderate to large mixed attenuation right hemispheric convexity SDH compressing right hemisphere and right lateral ventricle with 1.4 cm midline shift to left. Right frontal ventriculostomy shunt catheter within right frontal horn.  - CTH 11/14:Right convexity subdural collection of air, blood and fluid continues to decrease in size.  - S/p R sided reilly hole placement (11/9), followed by MMA embolization (11/10)  - Seizure PPx per primary team  - continue comprehensive rehab program    #Rapid AFIB, HLD  - will decrease metoprolol to 12.5mg BID for low BP and controlled-low HR  - No AC  - Evaluation for Watchmann device outpatient with cardiology  - Atorvastatin 40mg at HS     #UTI  - UA positive for "triple phosphate crystal", wbc and LE  - Vantin 200mg BID for 7 days    #Arthritis  - Plaquenil 200mg BID    #DVT PPx  - Lovenox 40 SUbQ

## 2023-11-24 NOTE — PROGRESS NOTE ADULT - SUBJECTIVE AND OBJECTIVE BOX
Patient is a 83y old  Female who presents with a chief complaint of Subdural Hemorrhage (23 Nov 2023 14:17)      Patient seen and examined at bedside. No events overnight. Patient without headache, changes in vision, nausea or vomiting, chest pain, sob, abd pain.    ALLERGIES:  No Known Allergies    MEDICATIONS  (STANDING):  atorvastatin 40 milliGRAM(s) Oral at bedtime  cefpodoxime 200 milliGRAM(s) Oral every 12 hours  dextrose 5%. 1000 milliLiter(s) (50 mL/Hr) IV Continuous <Continuous>  dextrose 5%. 1000 milliLiter(s) (100 mL/Hr) IV Continuous <Continuous>  dextrose 50% Injectable 12.5 Gram(s) IV Push once  dextrose 50% Injectable 25 Gram(s) IV Push once  dextrose 50% Injectable 25 Gram(s) IV Push once  enoxaparin Injectable 40 milliGRAM(s) SubCutaneous <User Schedule>  escitalopram 5 milliGRAM(s) Oral daily  famotidine    Tablet 20 milliGRAM(s) Oral two times a day  glucagon  Injectable 1 milliGRAM(s) IntraMuscular once  hydroxychloroquine 200 milliGRAM(s) Oral two times a day  lactobacillus acidophilus 1 Tablet(s) Oral two times a day with meals  loratadine 10 milliGRAM(s) Oral daily  magnesium oxide 400 milliGRAM(s) Oral at bedtime  metoprolol tartrate 25 milliGRAM(s) Oral every 12 hours  modafinil 50 milliGRAM(s) Oral <User Schedule>  nystatin Powder 1 Application(s) Topical two times a day  polyethylene glycol 3350 17 Gram(s) Oral two times a day  potassium chloride    Tablet ER 10 milliEquivalent(s) Oral daily  senna 2 Tablet(s) Oral at bedtime    MEDICATIONS  (PRN):  acetaminophen     Tablet .. 975 milliGRAM(s) Oral every 8 hours PRN Mild Pain (1 - 3)  bisacodyl 5 milliGRAM(s) Oral every 12 hours PRN Constipation  dextrose Oral Gel 15 Gram(s) Oral once PRN Blood Glucose LESS THAN 70 milliGRAM(s)/deciliter    Vital Signs Last 24 Hrs  T(F): 97.6 (24 Nov 2023 08:05), Max: 97.8 (23 Nov 2023 17:44)  HR: 64 (24 Nov 2023 08:05) (57 - 83)  BP: 95/62 (24 Nov 2023 08:05) (95/62 - 117/73)  RR: 15 (24 Nov 2023 08:05) (15 - 16)  SpO2: 98% (24 Nov 2023 08:05) (96% - 98%)  I&O's Summary      PHYSICAL EXAM:  GENERAL: NAD, laying in bed  EYES: PEERL, conjunctiva and sclera clear  ENMT: Moist mucous membranes, Supple, No JVD  CHEST/LUNG: Clear to auscultation bilaterally, good air entry, non-labored breathing  HEART: RRR; S1/S2, No murmur  ABDOMEN: Soft, Nontender, Nondistended; Bowel sounds present  EXTREMITIES: No calf tenderness, No cyanosis, No edema  SKIN: Warm, perfused  PSYCH: Normal mood, Normal affect    LABS:                        10.1   6.84  )-----------( 345      ( 24 Nov 2023 06:20 )             31.8     11-24    140  |  105  |  28  ----------------------------<  96  4.8   |  28  |  1.06    Ca    10.8      24 Nov 2023 06:20    TPro  6.4  /  Alb  2.8  /  TBili  0.3  /  DBili  x   /  AST  17  /  ALT  23  /  AlkPhos  73  11-24              11-08 Chol 144 mg/dL LDL -- HDL 65 mg/dL Trig 36 mg/dL                  Urinalysis Basic - ( 24 Nov 2023 06:20 )    Color: x / Appearance: x / SG: x / pH: x  Gluc: 96 mg/dL / Ketone: x  / Bili: x / Urobili: x   Blood: x / Protein: x / Nitrite: x   Leuk Esterase: x / RBC: x / WBC x   Sq Epi: x / Non Sq Epi: x / Bacteria: x        Culture - Urine (collected 21 Nov 2023 18:30)  Source: Catheterized Catheterized  Preliminary Report (23 Nov 2023 15:37):    >100,000 CFU/ml Gram Negative Rods      COVID-19 PCR: Elianatemichael (11-17-23 @ 19:55)      RADIOLOGY & ADDITIONAL TESTS:    Care Discussed with Consultants/Other Providers:

## 2023-11-24 NOTE — PROGRESS NOTE ADULT - ASSESSMENT
Assessment and Plan:   83 year old female with PMH of HTN, HLD, Arthritis, and normal pressure hydrocephalus (s/p VPS- March 2023 at [facility]); who presented to [facility] on 11/8 for progressive weakness, confusion and decreased PO intake, and was found to have a mixed SDH with 1.4cm midline shift. She was transferred to [facility] later that day for further management. On 11/9, she underwent R sided reilly hole placement, followed by MMA embolization on 11/10 with Dr. Rodriguez. Her hospital course was complicated by 2 RRTs for unresponsiveness. Serial brain imaging was stable and she was found to be in rapid AFIB. 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 Patient now admitted for a multidisciplinary rehab program with cognitive deficits, gait and ADL impairments.     #Traumatic Subdural Hemorrhage  - CTH 11/8: Moderate to large mixed attenuation right hemispheric convexity SDH compressing right hemisphere and right lateral ventricle with 1.4 cm midline shift to left. Right frontal ventriculostomy shunt catheter within right frontal horn.  - CTH 11/14: Right convexity subdural collection of air, blood and fluid continues to decrease in size.  - Gait Instability, ADL impairments and Functional impairments: start Comprehensive Rehab Program of PT/OT/SLP  - 3 hours a day, 5 days a week  - S/p R sided reilly hole placement (11/9), followed by MMA embolization (11/10) - Staples intact  - Outpatient follow up with NSGY, Dr. Rodriguez  - Modafinil 50 mg q 8am (11/21)    #Normal Pressure Hydrocephalus  - S/p VPS March 2023  - Dr. Simon ([facility])   - CTH 11/20: deep subcortical white matter ischemia, unchanged. RIGHT frontal shunt catheter tip noted in the RIGHT lateral ventricle. Mild ventriculomegaly unchanged. Tiny residual subdural collection.  -No acute intervention from NSGY standpoint-- per NP discussion with neurosurgeon Dr Méndez 11/20 , follow up outpatient.     UTI--denies urinary frequency urgency, dysuria, however confused and unreliable  -UA +(+) Leuk esterase, (+) WBC, (-) nitrite  - pending final culture, g- rods  -Vantin 200 mg po q 12 for 7 days, discussed with hospitalist     #Runny nose-- reported in therapy 11/21 afternoon  - RVP and Covid swab negative  - afebrile, no elevated wbc count, continue to monitor     #KEITH  - Bun/Cr: 25/1.03 (11/20)  28/1.06 (11/24).   - Encourage oral hydration  - Repeat BMP 11/28    #Mood  - Lexapro 5 mg po (11/20), for irritability     #Seizure Prophylaxis, no longer required  - Levetiracetam 500mg BID-- tapered off  --No seizure events throughout hospital course and EEG neg for seizures    #Rapid AFIB  - Metoprolol 25mg BID  - No AC d/t SDH  - Evaluation for Watchmann device outpatient with cardiology    #HLD  - Atorvastatin 40mg at HS     #Arthritis  - Plaquenil 200mg BID (home regimen)    #Skin  - Skin on admission: Right sided surgical incision intact with staples Dry skin over gluteal region. Skin tags throughout body.   - Remove staples on 11/27  - Nystatin powder BID   - Pressure injury/Skin: OOB to Chair, PT/OT     #Pain Mgmt   - Tylenol PRN    #GI/Bowel Mgmt  - Diet: regular    - Famotidine 20mg BID   - Miralax BID  - Senna   - PRN: Bisacodyl 5mg at HS     #/Bladder Mgmt   -  monitor PVR q8h, CIC>400cc  - Toileting programed q3-4 hrs during awake hours    #FEN   - Diet - Regular + Thins    - Dysphagia  SLP - evaluation and treatment    #Health Maintenance  - KCL 10mEq daily   - Magnesium Oxide 400mg at HS  - Fosamax 70mg weekly- home regimen  - K: 4.5;  mag level 2.1    #Precautions / PROPHYLAXIS:   - Falls, Seizure   - ortho: Weight bearing status: WBAT   - Lungs: Aspiration, Incentive Spirometer   - DVT: Lovenox    #IDT 11/21   Nursing: incontinent bowel/bladder   Social: lives with , independent prior, retired    Preferences: wants pizza   SLP: reglar diet thin liquids, decreased memory/attention/left inattention   OT: supervision for eating/oral hygiene, dependent LBD/footwear   PT: dependent   Goals: Increase attention/focus within 10-15 intervals, ModA transfers/bedmobility    DC: Home/GIANLUCA, TBD   TDD 12/5/23  ------------------------------------------------------  OUTPATIENT/FOLLOW UP:    Sierra Rodriguez  Neurosurgery  [address] [place] [id number]  Phone: [phone]  Fax: [phone]  Follow Up Time: 2 weeks    Neurologist- managing VPS   Dr. Aurora Webb   Watchmann device outpatient with cardiology      -11/22: fellow attempted to call patient's , Kyle Vergara, to provide medical updates, but the call went to  x2. Will try again.      Assessment and Plan:   83 year old female with PMH of HTN, HLD, Arthritis, and normal pressure hydrocephalus (s/p VPS- March 2023 at [facility]); who presented to [facility] on 11/8 for progressive weakness, confusion and decreased PO intake, and was found to have a mixed SDH with 1.4cm midline shift. She was transferred to [facility] later that day for further management. On 11/9, she underwent R sided reilly hole placement, followed by MMA embolization on 11/10 with Dr. Rodriguez. Her hospital course was complicated by 2 RRTs for unresponsiveness. Serial brain imaging was stable and she was found to be in rapid AFIB. 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 Patient now admitted for a multidisciplinary rehab program with cognitive deficits, gait and ADL impairments.     #Traumatic Subdural Hemorrhage  - CTH 11/8: Moderate to large mixed attenuation right hemispheric convexity SDH compressing right hemisphere and right lateral ventricle with 1.4 cm midline shift to left. Right frontal ventriculostomy shunt catheter within right frontal horn.  - CTH 11/14: Right convexity subdural collection of air, blood and fluid continues to decrease in size.  - Gait Instability, ADL impairments and Functional impairments: start Comprehensive Rehab Program of PT/OT/SLP  - 3 hours a day, 5 days a week  - S/p R sided reilly hole placement (11/9), followed by MMA embolization (11/10) - Staples intact  - Outpatient follow up with NSGY, Dr. Rodriguez  - C/w Modafinil 50 mg q 8am (11/21)    #Normal Pressure Hydrocephalus  - S/p VPS March 2023  - Dr. Simon ([place])   - CTH 11/20: deep subcortical white matter ischemia, unchanged. RIGHT frontal shunt catheter tip noted in the RIGHT lateral ventricle. Mild ventriculomegaly unchanged. Tiny residual subdural collection.  -No acute intervention from NSGY standpoint-- per NP discussion with neurosurgeon Dr Méndez 11/20 , follow up outpatient.     UTI--denies urinary frequency urgency, dysuria, however confused and unreliable  -UA (+) Leuk esterase, (+) WBC, (-) nitrite  - pending final culture, gram- rods in prelim culture  -C/w Vantin 200 mg po q 12 for 7 days, discussed with hospitalist     #Rapid AFIB  - Metoprolol 25mg BID-- decreased to 12.5 mg BID (11/24)-- BP on lower side -- discussed with hospitalist   - No AC d/t SDH  - Evaluation for Watchmann device outpatient with cardiology    #KEITH  - Bun/Cr: 25/1.03 (11/20)  28/1.06 (11/24)  - Encourage oral hydration  - Repeat BMP 11/27    #Mood  - Lexapro 5 mg po (11/20), for irritability     #Runny nose-- reported in therapy 11/21 afternoon  - RVP and Covid swab negative  - afebrile, no elevated wbc count, continue to monitor     #Seizure Prophylaxis, no longer required  - Levetiracetam 500mg BID-- tapered off  --No seizure events throughout hospital course and EEG neg for seizures    #HLD  - Atorvastatin 40mg at HS     #Arthritis  - Plaquenil 200mg BID (home regimen)    #Skin  - Skin on admission: Right sided surgical incision intact with staples Dry skin over gluteal region. Skin tags throughout body.   - Remove staples on 11/27  - Nystatin powder BID   - Pressure injury/Skin: OOB to Chair, PT/OT     #Pain Mgmt   - Tylenol PRN    #GI/Bowel Mgmt  - Diet: regular    - Famotidine 20mg BID   - Miralax BID  - Senna   - PRN: Bisacodyl 5mg at HS     #/Bladder Mgmt   -  monitor PVR q8h, CIC>400cc  - Toileting programed q3-4 hrs during awake hours    #FEN   - Diet - Regular + Thins    - Dysphagia  SLP - evaluation and treatment    #Health Maintenance  - KCL 10mEq daily   - Magnesium Oxide 400mg at HS  - Fosamax 70mg weekly- home regimen  - K: 4.5;  mag level 2.1    #Precautions / PROPHYLAXIS:   - Falls, Seizure   - ortho: Weight bearing status: WBAT   - Lungs: Aspiration, Incentive Spirometer   - DVT ppx: Lovenox    #IDT 11/21   Nursing: incontinent bowel/bladder   Social: lives with , independent prior, retired    Preferences: wants anastasiia   SLP: reglar diet thin liquids, decreased memory/attention/left inattention   OT: supervision for eating/oral hygiene, dependent LBD/footwear   PT: dependent   Goals: Increase attention/focus within 10-15 intervals, ModA transfers/bedmobility    DC: Home/GIANLUCA, TBD   TDD 12/5/23  ------------------------------------------------------  OUTPATIENT/FOLLOW UP:    Sierra Rodriguez  Neurosurgery  [address] [place] [id number]  Phone: [phone]  Fax: [phone]  Follow Up Time: 2 weeks    Neurologist- managing VPS   Dr. Aurora Webb   Watchmann device outpatient with cardiology      -11/22: fellow attempted to call patient's , Kyle Vergara, to provide medical updates, but the call went to  x2.     Assessment and Plan:   83 year old female with PMH of HTN, HLD, Arthritis, and normal pressure hydrocephalus (s/p VPS- March 2023 at [place]); who presented to [facility] on 11/8 for progressive weakness, confusion and decreased PO intake, and was found to have a mixed SDH with 1.4cm midline shift. She was transferred to [facility] later that day for further management. On 11/9, she underwent R sided reilly hole placement, followed by MMA embolization on 11/10 with Dr. Rodriguez. Her hospital course was complicated by 2 RRTs for unresponsiveness. Serial brain imaging was stable and she was found to be in rapid AFIB. 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 Patient now admitted for a multidisciplinary rehab program with cognitive deficits, gait and ADL impairments.     #Traumatic Subdural Hemorrhage  - CTH 11/8: Moderate to large mixed attenuation right hemispheric convexity SDH compressing right hemisphere and right lateral ventricle with 1.4 cm midline shift to left. Right frontal ventriculostomy shunt catheter within right frontal horn.  - CTH 11/14: Right convexity subdural collection of air, blood and fluid continues to decrease in size.  - Gait Instability, ADL impairments and Functional impairments: start Comprehensive Rehab Program of PT/OT/SLP  - 3 hours a day, 5 days a week  - S/p R sided reilly hole placement (11/9), followed by MMA embolization (11/10) - Staples intact-- remove next week   - Outpatient follow up with NSGY, Dr. Rodriguez  - C/w Modafinil 50 mg q 8am (11/21)    #Normal Pressure Hydrocephalus  - S/p VPS March 2023  - Dr. Simon ([place])   - CTH 11/20: deep subcortical white matter ischemia, unchanged. RIGHT frontal shunt catheter tip noted in the RIGHT lateral ventricle. Mild ventriculomegaly unchanged. Tiny residual subdural collection.  -No acute intervention from NSGY standpoint-- per NP discussion with neurosurgeon Dr Méndez 11/20 , follow up outpatient.     UTI--denies urinary frequency urgency, dysuria, however confused and unreliable  -UA (+) Leuk esterase, (+) WBC, (-) nitrite  - pending final culture, gram- rods in prelim culture  -C/w Vantin 200 mg po q 12 for 7 days, discussed with hospitalist     #Rapid AFIB  - Metoprolol 25mg BID-- decreased to 12.5 mg BID (11/24)-- BP on lower side -- discussed with hospitalist   - No AC d/t SDH  - Evaluation for Watchmann device outpatient with cardiology    #KEITH  - Bun/Cr: 25/1.03 (11/20)  28/1.06 (11/24)  - Encourage oral hydration  - Repeat BMP 11/27    #Mood  - Lexapro 5 mg po (11/20), for irritability     #Runny nose-- reported in therapy 11/21 afternoon  - RVP and Covid swab negative  - afebrile, no elevated wbc count, continue to monitor     #Seizure Prophylaxis, no longer required  - Levetiracetam 500mg BID-- tapered off  --No seizure events throughout hospital course and EEG neg for seizures    #HLD  - Atorvastatin 40mg at HS     #Arthritis  - Plaquenil 200mg BID (home regimen)    #Skin  - Skin on admission: Right sided surgical incision intact with staples Dry skin over gluteal region. Skin tags throughout body.   - Remove staples on 11/27  - Nystatin powder BID   - Pressure injury/Skin: OOB to Chair, PT/OT     #Pain Mgmt   - Tylenol PRN    #GI/Bowel Mgmt  - Diet: regular    - Famotidine 20mg BID   - Miralax BID  - Senna   - PRN: Bisacodyl 5mg at HS     #/Bladder Mgmt   -  monitor PVR q8h, CIC>400cc  - Toileting programed q3-4 hrs during awake hours    #FEN   - Diet - Regular + Thins    - Dysphagia  SLP - evaluation and treatment    #Health Maintenance  - KCL 10mEq daily   - Magnesium Oxide 400mg at HS  - Fosamax 70mg weekly- home regimen  - K: 4.5;  mag level 2.1    #Precautions / PROPHYLAXIS:   - Falls, Seizure   - ortho: Weight bearing status: WBAT   - Lungs: Aspiration, Incentive Spirometer   - DVT ppx: Lovenox    #IDT 11/21   Nursing: incontinent bowel/bladder   Social: lives with , independent prior, retired    Preferences: wants anastasiia   SLP: reglar diet thin liquids, decreased memory/attention/left inattention   OT: supervision for eating/oral hygiene, dependent LBD/footwear   PT: dependent   Goals: Increase attention/focus within 10-15 intervals, ModA transfers/bedmobility    DC: Home/GIANLUCA, TBD   TDD 12/5/23  ------------------------------------------------------  OUTPATIENT/FOLLOW UP:    Sierra Rodriguez  Neurosurgery  [address] [place] [id number]  Phone: [phone]  Fax: [phone]  Follow Up Time: 2 weeks    Neurologist- managing VPS   Dr. Aurora Webb   Watchmann device outpatient with cardiology      -11/22: fellow attempted to call patient's , Kyle Vergara, to provide medical updates, but the call went to  x2.

## 2023-11-25 LAB
-  AMOXICILLIN/CLAVULANIC ACID: SIGNIFICANT CHANGE UP
-  AMOXICILLIN/CLAVULANIC ACID: SIGNIFICANT CHANGE UP
-  AMPICILLIN/SULBACTAM: SIGNIFICANT CHANGE UP
-  AMPICILLIN/SULBACTAM: SIGNIFICANT CHANGE UP
-  AMPICILLIN: SIGNIFICANT CHANGE UP
-  AMPICILLIN: SIGNIFICANT CHANGE UP
-  AZTREONAM: SIGNIFICANT CHANGE UP
-  AZTREONAM: SIGNIFICANT CHANGE UP
-  CEFAZOLIN: SIGNIFICANT CHANGE UP
-  CEFAZOLIN: SIGNIFICANT CHANGE UP
-  CEFEPIME: SIGNIFICANT CHANGE UP
-  CEFEPIME: SIGNIFICANT CHANGE UP
-  CEFOXITIN: SIGNIFICANT CHANGE UP
-  CEFOXITIN: SIGNIFICANT CHANGE UP
-  CEFTRIAXONE: SIGNIFICANT CHANGE UP
-  CEFTRIAXONE: SIGNIFICANT CHANGE UP
-  CEFUROXIME: SIGNIFICANT CHANGE UP
-  CEFUROXIME: SIGNIFICANT CHANGE UP
-  CIPROFLOXACIN: SIGNIFICANT CHANGE UP
-  CIPROFLOXACIN: SIGNIFICANT CHANGE UP
-  ERTAPENEM: SIGNIFICANT CHANGE UP
-  ERTAPENEM: SIGNIFICANT CHANGE UP
-  GENTAMICIN: SIGNIFICANT CHANGE UP
-  GENTAMICIN: SIGNIFICANT CHANGE UP
-  LEVOFLOXACIN: SIGNIFICANT CHANGE UP
-  LEVOFLOXACIN: SIGNIFICANT CHANGE UP
-  MEROPENEM: SIGNIFICANT CHANGE UP
-  MEROPENEM: SIGNIFICANT CHANGE UP
-  NITROFURANTOIN: SIGNIFICANT CHANGE UP
-  NITROFURANTOIN: SIGNIFICANT CHANGE UP
-  PIPERACILLIN/TAZOBACTAM: SIGNIFICANT CHANGE UP
-  PIPERACILLIN/TAZOBACTAM: SIGNIFICANT CHANGE UP
-  TOBRAMYCIN: SIGNIFICANT CHANGE UP
-  TOBRAMYCIN: SIGNIFICANT CHANGE UP
-  TRIMETHOPRIM/SULFAMETHOXAZOLE: SIGNIFICANT CHANGE UP
-  TRIMETHOPRIM/SULFAMETHOXAZOLE: SIGNIFICANT CHANGE UP
ANION GAP SERPL CALC-SCNC: 5 MMOL/L — SIGNIFICANT CHANGE UP (ref 5–17)
ANION GAP SERPL CALC-SCNC: 5 MMOL/L — SIGNIFICANT CHANGE UP (ref 5–17)
BUN SERPL-MCNC: 26 MG/DL — HIGH (ref 7–23)
BUN SERPL-MCNC: 26 MG/DL — HIGH (ref 7–23)
CALCIUM SERPL-MCNC: 10.5 MG/DL — SIGNIFICANT CHANGE UP (ref 8.4–10.5)
CALCIUM SERPL-MCNC: 10.5 MG/DL — SIGNIFICANT CHANGE UP (ref 8.4–10.5)
CHLORIDE SERPL-SCNC: 106 MMOL/L — SIGNIFICANT CHANGE UP (ref 96–108)
CHLORIDE SERPL-SCNC: 106 MMOL/L — SIGNIFICANT CHANGE UP (ref 96–108)
CO2 SERPL-SCNC: 29 MMOL/L — SIGNIFICANT CHANGE UP (ref 22–31)
CO2 SERPL-SCNC: 29 MMOL/L — SIGNIFICANT CHANGE UP (ref 22–31)
CREAT SERPL-MCNC: 1.05 MG/DL — SIGNIFICANT CHANGE UP (ref 0.5–1.3)
CREAT SERPL-MCNC: 1.05 MG/DL — SIGNIFICANT CHANGE UP (ref 0.5–1.3)
CULTURE RESULTS: ABNORMAL
CULTURE RESULTS: ABNORMAL
EGFR: 52 ML/MIN/1.73M2 — LOW
EGFR: 52 ML/MIN/1.73M2 — LOW
GLUCOSE SERPL-MCNC: 95 MG/DL — SIGNIFICANT CHANGE UP (ref 70–99)
GLUCOSE SERPL-MCNC: 95 MG/DL — SIGNIFICANT CHANGE UP (ref 70–99)
METHOD TYPE: SIGNIFICANT CHANGE UP
METHOD TYPE: SIGNIFICANT CHANGE UP
ORGANISM # SPEC MICROSCOPIC CNT: ABNORMAL
ORGANISM # SPEC MICROSCOPIC CNT: ABNORMAL
ORGANISM # SPEC MICROSCOPIC CNT: SIGNIFICANT CHANGE UP
ORGANISM # SPEC MICROSCOPIC CNT: SIGNIFICANT CHANGE UP
POTASSIUM SERPL-MCNC: 4.7 MMOL/L — SIGNIFICANT CHANGE UP (ref 3.5–5.3)
POTASSIUM SERPL-MCNC: 4.7 MMOL/L — SIGNIFICANT CHANGE UP (ref 3.5–5.3)
POTASSIUM SERPL-SCNC: 4.7 MMOL/L — SIGNIFICANT CHANGE UP (ref 3.5–5.3)
POTASSIUM SERPL-SCNC: 4.7 MMOL/L — SIGNIFICANT CHANGE UP (ref 3.5–5.3)
SODIUM SERPL-SCNC: 140 MMOL/L — SIGNIFICANT CHANGE UP (ref 135–145)
SODIUM SERPL-SCNC: 140 MMOL/L — SIGNIFICANT CHANGE UP (ref 135–145)
SPECIMEN SOURCE: SIGNIFICANT CHANGE UP
SPECIMEN SOURCE: SIGNIFICANT CHANGE UP

## 2023-11-25 PROCEDURE — 99232 SBSQ HOSP IP/OBS MODERATE 35: CPT

## 2023-11-25 PROCEDURE — 99232 SBSQ HOSP IP/OBS MODERATE 35: CPT | Mod: GC

## 2023-11-25 RX ADMIN — Medication 1 TABLET(S): at 11:36

## 2023-11-25 RX ADMIN — POLYETHYLENE GLYCOL 3350 17 GRAM(S): 17 POWDER, FOR SOLUTION ORAL at 05:52

## 2023-11-25 RX ADMIN — MODAFINIL 50 MILLIGRAM(S): 200 TABLET ORAL at 08:46

## 2023-11-25 RX ADMIN — MAGNESIUM OXIDE 400 MG ORAL TABLET 400 MILLIGRAM(S): 241.3 TABLET ORAL at 21:04

## 2023-11-25 RX ADMIN — Medication 12.5 MILLIGRAM(S): at 05:51

## 2023-11-25 RX ADMIN — Medication 1 TABLET(S): at 08:46

## 2023-11-25 RX ADMIN — ENOXAPARIN SODIUM 40 MILLIGRAM(S): 100 INJECTION SUBCUTANEOUS at 17:16

## 2023-11-25 RX ADMIN — ESCITALOPRAM OXALATE 5 MILLIGRAM(S): 10 TABLET, FILM COATED ORAL at 11:37

## 2023-11-25 RX ADMIN — Medication 200 MILLIGRAM(S): at 05:51

## 2023-11-25 RX ADMIN — FAMOTIDINE 20 MILLIGRAM(S): 10 INJECTION INTRAVENOUS at 05:52

## 2023-11-25 RX ADMIN — Medication 10 MILLIEQUIVALENT(S): at 11:36

## 2023-11-25 RX ADMIN — LORATADINE 10 MILLIGRAM(S): 10 TABLET ORAL at 11:36

## 2023-11-25 RX ADMIN — Medication 1 TABLET(S): at 17:15

## 2023-11-25 RX ADMIN — Medication 200 MILLIGRAM(S): at 05:52

## 2023-11-25 RX ADMIN — Medication 200 MILLIGRAM(S): at 17:15

## 2023-11-25 RX ADMIN — NYSTATIN CREAM 1 APPLICATION(S): 100000 CREAM TOPICAL at 17:19

## 2023-11-25 RX ADMIN — Medication 200 MILLIGRAM(S): at 17:16

## 2023-11-25 RX ADMIN — FAMOTIDINE 20 MILLIGRAM(S): 10 INJECTION INTRAVENOUS at 17:15

## 2023-11-25 RX ADMIN — Medication 12.5 MILLIGRAM(S): at 17:16

## 2023-11-25 RX ADMIN — ATORVASTATIN CALCIUM 40 MILLIGRAM(S): 80 TABLET, FILM COATED ORAL at 21:04

## 2023-11-25 RX ADMIN — NYSTATIN CREAM 1 APPLICATION(S): 100000 CREAM TOPICAL at 05:52

## 2023-11-25 NOTE — PROGRESS NOTE ADULT - SUBJECTIVE AND OBJECTIVE BOX
Patient is a 83y old  Female who presents with a chief complaint of Subdural Hemorrhage (24 Nov 2023 10:47)      Patient seen and examined at bedside. No events overnight. Patient without headache, changes in vision, nausea or vomiting, chest pain, sob, abd pain.    ALLERGIES:  No Known Allergies    MEDICATIONS  (STANDING):  atorvastatin 40 milliGRAM(s) Oral at bedtime  cefpodoxime 200 milliGRAM(s) Oral every 12 hours  dextrose 5%. 1000 milliLiter(s) (50 mL/Hr) IV Continuous <Continuous>  dextrose 5%. 1000 milliLiter(s) (100 mL/Hr) IV Continuous <Continuous>  dextrose 50% Injectable 12.5 Gram(s) IV Push once  dextrose 50% Injectable 25 Gram(s) IV Push once  dextrose 50% Injectable 25 Gram(s) IV Push once  enoxaparin Injectable 40 milliGRAM(s) SubCutaneous <User Schedule>  escitalopram 5 milliGRAM(s) Oral daily  famotidine    Tablet 20 milliGRAM(s) Oral two times a day  glucagon  Injectable 1 milliGRAM(s) IntraMuscular once  hydroxychloroquine 200 milliGRAM(s) Oral two times a day  lactobacillus acidophilus 1 Tablet(s) Oral two times a day with meals  loratadine 10 milliGRAM(s) Oral daily  magnesium oxide 400 milliGRAM(s) Oral at bedtime  metoprolol tartrate 12.5 milliGRAM(s) Oral every 12 hours  modafinil 50 milliGRAM(s) Oral <User Schedule>  multivitamin 1 Tablet(s) Oral daily  nystatin Powder 1 Application(s) Topical two times a day  polyethylene glycol 3350 17 Gram(s) Oral two times a day  potassium chloride    Tablet ER 10 milliEquivalent(s) Oral daily  senna 2 Tablet(s) Oral at bedtime    MEDICATIONS  (PRN):  acetaminophen     Tablet .. 975 milliGRAM(s) Oral every 8 hours PRN Mild Pain (1 - 3)  bisacodyl 5 milliGRAM(s) Oral every 12 hours PRN Constipation  dextrose Oral Gel 15 Gram(s) Oral once PRN Blood Glucose LESS THAN 70 milliGRAM(s)/deciliter    Vital Signs Last 24 Hrs  T(F): 97.7 (25 Nov 2023 07:57), Max: 98.1 (24 Nov 2023 21:14)  HR: 55 (25 Nov 2023 07:57) (55 - 68)  BP: 118/70 (25 Nov 2023 07:57) (116/66 - 125/75)  RR: 15 (25 Nov 2023 07:57) (15 - 16)  SpO2: 95% (25 Nov 2023 07:57) (95% - 97%)  I&O's Summary      PHYSICAL EXAM:  GENERAL: NAD, laying in bed  EYES: PEERL, conjunctiva and sclera clear  ENMT: Moist mucous membranes, Supple, No JVD, R side cranial incision   CHEST/LUNG: Clear to auscultation bilaterally, good air entry, non-labored breathing  HEART: RRR; S1/S2, No murmur  ABDOMEN: Soft, Nontender, Nondistended; Bowel sounds present  EXTREMITIES: No calf tenderness, No cyanosis, No edema  SKIN: Warm, perfused  PSYCH: Normal mood, Normal affect      LABS:                        10.1   6.84  )-----------( 345      ( 24 Nov 2023 06:20 )             31.8     11-25    140  |  106  |  26  ----------------------------<  95  4.7   |  29  |  1.05    Ca    10.5      25 Nov 2023 06:00    TPro  6.4  /  Alb  2.8  /  TBili  0.3  /  DBili  x   /  AST  17  /  ALT  23  /  AlkPhos  73  11-24              11-08 Chol 144 mg/dL LDL -- HDL 65 mg/dL Trig 36 mg/dL                  Urinalysis Basic - ( 25 Nov 2023 06:00 )    Color: x / Appearance: x / SG: x / pH: x  Gluc: 95 mg/dL / Ketone: x  / Bili: x / Urobili: x   Blood: x / Protein: x / Nitrite: x   Leuk Esterase: x / RBC: x / WBC x   Sq Epi: x / Non Sq Epi: x / Bacteria: x        Culture - Urine (collected 21 Nov 2023 18:30)  Source: Catheterized Catheterized  Preliminary Report (24 Nov 2023 16:43):    >100,000 CFU/ml Proteus mirabilis      COVID-19 PCR: NotDetec (11-17-23 @ 19:55)      RADIOLOGY & ADDITIONAL TESTS:    Care Discussed with Consultants/Other Providers:

## 2023-11-25 NOTE — PROGRESS NOTE ADULT - SUBJECTIVE AND OBJECTIVE BOX
Cc: Gait dysfunction    HPI: Patient with no new medical issues today.  Pain controlled, no chest pain, no N/V, no Fevers/Chills. No other new ROS  Has been tolerating rehabilitation program.    acetaminophen     Tablet .. 975 milliGRAM(s) Oral every 8 hours PRN  atorvastatin 40 milliGRAM(s) Oral at bedtime  bisacodyl 5 milliGRAM(s) Oral every 12 hours PRN  cefpodoxime 200 milliGRAM(s) Oral every 12 hours  dextrose 5%. 1000 milliLiter(s) IV Continuous <Continuous>  dextrose 5%. 1000 milliLiter(s) IV Continuous <Continuous>  dextrose 50% Injectable 25 Gram(s) IV Push once  dextrose 50% Injectable 12.5 Gram(s) IV Push once  dextrose 50% Injectable 25 Gram(s) IV Push once  dextrose Oral Gel 15 Gram(s) Oral once PRN  enoxaparin Injectable 40 milliGRAM(s) SubCutaneous <User Schedule>  escitalopram 5 milliGRAM(s) Oral daily  famotidine    Tablet 20 milliGRAM(s) Oral two times a day  glucagon  Injectable 1 milliGRAM(s) IntraMuscular once  hydroxychloroquine 200 milliGRAM(s) Oral two times a day  lactobacillus acidophilus 1 Tablet(s) Oral two times a day with meals  loratadine 10 milliGRAM(s) Oral daily  magnesium oxide 400 milliGRAM(s) Oral at bedtime  metoprolol tartrate 25 milliGRAM(s) Oral every 12 hours  modafinil 50 milliGRAM(s) Oral <User Schedule>  nystatin Powder 1 Application(s) Topical two times a day  polyethylene glycol 3350 17 Gram(s) Oral two times a day  potassium chloride    Tablet ER 10 milliEquivalent(s) Oral daily  senna 2 Tablet(s) Oral at bedtime      Vital Signs Last 24 Hrs  T(C): 36.5 (25 Nov 2023 07:57), Max: 36.7 (24 Nov 2023 21:14)  T(F): 97.7 (25 Nov 2023 07:57), Max: 98.1 (24 Nov 2023 21:14)  HR: 70 (25 Nov 2023 09:15) (55 - 70)  BP: 118/70 (25 Nov 2023 07:57) (116/66 - 125/75)  BP(mean): --  RR: 15 (25 Nov 2023 07:57) (15 - 16)  SpO2: 95% (25 Nov 2023 07:57) (95% - 97%)    Parameters below as of 25 Nov 2023 07:57  Patient On (Oxygen Delivery Method): room air        In NAD  HEENT- EOMI  Heart- RRR, S1S2  Lungs- CTA bl.  Abd- + BS, NT  Ext- No calf pain  Neuro- Exam unchanged          Imp: Patient with diagnosis of SDH  admitted for comprehensive acute rehabilitation.    Plan:  - Continue PT/OT/SLP  - DVT prophylaxis  - Skin- Turn q2h, check skin daily  - Continue current medications; patient medically stable.   -Active issues- UTI- On Vantin, urine culture sensitive   - Patient is stable to continue current rehabilitation program.

## 2023-11-25 NOTE — PROGRESS NOTE ADULT - ASSESSMENT
84 y/o woman with PMH of HTN, HLD, Arthritis, and normal pressure hydrocephalus (s/p VPS- March 2023 at [place]); who presented to [facility] on 11/8 for progressive weakness, confusion and decreased PO intake, and was found to have a mixed SDH with 1.4cm midline shift. She was transferred to [facility] later that day for further management. On 11/9, she underwent R sided reilly hole placement, followed by MMA embolization on 11/10 with Dr. Rodriguez. Her hospital course was complicated by 2 RRTs for unresponsiveness. Serial brain imaging was stable and she was found to be in rapid AFIB. 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 Patient now admitted for a multidisciplinary rehab program with cognitive deficits, gait and ADL impairments.     #Subdural Hemorrhage, Normal Pressure Hydrocephalus, Seizure Prophylaxis  - S/p VPS March 2023  - CTH 11/8: Moderate to large mixed attenuation right hemispheric convexity SDH compressing right hemisphere and right lateral ventricle with 1.4 cm midline shift to left. Right frontal ventriculostomy shunt catheter within right frontal horn.  - CTH 11/14:Right convexity subdural collection of air, blood and fluid continues to decrease in size.  - S/p R sided reilly hole placement (11/9), followed by MMA embolization (11/10)  - Seizure PPx per primary team  - continue comprehensive rehab program    #Rapid AFIB, HLD  - continue decreased metoprolol dose 12.5mg BID 11/24  - No AC  - Evaluation for Watchmann device outpatient with cardiology  - Atorvastatin 40mg at HS     #UTI  - UCx with proteus- follow up sensitivities   - Vantin 200mg BID for 7 days    #Arthritis  - Plaquenil 200mg BID    #DVT PPx  - Lovenox 40 SUbQ

## 2023-11-26 PROCEDURE — 99232 SBSQ HOSP IP/OBS MODERATE 35: CPT | Mod: GC

## 2023-11-26 RX ADMIN — Medication 1 TABLET(S): at 12:42

## 2023-11-26 RX ADMIN — FAMOTIDINE 20 MILLIGRAM(S): 10 INJECTION INTRAVENOUS at 18:21

## 2023-11-26 RX ADMIN — Medication 200 MILLIGRAM(S): at 05:23

## 2023-11-26 RX ADMIN — MAGNESIUM OXIDE 400 MG ORAL TABLET 400 MILLIGRAM(S): 241.3 TABLET ORAL at 21:46

## 2023-11-26 RX ADMIN — MODAFINIL 50 MILLIGRAM(S): 200 TABLET ORAL at 07:55

## 2023-11-26 RX ADMIN — ESCITALOPRAM OXALATE 5 MILLIGRAM(S): 10 TABLET, FILM COATED ORAL at 12:42

## 2023-11-26 RX ADMIN — ATORVASTATIN CALCIUM 40 MILLIGRAM(S): 80 TABLET, FILM COATED ORAL at 21:46

## 2023-11-26 RX ADMIN — ENOXAPARIN SODIUM 40 MILLIGRAM(S): 100 INJECTION SUBCUTANEOUS at 18:21

## 2023-11-26 RX ADMIN — Medication 1 TABLET(S): at 07:55

## 2023-11-26 RX ADMIN — Medication 10 MILLIEQUIVALENT(S): at 12:42

## 2023-11-26 RX ADMIN — Medication 1 TABLET(S): at 18:22

## 2023-11-26 RX ADMIN — SENNA PLUS 2 TABLET(S): 8.6 TABLET ORAL at 21:46

## 2023-11-26 RX ADMIN — LORATADINE 10 MILLIGRAM(S): 10 TABLET ORAL at 12:42

## 2023-11-26 RX ADMIN — FAMOTIDINE 20 MILLIGRAM(S): 10 INJECTION INTRAVENOUS at 05:23

## 2023-11-26 RX ADMIN — Medication 12.5 MILLIGRAM(S): at 18:22

## 2023-11-26 RX ADMIN — Medication 200 MILLIGRAM(S): at 18:21

## 2023-11-26 RX ADMIN — Medication 12.5 MILLIGRAM(S): at 05:23

## 2023-11-26 RX ADMIN — Medication 200 MILLIGRAM(S): at 18:22

## 2023-11-26 RX ADMIN — NYSTATIN CREAM 1 APPLICATION(S): 100000 CREAM TOPICAL at 18:21

## 2023-11-26 RX ADMIN — NYSTATIN CREAM 1 APPLICATION(S): 100000 CREAM TOPICAL at 05:24

## 2023-11-26 NOTE — PROGRESS NOTE ADULT - SUBJECTIVE AND OBJECTIVE BOX
Cc: Gait dysfunction    HPI: Patient with no new medical issues today.  Pain controlled, no chest pain, no N/V, no Fevers/Chills. No other new ROS  Has been tolerating rehabilitation program.    acetaminophen     Tablet .. 975 milliGRAM(s) Oral every 8 hours PRN  atorvastatin 40 milliGRAM(s) Oral at bedtime  bisacodyl 5 milliGRAM(s) Oral every 12 hours PRN  cefpodoxime 200 milliGRAM(s) Oral every 12 hours  dextrose 5%. 1000 milliLiter(s) IV Continuous <Continuous>  dextrose 5%. 1000 milliLiter(s) IV Continuous <Continuous>  dextrose 50% Injectable 25 Gram(s) IV Push once  dextrose 50% Injectable 12.5 Gram(s) IV Push once  dextrose 50% Injectable 25 Gram(s) IV Push once  dextrose Oral Gel 15 Gram(s) Oral once PRN  enoxaparin Injectable 40 milliGRAM(s) SubCutaneous <User Schedule>  escitalopram 5 milliGRAM(s) Oral daily  famotidine    Tablet 20 milliGRAM(s) Oral two times a day  glucagon  Injectable 1 milliGRAM(s) IntraMuscular once  hydroxychloroquine 200 milliGRAM(s) Oral two times a day  lactobacillus acidophilus 1 Tablet(s) Oral two times a day with meals  loratadine 10 milliGRAM(s) Oral daily  magnesium oxide 400 milliGRAM(s) Oral at bedtime  metoprolol tartrate 25 milliGRAM(s) Oral every 12 hours  modafinil 50 milliGRAM(s) Oral <User Schedule>  nystatin Powder 1 Application(s) Topical two times a day  polyethylene glycol 3350 17 Gram(s) Oral two times a day  potassium chloride    Tablet ER 10 milliEquivalent(s) Oral daily  senna 2 Tablet(s) Oral at bedtime      Vital Signs Last 24 Hrs  T(C): 36.4 (26 Nov 2023 07:52), Max: 36.5 (25 Nov 2023 19:58)  T(F): 97.5 (26 Nov 2023 07:52), Max: 97.7 (25 Nov 2023 19:58)  HR: 59 (26 Nov 2023 07:52) (59 - 75)  BP: 113/67 (26 Nov 2023 07:52) (102/61 - 114/64)  BP(mean): --  RR: 14 (26 Nov 2023 07:52) (14 - 15)  SpO2: 94% (26 Nov 2023 07:52) (94% - 96%)    Parameters below as of 26 Nov 2023 07:52  Patient On (Oxygen Delivery Method): room air            In NAD  HEENT- EOMI  Heart- RRR, S1S2  Lungs- CTA bl.  Abd- + BS, NT  Ext- No calf pain  Neuro- Exam unchanged          Imp: Patient with diagnosis of SDH  admitted for comprehensive acute rehabilitation.    Plan:  - Continue PT/OT/SLP  - DVT prophylaxis  - Skin- Turn q2h, check skin daily  - Continue current medications; patient medically stable.   -Active issues- UTI- On Vantin, urine culture sensitive   - Patient is stable to continue current rehabilitation program.

## 2023-11-27 ENCOUNTER — TRANSCRIPTION ENCOUNTER (OUTPATIENT)
Age: 83
End: 2023-11-27

## 2023-11-27 LAB
ALBUMIN SERPL ELPH-MCNC: 2.7 G/DL — LOW (ref 3.3–5)
ALBUMIN SERPL ELPH-MCNC: 2.7 G/DL — LOW (ref 3.3–5)
ALP SERPL-CCNC: 76 U/L — SIGNIFICANT CHANGE UP (ref 40–120)
ALP SERPL-CCNC: 76 U/L — SIGNIFICANT CHANGE UP (ref 40–120)
ALT FLD-CCNC: 21 U/L — SIGNIFICANT CHANGE UP (ref 10–45)
ALT FLD-CCNC: 21 U/L — SIGNIFICANT CHANGE UP (ref 10–45)
ANION GAP SERPL CALC-SCNC: 8 MMOL/L — SIGNIFICANT CHANGE UP (ref 5–17)
ANION GAP SERPL CALC-SCNC: 8 MMOL/L — SIGNIFICANT CHANGE UP (ref 5–17)
AST SERPL-CCNC: 18 U/L — SIGNIFICANT CHANGE UP (ref 10–40)
AST SERPL-CCNC: 18 U/L — SIGNIFICANT CHANGE UP (ref 10–40)
BASOPHILS # BLD AUTO: 0.03 K/UL — SIGNIFICANT CHANGE UP (ref 0–0.2)
BASOPHILS # BLD AUTO: 0.03 K/UL — SIGNIFICANT CHANGE UP (ref 0–0.2)
BASOPHILS NFR BLD AUTO: 0.4 % — SIGNIFICANT CHANGE UP (ref 0–2)
BASOPHILS NFR BLD AUTO: 0.4 % — SIGNIFICANT CHANGE UP (ref 0–2)
BILIRUB SERPL-MCNC: 0.3 MG/DL — SIGNIFICANT CHANGE UP (ref 0.2–1.2)
BILIRUB SERPL-MCNC: 0.3 MG/DL — SIGNIFICANT CHANGE UP (ref 0.2–1.2)
BUN SERPL-MCNC: 21 MG/DL — SIGNIFICANT CHANGE UP (ref 7–23)
BUN SERPL-MCNC: 21 MG/DL — SIGNIFICANT CHANGE UP (ref 7–23)
CALCIUM SERPL-MCNC: 10.3 MG/DL — SIGNIFICANT CHANGE UP (ref 8.4–10.5)
CALCIUM SERPL-MCNC: 10.3 MG/DL — SIGNIFICANT CHANGE UP (ref 8.4–10.5)
CHLORIDE SERPL-SCNC: 103 MMOL/L — SIGNIFICANT CHANGE UP (ref 96–108)
CHLORIDE SERPL-SCNC: 103 MMOL/L — SIGNIFICANT CHANGE UP (ref 96–108)
CO2 SERPL-SCNC: 26 MMOL/L — SIGNIFICANT CHANGE UP (ref 22–31)
CO2 SERPL-SCNC: 26 MMOL/L — SIGNIFICANT CHANGE UP (ref 22–31)
CREAT SERPL-MCNC: 1 MG/DL — SIGNIFICANT CHANGE UP (ref 0.5–1.3)
CREAT SERPL-MCNC: 1 MG/DL — SIGNIFICANT CHANGE UP (ref 0.5–1.3)
EGFR: 55 ML/MIN/1.73M2 — LOW
EGFR: 55 ML/MIN/1.73M2 — LOW
EOSINOPHIL # BLD AUTO: 0.21 K/UL — SIGNIFICANT CHANGE UP (ref 0–0.5)
EOSINOPHIL # BLD AUTO: 0.21 K/UL — SIGNIFICANT CHANGE UP (ref 0–0.5)
EOSINOPHIL NFR BLD AUTO: 2.7 % — SIGNIFICANT CHANGE UP (ref 0–6)
EOSINOPHIL NFR BLD AUTO: 2.7 % — SIGNIFICANT CHANGE UP (ref 0–6)
GLUCOSE SERPL-MCNC: 108 MG/DL — HIGH (ref 70–99)
GLUCOSE SERPL-MCNC: 108 MG/DL — HIGH (ref 70–99)
HCT VFR BLD CALC: 32.8 % — LOW (ref 34.5–45)
HCT VFR BLD CALC: 32.8 % — LOW (ref 34.5–45)
HGB BLD-MCNC: 10.1 G/DL — LOW (ref 11.5–15.5)
HGB BLD-MCNC: 10.1 G/DL — LOW (ref 11.5–15.5)
IMM GRANULOCYTES NFR BLD AUTO: 0.3 % — SIGNIFICANT CHANGE UP (ref 0–0.9)
IMM GRANULOCYTES NFR BLD AUTO: 0.3 % — SIGNIFICANT CHANGE UP (ref 0–0.9)
LYMPHOCYTES # BLD AUTO: 1.3 K/UL — SIGNIFICANT CHANGE UP (ref 1–3.3)
LYMPHOCYTES # BLD AUTO: 1.3 K/UL — SIGNIFICANT CHANGE UP (ref 1–3.3)
LYMPHOCYTES # BLD AUTO: 16.8 % — SIGNIFICANT CHANGE UP (ref 13–44)
LYMPHOCYTES # BLD AUTO: 16.8 % — SIGNIFICANT CHANGE UP (ref 13–44)
MCHC RBC-ENTMCNC: 28.1 PG — SIGNIFICANT CHANGE UP (ref 27–34)
MCHC RBC-ENTMCNC: 28.1 PG — SIGNIFICANT CHANGE UP (ref 27–34)
MCHC RBC-ENTMCNC: 30.8 GM/DL — LOW (ref 32–36)
MCHC RBC-ENTMCNC: 30.8 GM/DL — LOW (ref 32–36)
MCV RBC AUTO: 91.1 FL — SIGNIFICANT CHANGE UP (ref 80–100)
MCV RBC AUTO: 91.1 FL — SIGNIFICANT CHANGE UP (ref 80–100)
MONOCYTES # BLD AUTO: 0.99 K/UL — HIGH (ref 0–0.9)
MONOCYTES # BLD AUTO: 0.99 K/UL — HIGH (ref 0–0.9)
MONOCYTES NFR BLD AUTO: 12.8 % — SIGNIFICANT CHANGE UP (ref 2–14)
MONOCYTES NFR BLD AUTO: 12.8 % — SIGNIFICANT CHANGE UP (ref 2–14)
NEUTROPHILS # BLD AUTO: 5.2 K/UL — SIGNIFICANT CHANGE UP (ref 1.8–7.4)
NEUTROPHILS # BLD AUTO: 5.2 K/UL — SIGNIFICANT CHANGE UP (ref 1.8–7.4)
NEUTROPHILS NFR BLD AUTO: 67 % — SIGNIFICANT CHANGE UP (ref 43–77)
NEUTROPHILS NFR BLD AUTO: 67 % — SIGNIFICANT CHANGE UP (ref 43–77)
NRBC # BLD: 0 /100 WBCS — SIGNIFICANT CHANGE UP (ref 0–0)
NRBC # BLD: 0 /100 WBCS — SIGNIFICANT CHANGE UP (ref 0–0)
PLATELET # BLD AUTO: 225 K/UL — SIGNIFICANT CHANGE UP (ref 150–400)
PLATELET # BLD AUTO: 225 K/UL — SIGNIFICANT CHANGE UP (ref 150–400)
POTASSIUM SERPL-MCNC: 4.8 MMOL/L — SIGNIFICANT CHANGE UP (ref 3.5–5.3)
POTASSIUM SERPL-MCNC: 4.8 MMOL/L — SIGNIFICANT CHANGE UP (ref 3.5–5.3)
POTASSIUM SERPL-SCNC: 4.8 MMOL/L — SIGNIFICANT CHANGE UP (ref 3.5–5.3)
POTASSIUM SERPL-SCNC: 4.8 MMOL/L — SIGNIFICANT CHANGE UP (ref 3.5–5.3)
PROT SERPL-MCNC: 6.4 G/DL — SIGNIFICANT CHANGE UP (ref 6–8.3)
PROT SERPL-MCNC: 6.4 G/DL — SIGNIFICANT CHANGE UP (ref 6–8.3)
RBC # BLD: 3.6 M/UL — LOW (ref 3.8–5.2)
RBC # BLD: 3.6 M/UL — LOW (ref 3.8–5.2)
RBC # FLD: 15.4 % — HIGH (ref 10.3–14.5)
RBC # FLD: 15.4 % — HIGH (ref 10.3–14.5)
SODIUM SERPL-SCNC: 137 MMOL/L — SIGNIFICANT CHANGE UP (ref 135–145)
SODIUM SERPL-SCNC: 137 MMOL/L — SIGNIFICANT CHANGE UP (ref 135–145)
WBC # BLD: 7.75 K/UL — SIGNIFICANT CHANGE UP (ref 3.8–10.5)
WBC # BLD: 7.75 K/UL — SIGNIFICANT CHANGE UP (ref 3.8–10.5)
WBC # FLD AUTO: 7.75 K/UL — SIGNIFICANT CHANGE UP (ref 3.8–10.5)
WBC # FLD AUTO: 7.75 K/UL — SIGNIFICANT CHANGE UP (ref 3.8–10.5)

## 2023-11-27 PROCEDURE — 99233 SBSQ HOSP IP/OBS HIGH 50: CPT | Mod: GC

## 2023-11-27 PROCEDURE — 99232 SBSQ HOSP IP/OBS MODERATE 35: CPT

## 2023-11-27 RX ADMIN — FAMOTIDINE 20 MILLIGRAM(S): 10 INJECTION INTRAVENOUS at 17:50

## 2023-11-27 RX ADMIN — MODAFINIL 50 MILLIGRAM(S): 200 TABLET ORAL at 07:20

## 2023-11-27 RX ADMIN — Medication 200 MILLIGRAM(S): at 17:50

## 2023-11-27 RX ADMIN — ATORVASTATIN CALCIUM 40 MILLIGRAM(S): 80 TABLET, FILM COATED ORAL at 21:12

## 2023-11-27 RX ADMIN — Medication 10 MILLIEQUIVALENT(S): at 12:01

## 2023-11-27 RX ADMIN — Medication 1 TABLET(S): at 07:20

## 2023-11-27 RX ADMIN — Medication 1 TABLET(S): at 17:50

## 2023-11-27 RX ADMIN — ESCITALOPRAM OXALATE 5 MILLIGRAM(S): 10 TABLET, FILM COATED ORAL at 12:00

## 2023-11-27 RX ADMIN — Medication 12.5 MILLIGRAM(S): at 06:34

## 2023-11-27 RX ADMIN — NYSTATIN CREAM 1 APPLICATION(S): 100000 CREAM TOPICAL at 06:36

## 2023-11-27 RX ADMIN — MAGNESIUM OXIDE 400 MG ORAL TABLET 400 MILLIGRAM(S): 241.3 TABLET ORAL at 21:12

## 2023-11-27 RX ADMIN — ENOXAPARIN SODIUM 40 MILLIGRAM(S): 100 INJECTION SUBCUTANEOUS at 17:49

## 2023-11-27 RX ADMIN — LORATADINE 10 MILLIGRAM(S): 10 TABLET ORAL at 12:01

## 2023-11-27 RX ADMIN — Medication 200 MILLIGRAM(S): at 06:34

## 2023-11-27 RX ADMIN — Medication 12.5 MILLIGRAM(S): at 17:56

## 2023-11-27 RX ADMIN — FAMOTIDINE 20 MILLIGRAM(S): 10 INJECTION INTRAVENOUS at 06:34

## 2023-11-27 RX ADMIN — Medication 1 TABLET(S): at 12:01

## 2023-11-27 RX ADMIN — NYSTATIN CREAM 1 APPLICATION(S): 100000 CREAM TOPICAL at 17:50

## 2023-11-27 NOTE — DISCHARGE NOTE PROVIDER - CARE PROVIDER_API CALL
Sierra Rodriguez  Neurosurgery  [address] [place] [address]  Phone: [phone]  Fax: [phone]  Follow Up Time: 2 weeks   Sierra Rodriguez  Neurosurgery  [address] [place] [address]  Phone: [phone]  Fax: [phone]  Follow Up Time: 2 weeks   Sierra Rodriguez  Neurosurgery  [address] [place] [address]  Phone: [phone]  Fax: [phone]  Follow Up Time: 2 weeks   Sierra Rodriguez  Neurosurgery  [address] [place] [address]  Phone: [phone]  Fax: [phone]  Follow Up Time: 2 weeks    Josette Cobb  Physical/Rehab Medicine  [address] [place] [address]  Phone: [phone]  Fax: [phone]  Follow Up Time:    Sierra Rodriguez  Neurosurgery  [address] [place] [address]  Phone: [phone]  Fax: [phone]  Follow Up Time: 2 weeks    Josette Cobb  Physical/Rehab Medicine  [address] [place] [address]  Phone: [phone]  Fax: [phone]  Follow Up Time:    Sierra Rodriguez  Neurosurgery  [address] [place] [address]  Phone: [phone]  Fax: [phone]  Follow Up Time: 2 weeks    Josette Cobb  Physical/Rehab Medicine  [address] [place] [address]  Phone: [phone]  Fax: [phone]  Follow Up Time:

## 2023-11-27 NOTE — DISCHARGE NOTE PROVIDER - NSDCFUADDAPPT_GEN_ALL_CORE_FT
Josette Cobb  [facility] PreAdmits  Scheduled Appointment: 11/17/2023 Josette Cobb  [facility] PreAdmits  Scheduled Appointment: 11/17/2023 Josette Cobb  [facility] PreAdmits  Scheduled Appointment: 11/17/2023

## 2023-11-27 NOTE — DISCHARGE NOTE PROVIDER - NSDCCPCAREPLAN_GEN_ALL_CORE_FT
PRINCIPAL DISCHARGE DIAGNOSIS  Diagnosis: Injury of head with subdural hemorrhage  Assessment and Plan of Treatment:       SECONDARY DISCHARGE DIAGNOSES  Diagnosis: Hypertension  Assessment and Plan of Treatment:     Diagnosis: Normal pressure hydrocephalus  Assessment and Plan of Treatment:     Diagnosis: Afib  Assessment and Plan of Treatment:      PRINCIPAL DISCHARGE DIAGNOSIS  Diagnosis: Injury of head with subdural hemorrhage  Assessment and Plan of Treatment: You experienced a brain bleed which was treated with embolization of the artery. You have made significant improvements while in rehab. Your therapies will continue at subacute rehab and at home upon discharge. Continue to take Aspirin and atorvastatin. You were started on Modafinil while in rehab to help with daytime sedation and have responded well.  Please follow-up with the physicians listed in this document.      SECONDARY DISCHARGE DIAGNOSES  Diagnosis: Hypertension  Assessment and Plan of Treatment: Your blood pressure has been managed on metoprolol tartrate 12.5mg twice daily.  On discharge you may resume your home dose of Metoprolol extended release 25mg daily (Toprol XL).    Diagnosis: Normal pressure hydrocephalus  Assessment and Plan of Treatment: with presence of  shunt. Follow-up with neurosurgery for shunt adjustment. Dr. Méndez recommended an appointment at 6 weeks but No longer than 3 months from last adjustment.    Diagnosis: Afib  Assessment and Plan of Treatment: Metoprolol manages heart rate with added effect of blood pressure management. We recommend outpatient follow-up with cardiology for management and to assess appropriateness for watchman's device.    Diagnosis: Irritability  Assessment and Plan of Treatment: You were started on a low dose of Lexapro for irritability. Your mood has improved while in rehab. You may not need to continue on Lexapro indefinitely, but it should be continued for at least a few months and if discontinued should be supervised by your physicians.     PRINCIPAL DISCHARGE DIAGNOSIS  Diagnosis: Injury of head with subdural hemorrhage  Assessment and Plan of Treatment: You experienced a brain bleed which was treated with embolization of the artery. You have made significant improvements while in rehab. Your therapies will continue at subacute rehab and at home upon discharge. Continue to take Aspirin and atorvastatin. You were started on Modafinil while in rehab to help with daytime sedation and have responded well.  Please follow-up with the physicians listed in this document.  follow up with Neurosurgeon in 2 weeks.  Follow up with PCP and Dr. Cobb once discharged from Subacute rehabilitation.      SECONDARY DISCHARGE DIAGNOSES  Diagnosis: Hypertension  Assessment and Plan of Treatment: Your blood pressure has been managed on metoprolol tartrate 12.5mg twice daily.  On discharge you may resume your home dose of Metoprolol extended release 25mg daily (Toprol XL).    Diagnosis: Normal pressure hydrocephalus  Assessment and Plan of Treatment: with presence of  shunt. Follow-up with neurosurgery for shunt adjustment. Dr. Méndez recommended an appointment at 6 weeks but No longer than 3 months from last adjustment.    Diagnosis: Afib  Assessment and Plan of Treatment: Metoprolol manages heart rate with added effect of blood pressure management. We recommend outpatient follow-up with cardiology for management and to assess appropriateness for watchman's device.    Diagnosis: Irritability  Assessment and Plan of Treatment: You were started on a low dose of Lexapro for irritability. Your mood has improved while in rehab. You may not need to continue on Lexapro indefinitely, but it should be continued for at least a few months and if discontinued should be supervised by your physicians.

## 2023-11-27 NOTE — PROGRESS NOTE ADULT - ASSESSMENT
83F HTN HLD Arthritis NP Hydrocephalis (VPS placed in Mar 2023)  Came to [place] on nov8 for weakness,  SDH with midline Shift  SP Craniotomy, MMA embolization  Devoloped Afib with RVR  TX to rehab    #SDH  - Seizure Profx  - Care per orders  - no additional developments      #Rapid AFIB, HLD  - continue decreased metoprolol dose 12.5mg BID 11/24  - No AC  - Evaluation for Watchmann device outpatient with cardiology  - Atorvastatin 40mg at HS     #UTI  - UCx with proteus- follow up sensitivities   - Vantin 200mg BID for 7 days    #Arthritis  - Plaquenil 200mg BID    #DVT PPx  - Lovenox 40 SUbQ

## 2023-11-27 NOTE — DISCHARGE NOTE PROVIDER - NSDCMRMEDTOKEN_GEN_ALL_CORE_FT
acetaminophen 325 mg oral tablet: 3 tab(s) orally every 8 hours As needed Mild Pain (1 - 3)  bisacodyl 5 mg oral delayed release tablet: 1 tab(s) orally every 12 hours As needed Constipation  enoxaparin: 40 milligram(s) orally once a day 40mg  daily SC  famotidine 10 mg/mL intravenous solution: 2 milliliter(s) intravenous every 12 hours  Fosamax 70 mg oral tablet: 1 tab(s) orally once a week  levETIRAcetam 500 mg oral tablet: 1 tab(s) orally 2 times a day  Lipitor 40 mg oral tablet: 1 tab(s) orally once a day  magnesium oxide 400 mg oral tablet: 1 tab(s) orally once a day (at bedtime)  nystatin 100,000 units/g topical powder: 1 Apply topically to affected area 2 times a day  Plaquenil 200 mg oral tablet: 1 tab(s) orally 2 times a day  polyethylene glycol 3350 oral powder for reconstitution: 17 gram(s) orally 2 times a day  potassium chloride 10 mEq oral tablet, extended release: 1 tab(s) orally once a day  senna leaf extract oral tablet: 2 tab(s) orally once a day (at bedtime)  Toprol-XL 25 mg oral tablet, extended release: 1 tab(s) orally once a day   bisacodyl 5 mg oral delayed release tablet: 1 tab(s) orally every 12 hours As needed Constipation  enoxaparin: every 24 hours ~6pm  escitalopram 5 mg oral tablet: 1 tab(s) orally once a day  famotidine 10 mg/mL intravenous solution: 2 milliliter(s) intravenous every 12 hours  Fosamax 70 mg oral tablet: 1 tab(s) orally once a week  Lipitor 40 mg oral tablet: 1 tab(s) orally once a day  loratadine 10 mg oral tablet: 1 tab(s) orally once a day  magnesium oxide 400 mg oral tablet: 1 tab(s) orally once a day (at bedtime)  modafinil: 1 capsule with breakfast ~8am, before therapies  Multiple Vitamins oral tablet: 1 tab(s) orally once a day  Plaquenil 200 mg oral tablet: 1 tab(s) orally 2 times a day  polyethylene glycol 3350 oral powder for reconstitution: 17 gram(s) orally 2 times a day  potassium chloride 10 mEq oral tablet, extended release: 1 tab(s) orally once a day  senna leaf extract oral tablet: 2 tab(s) orally once a day (at bedtime)  Toprol-XL 25 mg oral tablet, extended release: 1 tab(s) orally once a day  Tylenol Regular Strength 325 mg oral tablet: 3 tab(s) orally every 8 hours As needed Mild Pain (1 - 3)   bisacodyl 5 mg oral delayed release tablet: 1 tab(s) orally every 12 hours As needed Constipation  enoxaparin: 40 milligram(s) subcutaneously once a day every 24 hours ~6pm  escitalopram 5 mg oral tablet: 1 tab(s) orally once a day  famotidine 10 mg/mL intravenous solution: 2 milliliter(s) intravenous every 12 hours  Fosamax 70 mg oral tablet: 1 tab(s) orally once a week  Lipitor 40 mg oral tablet: 1 tab(s) orally once a day  loratadine 10 mg oral tablet: 1 tab(s) orally once a day  magnesium oxide 400 mg oral tablet: 1 tab(s) orally once a day (at bedtime)  modafinil: 50 milligram(s) orally once a day (in the morning) 1 capsule with breakfast ~8am, before therapies  Multiple Vitamins oral tablet: 1 tab(s) orally once a day  Plaquenil 200 mg oral tablet: 1 tab(s) orally 2 times a day  polyethylene glycol 3350 oral powder for reconstitution: 17 gram(s) orally 2 times a day  potassium chloride 10 mEq oral tablet, extended release: 1 tab(s) orally once a day  senna leaf extract oral tablet: 2 tab(s) orally once a day (at bedtime)  Toprol-XL 25 mg oral tablet, extended release: 1 tab(s) orally once a day  Tylenol Regular Strength 325 mg oral tablet: 3 tab(s) orally every 8 hours As needed Mild Pain (1 - 3)

## 2023-11-27 NOTE — PROGRESS NOTE ADULT - SUBJECTIVE AND OBJECTIVE BOX
Patient is a 83y old  Female who presents with a chief complaint of Subdural Hemorrhage (19 Nov 2023 14:22)    HPI:  Ginna Vergara is an 83 year old female with PMH of HTN, HLD, Arthritis, and normal pressure hydrocephalus (s/p VPS- March 2023 at [place]); who presented to [facility] on 11/8 for progressive weakness, confusion and decreased PO intake, and was found to have a mixed SDH with 1.4cm midline shift. She was transferred to [facility] later that day for further management. On 11/9, she underwent R sided reilly hole placement followed by MMA embolization on 11/10 with Dr. Rodriguez. Her hospital course was complicated by 2 RRTs for unresponsiveness. Serial brain imaging was stable and she was found to be in rapid AFIB. 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 PM&R was consulted and deemed her an appropriate candidate for IRF. She was medically stabilized and cleared for discharge to [facility].      PAST MEDICAL & SURGICAL HISTORY:  Normal pressure hydrocephalus  HTN (hypertension)  HLD (hyperlipidemia)  Arthritis  S/P  shunt    SUBJECTIVE/OBJECTIVE/ROS: No acute overnight events. Patient seen and examined in the AM bedside in , most of breakfast eaten. Reports sleeping well and feeling alert this morning. Denies discomforts aside from feeling cold, blanket provided. Staples removed. Asked about d/c date. All questions answered.     ROS  +neurological deficits  +temperature intolerance, always cold  - insomnia    PHYSICAL EXAM  RECENT LABS    Vital Signs Last 24 Hrs  T(C): 36.3 (27 Nov 2023 09:18), Max: 36.7 (26 Nov 2023 19:33)  T(F): 97.3 (27 Nov 2023 09:18), Max: 98 (26 Nov 2023 19:33)  HR: 57 (27 Nov 2023 09:18) (57 - 89)  BP: 113/66 (27 Nov 2023 09:18) (107/67 - 121/67)  BP(mean): --  RR: 16 (27 Nov 2023 09:18) (15 - 16)  SpO2: 97% (27 Nov 2023 09:18) (95% - 97%)    Parameters below as of 26 Nov 2023 19:33  Patient On (Oxygen Delivery Method): room air    Constitutional - NAD, Comfortable  HEENT - s/p reilly hole with staples intact --incision c/d/i  Neck - Supple, No limited ROM  Chest - shallow breaths, CTA BL  Cardio - RRR  Abdomen -  Soft, NTND, +BS  Extremities - RLE with 2+ pitting edema, No calf tenderness   Neurologic Exam:                    Cognitive - confused            Orientation: Awake, A&O x2-- thinks its 2017, able to pick 2023 with multiple choice options, does not know name of hospital, able to figure out month when given hints            Attention:  easily distracted            Memory: impaired      Speech - Fluent, no aphasia , but delayed processing      Cranial Nerves - difficulty tracking, no facial droop      Motor -                      LEFT  UE - ShF 3/5, EF 4/5, EE 4/5, WE 4/5,  4/5                    RIGHT UE - ShF 4/5, EF 5/5, EE 5/5, WE 5/5,  4/5                    LEFT    LE - HF 4/5, KE 4/5, DF 4/5, PF 4/5                    RIGHT LE - HF 4/5, KE 5/5, DF 4/5, PF 5/5        Sensory - Intact to LT bilaterally  Psychiatric - Calm    RECENT LABS                        10.1   7.75  )-----------( 225      ( 27 Nov 2023 05:32 )             32.8     11-27    137  |  103  |  21  ----------------------------<  108<H>  4.8   |  26  |  1.00    Ca    10.3      27 Nov 2023 05:32    TPro  6.4  /  Alb  2.7<L>  /  TBili  0.3  /  DBili  x   /  AST  18  /  ALT  21  /  AlkPhos  76  11-27      Urinalysis Basic - ( 27 Nov 2023 05:32 )    Color: x / Appearance: x / SG: x / pH: x  Gluc: 108 mg/dL / Ketone: x  / Bili: x / Urobili: x   Blood: x / Protein: x / Nitrite: x   Leuk Esterase: x / RBC: x / WBC x   Sq Epi: x / Non Sq Epi: x / Bacteria: x      CAPILLARY BLOOD GLUCOSE      Allergies  No Known Allergies    MEDICATIONS  (STANDING):  atorvastatin 40 milliGRAM(s) Oral at bedtime  cefpodoxime 200 milliGRAM(s) Oral every 12 hours  dextrose 5%. 1000 milliLiter(s) (50 mL/Hr) IV Continuous <Continuous>  dextrose 5%. 1000 milliLiter(s) (100 mL/Hr) IV Continuous <Continuous>  dextrose 50% Injectable 25 Gram(s) IV Push once  dextrose 50% Injectable 12.5 Gram(s) IV Push once  dextrose 50% Injectable 25 Gram(s) IV Push once  enoxaparin Injectable 40 milliGRAM(s) SubCutaneous <User Schedule>  escitalopram 5 milliGRAM(s) Oral daily  famotidine    Tablet 20 milliGRAM(s) Oral two times a day  glucagon  Injectable 1 milliGRAM(s) IntraMuscular once  hydroxychloroquine 200 milliGRAM(s) Oral two times a day  lactobacillus acidophilus 1 Tablet(s) Oral two times a day with meals  loratadine 10 milliGRAM(s) Oral daily  magnesium oxide 400 milliGRAM(s) Oral at bedtime  metoprolol tartrate 12.5 milliGRAM(s) Oral every 12 hours  modafinil 50 milliGRAM(s) Oral <User Schedule>  multivitamin 1 Tablet(s) Oral daily  nystatin Powder 1 Application(s) Topical two times a day  polyethylene glycol 3350 17 Gram(s) Oral two times a day  potassium chloride    Tablet ER 10 milliEquivalent(s) Oral daily  senna 2 Tablet(s) Oral at bedtime    MEDICATIONS  (PRN):  acetaminophen     Tablet .. 975 milliGRAM(s) Oral every 8 hours PRN Mild Pain (1 - 3)  bisacodyl 5 milliGRAM(s) Oral every 12 hours PRN Constipation  dextrose Oral Gel 15 Gram(s) Oral once PRN Blood Glucose LESS THAN 70 milliGRAM(s)/deciliter

## 2023-11-27 NOTE — PROGRESS NOTE ADULT - SUBJECTIVE AND OBJECTIVE BOX
No events overnight  No new complaints to offer me    MEDICATIONS  (STANDING):  atorvastatin 40 milliGRAM(s) Oral at bedtime  cefpodoxime 200 milliGRAM(s) Oral every 12 hours  dextrose 5%. 1000 milliLiter(s) (50 mL/Hr) IV Continuous <Continuous>  dextrose 5%. 1000 milliLiter(s) (100 mL/Hr) IV Continuous <Continuous>  dextrose 50% Injectable 25 Gram(s) IV Push once  dextrose 50% Injectable 12.5 Gram(s) IV Push once  dextrose 50% Injectable 25 Gram(s) IV Push once  enoxaparin Injectable 40 milliGRAM(s) SubCutaneous <User Schedule>  escitalopram 5 milliGRAM(s) Oral daily  famotidine    Tablet 20 milliGRAM(s) Oral two times a day  glucagon  Injectable 1 milliGRAM(s) IntraMuscular once  hydroxychloroquine 200 milliGRAM(s) Oral two times a day  lactobacillus acidophilus 1 Tablet(s) Oral two times a day with meals  loratadine 10 milliGRAM(s) Oral daily  magnesium oxide 400 milliGRAM(s) Oral at bedtime  metoprolol tartrate 12.5 milliGRAM(s) Oral every 12 hours  modafinil 50 milliGRAM(s) Oral <User Schedule>  multivitamin 1 Tablet(s) Oral daily  nystatin Powder 1 Application(s) Topical two times a day  polyethylene glycol 3350 17 Gram(s) Oral two times a day  potassium chloride    Tablet ER 10 milliEquivalent(s) Oral daily  senna 2 Tablet(s) Oral at bedtime    MEDICATIONS  (PRN):  acetaminophen     Tablet .. 975 milliGRAM(s) Oral every 8 hours PRN Mild Pain (1 - 3)  bisacodyl 5 milliGRAM(s) Oral every 12 hours PRN Constipation  dextrose Oral Gel 15 Gram(s) Oral once PRN Blood Glucose LESS THAN 70 milliGRAM(s)/deciliter      Vital Signs Last 24 Hrs  T(F): 97.3 (27 Nov 2023 09:18), Max: 98 (26 Nov 2023 19:33)  HR: 57 (27 Nov 2023 09:18) (57 - 89)  BP: 113/66 (27 Nov 2023 09:18) (107/67 - 121/67)  RR: 16 (27 Nov 2023 09:18) (15 - 16)  SpO2: 97% (27 Nov 2023 09:18) (95% - 97%)    I&O's Summary    26 Nov 2023 07:01  -  27 Nov 2023 07:00  --------------------------------------------------------  IN: 0 mL / OUT: 250 mL / NET: -250 mL        Examination:  General: NAD, Comfortable  Pulm: CTA BL No Rhonchi Rales or wheezes  Neck: Supple, No JVD  CVS: RRR No rubs murmurs or gallops  Abdomen: Soft, Nontender, Nondistended; No masses or organomegally  Extremities: No calf tenderness, No pitting edema    Labs:                        10.1   7.75  )-----------( 225      ( 27 Nov 2023 05:32 )             32.8       11-27    137  |  103  |  21  ----------------------------<  108  4.8   |  26  |  1.00    Ca    10.3      27 Nov 2023 05:32    TPro  6.4  /  Alb  2.7  /  TBili  0.3  /  DBili  x   /  AST  18  /  ALT  21  /  AlkPhos  76  11-27

## 2023-11-27 NOTE — DISCHARGE NOTE PROVIDER - DETAILS OF MALNUTRITION DIAGNOSIS/DIAGNOSES
This patient has been assessed with a concern for Malnutrition and was treated during this hospitalization for the following Nutrition diagnosis/diagnoses:     -  11/18/2023: Severe protein-calorie malnutrition

## 2023-11-27 NOTE — DISCHARGE NOTE PROVIDER - PROVIDER TOKENS
PROVIDER:[TOKEN:[[id number]],FOLLOWUP:[2 weeks]] PROVIDER:[TOKEN:[[id number]],FOLLOWUP:[2 weeks]] PROVIDER:[TOKEN:[[id number]],FOLLOWUP:[2 weeks]] PROVIDER:[TOKEN:[[id number]],FOLLOWUP:[2 weeks]],PROVIDER:[TOKEN:[[id number]]] PROVIDER:[TOKEN:[[id number]],FOLLOWUP:[2 weeks]],PROVIDER:[TOKEN:[[id number]]] PROVIDER:[TOKEN:[[id number]],FOLLOWUP:[2 weeks]],PROVIDER:[TOKEN:[[id number]]]

## 2023-11-27 NOTE — DISCHARGE NOTE PROVIDER - CARE PROVIDERS DIRECT ADDRESSES
,DirectAddress_Unknown ,DirectAddress_Unknown,[email or web address] ,DirectAddress_Unknown,[email or web address] ,DirectAddress_Unknown,[email or web address]

## 2023-11-27 NOTE — DISCHARGE NOTE PROVIDER - NPI NUMBER (FOR SYSAD[employer] USE ONLY) :
[[id number]] [[id number]] [[id number]] [[id number]],[[id number]] [[id number]],[[id number]] [[id number]]

## 2023-11-27 NOTE — PROGRESS NOTE ADULT - ATTENDING COMMENTS
Patient in good spirits this morning  Ongoing left shoulder pain when arm is moved  S/p staples removal Patient in good spirits this morning  Ongoing left shoulder pain when arm is moved  S/p reilly hole-- staples removal today

## 2023-11-27 NOTE — PROGRESS NOTE ADULT - ASSESSMENT
Assessment and Plan:   83 year old female with PMH of HTN, HLD, Arthritis, and normal pressure hydrocephalus (s/p VPS- March 2023 at [place]); who presented to [facility] on 11/8 for progressive weakness, confusion and decreased PO intake, and was found to have a mixed SDH with 1.4cm midline shift. She was transferred to [facility] later that day for further management. On 11/9, she underwent R sided reilly hole placement, followed by MMA embolization on 11/10 with Dr. Rodriguez. Her hospital course was complicated by 2 RRTs for unresponsiveness. Serial brain imaging was stable and she was found to be in rapid AFIB. 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 Patient now admitted for a multidisciplinary rehab program with cognitive deficits, gait and ADL impairments.     #Traumatic Subdural Hemorrhage  - CTH 11/8: Moderate to large mixed attenuation right hemispheric convexity SDH compressing right hemisphere and right lateral ventricle with 1.4 cm midline shift to left. Right frontal ventriculostomy shunt catheter within right frontal horn.  - CTH 11/14: Right convexity subdural collection of air, blood and fluid continues to decrease in size.  - Gait Instability, ADL impairments and Functional impairments: start Comprehensive Rehab Program of PT/OT/SLP  - 3 hours a day, 5 days a week  - S/p R sided reilly hole placement (11/9), followed by MMA embolization (11/10). Staples and 1 suture removed at bedside. Tolerated well.   - Outpatient follow up with [facility], Dr. Rodriguez  - C/w Modafinil 50 mg q 8am (11/21)    #Normal Pressure Hydrocephalus  - S/p VPS March 2023  - Dr. Simon ([place])   - CTH 11/20: deep subcortical white matter ischemia, unchanged. RIGHT frontal shunt catheter tip noted in the RIGHT lateral ventricle. Mild ventriculomegaly unchanged. Tiny residual subdural collection.  -No acute intervention from NSGY standpoint-- per NP discussion with neurosurgeon Dr Méndez 11/20 , follow up outpatient.     UTI--denies urinary frequency urgency, dysuria, however confused and unreliable  -UA (+) Leuk esterase, (+) WBC, (-) nitrite  -UCx sensitive to Vantin, cont through 11/29  -C/w Vantin 200 mg po q 12 for 7 days, discussed with hospitalist     #Rapid AFIB  - Metoprolol 25mg BID-- decreased to 12.5 mg BID (11/24)-- BP on lower side -- discussed with hospitalist. (107/67 - 121/67) Tolerating new dose well.   - No AC d/t SDH  - Evaluation for Watchmann device outpatient with cardiology    #KEITH  - Bun/Cr: 25/1.03 (11/20)   21/1 (11/27). Improving  - Encourage oral hydration  - BMP 11/30    #Mood  - Lexapro 5 mg po (11/20), for irritability     #Runny nose-- reported in therapy 11/21 afternoon  - RVP and Covid swab negative  - afebrile, no elevated wbc count, continue to monitor     #Seizure Prophylaxis, no longer required  - Levetiracetam 500mg BID-- tapered off  --No seizure events throughout hospital course and EEG neg for seizures    #HLD  - Atorvastatin 40mg at HS     #Arthritis  - Plaquenil 200mg BID (home regimen)    #Skin  - Skin on admission: Right sided surgical incision intact with staples Dry skin over gluteal region. Skin tags throughout body.   - Remove staples on 11/27  - Nystatin powder BID   - Pressure injury/Skin: OOB to Chair, PT/OT     #Pain Mgmt   - Tylenol PRN    #GI/Bowel Mgmt  - Diet: regular    - Famotidine 20mg BID   - Miralax BID  - Senna   - PRN: Bisacodyl 5mg at HS     #/Bladder Mgmt   -  monitor PVR q8h, CIC>400cc  - Toileting programed q3-4 hrs during awake hours    #FEN   - Diet - Regular + Thins    - Dysphagia  SLP - evaluation and treatment    #Health Maintenance  - KCL 10mEq daily   - Magnesium Oxide 400mg at HS  - Fosamax 70mg weekly- home regimen  - K: 4.5;  mag level 2.1    #Precautions / PROPHYLAXIS:   - Falls, Seizure   - ortho: Weight bearing status: WBAT   - Lungs: Aspiration, Incentive Spirometer   - DVT ppx: Lovenox    #IDT 11/21   Nursing: incontinent bowel/bladder   Social: lives with , independent prior, retired    Preferences: wants pizza   SLP: reglar diet thin liquids, decreased memory/attention/left inattention   OT: supervision for eating/oral hygiene, dependent LBD/footwear   PT: dependent   Goals: Increase attention/focus within 10-15 intervals, ModA transfers/bedmobility    DC: Home/GIANLUCA, TBD   TDD 12/5/23  ------------------------------------------------------  OUTPATIENT/FOLLOW UP:    Sierra Rodriguez  Neurosurgery  [address] [place] [id number]  Phone: [phone]  Fax: [phone]  Follow Up Time: 2 weeks    Neurologist- managing VPS   Dr. Aurora Webb   Watchmann device outpatient with cardiology      -11/22: fellow attempted to call patient's , Kyle Vergara, to provide medical updates, but the call went to  x2.     Assessment and Plan:   83 year old female with PMH of HTN, HLD, Arthritis, and normal pressure hydrocephalus (s/p VPS- March 2023 at [place]); who presented to [facility] on 11/8 for progressive weakness, confusion and decreased PO intake, and was found to have a mixed SDH with 1.4cm midline shift. She was transferred to [facility] later that day for further management. On 11/9, she underwent R sided reilly hole placement, followed by MMA embolization on 11/10 with Dr. Rodriguez. Her hospital course was complicated by 2 RRTs for unresponsiveness. Serial brain imaging was stable and she was found to be in rapid AFIB. 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 Patient now admitted for a multidisciplinary rehab program with cognitive deficits, gait and ADL impairments.     #Traumatic Subdural Hemorrhage  - CTH 11/8: Moderate to large mixed attenuation right hemispheric convexity SDH compressing right hemisphere and right lateral ventricle with 1.4 cm midline shift to left. Right frontal ventriculostomy shunt catheter within right frontal horn.  - CTH 11/14: Right convexity subdural collection of air, blood and fluid continues to decrease in size.  - Gait Instability, ADL impairments and Functional impairments: start Comprehensive Rehab Program of PT/OT/SLP  - 3 hours a day, 5 days a week  - S/p R sided reilly hole placement (11/9), followed by MMA embolization (11/10). Staples and 1 suture removed at bedside (11/27). Tolerated well.   - Outpatient follow up with NSGY, Dr. Rodriguez  - C/w Modafinil 50 mg q 8am (11/21)    #Normal Pressure Hydrocephalus  - S/p VPS March 2023  - Dr. Simon ([place])   - CTH 11/20: deep subcortical white matter ischemia, unchanged. RIGHT frontal shunt catheter tip noted in the RIGHT lateral ventricle. Mild ventriculomegaly unchanged. Tiny residual subdural collection.  -No acute intervention from NSGY standpoint-- per NP discussion with neurosurgeon Dr Méndez 11/20 , follow up outpatient.     UTI--denies urinary frequency urgency, dysuria, however confused and unreliable  -UA (+) Leuk esterase, (+) WBC, (-) nitrite  -UCx sensitive to Vantin, cont through 11/29  -C/w Vantin 200 mg po q 12 for 7 days, discussed with hospitalist     #Rapid AFIB  - Metoprolol 25mg BID-- decreased to 12.5 mg BID (11/24)-- BP on lower side -- discussed with hospitalist. (107/67 - 121/67) Tolerating new dose well.   - No AC d/t SDH  - Evaluation for Watchmann device outpatient with cardiology    #KEITH  - Bun/Cr: 25/1.03 (11/20)   21/1 (11/27). Improving  - Encourage oral hydration  - BMP 11/30    #Mood  - Lexapro 5 mg po (11/20), for irritability     #Runny nose-- reported in therapy 11/21 afternoon  - RVP and Covid swab negative  - afebrile, no elevated wbc count, continue to monitor     #Seizure Prophylaxis, no longer required  - Levetiracetam 500mg BID-- tapered off  --No seizure events throughout hospital course and EEG neg for seizures    #HLD  - Atorvastatin 40mg at HS     #Arthritis  - Plaquenil 200mg BID (home regimen)    #Skin  - Skin on admission: Right sided surgical incision intact with staples Dry skin over gluteal region. Skin tags throughout body.   - Remove staples on 11/27  - Nystatin powder BID   - Pressure injury/Skin: OOB to Chair, PT/OT     #Pain Mgmt   - Tylenol PRN    #GI/Bowel Mgmt  - Diet: regular    - Famotidine 20mg BID   - Miralax BID  - Senna   - PRN: Bisacodyl 5mg at HS     #/Bladder Mgmt   -  monitor PVR q8h, CIC>400cc  - Toileting programed q3-4 hrs during awake hours    #FEN   - Diet - Regular + Thins    - Dysphagia  SLP - evaluation and treatment    #Health Maintenance  - KCL 10mEq daily   - Magnesium Oxide 400mg at HS  - Fosamax 70mg weekly- home regimen  - K: 4.5;  mag level 2.1    #Precautions / PROPHYLAXIS:   - Falls, Seizure   - ortho: Weight bearing status: WBAT   - Lungs: Aspiration, Incentive Spirometer   - DVT ppx: Lovenox    #IDT 11/21   Nursing: incontinent bowel/bladder   Social: lives with , independent prior, retired    Preferences: wants pizza   SLP: reglar diet thin liquids, decreased memory/attention/left inattention   OT: supervision for eating/oral hygiene, dependent LBD/footwear   PT: dependent   Goals: Increase attention/focus within 10-15 intervals, ModA transfers/bedmobility    DC: Home/GIANLUCA, TBD   TDD 12/5/23  ------------------------------------------------------  OUTPATIENT/FOLLOW UP:    Sierra Rodriguez  Neurosurgery  [address] [place] [id number]  Phone: [phone]  Fax: [phone]  Follow Up Time: 2 weeks    Neurologist- managing VPS   Dr. Aurora Earlmann device outpatient with cardiology      -11/22: fellow attempted to call patient's , Kyle Vergara, to provide medical updates, but the call went to  x2.     Assessment and Plan:   83 year old female with PMH of HTN, HLD, Arthritis, and normal pressure hydrocephalus (s/p VPS- March 2023 at [place]); who presented to [facility] on 11/8 for progressive weakness, confusion and decreased PO intake, and was found to have a mixed SDH with 1.4cm midline shift. She was transferred to [facility] later that day for further management. On 11/9, she underwent R sided reilly hole placement, followed by MMA embolization on 11/10 with Dr. Rodriguez. Her hospital course was complicated by 2 RRTs for unresponsiveness. Serial brain imaging was stable and she was found to be in rapid AFIB. 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 Patient now admitted for a multidisciplinary rehab program with cognitive deficits, gait and ADL impairments.     #Traumatic Subdural Hemorrhage  - CTH 11/8: Moderate to large mixed attenuation right hemispheric convexity SDH compressing right hemisphere and right lateral ventricle with 1.4 cm midline shift to left. Right frontal ventriculostomy shunt catheter within right frontal horn.  - CTH 11/14: Right convexity subdural collection of air, blood and fluid continues to decrease in size.  - Gait Instability, ADL impairments and Functional impairments: start Comprehensive Rehab Program of PT/OT/SLP  - 3 hours a day, 5 days a week  - S/p R sided reilly hole placement (11/9), followed by MMA embolization (11/10). Staples and 1 suture removed at bedside (11/27). Tolerated well.   - Outpatient follow up with NSGY, Dr. Rodriguez  - C/w Modafinil 50 mg q 8am (11/21)    #Normal Pressure Hydrocephalus  - S/p VPS March 2023  - Dr. Simon ([place])   - CTH 11/20: deep subcortical white matter ischemia, unchanged. RIGHT frontal shunt catheter tip noted in the RIGHT lateral ventricle. Mild ventriculomegaly unchanged. Tiny residual subdural collection.  -No acute intervention from NSGY standpoint-- per NP discussion with neurosurgeon Dr Méndez 11/20 , follow up outpatient.   -D/W neurosurgeon Dr. Méndez to clarify time for next shunt adjustment-  "should follow up in 6 weeks. At the latest 3 months" 11/27    UTI--denies urinary frequency urgency, dysuria, however confused and unreliable  -UA (+) Leuk esterase, (+) WBC, (-) nitrite  -UCx sensitive to Vantin, cont through 11/29  -C/w Vantin 200 mg po q 12 for 7 days, discussed with hospitalist     #Rapid AFIB  - Metoprolol 25mg BID-- decreased to 12.5 mg BID (11/24)-- BP on lower side -- discussed with hospitalist. (107/67 - 121/67) Tolerating new dose well.   - No AC d/t SDH  - Evaluation for Watchmann device outpatient with cardiology    #KEITH  - Bun/Cr: 25/1.03 (11/20)   21/1 (11/27). Improving  - Encourage oral hydration  - BMP 11/30    #Mood  - Lexapro 5 mg po (11/20), for irritability     #Runny nose-- reported in therapy 11/21 afternoon  - RVP and Covid swab negative  - afebrile, no elevated wbc count, continue to monitor     #Seizure Prophylaxis, no longer required  - Levetiracetam 500mg BID-- tapered off  --No seizure events throughout hospital course and EEG neg for seizures    #HLD  - Atorvastatin 40mg at HS     #Arthritis  - Plaquenil 200mg BID (home regimen)    #Skin  - Skin on admission: Right sided surgical incision intact with staples Dry skin over gluteal region. Skin tags throughout body.   - Remove staples on 11/27  - Nystatin powder BID   - Pressure injury/Skin: OOB to Chair, PT/OT     #Pain Mgmt   - Tylenol PRN    #GI/Bowel Mgmt  - Diet: regular    - Famotidine 20mg BID   - Miralax BID  - Senna   - PRN: Bisacodyl 5mg at HS     #/Bladder Mgmt   -  monitor PVR q8h, CIC>400cc  - Toileting programed q3-4 hrs during awake hours    #FEN   - Diet - Regular + Thins    - Dysphagia  SLP - evaluation and treatment    #Health Maintenance  - KCL 10mEq daily   - Magnesium Oxide 400mg at HS  - Fosamax 70mg weekly- home regimen  - K: 4.5;  mag level 2.1    #Precautions / PROPHYLAXIS:   - Falls, Seizure   - ortho: Weight bearing status: WBAT   - Lungs: Aspiration, Incentive Spirometer   - DVT ppx: Lovenox    #IDT 11/21   Nursing: incontinent bowel/bladder   Social: lives with , independent prior, retired    Preferences: zohreh laurent   SLP: reglar diet thin liquids, decreased memory/attention/left inattention   OT: supervision for eating/oral hygiene, dependent LBD/footwear   PT: dependent   Goals: Increase attention/focus within 10-15 intervals, ModA transfers/bedmobility    DC: Home/GIANLUCA, TBD   TDD 12/5/23  ------------------------------------------------------  OUTPATIENT/FOLLOW UP:    Sierra Rodriguez  Neurosurgery  [address] [place] [id number]  Phone: [phone]  Fax: [phone]  Follow Up Time: 2 weeks    Neurologist- managing VPS   [facility]      11/27 Spoke with family bedside. Questions about  shunt, reached out to neurosurgery, see above. No other questions at this time.

## 2023-11-27 NOTE — DISCHARGE NOTE PROVIDER - HOSPITAL COURSE
HPI:  Ginna Vergara is an 83 year old female with PMH of HTN, HLD, Arthritis, and normal pressure hydrocephalus (s/p VPS- March 2023 at [place]); who presented to [facility] on 11/8 for progressive weakness, confusion and decreased PO intake, and was found to have a mixed SDH with 1.4cm midline shift. She was transferred to [facility] later that day for further management. On 11/9, she underwent R sided reilly hole placement followed by MMA embolization on 11/10 with Dr. Rodriguez. Her hospital course was complicated by 2 RRTs for unresponsiveness. Serial brain imaging was stable and she was found to be in rapid AFIB. 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 PM&R was consulted and deemed her an appropriate candidate for IRF. She was medically stabilized and cleared for discharge to [facility].  (17 Nov 2023 11:31)    Rehab course     Functional Status:  Nursing: incontinent bowel/bladder   Social: lives with , independent prior, retired    Preferences: zohreh laurent   SLP: reglar diet thin liquids, decreased memory/attention/left inattention   OT: supervision for eating/oral hygiene, dependent LBD/footwear   PT: dependent       All other medical co-morbidities were stable. Patient tolerated course of inpatient PT/OT/SLP rehab with significant improvements and met rehab goals prior to discharge. Discharge instructions were discussed with patient and family. All questions answered and all concerns addressed. Patient was medically cleared for discharge to [facility].     Outpatient Follow-ups:    Sierra Rodriguez  Neurosurgery  [address] [place] [address]  Phone: [phone]  Fax: [phone]  Follow Up Time: 2 weeks    Josette Cobb  [facility] PreAdmits  Scheduled Appointment: 11/17/2023   HPI:  Ginna Vergara is an 83 year old female with PMH of HTN, HLD, Arthritis, and normal pressure hydrocephalus (s/p VPS- March 2023 at [place]); who presented to [facility] on 11/8 for progressive weakness, confusion and decreased PO intake, and was found to have a mixed SDH with 1.4cm midline shift. She was transferred to [facility] later that day for further management. On 11/9, she underwent R sided reilly hole placement followed by MMA embolization on 11/10 with Dr. Rodriguez. Her hospital course was complicated by 2 RRTs for unresponsiveness. Serial brain imaging was stable and she was found to be in rapid AFIB. 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 PM&R was consulted and deemed her an appropriate candidate for IRF. She was medically stabilized and cleared for discharge to [facility].  (17 Nov 2023 11:31)    Rehab course     Functional Status:  Nursing: incontinent bowel/bladder   Social: lives with , independent prior, retired    Preferences: zohreh laurent   SLP: reglar diet thin liquids, decreased memory/attention/left inattention   OT: supervision for eating/oral hygiene, dependent LBD/footwear   PT: dependent       All other medical co-morbidities were stable. Patient tolerated course of inpatient PT/OT/SLP rehab with significant improvements and met rehab goals prior to discharge. Discharge instructions were discussed with patient and family. All questions answered and all concerns addressed. Patient was medically cleared for discharge to [facility].     Outpatient Follow-ups:    Sierra Rodriguez  Neurosurgery  [address] [place] [address]  Phone: [phone]  Fax: [phone]  Follow Up Time: 2 weeks    Josette Cobb  [facility] PreAdmits  Scheduled Appointment: 11/17/2023   HPI:  Ginna Vergara is an 83 year old female with PMH of HTN, HLD, Arthritis, and normal pressure hydrocephalus (s/p VPS- March 2023 at [place]); who presented to [facility] on 11/8 for progressive weakness, confusion and decreased PO intake, and was found to have a mixed SDH with 1.4cm midline shift. She was transferred to [facility] later that day for further management. On 11/9, she underwent R sided reilly hole placement followed by MMA embolization on 11/10 with Dr. Rodriguez. Her hospital course was complicated by 2 RRTs for unresponsiveness. Serial brain imaging was stable and she was found to be in rapid AFIB. 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 PM&R was consulted and deemed her an appropriate candidate for IRF. She was medically stabilized and cleared for discharge to [facility].  (17 Nov 2023 11:31)    Rehab course     Functional Status:  Nursing: incontinent bowel/bladder   Social: lives with , independent prior, retired    Preferences: zohreh laurent   SLP: reglar diet thin liquids, decreased memory/attention/left inattention   OT: supervision for eating/oral hygiene, dependent LBD/footwear   PT: dependent       All other medical co-morbidities were stable. Patient tolerated course of inpatient PT/OT/SLP rehab with significant improvements and met rehab goals prior to discharge. Discharge instructions were discussed with patient and family. All questions answered and all concerns addressed. Patient was medically cleared for discharge to [facility].     Outpatient Follow-ups:    Sierra Rodriguez  Neurosurgery  [address] [place] [address]  Phone: [phone]  Fax: [phone]  Follow Up Time: 2 weeks    Josette Cobb  [facility] PreAdmits  Scheduled Appointment: 11/17/2023   HPI:  Ginna Vergara is an 83 year old female with PMH of HTN, HLD, Arthritis, and normal pressure hydrocephalus (s/p VPS- March 2023 at [place]); who presented to [facility] on 11/8 for progressive weakness, confusion and decreased PO intake, and was found to have a mixed SDH with 1.4cm midline shift. She was transferred to [facility] later that day for further management. On 11/9, she underwent R sided reilly hole placement followed by MMA embolization on 11/10 with Dr. Rodriguez. Her hospital course was complicated by 2 RRTs for unresponsiveness. Serial brain imaging was stable and she was found to be in rapid AFIB. 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 PM&R was consulted and deemed her an appropriate candidate for IRF. She was medically stabilized and cleared for discharge to [facility].  (17 Nov 2023 11:31)    Rehab course uncomplicated    Functional Status:  SLP: reglar diet thin liquids, mild/mod cog, mild underlying cognitive defitis    OT: maxA for most ADLs, toileting assist x2, maxA UBD, supervision for eating/oral care   PT: stand pivot MaxA (motor planning?/anxiety), 2 person assist in bathroom, walks with walker (high anxiety) 30ft, fearful of falling, sit/stand transfer modA, stand pivot (squat pivot) mod/max x1     All other medical co-morbidities were stable. Patient tolerated course of inpatient PT/OT/SLP rehab with significant improvements and met rehab goals prior to discharge. Discharge instructions were discussed with patient and family. All questions answered and all concerns addressed. Patient was medically cleared for discharge to [facility].     Outpatient Follow-ups:    Sierra Rodriguez (office of Dr. Kyle Méndez)  Neurosurgery  [address] [place] [address]  Phone: [phone]  Fax: [phone]  Follow Up Time: 2 weeks    Josette Cobb  [facility] PreAdmits  Scheduled Appointment: 11/17/2023   HPI:  Ginna Vergara is an 83 year old female with PMH of HTN, HLD, Arthritis, and normal pressure hydrocephalus (s/p VPS- March 2023 at [place]); who presented to [facility] on 11/8 for progressive weakness, confusion and decreased PO intake, and was found to have a mixed SDH with 1.4cm midline shift. She was transferred to [facility] later that day for further management. On 11/9, she underwent R sided reilly hole placement followed by MMA embolization on 11/10 with Dr. Rodriguez. Her hospital course was complicated by 2 RRTs for unresponsiveness. Serial brain imaging was stable and she was found to be in rapid AFIB. 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 PM&R was consulted and deemed her an appropriate candidate for IRF. She was medically stabilized and cleared for discharge to [facility].  (17 Nov 2023 11:31)    Rehab course uncomplicated    Functional Status:  SLP: reglar diet thin liquids, mild/mod cog, mild underlying cognitive defitis    OT: maxA for most ADLs, toileting assist x2, maxA UBD, supervision for eating/oral care   PT: stand pivot MaxA (motor planning?/anxiety), 2 person assist in bathroom, walks with walker (high anxiety) 30ft, fearful of falling, sit/stand transfer modA, stand pivot (squat pivot) mod/max x1     All other medical co-morbidities were stable. Patient tolerated course of inpatient PT/OT/SLP rehab with significant improvements and met rehab goals prior to discharge. Discharge instructions were discussed with patient and family. All questions answered and all concerns addressed. Patient was medically cleared for [facility] to [facility].     Outpatient Follow-ups:    Sierra Rodriguez (office of Dr. Kyle Méndez)  Neurosurgery  [address] [place] [address]  Phone: [phone]  Fax: [phone]  Follow Up Time: 2 weeks    Josette Cobb  [facility] PreAdmits  Scheduled Appointment: 11/17/2023   HPI:  Ginna Vergara is an 83 year old female with PMH of HTN, HLD, Arthritis, and normal pressure hydrocephalus (s/p VPS- March 2023 at [place]); who presented to [facility] on 11/8 for progressive weakness, confusion and decreased PO intake, and was found to have a mixed SDH with 1.4cm midline shift. She was transferred to [facility] later that day for further management. On 11/9, she underwent R sided reilly hole placement followed by MMA embolization on 11/10 with Dr. Rodriguez. Her hospital course was complicated by 2 RRTs for unresponsiveness. Serial brain imaging was stable and she was found to be in rapid AFIB. 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 PM&R was consulted and deemed her an appropriate candidate for IRF. She was medically stabilized and cleared for discharge to [facility].  (17 Nov 2023 11:31)    Rehab course uncomplicated    Functional Status:  SLP: reglar diet thin liquids, mild/mod cog, mild underlying cognitive defitis    OT: maxA for most ADLs, toileting assist x2, maxA UBD, supervision for eating/oral care   PT: stand pivot MaxA (motor planning?/anxiety), 2 person assist in bathroom, walks with walker (high anxiety) 30ft, fearful of falling, sit/stand transfer modA, stand pivot (squat pivot) mod/max x1     All other medical co-morbidities were stable. Patient tolerated course of inpatient PT/OT/SLP rehab with significant improvements and met rehab goals prior to discharge. Discharge instructions were discussed with patient and family. All questions answered and all concerns addressed. Patient was medically cleared for discharge to [facility].     Outpatient Follow-ups:    Sierra Rodriguez (office of Dr. Kyle Méndez)  Neurosurgery  [address] [place] [address]  Phone: [phone]  Fax: [phone]  Follow Up Time: 2 weeks    Josette Cobb  [facility] PreAdmits  Scheduled Appointment: 11/17/2023

## 2023-11-28 PROCEDURE — 99232 SBSQ HOSP IP/OBS MODERATE 35: CPT | Mod: GC

## 2023-11-28 PROCEDURE — 99232 SBSQ HOSP IP/OBS MODERATE 35: CPT

## 2023-11-28 RX ORDER — MODAFINIL 200 MG/1
50 TABLET ORAL
Refills: 0 | Status: DISCONTINUED | OUTPATIENT
Start: 2023-11-29 | End: 2023-12-05

## 2023-11-28 RX ADMIN — Medication 200 MILLIGRAM(S): at 05:26

## 2023-11-28 RX ADMIN — FAMOTIDINE 20 MILLIGRAM(S): 10 INJECTION INTRAVENOUS at 05:26

## 2023-11-28 RX ADMIN — MAGNESIUM OXIDE 400 MG ORAL TABLET 400 MILLIGRAM(S): 241.3 TABLET ORAL at 21:21

## 2023-11-28 RX ADMIN — LORATADINE 10 MILLIGRAM(S): 10 TABLET ORAL at 11:16

## 2023-11-28 RX ADMIN — FAMOTIDINE 20 MILLIGRAM(S): 10 INJECTION INTRAVENOUS at 17:24

## 2023-11-28 RX ADMIN — POLYETHYLENE GLYCOL 3350 17 GRAM(S): 17 POWDER, FOR SOLUTION ORAL at 17:24

## 2023-11-28 RX ADMIN — Medication 12.5 MILLIGRAM(S): at 17:23

## 2023-11-28 RX ADMIN — NYSTATIN CREAM 1 APPLICATION(S): 100000 CREAM TOPICAL at 17:24

## 2023-11-28 RX ADMIN — ESCITALOPRAM OXALATE 5 MILLIGRAM(S): 10 TABLET, FILM COATED ORAL at 11:15

## 2023-11-28 RX ADMIN — Medication 10 MILLIEQUIVALENT(S): at 11:16

## 2023-11-28 RX ADMIN — Medication 1 TABLET(S): at 11:15

## 2023-11-28 RX ADMIN — MODAFINIL 50 MILLIGRAM(S): 200 TABLET ORAL at 07:39

## 2023-11-28 RX ADMIN — ATORVASTATIN CALCIUM 40 MILLIGRAM(S): 80 TABLET, FILM COATED ORAL at 21:21

## 2023-11-28 RX ADMIN — Medication 1 TABLET(S): at 07:39

## 2023-11-28 RX ADMIN — ENOXAPARIN SODIUM 40 MILLIGRAM(S): 100 INJECTION SUBCUTANEOUS at 17:24

## 2023-11-28 RX ADMIN — Medication 12.5 MILLIGRAM(S): at 05:26

## 2023-11-28 RX ADMIN — Medication 200 MILLIGRAM(S): at 17:23

## 2023-11-28 RX ADMIN — Medication 1 TABLET(S): at 17:23

## 2023-11-28 RX ADMIN — NYSTATIN CREAM 1 APPLICATION(S): 100000 CREAM TOPICAL at 05:25

## 2023-11-28 NOTE — PROGRESS NOTE ADULT - ATTENDING COMMENTS
Patient seen and examined at bedside, endorses feeling cold. Sleeping well. Denies pain. Continues to be confused at times.

## 2023-11-28 NOTE — PROGRESS NOTE ADULT - SUBJECTIVE AND OBJECTIVE BOX
Patient is a 83y old  Female who presents with a chief complaint of Subdural Hemorrhage (19 Nov 2023 14:22)    HPI:  Ginna Vergara is an 83 year old female with PMH of HTN, HLD, Arthritis, and normal pressure hydrocephalus (s/p VPS- March 2023 at [place]); who presented to [facility] on 11/8 for progressive weakness, confusion and decreased PO intake, and was found to have a mixed SDH with 1.4cm midline shift. She was transferred to [facility] later that day for further management. On 11/9, she underwent R sided reilly hole placement followed by MMA embolization on 11/10 with Dr. Rodriguez. Her hospital course was complicated by 2 RRTs for unresponsiveness. Serial brain imaging was stable and she was found to be in rapid AFIB. 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 PM&R was consulted and deemed her an appropriate candidate for IRF. She was medically stabilized and cleared for discharge to [facility].      PAST MEDICAL & SURGICAL HISTORY:  Normal pressure hydrocephalus  HTN (hypertension)  HLD (hyperlipidemia)  Arthritis  S/P  shunt    SUBJECTIVE/OBJECTIVE/ROS: Patient awake, alert, sitting in WC bedside. Decreased appetite this morning, drank Ensure and a few sips of water. Denies pain or discomforts. Able to follow simple commands. Can choose year from . Very pleasant and perky this morning.     ROS  +neurological deficits  +temperature intolerance, always cold  - insomnia  +decreased appetite    PHYSICAL EXAM  RECENT LABS    Vital Signs Last 24 Hrs  T(C): 36.8 (28 Nov 2023 07:36), Max: 36.8 (28 Nov 2023 07:36)  T(F): 98.3 (28 Nov 2023 07:36), Max: 98.3 (28 Nov 2023 07:36)  HR: 58 (28 Nov 2023 07:36) (58 - 80)  BP: 102/63 (28 Nov 2023 07:36) (102/63 - 110/68)  BP(mean): --  RR: 16 (28 Nov 2023 07:36) (15 - 16)  SpO2: 96% (28 Nov 2023 07:36) (96% - 98%)    Parameters below as of 28 Nov 2023 07:36  Patient On (Oxygen Delivery Method): room air    Constitutional - NAD, Comfortable  HEENT - s/p reilly hole, staples removed, incision healing well  Neck - Supple, No limited ROM  Chest - shallow breaths   Cardio - RRR  Abdomen -  Soft, NTND   Extremities - RLE with 2+ pitting edema, No calf tenderness. Wearing TEDs   Neurologic Exam:                    Cognitive - mildly confused            Orientation: Awake, A&O x2-- able to pick 2023 with multiple choice options, does not know name of hospital            Attention:  easily distracted            Memory: impaired      Speech - Fluent, no aphasia , but delayed processing      Cranial Nerves - difficulty tracking, no facial droop      Motor -                      LEFT  UE - ShF 3/5, EF 4/5, EE 4/5, WE 4/5,  4/5                    RIGHT UE - ShF 4/5, EF 5/5, EE 5/5, WE 5/5,  4/5                    LEFT    LE - HF 4/5, KE 4/5, DF 4/5, PF 4/5                    RIGHT LE - HF 4/5, KE 5/5, DF 4/5, PF 5/5        Sensory - Intact to LT bilaterally  Psychiatric - Calm    RECENT LABS                                     10.1   7.75  )-----------( 225      ( 27 Nov 2023 05:32 )             32.8     11-27    137  |  103  |  21  ----------------------------<  108<H>  4.8   |  26  |  1.00    Ca    10.3      27 Nov 2023 05:32    TPro  6.4  /  Alb  2.7<L>  /  TBili  0.3  /  DBili  x   /  AST  18  /  ALT  21  /  AlkPhos  76  11-27      Urinalysis Basic - ( 27 Nov 2023 05:32 )    Color: x / Appearance: x / SG: x / pH: x  Gluc: 108 mg/dL / Ketone: x  / Bili: x / Urobili: x   Blood: x / Protein: x / Nitrite: x   Leuk Esterase: x / RBC: x / WBC x   Sq Epi: x / Non Sq Epi: x / Bacteria: x    CAPILLARY BLOOD GLUCOSE      Allergies  No Known Allergies    MEDICATIONS  (STANDING):  atorvastatin 40 milliGRAM(s) Oral at bedtime  cefpodoxime 200 milliGRAM(s) Oral every 12 hours  dextrose 5%. 1000 milliLiter(s) (100 mL/Hr) IV Continuous <Continuous>  dextrose 5%. 1000 milliLiter(s) (50 mL/Hr) IV Continuous <Continuous>  dextrose 50% Injectable 25 Gram(s) IV Push once  dextrose 50% Injectable 12.5 Gram(s) IV Push once  dextrose 50% Injectable 25 Gram(s) IV Push once  enoxaparin Injectable 40 milliGRAM(s) SubCutaneous <User Schedule>  escitalopram 5 milliGRAM(s) Oral daily  famotidine    Tablet 20 milliGRAM(s) Oral two times a day  glucagon  Injectable 1 milliGRAM(s) IntraMuscular once  hydroxychloroquine 200 milliGRAM(s) Oral two times a day  lactobacillus acidophilus 1 Tablet(s) Oral two times a day with meals  loratadine 10 milliGRAM(s) Oral daily  magnesium oxide 400 milliGRAM(s) Oral at bedtime  metoprolol tartrate 12.5 milliGRAM(s) Oral every 12 hours  modafinil 50 milliGRAM(s) Oral <User Schedule>  multivitamin 1 Tablet(s) Oral daily  nystatin Powder 1 Application(s) Topical two times a day  polyethylene glycol 3350 17 Gram(s) Oral two times a day  potassium chloride    Tablet ER 10 milliEquivalent(s) Oral daily  senna 2 Tablet(s) Oral at bedtime    MEDICATIONS  (PRN):  acetaminophen     Tablet .. 975 milliGRAM(s) Oral every 8 hours PRN Mild Pain (1 - 3)  bisacodyl 5 milliGRAM(s) Oral every 12 hours PRN Constipation  dextrose Oral Gel 15 Gram(s) Oral once PRN Blood Glucose LESS THAN 70 milliGRAM(s)/deciliter   Patient is a 83y old  Female who presents with a chief complaint of Subdural Hemorrhage (19 Nov 2023 14:22)    HPI:  Ginna Vergara is an 83 year old female with PMH of HTN, HLD, Arthritis, and normal pressure hydrocephalus (s/p VPS- March 2023 at [place]); who presented to [facility] on 11/8 for progressive weakness, confusion and decreased PO intake, and was found to have a mixed SDH with 1.4cm midline shift. She was transferred to [facility] later that day for further management. On 11/9, she underwent R sided reilly hole placement followed by MMA embolization on 11/10 with Dr. Rodriguez. Her hospital course was complicated by 2 RRTs for unresponsiveness. Serial brain imaging was stable and she was found to be in rapid AFIB. 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 PM&R was consulted and deemed her an appropriate candidate for IRF. She was medically stabilized and cleared for discharge to [facility].      PAST MEDICAL & SURGICAL HISTORY:  Normal pressure hydrocephalus  HTN (hypertension)  HLD (hyperlipidemia)  Arthritis  S/P  shunt    SUBJECTIVE/OBJECTIVE/ROS: Patient awake, alert, sitting in WC bedside. Decreased appetite this morning, drank Ensure and a few sips of water. Denies pain or discomforts. Able to follow simple commands. Can choose year from . Very pleasant and perky this morning. Endorses feeling cold.    ROS  +neurological deficits  +temperature intolerance, always cold  - insomnia  +decreased appetite    PHYSICAL EXAM  RECENT LABS    Vital Signs Last 24 Hrs  T(C): 36.8 (28 Nov 2023 07:36), Max: 36.8 (28 Nov 2023 07:36)  T(F): 98.3 (28 Nov 2023 07:36), Max: 98.3 (28 Nov 2023 07:36)  HR: 58 (28 Nov 2023 07:36) (58 - 80)  BP: 102/63 (28 Nov 2023 07:36) (102/63 - 110/68)  BP(mean): --  RR: 16 (28 Nov 2023 07:36) (15 - 16)  SpO2: 96% (28 Nov 2023 07:36) (96% - 98%)    Parameters below as of 28 Nov 2023 07:36  Patient On (Oxygen Delivery Method): room air    Constitutional - NAD, Comfortable  HEENT - s/p reilly hole, staples removed, incision healing well  Neck - Supple, No limited ROM  Chest - shallow breaths   Cardio - RRR  Abdomen -  Soft, NTND   Extremities - RLE with 2+ pitting edema, No calf tenderness. Wearing TEDs   Neurologic Exam:                    Cognitive - mildly confused            Orientation: Awake, A&O x2-- able to pick 2023 with multiple choice options, does not know name of hospital            Attention:  easily distracted            Memory: impaired      Speech - Fluent, no aphasia , but delayed processing      Cranial Nerves - difficulty tracking, no facial droop      Motor -                      LEFT  UE - ShF 3/5, EF 4/5, EE 4/5, WE 4/5,  4/5                    RIGHT UE - ShF 4/5, EF 5/5, EE 5/5, WE 5/5,  4/5                    LEFT    LE - HF 4/5, KE 4/5, DF 4/5, PF 4/5                    RIGHT LE - HF 4/5, KE 5/5, DF 4/5, PF 5/5        Sensory - Intact to LT bilaterally  Psychiatric - Calm    RECENT LABS                                     10.1   7.75  )-----------( 225      ( 27 Nov 2023 05:32 )             32.8     11-27    137  |  103  |  21  ----------------------------<  108<H>  4.8   |  26  |  1.00    Ca    10.3      27 Nov 2023 05:32    TPro  6.4  /  Alb  2.7<L>  /  TBili  0.3  /  DBili  x   /  AST  18  /  ALT  21  /  AlkPhos  76  11-27      Urinalysis Basic - ( 27 Nov 2023 05:32 )    Color: x / Appearance: x / SG: x / pH: x  Gluc: 108 mg/dL / Ketone: x  / Bili: x / Urobili: x   Blood: x / Protein: x / Nitrite: x   Leuk Esterase: x / RBC: x / WBC x   Sq Epi: x / Non Sq Epi: x / Bacteria: x    CAPILLARY BLOOD GLUCOSE      Allergies  No Known Allergies    MEDICATIONS  (STANDING):  atorvastatin 40 milliGRAM(s) Oral at bedtime  cefpodoxime 200 milliGRAM(s) Oral every 12 hours  dextrose 5%. 1000 milliLiter(s) (100 mL/Hr) IV Continuous <Continuous>  dextrose 5%. 1000 milliLiter(s) (50 mL/Hr) IV Continuous <Continuous>  dextrose 50% Injectable 25 Gram(s) IV Push once  dextrose 50% Injectable 12.5 Gram(s) IV Push once  dextrose 50% Injectable 25 Gram(s) IV Push once  enoxaparin Injectable 40 milliGRAM(s) SubCutaneous <User Schedule>  escitalopram 5 milliGRAM(s) Oral daily  famotidine    Tablet 20 milliGRAM(s) Oral two times a day  glucagon  Injectable 1 milliGRAM(s) IntraMuscular once  hydroxychloroquine 200 milliGRAM(s) Oral two times a day  lactobacillus acidophilus 1 Tablet(s) Oral two times a day with meals  loratadine 10 milliGRAM(s) Oral daily  magnesium oxide 400 milliGRAM(s) Oral at bedtime  metoprolol tartrate 12.5 milliGRAM(s) Oral every 12 hours  modafinil 50 milliGRAM(s) Oral <User Schedule>  multivitamin 1 Tablet(s) Oral daily  nystatin Powder 1 Application(s) Topical two times a day  polyethylene glycol 3350 17 Gram(s) Oral two times a day  potassium chloride    Tablet ER 10 milliEquivalent(s) Oral daily  senna 2 Tablet(s) Oral at bedtime    MEDICATIONS  (PRN):  acetaminophen     Tablet .. 975 milliGRAM(s) Oral every 8 hours PRN Mild Pain (1 - 3)  bisacodyl 5 milliGRAM(s) Oral every 12 hours PRN Constipation  dextrose Oral Gel 15 Gram(s) Oral once PRN Blood Glucose LESS THAN 70 milliGRAM(s)/deciliter

## 2023-11-28 NOTE — PROGRESS NOTE ADULT - SUBJECTIVE AND OBJECTIVE BOX
No events overnight  No new complaints to offer me    Vital Signs Last 24 Hrs  T(F): 97.3 (27 Nov 2023 09:18), Max: 98 (26 Nov 2023 19:33)  HR: 57 (27 Nov 2023 09:18) (57 - 89)  BP: 113/66 (27 Nov 2023 09:18) (107/67 - 121/67)  RR: 16 (27 Nov 2023 09:18) (15 - 16)  SpO2: 97% (27 Nov 2023 09:18) (95% - 97%)    I&O's Summary    26 Nov 2023 07:01  -  27 Nov 2023 07:00  --------------------------------------------------------  IN: 0 mL / OUT: 250 mL / NET: -250 mL      Examination:  General: NAD, Comfortable  Pulm: CTA BL No Rhonchi Rales or wheezes  Neck: Supple, No JVD  CVS: RRR No rubs murmurs or gallops  Abdomen: Soft, Nontender, Nondistended; No masses or organomegally  Extremities: No calf tenderness, No pitting edema    Labs:                        10.1   7.75  )-----------( 225      ( 27 Nov 2023 05:32 )             32.8       11-27    137  |  103  |  21  ----------------------------<  108  4.8   |  26  |  1.00    Ca    10.3      27 Nov 2023 05:32    TPro  6.4  /  Alb  2.7  /  TBili  0.3  /  DBili  x   /  AST  18  /  ALT  21  /  AlkPhos  76  11-27

## 2023-11-28 NOTE — PROGRESS NOTE ADULT - ASSESSMENT
Assessment and Plan:   83 year old female with PMH of HTN, HLD, Arthritis, and normal pressure hydrocephalus (s/p VPS- March 2023 at [facility]); who presented to [facility] on 11/8 for progressive weakness, confusion and decreased PO intake, and was found to have a mixed SDH with 1.4cm midline shift. She was transferred to [facility] later that day for further management. On 11/9, she underwent R sided reilly hole placement, followed by MMA embolization on 11/10 with Dr. Rodriguez. Her hospital course was complicated by 2 RRTs for unresponsiveness. Serial brain imaging was stable and she was found to be in rapid AFIB. 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 Patient now admitted for a multidisciplinary rehab program with cognitive deficits, gait and ADL impairments.     #Traumatic Subdural Hemorrhage  - CTH 11/8: Moderate to large mixed attenuation right hemispheric convexity SDH compressing right hemisphere and right lateral ventricle with 1.4 cm midline shift to left. Right frontal ventriculostomy shunt catheter within right frontal horn.  - CTH 11/14: Right convexity subdural collection of air, blood and fluid continues to decrease in size.  - Gait Instability, ADL impairments and Functional impairments: start Comprehensive Rehab Program of PT/OT/SLP  - 3 hours a day, 5 days a week  - S/p R sided reilly hole placement (11/9), followed by MMA embolization (11/10). Staples and 1 suture removed at bedside (11/27). Tolerated well.   - Outpatient follow up with NSGY, Dr. Rodriguez  - C/w Modafinil 50 mg q 8am (11/21)    #Normal Pressure Hydrocephalus  - S/p VPS March 2023  - Dr. Simon ([facility])   - CTH 11/20: deep subcortical white matter ischemia, unchanged. RIGHT frontal shunt catheter tip noted in the RIGHT lateral ventricle. Mild ventriculomegaly unchanged. Tiny residual subdural collection.  -No acute intervention from NSGY standpoint-- per NP discussion with neurosurgeon Dr Méndez 11/20 , follow up outpatient.   -D/W neurosurgeon Dr. Méndez to clarify time for next shunt adjustment-  "should follow up in 6 weeks. At the latest 3 months" 11/27    UTI--denies urinary frequency urgency, dysuria, however confused and unreliable  -UA (+) Leuk esterase, (+) WBC, (-) nitrite  -UCx sensitive to Vantin, cont through 11/29  -C/w Vantin 200 mg po q 12 for 7 days, discussed with hospitalist. Last day 11/29    #Rapid AFIB  - Metoprolol 25mg BID-- decreased to 12.5 mg BID (11/24)-- BP on lower side -- discussed with hospitalist. (107/67 - 121/67) Tolerating new dose well.   - No AC d/t SDH  - Evaluation for Watchmann device outpatient with cardiology    #KEITH  - Bun/Cr: 25/1.03 (11/20)   21/1 (11/27). Improving  - Encourage oral hydration  - BMP 11/30    #Mood  - Lexapro 5 mg po (11/20), for irritability     #Runny nose-- reported in therapy 11/21 afternoon  - RVP and Covid swab negative  - afebrile, no elevated wbc count, continue to monitor     #Seizure Prophylaxis, no longer required  - Levetiracetam 500mg BID-- tapered off  --No seizure events throughout hospital course and EEG neg for seizures    #HLD  - Atorvastatin 40mg at HS     #Arthritis  - Plaquenil 200mg BID (home regimen)    #Skin  - Skin on admission: Right sided surgical incision intact with staples Dry skin over gluteal region. Skin tags throughout body.   - Remove staples on 11/27  - Nystatin powder BID   - Pressure injury/Skin: OOB to Chair, PT/OT     #Pain Mgmt   - Tylenol PRN    #GI/Bowel Mgmt  - Diet: regular    - Famotidine 20mg BID   - Miralax BID  - Senna   - PRN: Bisacodyl 5mg at HS     #/Bladder Mgmt   -  monitor PVR q8h, CIC>400cc  - Toileting programed q3-4 hrs during awake hours    #FEN   - Diet - Regular + Thins    - Dysphagia  SLP - evaluation and treatment    #Health Maintenance  - KCL 10mEq daily   - Magnesium Oxide 400mg at HS  - Fosamax 70mg weekly- home regimen  - K: 4.5;  mag level 2.1    #Precautions / PROPHYLAXIS:   - Falls, Seizure   - ortho: Weight bearing status: WBAT   - Lungs: Aspiration, Incentive Spirometer   - DVT ppx: Lovenox    #IDT 11/21   Nursing: incontinent bowel/bladder   Social: lives with , independent prior, retired    Preferences: zohreh laurent   SLP: reglar diet thin liquids, decreased memory/attention/left inattention   OT: supervision for eating/oral hygiene, dependent LBD/footwear   PT: dependent   Goals: Increase attention/focus within 10-15 intervals, ModA transfers/bedmobility    DC: Home/GIANLUCA, TBD   TDD 12/5/23  ------------------------------------------------------  OUTPATIENT/FOLLOW UP:    Sierra Rodriguez  Neurosurgery  [address] [place] [id number]  Phone: [phone]  Fax: [phone]  Follow Up Time: 2 weeks    Neurologist- managing VPS   Dr. Aurora Earlmann device outpatient with cardiology      11/27 Spoke with family bedside. Questions about  shunt, reached out to neurosurgery who would like to see her in 6 weeks, no later than 3 months. Was unable to reach family to convey message yesterday, will try again.       Assessment and Plan:   83 year old female with PMH of HTN, HLD, Arthritis, and normal pressure hydrocephalus (s/p VPS- March 2023 at [facility]); who presented to [facility] on 11/8 for progressive weakness, confusion and decreased PO intake, and was found to have a mixed SDH with 1.4cm midline shift. She was transferred to [facility] later that day for further management. On 11/9, she underwent R sided reilly hole placement, followed by MMA embolization on 11/10 with Dr. Rodriguez. Her hospital course was complicated by 2 RRTs for unresponsiveness. Serial brain imaging was stable and she was found to be in rapid AFIB. 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 Patient now admitted for a multidisciplinary rehab program with cognitive deficits, gait and ADL impairments.     #Traumatic Subdural Hemorrhage  - CTH 11/8: Moderate to large mixed attenuation right hemispheric convexity SDH compressing right hemisphere and right lateral ventricle with 1.4 cm midline shift to left. Right frontal ventriculostomy shunt catheter within right frontal horn.  - CTH 11/14: Right convexity subdural collection of air, blood and fluid continues to decrease in size.  - Gait Instability, ADL impairments and Functional impairments: start Comprehensive Rehab Program of PT/OT/SLP  - 3 hours a day, 5 days a week  - S/p R sided reilly hole placement (11/9), followed by MMA embolization (11/10). Staples and 1 suture removed at bedside (11/27). Tolerated well.   - Outpatient follow up with NSGY, Dr. Rodriguez  - C/w Modafinil 50 mg q 8am (11/21)    #Normal Pressure Hydrocephalus  - S/p VPS March 2023  - Dr. Simon ([place])   - CTH 11/20: deep subcortical white matter ischemia, unchanged. RIGHT frontal shunt catheter tip noted in the RIGHT lateral ventricle. Mild ventriculomegaly unchanged. Tiny residual subdural collection.  -No acute intervention from NSGY standpoint-- per NP discussion with neurosurgeon Dr Méndez 11/20 , follow up outpatient.   -D/W neurosurgeon Dr. Méndez to clarify time for next shunt adjustment-  "should follow up in 6 weeks. At the latest 3 months" 11/27    UTI--denies urinary frequency urgency, dysuria, however confused and unreliable  -UA (+) Leuk esterase, (+) WBC, (-) nitrite  -UCx sensitive to Vantin, cont through 11/29  -C/w Vantin 200 mg po q 12 for 7 days, discussed with hospitalist. Last day 11/29    #Rapid AFIB  - Metoprolol 25mg BID-- decreased to 12.5 mg BID (11/24)-- BP on lower side -- discussed with hospitalist. (107/67 - 121/67) Tolerating new dose well.   - No AC d/t SDH  - Evaluation for Watchmann device outpatient with cardiology    #KEITH  - Bun/Cr: 25/1.03 (11/20)   21/1 (11/27). Improving  - Encourage oral hydration  - BMP 11/30    #Mood  - Lexapro 5 mg po (11/20), for irritability     #Runny nose-- reported in therapy 11/21 afternoon  - RVP and Covid swab negative  - afebrile, no elevated wbc count, continue to monitor     #Seizure Prophylaxis, no longer required  - Levetiracetam 500mg BID-- tapered off  --No seizure events throughout hospital course and EEG neg for seizures    #HLD  - Atorvastatin 40mg at HS     #Arthritis  - Plaquenil 200mg BID (home regimen)    #Skin  - Skin on admission: Right sided surgical incision intact with staples Dry skin over gluteal region. Skin tags throughout body.   - Remove staples on 11/27  - Nystatin powder BID   - Pressure injury/Skin: OOB to Chair, PT/OT     #Pain Mgmt   - Tylenol PRN    #GI/Bowel Mgmt  - Diet: regular    - Famotidine 20mg BID   - Miralax BID  - Senna   - PRN: Bisacodyl 5mg at HS     #/Bladder Mgmt   -  monitor PVR q8h, CIC>400cc  - Toileting programed q3-4 hrs during awake hours    #FEN   - Diet - Regular + Thins    - Dysphagia  SLP - evaluation and treatment    #Health Maintenance  - KCL 10mEq daily   - Magnesium Oxide 400mg at HS  - Fosamax 70mg weekly- home regimen  - K: 4.5;  mag level 2.1    #Precautions / PROPHYLAXIS:   - Falls, Seizure   - ortho: Weight bearing status: WBAT   - Lungs: Aspiration, Incentive Spirometer   - DVT ppx: Lovenox    #IDT 11/21   Nursing: incontinent bowel/bladder   Social: lives with , independent prior, retired    Preferences: zohreh laurent   SLP: reglar diet thin liquids, decreased memory/attention/left inattention   OT: supervision for eating/oral hygiene, dependent LBD/footwear   PT: dependent   Goals: Increase attention/focus within 10-15 intervals, ModA transfers/bedmobility    DC: Home/GIANLUCA, TBD   TDD 12/5/23  ------------------------------------------------------  OUTPATIENT/FOLLOW UP:    Sierra Rodriguez  Neurosurgery  [address] [place] [id number]  Phone: [phone]  Fax: [phone]  Follow Up Time: 2 weeks    Neurologist- managing VPS   Dr. Aurora Earlmann device outpatient with cardiology      11/27 Spoke with family bedside. Questions about  shunt, reached out to neurosurgery who would like to see her in 6 weeks, no later than 3 months. Was unable to reach family to convey message yesterday, will try again.       Assessment and Plan:   83 year old female with PMH of HTN, HLD, Arthritis, and normal pressure hydrocephalus (s/p VPS- March 2023 at [facility]); who presented to [facility] on 11/8 for progressive weakness, confusion and decreased PO intake, and was found to have a mixed SDH with 1.4cm midline shift. She was transferred to [facility] later that day for further management. On 11/9, she underwent R sided reilly hole placement, followed by MMA embolization on 11/10 with Dr. Rodriguez. Her hospital course was complicated by 2 RRTs for unresponsiveness. Serial brain imaging was stable and she was found to be in rapid AFIB. 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 Patient now admitted for a multidisciplinary rehab program with cognitive deficits, gait and ADL impairments.     #Traumatic Subdural Hemorrhage  - CTH 11/8: Moderate to large mixed attenuation right hemispheric convexity SDH compressing right hemisphere and right lateral ventricle with 1.4 cm midline shift to left. Right frontal ventriculostomy shunt catheter within right frontal horn.  - CTH 11/14: Right convexity subdural collection of air, blood and fluid continues to decrease in size.  - Gait Instability, ADL impairments and Functional impairments: start Comprehensive Rehab Program of PT/OT/SLP  - 3 hours a day, 5 days a week  - S/p R sided reilly hole placement (11/9), followed by MMA embolization (11/10). Staples and 1 suture removed at bedside (11/27). Tolerated well.   - Outpatient follow up with NSGY, Dr. Rodriguez  - C/w Modafinil 50 mg q 8am (11/21)    #Normal Pressure Hydrocephalus  - S/p VPS March 2023  - Dr. Simon ([place])   - CTH 11/20: deep subcortical white matter ischemia, unchanged. RIGHT frontal shunt catheter tip noted in the RIGHT lateral ventricle. Mild ventriculomegaly unchanged. Tiny residual subdural collection.  -No acute intervention from NSGY standpoint-- per NP discussion with neurosurgeon Dr Méndez 11/20 , follow up outpatient.   -D/W neurosurgeon Dr. Méndez to clarify time for next shunt adjustment-  "should follow up in 6 weeks. At the latest 3 months" 11/27    UTI--denies urinary frequency urgency, dysuria, however confused and unreliable  -UA (+) Leuk esterase, (+) WBC, (-) nitrite  -UCx sensitive to Vantin, cont through 11/29  -C/w Vantin 200 mg po q 12 for 7 days, discussed with hospitalist. Last day 11/29    #Rapid AFIB  - Metoprolol 25mg BID-- decreased to 12.5 mg BID (11/24)-- BP on lower side -- discussed with hospitalist. (107/67 - 121/67) Tolerating new dose well.   - No AC d/t SDH  - Evaluation for Watchmann device outpatient with cardiology    #KEITH  - Bun/Cr: 25/1.03 (11/20)   21/1 (11/27). Improving  - Encourage oral hydration  - BMP 11/30    #Mood  - Lexapro 5 mg po (11/20), for irritability     #Runny nose-- reported in therapy 11/21 afternoon  - RVP and Covid swab negative  - afebrile, no elevated wbc count, continue to monitor     #Seizure Prophylaxis, no longer required  - Levetiracetam 500mg BID-- tapered off  --No seizure events throughout hospital course and EEG neg for seizures    #HLD  - Atorvastatin 40mg at HS     #Arthritis  - Plaquenil 200mg BID (home regimen)    #Skin  - Skin on admission: Right sided surgical incision intact with staples Dry skin over gluteal region. Skin tags throughout body.   - Remove staples on 11/27  - Nystatin powder BID   - Pressure injury/Skin: OOB to Chair, PT/OT     #Pain Mgmt   - Tylenol PRN    #GI/Bowel Mgmt  - Diet: regular    - Famotidine 20mg BID   - Miralax BID  - Senna   - PRN: Bisacodyl 5mg at HS     #/Bladder Mgmt   -  monitor PVR q8h, CIC>400cc  - Toileting programed q3-4 hrs during awake hours    #FEN   - Diet - Regular + Thins    - Dysphagia  SLP - evaluation and treatment    #Health Maintenance  - KCL 10mEq daily   - Magnesium Oxide 400mg at HS  - Fosamax 70mg weekly- home regimen  - K: 4.5;  mag level 2.1    #Precautions / PROPHYLAXIS:   - Falls, Seizure   - ortho: Weight bearing status: WBAT   - Lungs: Aspiration, Incentive Spirometer   - DVT ppx: Lovenox    #IDT 11/21   Nursing: incontinent bowel/bladder   Social: lives with , independent prior, retired    Preferences: zohreh laurent   SLP: reglar diet thin liquids, decreased memory/attention/left inattention   OT: supervision for eating/oral hygiene, dependent LBD/footwear   PT: dependent   Goals: Increase attention/focus within 10-15 intervals, ModA transfers/bedmobility    DC: Home/GIANLUCA, TBD   TDD 12/5/23    Spoke to , bedside, to relay Shunt adjustment per Dr. Méndez - ideally appt in 6 weeks but no longer than 3 mos out.  and patient aware of GIANLU[facility] discharge. 11/28  ------------------------------------------------------  OUTPATIENT/FOLLOW UP:    Sierra Rodriguez  Neurosurgery  [address] [place] [id number]  Phone: [phone]  Fax: [phone]  Follow Up Time: 2 weeks    Neurologist- managing VPS   Dr. Simon  Counts include 234 beds at the [facility] device outpatient with cardiology      11/27 Spoke with family bedside. Questions about  shunt, reached out to neurosurgery who would like to see her in 6 weeks, no later than 3 months. Was unable to reach family to convey message yesterday, will try again.

## 2023-11-28 NOTE — PROGRESS NOTE ADULT - ASSESSMENT
83F HTN HLD Arthritis NP Hydrocephalis (VPS placed in Mar 2023)  Came to [facility] on nov8 for weakness,  SDH with midline Shift  SP Craniotomy, MMA embolization  Devoloped Afib with RVR  TX to rehab    #SDH  - Seizure Profx  - Care per orders  - no additional developments      #Rapid AFIB, HLD  - continue decreased metoprolol dose 12.5mg BID 11/24  - No AC  - Evaluation for Watchmann device outpatient with cardiology  - Atorvastatin 40mg at HS     #UTI  - UCx with proteus- follow up sensitivities   - Vantin 200mg BID for 7 days  - nov28 Pan sensitive culture. 2 more days of abx    #Arthritis  - Plaquenil 200mg BID    #DVT PPx  - Lovenox 40 SUbQ

## 2023-11-29 PROCEDURE — 99232 SBSQ HOSP IP/OBS MODERATE 35: CPT

## 2023-11-29 RX ADMIN — LORATADINE 10 MILLIGRAM(S): 10 TABLET ORAL at 11:18

## 2023-11-29 RX ADMIN — Medication 1 TABLET(S): at 07:21

## 2023-11-29 RX ADMIN — Medication 200 MILLIGRAM(S): at 05:17

## 2023-11-29 RX ADMIN — Medication 12.5 MILLIGRAM(S): at 17:09

## 2023-11-29 RX ADMIN — Medication 10 MILLIEQUIVALENT(S): at 11:18

## 2023-11-29 RX ADMIN — FAMOTIDINE 20 MILLIGRAM(S): 10 INJECTION INTRAVENOUS at 17:08

## 2023-11-29 RX ADMIN — POLYETHYLENE GLYCOL 3350 17 GRAM(S): 17 POWDER, FOR SOLUTION ORAL at 17:08

## 2023-11-29 RX ADMIN — Medication 12.5 MILLIGRAM(S): at 05:17

## 2023-11-29 RX ADMIN — MODAFINIL 50 MILLIGRAM(S): 200 TABLET ORAL at 07:23

## 2023-11-29 RX ADMIN — ESCITALOPRAM OXALATE 5 MILLIGRAM(S): 10 TABLET, FILM COATED ORAL at 11:18

## 2023-11-29 RX ADMIN — ATORVASTATIN CALCIUM 40 MILLIGRAM(S): 80 TABLET, FILM COATED ORAL at 21:32

## 2023-11-29 RX ADMIN — MAGNESIUM OXIDE 400 MG ORAL TABLET 400 MILLIGRAM(S): 241.3 TABLET ORAL at 21:32

## 2023-11-29 RX ADMIN — NYSTATIN CREAM 1 APPLICATION(S): 100000 CREAM TOPICAL at 05:17

## 2023-11-29 RX ADMIN — Medication 1 TABLET(S): at 11:18

## 2023-11-29 RX ADMIN — SENNA PLUS 2 TABLET(S): 8.6 TABLET ORAL at 21:32

## 2023-11-29 RX ADMIN — Medication 200 MILLIGRAM(S): at 17:09

## 2023-11-29 RX ADMIN — ENOXAPARIN SODIUM 40 MILLIGRAM(S): 100 INJECTION SUBCUTANEOUS at 17:09

## 2023-11-29 RX ADMIN — FAMOTIDINE 20 MILLIGRAM(S): 10 INJECTION INTRAVENOUS at 05:17

## 2023-11-29 RX ADMIN — POLYETHYLENE GLYCOL 3350 17 GRAM(S): 17 POWDER, FOR SOLUTION ORAL at 05:17

## 2023-11-29 NOTE — PROGRESS NOTE ADULT - ASSESSMENT
83F HTN HLD Arthritis NP Hydrocephalis (VPS placed in Mar 2023)  Came to [facility] on nov8 for weakness,  SDH with midline Shift  SP Craniotomy, MMA embolization  Devoloped Afib with RVR  TX to rehab    #SDH  - Seizure Profx  - Care per orders  - no additional developments      #Rapid AFIB, HLD  - continue decreased metoprolol dose 12.5mg BID 11/24  - No AC  - Evaluation for Watchmann device outpatient with cardiology  - Atorvastatin 40mg at HS     #UTI  - UCx with proteus- follow up sensitivities   - Vantin 200mg BID for 7 days  - nov28 Pan sensitive culture. 2 more days of abx    #Arthritis  - Plaquenil 200mg BID    #DVT PPx  - Lovenox 40 SUbQ

## 2023-11-29 NOTE — PROGRESS NOTE ADULT - SUBJECTIVE AND OBJECTIVE BOX
Patient is a 83y old  Female who presents with a chief complaint of Subdural Hemorrhage (19 Nov 2023 14:22)    HPI:  Ginna Vergara is an 83 year old female with PMH of HTN, HLD, Arthritis, and normal pressure hydrocephalus (s/p VPS- March 2023 at [place]); who presented to [facility] on 11/8 for progressive weakness, confusion and decreased PO intake, and was found to have a mixed SDH with 1.4cm midline shift. She was transferred to [facility] later that day for further management. On 11/9, she underwent R sided reilly hole placement followed by MMA embolization on 11/10 with Dr. Rodriguez. Her hospital course was complicated by 2 RRTs for unresponsiveness. Serial brain imaging was stable and she was found to be in rapid AFIB. 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 PM&R was consulted and deemed her an appropriate candidate for IRF. She was medically stabilized and cleared for discharge to [facility].      PAST MEDICAL & SURGICAL HISTORY:  Normal pressure hydrocephalus  HTN (hypertension)  HLD (hyperlipidemia)  Arthritis  S/P  shunt    SUBJECTIVE/OBJECTIVE/ROS: Patient awake, alert, sitting in WC bedside. Decreased appetite this morning, drank Ensure and a few sips of water. Denies pain or discomforts. Able to follow simple commands. Can choose year from . Very pleasant and perky this morning. Endorses feeling cold.    ROS  +neurological deficits  +temperature intolerance, always cold  - insomnia  +decreased appetite    PHYSICAL EXAM  RECENT LABS    Vital Signs Last 24 Hrs  T(C): 36.8 (28 Nov 2023 07:36), Max: 36.8 (28 Nov 2023 07:36)  T(F): 98.3 (28 Nov 2023 07:36), Max: 98.3 (28 Nov 2023 07:36)  HR: 58 (28 Nov 2023 07:36) (58 - 80)  BP: 102/63 (28 Nov 2023 07:36) (102/63 - 110/68)  BP(mean): --  RR: 16 (28 Nov 2023 07:36) (15 - 16)  SpO2: 96% (28 Nov 2023 07:36) (96% - 98%)    Parameters below as of 28 Nov 2023 07:36  Patient On (Oxygen Delivery Method): room air    Constitutional - NAD, Comfortable  HEENT - s/p reilly hole, staples removed, incision healing well  Neck - Supple, No limited ROM  Chest - shallow breaths   Cardio - RRR  Abdomen -  Soft, NTND   Extremities - RLE with 2+ pitting edema, No calf tenderness. Wearing TEDs   Neurologic Exam:                    Cognitive - mildly confused            Orientation: Awake, A&O x2-- able to pick 2023 with multiple choice options, does not know name of hospital            Attention:  easily distracted            Memory: impaired      Speech - Fluent, no aphasia , but delayed processing      Cranial Nerves - difficulty tracking, no facial droop      Motor -                      LEFT  UE - ShF 3/5, EF 4/5, EE 4/5, WE 4/5,  4/5                    RIGHT UE - ShF 4/5, EF 5/5, EE 5/5, WE 5/5,  4/5                    LEFT    LE - HF 4/5, KE 4/5, DF 4/5, PF 4/5                    RIGHT LE - HF 4/5, KE 5/5, DF 4/5, PF 5/5        Sensory - Intact to LT bilaterally  Psychiatric - Calm    RECENT LABS                                     10.1   7.75  )-----------( 225      ( 27 Nov 2023 05:32 )             32.8     11-27    137  |  103  |  21  ----------------------------<  108<H>  4.8   |  26  |  1.00    Ca    10.3      27 Nov 2023 05:32    TPro  6.4  /  Alb  2.7<L>  /  TBili  0.3  /  DBili  x   /  AST  18  /  ALT  21  /  AlkPhos  76  11-27      Urinalysis Basic - ( 27 Nov 2023 05:32 )    Color: x / Appearance: x / SG: x / pH: x  Gluc: 108 mg/dL / Ketone: x  / Bili: x / Urobili: x   Blood: x / Protein: x / Nitrite: x   Leuk Esterase: x / RBC: x / WBC x   Sq Epi: x / Non Sq Epi: x / Bacteria: x    CAPILLARY BLOOD GLUCOSE      Allergies  No Known Allergies    MEDICATIONS  (STANDING):  atorvastatin 40 milliGRAM(s) Oral at bedtime  cefpodoxime 200 milliGRAM(s) Oral every 12 hours  dextrose 5%. 1000 milliLiter(s) (100 mL/Hr) IV Continuous <Continuous>  dextrose 5%. 1000 milliLiter(s) (50 mL/Hr) IV Continuous <Continuous>  dextrose 50% Injectable 25 Gram(s) IV Push once  dextrose 50% Injectable 12.5 Gram(s) IV Push once  dextrose 50% Injectable 25 Gram(s) IV Push once  enoxaparin Injectable 40 milliGRAM(s) SubCutaneous <User Schedule>  escitalopram 5 milliGRAM(s) Oral daily  famotidine    Tablet 20 milliGRAM(s) Oral two times a day  glucagon  Injectable 1 milliGRAM(s) IntraMuscular once  hydroxychloroquine 200 milliGRAM(s) Oral two times a day  lactobacillus acidophilus 1 Tablet(s) Oral two times a day with meals  loratadine 10 milliGRAM(s) Oral daily  magnesium oxide 400 milliGRAM(s) Oral at bedtime  metoprolol tartrate 12.5 milliGRAM(s) Oral every 12 hours  modafinil 50 milliGRAM(s) Oral <User Schedule>  multivitamin 1 Tablet(s) Oral daily  nystatin Powder 1 Application(s) Topical two times a day  polyethylene glycol 3350 17 Gram(s) Oral two times a day  potassium chloride    Tablet ER 10 milliEquivalent(s) Oral daily  senna 2 Tablet(s) Oral at bedtime    MEDICATIONS  (PRN):  acetaminophen     Tablet .. 975 milliGRAM(s) Oral every 8 hours PRN Mild Pain (1 - 3)  bisacodyl 5 milliGRAM(s) Oral every 12 hours PRN Constipation  dextrose Oral Gel 15 Gram(s) Oral once PRN Blood Glucose LESS THAN 70 milliGRAM(s)/deciliter    Assessment and Plan:   · Assessment	    Assessment and Plan:   83 year old female with PMH of HTN, HLD, Arthritis, and normal pressure hydrocephalus (s/p VPS- March 2023 at [place]); who presented to [facility] on 11/8 for progressive weakness, confusion and decreased PO intake, and was found to have a mixed SDH with 1.4cm midline shift. She was transferred to [facility] later that day for further management. On 11/9, she underwent R sided reilly hole placement, followed by MMA embolization on 11/10 with Dr. Rodriguez. Her hospital course was complicated by 2 RRTs for unresponsiveness. Serial brain imaging was stable and she was found to be in rapid AFIB. 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 Patient now admitted for a multidisciplinary rehab program with cognitive deficits, gait and ADL impairments.     #Traumatic Subdural Hemorrhage  - CTH 11/8: Moderate to large mixed attenuation right hemispheric convexity SDH compressing right hemisphere and right lateral ventricle with 1.4 cm midline shift to left. Right frontal ventriculostomy shunt catheter within right frontal horn.  - CTH 11/14: Right convexity subdural collection of air, blood and fluid continues to decrease in size.  - Gait Instability, ADL impairments and Functional impairments: start Comprehensive Rehab Program of PT/OT/SLP  - 3 hours a day, 5 days a week  - S/p R sided reilly hole placement (11/9), followed by MMA embolization (11/10). Staples and 1 suture removed at bedside (11/27). Tolerated well.   - Outpatient follow up with NSGY, Dr. Rodriguez  - C/w Modafinil 50 mg q 8am (11/21)    #Normal Pressure Hydrocephalus  - S/p VPS March 2023  - Dr. Simon ([place])   - CTH 11/20: deep subcortical white matter ischemia, unchanged. RIGHT frontal shunt catheter tip noted in the RIGHT lateral ventricle. Mild ventriculomegaly unchanged. Tiny residual subdural collection.  -No acute intervention from NSGY standpoint-- per NP discussion with neurosurgeon Dr Méndez 11/20 , follow up outpatient.   -D/W neurosurgeon Dr. Méndez to clarify time for next shunt adjustment-  "should follow up in 6 weeks. At the latest 3 months" 11/27    UTI--denies urinary frequency urgency, dysuria, however confused and unreliable  -UA (+) Leuk esterase, (+) WBC, (-) nitrite  -UCx sensitive to Vantin, cont through 11/29  -C/w Vantin 200 mg po q 12 for 7 days, discussed with hospitalist. Last day 11/29    #Rapid AFIB  - Metoprolol 25mg BID-- decreased to 12.5 mg BID (11/24)-- BP on lower side -- discussed with hospitalist. (107/67 - 121/67) Tolerating new dose well.   - No AC d/t SDH  - Evaluation for Watchmann device outpatient with cardiology    #KEITH  - Bun/Cr: 25/1.03 (11/20)   21/1 (11/27). Improving  - Encourage oral hydration  - BMP 11/30    #Mood  - Lexapro 5 mg po (11/20), for irritability     #Runny nose-- reported in therapy 11/21 afternoon  - RVP and Covid swab negative  - afebrile, no elevated wbc count, continue to monitor     #Seizure Prophylaxis, no longer required  - Levetiracetam 500mg BID-- tapered off  --No seizure events throughout hospital course and EEG neg for seizures    #HLD  - Atorvastatin 40mg at HS     #Arthritis  - Plaquenil 200mg BID (home regimen)    #Skin  - Skin on admission: Right sided surgical incision intact with staples Dry skin over gluteal region. Skin tags throughout body.   - Remove staples on 11/27  - Nystatin powder BID   - Pressure injury/Skin: OOB to Chair, PT/OT     #Pain Mgmt   - Tylenol PRN    #GI/Bowel Mgmt  - Diet: regular    - Famotidine 20mg BID   - Miralax BID  - Senna   - PRN: Bisacodyl 5mg at HS     #/Bladder Mgmt   -  monitor PVR q8h, CIC>400cc  - Toileting programed q3-4 hrs during awake hours    #FEN   - Diet - Regular + Thins    - Dysphagia  SLP - evaluation and treatment    #Health Maintenance  - KCL 10mEq daily   - Magnesium Oxide 400mg at HS  - Fosamax 70mg weekly- home regimen  - K: 4.5;  mag level 2.1    #Precautions / PROPHYLAXIS:   - Falls, Seizure   - ortho: Weight bearing status: WBAT   - Lungs: Aspiration, Incentive Spirometer   - DVT ppx: Lovenox    #IDT 11/21   Nursing: incontinent bowel/bladder   Social: lives with , independent prior, retired    Preferences: wants anastasiia   SLP: reglar diet thin liquids, decreased memory/attention/left inattention   OT: supervision for eating/oral hygiene, dependent LBD/footwear   PT: dependent   Goals: Increase attention/focus within 10-15 intervals, ModA transfers/bedmobility    DC: Home/GIANLUCA, TBD   TDD 12/5/23    Spoke to , bedside, to relay Shunt adjustment per Dr. Méndez - ideally appt in 6 weeks but no longer than 3 mos out.  and patient aware of GIANLUCA discharge. 11/28  ------------------------------------------------------  OUTPATIENT/FOLLOW UP:    Sierra Rodriguez  Neurosurgery  [address] [place] [id number]  Phone: [phone]  Fax: [phone]  Follow Up Time: 2 weeks    Neurologist- managing VPS   Dr. Aurora Earlmann device outpatient with cardiology      11/27 Spoke with family bedside. Questions about  shunt, reached out to neurosurgery who would like to see her in 6 weeks, no later than 3 months. Was unable to reach family to convey message yesterday, will try again.             HPI:  Ginna Vergara is an 83 year old female with PMH of HTN, HLD, Arthritis, and normal pressure hydrocephalus (s/p VPS- March 2023 at [place]); who presented to [facility] on 11/8 for progressive weakness, confusion and decreased PO intake, and was found to have a mixed SDH with 1.4cm midline shift. She was transferred to [facility] later that day for further management. On 11/9, she underwent R sided reilly hole placement followed by MMA embolization on 11/10 with Dr. Rodriguez. Her hospital course was complicated by 2 RRTs for unresponsiveness. Serial brain imaging was stable and she was found to be in rapid AFIB. 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 PM&R was consulted and deemed her an appropriate candidate for IRF. She was medically stabilized and cleared for discharge to [facility].        SUBJECTIVE/OBJECTIVE/ROS: No acute overnight events. Patient was seen this AM. Doing well. No complaints. Improving in therapies. Eating/Sleeping well. No new numbness/tingling/weakness. Ambulated with assistance yesterday and was very excited. VSS. Afebrile.     PAST MEDICAL & SURGICAL HISTORY:  Normal pressure hydrocephalus  HTN (hypertension)  HLD (hyperlipidemia)  Arthritis  S/P  shunt    ROS  +neurological deficits  +temperature intolerance, always cold    PHYSICAL EXAM  RECENT LABS    Vital Signs Last 24 Hrs  T(C): 36.8 (28 Nov 2023 07:36), Max: 36.8 (28 Nov 2023 07:36)  T(F): 98.3 (28 Nov 2023 07:36), Max: 98.3 (28 Nov 2023 07:36)  HR: 58 (28 Nov 2023 07:36) (58 - 80)  BP: 102/63 (28 Nov 2023 07:36) (102/63 - 110/68)  BP(mean): --  RR: 16 (28 Nov 2023 07:36) (15 - 16)  SpO2: 96% (28 Nov 2023 07:36) (96% - 98%)    Parameters below as of 28 Nov 2023 07:36  Patient On (Oxygen Delivery Method): room air    General: No acute distress  CV: RRR  Lungs: Breathing comfortably on RA   Abd: Soft, mildly distended   Ext:  No peripheral edema   Neuro:            Mental status: Knows she is in the hospital, thinks "1932", but on prompting knows the year, (+) month             CN: II-VII intact             Strength: Diffuse weakness             Sensation: Intact throughout    RECENT LABS                                     10.1   7.75  )-----------( 225      ( 27 Nov 2023 05:32 )             32.8     11-27    137  |  103  |  21  ----------------------------<  108<H>  4.8   |  26  |  1.00    Ca    10.3      27 Nov 2023 05:32    TPro  6.4  /  Alb  2.7<L>  /  TBili  0.3  /  DBili  x   /  AST  18  /  ALT  21  /  AlkPhos  76  11-27      Urinalysis Basic - ( 27 Nov 2023 05:32 )    Color: x / Appearance: x / SG: x / pH: x  Gluc: 108 mg/dL / Ketone: x  / Bili: x / Urobili: x   Blood: x / Protein: x / Nitrite: x   Leuk Esterase: x / RBC: x / WBC x   Sq Epi: x / Non Sq Epi: x / Bacteria: x    CAPILLARY BLOOD GLUCOSE      Allergies  No Known Allergies    MEDICATIONS  (STANDING):  atorvastatin 40 milliGRAM(s) Oral at bedtime  cefpodoxime 200 milliGRAM(s) Oral every 12 hours  dextrose 5%. 1000 milliLiter(s) (100 mL/Hr) IV Continuous <Continuous>  dextrose 5%. 1000 milliLiter(s) (50 mL/Hr) IV Continuous <Continuous>  dextrose 50% Injectable 25 Gram(s) IV Push once  dextrose 50% Injectable 12.5 Gram(s) IV Push once  dextrose 50% Injectable 25 Gram(s) IV Push once  enoxaparin Injectable 40 milliGRAM(s) SubCutaneous <User Schedule>  escitalopram 5 milliGRAM(s) Oral daily  famotidine    Tablet 20 milliGRAM(s) Oral two times a day  glucagon  Injectable 1 milliGRAM(s) IntraMuscular once  hydroxychloroquine 200 milliGRAM(s) Oral two times a day  lactobacillus acidophilus 1 Tablet(s) Oral two times a day with meals  loratadine 10 milliGRAM(s) Oral daily  magnesium oxide 400 milliGRAM(s) Oral at bedtime  metoprolol tartrate 12.5 milliGRAM(s) Oral every 12 hours  modafinil 50 milliGRAM(s) Oral <User Schedule>  multivitamin 1 Tablet(s) Oral daily  nystatin Powder 1 Application(s) Topical two times a day  polyethylene glycol 3350 17 Gram(s) Oral two times a day  potassium chloride    Tablet ER 10 milliEquivalent(s) Oral daily  senna 2 Tablet(s) Oral at bedtime    MEDICATIONS  (PRN):  acetaminophen     Tablet .. 975 milliGRAM(s) Oral every 8 hours PRN Mild Pain (1 - 3)  bisacodyl 5 milliGRAM(s) Oral every 12 hours PRN Constipation  dextrose Oral Gel 15 Gram(s) Oral once PRN Blood Glucose LESS THAN 70 milliGRAM(s)/deciliter    Assessment and Plan:   83 year old female with PMH of HTN, HLD, Arthritis, and normal pressure hydrocephalus (s/p VPS- March 2023 at [place]); who presented to [facility] on 11/8 for progressive weakness, confusion and decreased PO intake, and was found to have a mixed SDH with 1.4cm midline shift. She was transferred to [facility] later that day for further management. On 11/9, she underwent R sided reilly hole placement, followed by MMA embolization on 11/10 with Dr. Rodriguez. Her hospital course was complicated by 2 RRTs for unresponsiveness. Serial brain imaging was stable and she was found to be in rapid AFIB. 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 Patient now admitted for a multidisciplinary rehab program with cognitive deficits, gait and ADL impairments.     #Traumatic Subdural Hemorrhage  - CTH 11/8: Moderate to large mixed attenuation right hemispheric convexity SDH compressing right hemisphere and right lateral ventricle with 1.4 cm midline shift to left. Right frontal ventriculostomy shunt catheter within right frontal horn.  - CTH 11/14: Right convexity subdural collection of air, blood and fluid continues to decrease in size.  - S/p R sided reilly hole placement (11/9), followed by MMA embolization (11/10). Staples and 1 suture removed at bedside (11/27). Tolerated well.   - Outpatient follow up with Dr. Michael MARIE  - Improving in therapies     #Wakefulness   - Modafinil 50 mg q 8am (11/21)    #Agitation-Improved   -Lexapro 5mg daily     #Normal Pressure Hydrocephalus  - S/p VPS March 2023  - Dr. Simon ([place])   - CT 11/20: deep subcortical white matter ischemia, unchanged. RIGHT frontal shunt catheter tip noted in the RIGHT lateral ventricle. Mild ventriculomegaly unchanged. Tiny residual subdural collection.  -No acute intervention from NSGY standpoint-- per NP discussion with neurosurgeon Dr Méndez 11/20 , follow up outpatient.   -D/W neurosurgeon Dr. Méndez to clarify time for next shunt adjustment-  "should follow up in 6 weeks. At the latest 3 months" 11/27    #UTI-resolved   -UA (+) Leuk esterase, (+) WBC, (-) nitrite  -UCx sensitive to Vantin, cont through 11/29  -Completed Vantin 200 mg po q 12 for 7 days     #Rapid AFIB  - Evaluation for watchman as outpatient   - Metoprolol 12.5mg BID   - No AC d/t SDH    #HLD  - Atorvastatin 40mg at HS     #Arthritis  - Plaquenil 200mg BID (home regimen)    #Skin  - Skin on admission: Right sided surgical incision intact with staples Dry skin over gluteal region. Skin tags throughout body.   - Remove staples on 11/27  - Discontinued Nystatin BID (11/29)     #Pain Mgmt   - Tylenol PRN    #GI/Bowel Mgmt  - Diet: Regular   - Famotidine 20mg BID   - Miralax BID  - Senna     #/Bladder Mgmt   -  No issues     #Health Maintenance  - KCL 10mEq daily   - Magnesium Oxide 400mg at HS  - Fosamax 70mg weekly- home regimen    #Ppx   - DVT ppx: Lovenox    #IDT 11/21   Nursing: incontinent bowel/bladder   Social: lives with , independent prior, retired    Preferences: wants piaylaa   SLP: reglar diet thin liquids, decreased memory/attention/left inattention   OT: supervision for eating/oral hygiene, dependent LBD/footwear   PT: dependent   Goals: Increase attention/focus within 10-15 intervals, ModA transfers/bedmobility    DC: Home/GIANLUCA, TBD   TDD 12/5/23    Spoke to , bedside, to relay Shunt adjustment per Dr. Méndez - ideally appt in 6 weeks but no longer than 3 mos out.  and patient aware of GIANLUCA discharge. 11/28  ------------------------------------------------------  OUTPATIENT/FOLLOW UP:    Sierra Rodriguez  Neurosurgery  [address] [place] [id number]  Phone: [phone]  Fax: [phone]  Follow Up Time: 2 weeks    Neurologist- managing VPS   Dr. Aurora Earlmann device outpatient with cardiology      11/27 Spoke with family bedside. Questions about  shunt, reached out to neurosurgery who would like to see her in 6 weeks, no later than 3 months. Was unable to reach family to convey message yesterday, will try again.             HPI:  Ginna Vergara is an 83 year old female with PMH of HTN, HLD, Arthritis, and normal pressure hydrocephalus (s/p VPS- March 2023 at [place]); who presented to [facility] on 11/8 for progressive weakness, confusion and decreased PO intake, and was found to have a mixed SDH with 1.4cm midline shift. She was transferred to [facility] later that day for further management. On 11/9, she underwent R sided reilly hole placement followed by MMA embolization on 11/10 with Dr. Rodriguez. Her hospital course was complicated by 2 RRTs for unresponsiveness. Serial brain imaging was stable and she was found to be in rapid AFIB. 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 PM&R was consulted and deemed her an appropriate candidate for IRF. She was medically stabilized and cleared for discharge to [facility].        SUBJECTIVE/OBJECTIVE/ROS: No acute overnight events. Patient was seen this AM. Doing well. No complaints. Improving in therapies. Eating/Sleeping well. No new numbness/tingling/weakness. Ambulated with assistance yesterday and was very excited. VSS. Afebrile.     PAST MEDICAL & SURGICAL HISTORY:  Normal pressure hydrocephalus  HTN (hypertension)  HLD (hyperlipidemia)  Arthritis  S/P  shunt    ROS  +neurological deficits  +temperature intolerance, always cold    PHYSICAL EXAM  RECENT LABS    Vital Signs Last 24 Hrs  T(C): 36.8 (28 Nov 2023 07:36), Max: 36.8 (28 Nov 2023 07:36)  T(F): 98.3 (28 Nov 2023 07:36), Max: 98.3 (28 Nov 2023 07:36)  HR: 58 (28 Nov 2023 07:36) (58 - 80)  BP: 102/63 (28 Nov 2023 07:36) (102/63 - 110/68)  BP(mean): --  RR: 16 (28 Nov 2023 07:36) (15 - 16)  SpO2: 96% (28 Nov 2023 07:36) (96% - 98%)    Parameters below as of 28 Nov 2023 07:36  Patient On (Oxygen Delivery Method): room air    General: No acute distress  CV: RRR  Lungs: Breathing comfortably on RA   Abd: Soft, mildly distended   Ext:  No peripheral edema   Neuro:            Mental status: Knows she is in the hospital, thinks "1932", but on prompting knows the year, (+) month             CN: II-VII intact             Strength: Diffuse weakness             Sensation: Intact throughout    RECENT LABS                                     10.1   7.75  )-----------( 225      ( 27 Nov 2023 05:32 )             32.8     11-27    137  |  103  |  21  ----------------------------<  108<H>  4.8   |  26  |  1.00    Ca    10.3      27 Nov 2023 05:32    TPro  6.4  /  Alb  2.7<L>  /  TBili  0.3  /  DBili  x   /  AST  18  /  ALT  21  /  AlkPhos  76  11-27      Urinalysis Basic - ( 27 Nov 2023 05:32 )    Color: x / Appearance: x / SG: x / pH: x  Gluc: 108 mg/dL / Ketone: x  / Bili: x / Urobili: x   Blood: x / Protein: x / Nitrite: x   Leuk Esterase: x / RBC: x / WBC x   Sq Epi: x / Non Sq Epi: x / Bacteria: x    CAPILLARY BLOOD GLUCOSE      Allergies  No Known Allergies    MEDICATIONS  (STANDING):  atorvastatin 40 milliGRAM(s) Oral at bedtime  cefpodoxime 200 milliGRAM(s) Oral every 12 hours  dextrose 5%. 1000 milliLiter(s) (100 mL/Hr) IV Continuous <Continuous>  dextrose 5%. 1000 milliLiter(s) (50 mL/Hr) IV Continuous <Continuous>  dextrose 50% Injectable 25 Gram(s) IV Push once  dextrose 50% Injectable 12.5 Gram(s) IV Push once  dextrose 50% Injectable 25 Gram(s) IV Push once  enoxaparin Injectable 40 milliGRAM(s) SubCutaneous <User Schedule>  escitalopram 5 milliGRAM(s) Oral daily  famotidine    Tablet 20 milliGRAM(s) Oral two times a day  glucagon  Injectable 1 milliGRAM(s) IntraMuscular once  hydroxychloroquine 200 milliGRAM(s) Oral two times a day  lactobacillus acidophilus 1 Tablet(s) Oral two times a day with meals  loratadine 10 milliGRAM(s) Oral daily  magnesium oxide 400 milliGRAM(s) Oral at bedtime  metoprolol tartrate 12.5 milliGRAM(s) Oral every 12 hours  modafinil 50 milliGRAM(s) Oral <User Schedule>  multivitamin 1 Tablet(s) Oral daily  nystatin Powder 1 Application(s) Topical two times a day  polyethylene glycol 3350 17 Gram(s) Oral two times a day  potassium chloride    Tablet ER 10 milliEquivalent(s) Oral daily  senna 2 Tablet(s) Oral at bedtime    MEDICATIONS  (PRN):  acetaminophen     Tablet .. 975 milliGRAM(s) Oral every 8 hours PRN Mild Pain (1 - 3)  bisacodyl 5 milliGRAM(s) Oral every 12 hours PRN Constipation  dextrose Oral Gel 15 Gram(s) Oral once PRN Blood Glucose LESS THAN 70 milliGRAM(s)/deciliter    Assessment and Plan:   83 year old female with PMH of HTN, HLD, Arthritis, and normal pressure hydrocephalus (s/p VPS- March 2023 at [place]); who presented to [facility] on 11/8 for progressive weakness, confusion and decreased PO intake, and was found to have a mixed SDH with 1.4cm midline shift. She was transferred to [facility] later that day for further management. On 11/9, she underwent R sided reilly hole placement, followed by MMA embolization on 11/10 with Dr. Rodriguez. Her hospital course was complicated by 2 RRTs for unresponsiveness. Serial brain imaging was stable and she was found to be in rapid AFIB. 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 Patient now admitted for a multidisciplinary rehab program with cognitive deficits, gait and ADL impairments.     #Traumatic Subdural Hemorrhage  - CTH 11/8: Moderate to large mixed attenuation right hemispheric convexity SDH compressing right hemisphere and right lateral ventricle with 1.4 cm midline shift to left. Right frontal ventriculostomy shunt catheter within right frontal horn.  - CTH 11/14: Right convexity subdural collection of air, blood and fluid continues to decrease in size.  - S/p R sided reilly hole placement (11/9), followed by MMA embolization (11/10). Staples and 1 suture removed at bedside (11/27). Tolerated well.   - Outpatient follow up with Dr. Michael MARIE  - Improving in therapies     #Wakefulness   - Modafinil 50 mg q 8am (11/21)    #Agitation-Improved   -cont. Lexapro 5mg daily     #Normal Pressure Hydrocephalus  - S/p VPS March 2023  - Dr. Simon ([place])   - CTH 11/20: deep subcortical white matter ischemia, unchanged. RIGHT frontal shunt catheter tip noted in the RIGHT lateral ventricle. Mild ventriculomegaly unchanged. Tiny residual subdural collection.  -No acute intervention from NSGY standpoint-- per NP discussion with neurosurgeon Dr Méndez 11/20 , follow up outpatient.   -D/W neurosurgeon Dr. Méndez to clarify time for next shunt adjustment-  "should follow up in 6 weeks. At the latest 3 months" 11/27    #UTI-resolved   -UA (+) Leuk esterase, (+) WBC, (-) nitrite  -UCx sensitive to Vantin, cont through 11/29  -Completed Vantin 200 mg po q 12 for 7 days     #Rapid AFIB  - Evaluation for watchman as outpatient   - Metoprolol 12.5mg BID   - No AC d/t SDH    #HLD  - Atorvastatin 40mg at HS     #Arthritis  - Plaquenil 200mg BID (home regimen)    #Skin  - Skin on admission: Right sided surgical incision intact with staples Dry skin over gluteal region. Skin tags throughout body.   - Remove staples on 11/27  - Discontinued Nystatin BID (11/29)     #Pain Mgmt   - Tylenol PRN    #GI/Bowel Mgmt  - Diet: Regular   - Famotidine 20mg BID   - Miralax BID  - Senna     #/Bladder Mgmt   -  No issues     #Health Maintenance  - KCL 10mEq daily   - Magnesium Oxide 400mg at HS  - Fosamax 70mg weekly- home regimen    #Ppx   - DVT ppx: Lovenox    #IDT 11/21   Nursing: incontinent bowel/bladder   Social: lives with , independent prior, retired    Preferences: wants anastasiia   SLP: reglar diet thin liquids, decreased memory/attention/left inattention   OT: supervision for eating/oral hygiene, dependent LBD/footwear   PT: dependent   Goals: Increase attention/focus within 10-15 intervals, ModA transfers/bedmobility    DC: Home/GIANLUCA, TBD   TDD 12/5/23    ------------------------------------------------------  OUTPATIENT/FOLLOW UP:    Sierra Rodriguez  Neurosurgery  [address] [place] [id number]  Phone: [phone]  Fax: [phone]  Follow Up Time: 2 weeks    Neurologist- managing VPS   Dr. Aurora Ireland device outpatient with cardiology      11/27 Spoke with family bedside. Questions about  shunt, reached out to neurosurgery who would like to see her in 6 weeks, no later than 3 months. Was unable to reach family to convey message yesterday, will try again.

## 2023-11-29 NOTE — PROGRESS NOTE ADULT - ATTENDING COMMENTS
Pt. seen with fellow.  Agree with documentation above as per fellow with amendments made as appropriate. Patient medically stable. Making progress towards rehab goals.       right SDH  improved cognition since I last saw her -- pt. stable.

## 2023-11-30 LAB
ALBUMIN SERPL ELPH-MCNC: 2.8 G/DL — LOW (ref 3.3–5)
ALBUMIN SERPL ELPH-MCNC: 2.8 G/DL — LOW (ref 3.3–5)
ALP SERPL-CCNC: 78 U/L — SIGNIFICANT CHANGE UP (ref 40–120)
ALP SERPL-CCNC: 78 U/L — SIGNIFICANT CHANGE UP (ref 40–120)
ALT FLD-CCNC: 21 U/L — SIGNIFICANT CHANGE UP (ref 10–45)
ALT FLD-CCNC: 21 U/L — SIGNIFICANT CHANGE UP (ref 10–45)
ANION GAP SERPL CALC-SCNC: 10 MMOL/L — SIGNIFICANT CHANGE UP (ref 5–17)
ANION GAP SERPL CALC-SCNC: 10 MMOL/L — SIGNIFICANT CHANGE UP (ref 5–17)
AST SERPL-CCNC: 21 U/L — SIGNIFICANT CHANGE UP (ref 10–40)
AST SERPL-CCNC: 21 U/L — SIGNIFICANT CHANGE UP (ref 10–40)
BASOPHILS # BLD AUTO: 0.03 K/UL — SIGNIFICANT CHANGE UP (ref 0–0.2)
BASOPHILS # BLD AUTO: 0.03 K/UL — SIGNIFICANT CHANGE UP (ref 0–0.2)
BASOPHILS NFR BLD AUTO: 0.4 % — SIGNIFICANT CHANGE UP (ref 0–2)
BASOPHILS NFR BLD AUTO: 0.4 % — SIGNIFICANT CHANGE UP (ref 0–2)
BILIRUB SERPL-MCNC: 0.2 MG/DL — SIGNIFICANT CHANGE UP (ref 0.2–1.2)
BILIRUB SERPL-MCNC: 0.2 MG/DL — SIGNIFICANT CHANGE UP (ref 0.2–1.2)
BUN SERPL-MCNC: 32 MG/DL — HIGH (ref 7–23)
BUN SERPL-MCNC: 32 MG/DL — HIGH (ref 7–23)
CALCIUM SERPL-MCNC: 10.7 MG/DL — HIGH (ref 8.4–10.5)
CALCIUM SERPL-MCNC: 10.7 MG/DL — HIGH (ref 8.4–10.5)
CHLORIDE SERPL-SCNC: 104 MMOL/L — SIGNIFICANT CHANGE UP (ref 96–108)
CHLORIDE SERPL-SCNC: 104 MMOL/L — SIGNIFICANT CHANGE UP (ref 96–108)
CO2 SERPL-SCNC: 25 MMOL/L — SIGNIFICANT CHANGE UP (ref 22–31)
CO2 SERPL-SCNC: 25 MMOL/L — SIGNIFICANT CHANGE UP (ref 22–31)
CREAT SERPL-MCNC: 1.09 MG/DL — SIGNIFICANT CHANGE UP (ref 0.5–1.3)
CREAT SERPL-MCNC: 1.09 MG/DL — SIGNIFICANT CHANGE UP (ref 0.5–1.3)
EGFR: 50 ML/MIN/1.73M2 — LOW
EGFR: 50 ML/MIN/1.73M2 — LOW
EOSINOPHIL # BLD AUTO: 0.18 K/UL — SIGNIFICANT CHANGE UP (ref 0–0.5)
EOSINOPHIL # BLD AUTO: 0.18 K/UL — SIGNIFICANT CHANGE UP (ref 0–0.5)
EOSINOPHIL NFR BLD AUTO: 2.2 % — SIGNIFICANT CHANGE UP (ref 0–6)
EOSINOPHIL NFR BLD AUTO: 2.2 % — SIGNIFICANT CHANGE UP (ref 0–6)
GLUCOSE SERPL-MCNC: 100 MG/DL — HIGH (ref 70–99)
GLUCOSE SERPL-MCNC: 100 MG/DL — HIGH (ref 70–99)
HCT VFR BLD CALC: 31.7 % — LOW (ref 34.5–45)
HCT VFR BLD CALC: 31.7 % — LOW (ref 34.5–45)
HGB BLD-MCNC: 9.8 G/DL — LOW (ref 11.5–15.5)
HGB BLD-MCNC: 9.8 G/DL — LOW (ref 11.5–15.5)
IMM GRANULOCYTES NFR BLD AUTO: 0.2 % — SIGNIFICANT CHANGE UP (ref 0–0.9)
IMM GRANULOCYTES NFR BLD AUTO: 0.2 % — SIGNIFICANT CHANGE UP (ref 0–0.9)
LYMPHOCYTES # BLD AUTO: 1.4 K/UL — SIGNIFICANT CHANGE UP (ref 1–3.3)
LYMPHOCYTES # BLD AUTO: 1.4 K/UL — SIGNIFICANT CHANGE UP (ref 1–3.3)
LYMPHOCYTES # BLD AUTO: 17.2 % — SIGNIFICANT CHANGE UP (ref 13–44)
LYMPHOCYTES # BLD AUTO: 17.2 % — SIGNIFICANT CHANGE UP (ref 13–44)
MCHC RBC-ENTMCNC: 28.2 PG — SIGNIFICANT CHANGE UP (ref 27–34)
MCHC RBC-ENTMCNC: 28.2 PG — SIGNIFICANT CHANGE UP (ref 27–34)
MCHC RBC-ENTMCNC: 30.9 GM/DL — LOW (ref 32–36)
MCHC RBC-ENTMCNC: 30.9 GM/DL — LOW (ref 32–36)
MCV RBC AUTO: 91.4 FL — SIGNIFICANT CHANGE UP (ref 80–100)
MCV RBC AUTO: 91.4 FL — SIGNIFICANT CHANGE UP (ref 80–100)
MONOCYTES # BLD AUTO: 0.95 K/UL — HIGH (ref 0–0.9)
MONOCYTES # BLD AUTO: 0.95 K/UL — HIGH (ref 0–0.9)
MONOCYTES NFR BLD AUTO: 11.7 % — SIGNIFICANT CHANGE UP (ref 2–14)
MONOCYTES NFR BLD AUTO: 11.7 % — SIGNIFICANT CHANGE UP (ref 2–14)
NEUTROPHILS # BLD AUTO: 5.54 K/UL — SIGNIFICANT CHANGE UP (ref 1.8–7.4)
NEUTROPHILS # BLD AUTO: 5.54 K/UL — SIGNIFICANT CHANGE UP (ref 1.8–7.4)
NEUTROPHILS NFR BLD AUTO: 68.3 % — SIGNIFICANT CHANGE UP (ref 43–77)
NEUTROPHILS NFR BLD AUTO: 68.3 % — SIGNIFICANT CHANGE UP (ref 43–77)
NRBC # BLD: 0 /100 WBCS — SIGNIFICANT CHANGE UP (ref 0–0)
NRBC # BLD: 0 /100 WBCS — SIGNIFICANT CHANGE UP (ref 0–0)
PLATELET # BLD AUTO: 222 K/UL — SIGNIFICANT CHANGE UP (ref 150–400)
PLATELET # BLD AUTO: 222 K/UL — SIGNIFICANT CHANGE UP (ref 150–400)
POTASSIUM SERPL-MCNC: 4.8 MMOL/L — SIGNIFICANT CHANGE UP (ref 3.5–5.3)
POTASSIUM SERPL-MCNC: 4.8 MMOL/L — SIGNIFICANT CHANGE UP (ref 3.5–5.3)
POTASSIUM SERPL-SCNC: 4.8 MMOL/L — SIGNIFICANT CHANGE UP (ref 3.5–5.3)
POTASSIUM SERPL-SCNC: 4.8 MMOL/L — SIGNIFICANT CHANGE UP (ref 3.5–5.3)
PROT SERPL-MCNC: 6.5 G/DL — SIGNIFICANT CHANGE UP (ref 6–8.3)
PROT SERPL-MCNC: 6.5 G/DL — SIGNIFICANT CHANGE UP (ref 6–8.3)
RBC # BLD: 3.47 M/UL — LOW (ref 3.8–5.2)
RBC # BLD: 3.47 M/UL — LOW (ref 3.8–5.2)
RBC # FLD: 15.9 % — HIGH (ref 10.3–14.5)
RBC # FLD: 15.9 % — HIGH (ref 10.3–14.5)
SODIUM SERPL-SCNC: 139 MMOL/L — SIGNIFICANT CHANGE UP (ref 135–145)
SODIUM SERPL-SCNC: 139 MMOL/L — SIGNIFICANT CHANGE UP (ref 135–145)
WBC # BLD: 8.12 K/UL — SIGNIFICANT CHANGE UP (ref 3.8–10.5)
WBC # BLD: 8.12 K/UL — SIGNIFICANT CHANGE UP (ref 3.8–10.5)
WBC # FLD AUTO: 8.12 K/UL — SIGNIFICANT CHANGE UP (ref 3.8–10.5)
WBC # FLD AUTO: 8.12 K/UL — SIGNIFICANT CHANGE UP (ref 3.8–10.5)

## 2023-11-30 PROCEDURE — 99232 SBSQ HOSP IP/OBS MODERATE 35: CPT

## 2023-11-30 RX ORDER — ACETAMINOPHEN 500 MG
3 TABLET ORAL
Qty: 0 | Refills: 0 | DISCHARGE
Start: 2023-11-30

## 2023-11-30 RX ORDER — MODAFINIL 200 MG/1
50 TABLET ORAL
Qty: 0 | Refills: 0 | DISCHARGE
Start: 2023-11-30

## 2023-11-30 RX ORDER — ENOXAPARIN SODIUM 100 MG/ML
40 INJECTION SUBCUTANEOUS
Qty: 0 | Refills: 0 | DISCHARGE
Start: 2023-11-30

## 2023-11-30 RX ORDER — LORATADINE 10 MG/1
1 TABLET ORAL
Qty: 0 | Refills: 0 | DISCHARGE
Start: 2023-11-30

## 2023-11-30 RX ORDER — ESCITALOPRAM OXALATE 10 MG/1
1 TABLET, FILM COATED ORAL
Qty: 0 | Refills: 0 | DISCHARGE
Start: 2023-11-30

## 2023-11-30 RX ADMIN — SENNA PLUS 2 TABLET(S): 8.6 TABLET ORAL at 21:04

## 2023-11-30 RX ADMIN — LORATADINE 10 MILLIGRAM(S): 10 TABLET ORAL at 11:17

## 2023-11-30 RX ADMIN — Medication 12.5 MILLIGRAM(S): at 05:14

## 2023-11-30 RX ADMIN — MAGNESIUM OXIDE 400 MG ORAL TABLET 400 MILLIGRAM(S): 241.3 TABLET ORAL at 21:04

## 2023-11-30 RX ADMIN — ESCITALOPRAM OXALATE 5 MILLIGRAM(S): 10 TABLET, FILM COATED ORAL at 11:17

## 2023-11-30 RX ADMIN — Medication 10 MILLIEQUIVALENT(S): at 11:17

## 2023-11-30 RX ADMIN — Medication 12.5 MILLIGRAM(S): at 17:10

## 2023-11-30 RX ADMIN — MODAFINIL 50 MILLIGRAM(S): 200 TABLET ORAL at 08:28

## 2023-11-30 RX ADMIN — Medication 200 MILLIGRAM(S): at 05:14

## 2023-11-30 RX ADMIN — POLYETHYLENE GLYCOL 3350 17 GRAM(S): 17 POWDER, FOR SOLUTION ORAL at 05:14

## 2023-11-30 RX ADMIN — FAMOTIDINE 20 MILLIGRAM(S): 10 INJECTION INTRAVENOUS at 05:14

## 2023-11-30 RX ADMIN — ATORVASTATIN CALCIUM 40 MILLIGRAM(S): 80 TABLET, FILM COATED ORAL at 21:05

## 2023-11-30 RX ADMIN — Medication 1 TABLET(S): at 11:17

## 2023-11-30 RX ADMIN — FAMOTIDINE 20 MILLIGRAM(S): 10 INJECTION INTRAVENOUS at 17:10

## 2023-11-30 RX ADMIN — Medication 200 MILLIGRAM(S): at 17:10

## 2023-11-30 RX ADMIN — ENOXAPARIN SODIUM 40 MILLIGRAM(S): 100 INJECTION SUBCUTANEOUS at 17:10

## 2023-11-30 RX ADMIN — POLYETHYLENE GLYCOL 3350 17 GRAM(S): 17 POWDER, FOR SOLUTION ORAL at 17:09

## 2023-11-30 NOTE — PROGRESS NOTE ADULT - SUBJECTIVE AND OBJECTIVE BOX
***incomplete, interrupted  HPI:  Ginna Vergara is an 83 year old female with PMH of HTN, HLD, Arthritis, and normal pressure hydrocephalus (s/p VPS- March 2023 at [facility]); who presented to [facility] on 11/8 for progressive weakness, confusion and decreased PO intake, and was found to have a mixed SDH with 1.4cm midline shift. She was transferred to [facility] later that day for further management. On 11/9, she underwent R sided reilly hole placement followed by MMA embolization on 11/10 with Dr. Rodriguez. Her hospital course was complicated by 2 RRTs for unresponsiveness. Serial brain imaging was stable and she was found to be in rapid AFIB. 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 PM&R was consulted and deemed her an appropriate candidate for IRF. She was medically stabilized and cleared for discharge to [facility].        SUBJECTIVE/OBJECTIVE/ROS: No acute overnight events. Patient was seen this AM. Doing well. No complaints. Improving in therapies. Eating/Sleeping well. No new numbness/tingling/weakness. Ambulated with assistance yesterday and was very excited. VSS. Afebrile.     PAST MEDICAL & SURGICAL HISTORY:  Normal pressure hydrocephalus  HTN (hypertension)  HLD (hyperlipidemia)  Arthritis  S/P  shunt    ROS  +neurological deficits  +temperature intolerance, always cold    PHYSICAL EXAM  RECENT LABS    Vital Signs Last 24 Hrs  T(C): 36.8 (28 Nov 2023 07:36), Max: 36.8 (28 Nov 2023 07:36)  T(F): 98.3 (28 Nov 2023 07:36), Max: 98.3 (28 Nov 2023 07:36)  HR: 58 (28 Nov 2023 07:36) (58 - 80)  BP: 102/63 (28 Nov 2023 07:36) (102/63 - 110/68)  BP(mean): --  RR: 16 (28 Nov 2023 07:36) (15 - 16)  SpO2: 96% (28 Nov 2023 07:36) (96% - 98%)    Parameters below as of 28 Nov 2023 07:36  Patient On (Oxygen Delivery Method): room air    General: No acute distress  CV: RRR  Lungs: Breathing comfortably on RA   Abd: Soft, mildly distended   Ext:  No peripheral edema   Neuro:            Mental status: Knows she is in the hospital, thinks "1932", but on prompting knows the year, (+) month             CN: II-VII intact             Strength: Diffuse weakness             Sensation: Intact throughout    RECENT LABS                                     10.1   7.75  )-----------( 225      ( 27 Nov 2023 05:32 )             32.8     11-27    137  |  103  |  21  ----------------------------<  108<H>  4.8   |  26  |  1.00    Ca    10.3      27 Nov 2023 05:32    TPro  6.4  /  Alb  2.7<L>  /  TBili  0.3  /  DBili  x   /  AST  18  /  ALT  21  /  AlkPhos  76  11-27      Urinalysis Basic - ( 27 Nov 2023 05:32 )    Color: x / Appearance: x / SG: x / pH: x  Gluc: 108 mg/dL / Ketone: x  / Bili: x / Urobili: x   Blood: x / Protein: x / Nitrite: x   Leuk Esterase: x / RBC: x / WBC x   Sq Epi: x / Non Sq Epi: x / Bacteria: x    CAPILLARY BLOOD GLUCOSE      Allergies  No Known Allergies    MEDICATIONS  (STANDING):  atorvastatin 40 milliGRAM(s) Oral at bedtime  cefpodoxime 200 milliGRAM(s) Oral every 12 hours  dextrose 5%. 1000 milliLiter(s) (100 mL/Hr) IV Continuous <Continuous>  dextrose 5%. 1000 milliLiter(s) (50 mL/Hr) IV Continuous <Continuous>  dextrose 50% Injectable 25 Gram(s) IV Push once  dextrose 50% Injectable 12.5 Gram(s) IV Push once  dextrose 50% Injectable 25 Gram(s) IV Push once  enoxaparin Injectable 40 milliGRAM(s) SubCutaneous <User Schedule>  escitalopram 5 milliGRAM(s) Oral daily  famotidine    Tablet 20 milliGRAM(s) Oral two times a day  glucagon  Injectable 1 milliGRAM(s) IntraMuscular once  hydroxychloroquine 200 milliGRAM(s) Oral two times a day  lactobacillus acidophilus 1 Tablet(s) Oral two times a day with meals  loratadine 10 milliGRAM(s) Oral daily  magnesium oxide 400 milliGRAM(s) Oral at bedtime  metoprolol tartrate 12.5 milliGRAM(s) Oral every 12 hours  modafinil 50 milliGRAM(s) Oral <User Schedule>  multivitamin 1 Tablet(s) Oral daily  nystatin Powder 1 Application(s) Topical two times a day  polyethylene glycol 3350 17 Gram(s) Oral two times a day  potassium chloride    Tablet ER 10 milliEquivalent(s) Oral daily  senna 2 Tablet(s) Oral at bedtime    MEDICATIONS  (PRN):  acetaminophen     Tablet .. 975 milliGRAM(s) Oral every 8 hours PRN Mild Pain (1 - 3)  bisacodyl 5 milliGRAM(s) Oral every 12 hours PRN Constipation  dextrose Oral Gel 15 Gram(s) Oral once PRN Blood Glucose LESS THAN 70 milliGRAM(s)/deciliter    Assessment and Plan:   83 year old female with PMH of HTN, HLD, Arthritis, and normal pressure hydrocephalus (s/p VPS- March 2023 at [facility]); who presented to [facility] on 11/8 for progressive weakness, confusion and decreased PO intake, and was found to have a mixed SDH with 1.4cm midline shift. She was transferred to [facility] later that day for further management. On 11/9, she underwent R sided reilly hole placement, followed by MMA embolization on 11/10 with Dr. Rodriguez. Her hospital course was complicated by 2 RRTs for unresponsiveness. Serial brain imaging was stable and she was found to be in rapid AFIB. 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 Patient now admitted for a multidisciplinary rehab program with cognitive deficits, gait and ADL impairments.     #Traumatic Subdural Hemorrhage  - CTH 11/8: Moderate to large mixed attenuation right hemispheric convexity SDH compressing right hemisphere and right lateral ventricle with 1.4 cm midline shift to left. Right frontal ventriculostomy shunt catheter within right frontal horn.  - CTH 11/14: Right convexity subdural collection of air, blood and fluid continues to decrease in size.  - S/p R sided reilly hole placement (11/9), followed by MMA embolization (11/10). Staples and 1 suture removed at bedside (11/27). Tolerated well.   - Outpatient follow up with [facility], Dr. Rodriguez  - Improving in therapies     #Wakefulness   - Modafinil 50 mg q 8am (11/21)    #Agitation-Improved   -cont. Lexapro 5mg daily     #Normal Pressure Hydrocephalus  - S/p VPS March 2023  - Dr. Simon ([place])   - CTH 11/20: deep subcortical white matter ischemia, unchanged. RIGHT frontal shunt catheter tip noted in the RIGHT lateral ventricle. Mild ventriculomegaly unchanged. Tiny residual subdural collection.  -No acute intervention from NSGY standpoint-- per NP discussion with neurosurgeon Dr Méndez 11/20 , follow up outpatient.   -D/W neurosurgeon Dr. Méndez to clarify time for next shunt adjustment-  "should follow up in 6 weeks. At the latest 3 months" 11/27    #UTI-resolved   -UA (+) Leuk esterase, (+) WBC, (-) nitrite  -UCx sensitive to Vantin, cont through 11/29  -Completed Vantin 200 mg po q 12 for 7 days     #Rapid AFIB  - Evaluation for watchman as outpatient   - Metoprolol 12.5mg BID   - No AC d/t SDH    #HLD  - Atorvastatin 40mg at HS     #Arthritis  - Plaquenil 200mg BID (home regimen)    #Skin  - Skin on admission: Right sided surgical incision intact with staples Dry skin over gluteal region. Skin tags throughout body.   - Remove staples on 11/27  - Discontinued Nystatin BID (11/29)     #Pain Mgmt   - Tylenol PRN    #GI/Bowel Mgmt  - Diet: Regular   - Famotidine 20mg BID   - Miralax BID  - Senna     #/Bladder Mgmt   -  No issues     #Health Maintenance  - KCL 10mEq daily   - Magnesium Oxide 400mg at HS  - Fosamax 70mg weekly- home regimen    #Ppx   - DVT ppx: Lovenox    #IDT 11/21   Nursing: incontinent bowel/bladder   Social: lives with , independent prior, retired    Preferences: wants anastasiia   SLP: reglar diet thin liquids, decreased memory/attention/left inattention   OT: supervision for eating/oral hygiene, dependent LBD/footwear   PT: dependent   Goals: Increase attention/focus within 10-15 intervals, ModA transfers/bedmobility    DC: Home/GIANLUCA, TBD   TDD 12/5/23    ------------------------------------------------------  OUTPATIENT/FOLLOW UP:    Sierra Rodriguez  Neurosurgery  [address] [place] [address]  Phone: [phone]  Fax: [phone]  Follow Up Time: 2 weeks    Neurologist- managing VPS   [facility]      11/27 Spoke with family bedside. Questions about  shunt, reached out to neurosurgery who would like to see her in 6 weeks, no later than 3 months. Was unable to reach family to convey message yesterday, will try again.           HPI:  Ginna Vergara is an 83 year old female with PMH of HTN, HLD, Arthritis, and normal pressure hydrocephalus (s/p VPS- March 2023 at [place]); who presented to [facility] on 11/8 for progressive weakness, confusion and decreased PO intake, and was found to have a mixed SDH with 1.4cm midline shift. She was transferred to [facility] later that day for further management. On 11/9, she underwent R sided reilly hole placement followed by MMA embolization on 11/10 with Dr. Rodriguez. Her hospital course was complicated by 2 RRTs for unresponsiveness. Serial brain imaging was stable and she was found to be in rapid AFIB. 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 PM&R was consulted and deemed her an appropriate candidate for IRF. She was medically stabilized and cleared for discharge to [facility].        SUBJECTIVE/OBJECTIVE/ROS: No acute overnight events. Patient was seen this AM. Doing well. No complaints. Improvement in cognition, able to correctly state the year and month today.    bedside this afternoon, no questions or concerns.     PAST MEDICAL & SURGICAL HISTORY:  Normal pressure hydrocephalus  HTN (hypertension)  HLD (hyperlipidemia)  Arthritis  S/P  shunt    ROS  +neurological deficits  +temperature intolerance, always cold    PHYSICAL EXAM  RECENT LABS    Vital Signs Last 24 Hrs  T(C): 36.4 (30 Nov 2023 08:30), Max: 36.9 (29 Nov 2023 20:14)  T(F): 97.5 (30 Nov 2023 08:30), Max: 98.4 (29 Nov 2023 20:14)  HR: 61 (30 Nov 2023 08:30) (61 - 79)  BP: 106/69 (30 Nov 2023 08:30) (106/69 - 126/62)  RR: 16 (30 Nov 2023 08:30) (15 - 16)  SpO2: 100% (30 Nov 2023 08:30) (95% - 100%)    Parameters below as of 30 Nov 2023 08:30  Patient On (Oxygen Delivery Method): room air                          9.8    8.12  )-----------( 222      ( 30 Nov 2023 06:32 )             31.7     11-30    139  |  104  |  32<H>  ----------------------------<  100<H>  4.8   |  25  |  1.09    Ca    10.7<H>      30 Nov 2023 06:32    TPro  6.5  /  Alb  2.8<L>  /  TBili  0.2  /  DBili  x   /  AST  21  /  ALT  21  /  AlkPhos  78  11-30      Urinalysis Basic - ( 30 Nov 2023 06:32 )    Color: x / Appearance: x / SG: x / pH: x  Gluc: 100 mg/dL / Ketone: x  / Bili: x / Urobili: x   Blood: x / Protein: x / Nitrite: x   Leuk Esterase: x / RBC: x / WBC x   Sq Epi: x / Non Sq Epi: x / Bacteria: x      CAPILLARY BLOOD GLUCOSE      General: No acute distress  CV: RRR  Lungs: Breathing comfortably on RA   Abd: Soft, mildly distended   Ext:  No peripheral edema   Neuro:            Mental status: Knows she is in the hospital, correctly states 2023 knows month and within 2 days of date            CN: II-VII intact             Strength: Diffuse weakness             Sensation: Intact throughout    Allergies  No Known Allergies    MEDICATIONS  (STANDING):  atorvastatin 40 milliGRAM(s) Oral at bedtime  dextrose 5%. 1000 milliLiter(s) (100 mL/Hr) IV Continuous <Continuous>  dextrose 5%. 1000 milliLiter(s) (50 mL/Hr) IV Continuous <Continuous>  dextrose 50% Injectable 25 Gram(s) IV Push once  dextrose 50% Injectable 12.5 Gram(s) IV Push once  dextrose 50% Injectable 25 Gram(s) IV Push once  enoxaparin Injectable 40 milliGRAM(s) SubCutaneous <User Schedule>  escitalopram 5 milliGRAM(s) Oral daily  famotidine    Tablet 20 milliGRAM(s) Oral two times a day  glucagon  Injectable 1 milliGRAM(s) IntraMuscular once  hydroxychloroquine 200 milliGRAM(s) Oral two times a day  loratadine 10 milliGRAM(s) Oral daily  magnesium oxide 400 milliGRAM(s) Oral at bedtime  metoprolol tartrate 12.5 milliGRAM(s) Oral every 12 hours  modafinil 50 milliGRAM(s) Oral <User Schedule>  multivitamin 1 Tablet(s) Oral daily  polyethylene glycol 3350 17 Gram(s) Oral two times a day  potassium chloride    Tablet ER 10 milliEquivalent(s) Oral daily  senna 2 Tablet(s) Oral at bedtime    MEDICATIONS  (PRN):  acetaminophen     Tablet .. 975 milliGRAM(s) Oral every 8 hours PRN Mild Pain (1 - 3)  bisacodyl 5 milliGRAM(s) Oral every 12 hours PRN Constipation  dextrose Oral Gel 15 Gram(s) Oral once PRN Blood Glucose LESS THAN 70 milliGRAM(s)/deciliter

## 2023-11-30 NOTE — PROGRESS NOTE ADULT - ASSESSMENT
83F HTN HLD Arthritis NP Hydrocephalis (VPS placed in Mar 2023)  Came to [place] on nov8 for weakness,  SDH with midline Shift  SP Craniotomy, MMA embolization  Devoloped Afib with RVR  TX to rehab    #SDH  - Seizure Profx  - Care per orders  - no additional developments    #Rapid AFIB, HLD  - decreased metoprolol dose 12.5mg BID 11/24  - No AC  - Evaluation for Watchmann device outpatient with cardiology  - Atorvastatin 40mg at HS     #UTI  - UCx with proteus- follow up sensitivities   - Vantin 200mg BID for 7 days  - nov28 Pan sensitive culture. 2 more days of abx  - Nov30 DC meds    #Arthritis  - Plaquenil 200mg BID    #DVT PPx  - Lovenox 40 SUbQ

## 2023-11-30 NOTE — PROGRESS NOTE ADULT - ATTENDING COMMENTS
Pt. seen with resident.  Agree with documentation above as per resident with amendments made as appropriate. Patient medically stable. Making progress towards rehab goals.       SDH  Stable.  improving cognitively.  discussed discharge plan with pt.  as she inquired.

## 2023-11-30 NOTE — PROGRESS NOTE ADULT - ASSESSMENT
Assessment and Plan:   83 year old female with PMH of HTN, HLD, Arthritis, and normal pressure hydrocephalus (s/p VPS- March 2023 at [place]); who presented to [facility] on 11/8 for progressive weakness, confusion and decreased PO intake, and was found to have a mixed SDH with 1.4cm midline shift. She was transferred to [facility] later that day for further management. On 11/9, she underwent R sided reilly hole placement, followed by MMA embolization on 11/10 with Dr. Rodriguez. Her hospital course was complicated by 2 RRTs for unresponsiveness. Serial brain imaging was stable and she was found to be in rapid AFIB. 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 Patient now admitted for a multidisciplinary rehab program with cognitive deficits, gait and ADL impairments.     #Traumatic Subdural Hemorrhage  - CTH 11/8: Moderate to large mixed attenuation right hemispheric convexity SDH compressing right hemisphere and right lateral ventricle with 1.4 cm midline shift to left. Right frontal ventriculostomy shunt catheter within right frontal horn.  - CTH 11/14: Right convexity subdural collection of air, blood and fluid continues to decrease in size.  - S/p R sided reilly hole placement (11/9), followed by MMA embolization (11/10). Staples and 1 suture removed at bedside (11/27). Tolerated well.   - Outpatient follow up with NSGY, Dr. Rodriguez  - Improving in therapies     #Wakefulness   - Modafinil 50 mg q 8am (11/21)    #Agitation-Improved   -cont. Lexapro 5mg daily     #Normal Pressure Hydrocephalus  - S/p VPS March 2023  - Dr. Simon ([place])   - CTH 11/20: deep subcortical white matter ischemia, unchanged. RIGHT frontal shunt catheter tip noted in the RIGHT lateral ventricle. Mild ventriculomegaly unchanged. Tiny residual subdural collection.  -No acute intervention from NSGY standpoint-- per NP discussion with neurosurgeon Dr Méndez 11/20 , follow up outpatient.   -D/W neurosurgeon Dr. Méndez to clarify time for next shunt adjustment-  "should follow up in 6 weeks. At the latest 3 months" 11/27    #UTI-resolved   -UA (+) Leuk esterase, (+) WBC, (-) nitrite  -UCx sensitive to Vantin, cont through 11/29  -Completed Vantin 200 mg po q 12 for 7 days     #Rapid AFIB  - Evaluation for watchman as outpatient   - Metoprolol 12.5mg BID   - No AC d/t SDH    #HLD  - Atorvastatin 40mg at HS     #Arthritis  - Plaquenil 200mg BID (home regimen)    #Skin  - Skin on admission: Right sided surgical incision intact with staples Dry skin over gluteal region. Skin tags throughout body.   - Remove staples on 11/27  - Discontinued Nystatin BID (11/29)     #Pain Mgmt   - Tylenol PRN    #GI/Bowel Mgmt  - Diet: Regular   - Famotidine 20mg BID   - Miralax BID  - Senna     #/Bladder Mgmt   -  No issues     #Health Maintenance  - KCL 10mEq daily   - Magnesium Oxide 400mg at HS  - Fosamax 70mg weekly- home regimen    #Ppx   - DVT ppx: Lovenox    #IDT 11/21   Nursing: incontinent bowel/bladder   Social: lives with , independent prior, retired    Preferences: wants pizza   SLP: reglar diet thin liquids, decreased memory/attention/left inattention   OT: supervision for eating/oral hygiene, dependent LBD/footwear   PT: dependent   Goals: Increase attention/focus within 10-15 intervals, ModA transfers/bedmobility    DC: 12/5 Home/GIANLUCA, TBD   TDD 12/5/23    Dr. Pugh spoke with , bedside, no current questions or concerns.   ------------------------------------------------------  OUTPATIENT/FOLLOW UP:    Sierra Rodriguez  Neurosurgery  [address] [place] [id number]  Phone: [phone]  Fax: [phone]  Follow Up Time: 2 weeks    Neurologist- managing VPS   Dr. Aurora Ireland device outpatient with cardiology      11/27 Spoke with family bedside. Questions about  shunt, reached out to neurosurgery who would like to see her in 6 weeks, no later than 3 months. Was unable to reach family to convey message yesterday, will try again.

## 2023-12-01 PROCEDURE — 99232 SBSQ HOSP IP/OBS MODERATE 35: CPT

## 2023-12-01 RX ADMIN — SENNA PLUS 2 TABLET(S): 8.6 TABLET ORAL at 21:01

## 2023-12-01 RX ADMIN — ATORVASTATIN CALCIUM 40 MILLIGRAM(S): 80 TABLET, FILM COATED ORAL at 21:01

## 2023-12-01 RX ADMIN — ENOXAPARIN SODIUM 40 MILLIGRAM(S): 100 INJECTION SUBCUTANEOUS at 17:39

## 2023-12-01 RX ADMIN — FAMOTIDINE 20 MILLIGRAM(S): 10 INJECTION INTRAVENOUS at 05:10

## 2023-12-01 RX ADMIN — POLYETHYLENE GLYCOL 3350 17 GRAM(S): 17 POWDER, FOR SOLUTION ORAL at 05:09

## 2023-12-01 RX ADMIN — LORATADINE 10 MILLIGRAM(S): 10 TABLET ORAL at 12:40

## 2023-12-01 RX ADMIN — ESCITALOPRAM OXALATE 5 MILLIGRAM(S): 10 TABLET, FILM COATED ORAL at 12:40

## 2023-12-01 RX ADMIN — Medication 200 MILLIGRAM(S): at 17:39

## 2023-12-01 RX ADMIN — MODAFINIL 50 MILLIGRAM(S): 200 TABLET ORAL at 08:39

## 2023-12-01 RX ADMIN — FAMOTIDINE 20 MILLIGRAM(S): 10 INJECTION INTRAVENOUS at 17:39

## 2023-12-01 RX ADMIN — Medication 200 MILLIGRAM(S): at 05:09

## 2023-12-01 RX ADMIN — Medication 1 TABLET(S): at 12:39

## 2023-12-01 RX ADMIN — Medication 12.5 MILLIGRAM(S): at 17:40

## 2023-12-01 RX ADMIN — MAGNESIUM OXIDE 400 MG ORAL TABLET 400 MILLIGRAM(S): 241.3 TABLET ORAL at 21:01

## 2023-12-01 RX ADMIN — Medication 10 MILLIEQUIVALENT(S): at 12:39

## 2023-12-01 RX ADMIN — Medication 12.5 MILLIGRAM(S): at 05:10

## 2023-12-01 NOTE — PROGRESS NOTE ADULT - SUBJECTIVE AND OBJECTIVE BOX
HPI:  Ginna Vergara is an 83 year old female with PMH of HTN, HLD, Arthritis, and normal pressure hydrocephalus (s/p VPS- March 2023 at [place]); who presented to [facility] [place] on 11/8 for progressive weakness, confusion and decreased PO intake, and was found to have a mixed SDH with 1.4cm midline shift. She was transferred to [facility] later that day for further management. On 11/9, she underwent R sided reilly hole placement followed by MMA embolization on 11/10 with Dr. Rodriguez. Her hospital course was complicated by 2 RRTs for unresponsiveness. Serial brain imaging was stable and she was found to be in rapid AFIB. 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 PM&R was consulted and deemed her an appropriate candidate for IRF. She was medically stabilized and cleared for discharge to [facility].        SUBJECTIVE/OBJECTIVE/ROS: No acute overnight events. Patient was seen this AM. Doing well. No complaints. Able to correctly state the year. In good spirits, alert.       PAST MEDICAL & SURGICAL HISTORY:  Normal pressure hydrocephalus  HTN (hypertension)  HLD (hyperlipidemia)  Arthritis  S/P  shunt    ROS  +neurological deficits  +temperature intolerance, always cold    PHYSICAL EXAM  RECENT LABS    RECENT LABS    Vital Signs Last 24 Hrs  T(C): 36.6 (01 Dec 2023 08:46), Max: 36.6 (30 Nov 2023 21:09)  T(F): 97.8 (01 Dec 2023 08:46), Max: 97.8 (30 Nov 2023 21:09)  HR: 60 (01 Dec 2023 08:46) (60 - 68)  BP: 105/71 (01 Dec 2023 08:46) (104/61 - 114/63)  RR: 16 (01 Dec 2023 08:46) (14 - 16)  SpO2: 98% (01 Dec 2023 08:46) (96% - 98%)    Parameters below as of 01 Dec 2023 08:46  Patient On (Oxygen Delivery Method): room air                          9.8    8.12  )-----------( 222      ( 30 Nov 2023 06:32 )             31.7     11-30    139  |  104  |  32<H>  ----------------------------<  100<H>  4.8   |  25  |  1.09    Ca    10.7<H>      30 Nov 2023 06:32    TPro  6.5  /  Alb  2.8<L>  /  TBili  0.2  /  DBili  x   /  AST  21  /  ALT  21  /  AlkPhos  78  11-30      Urinalysis Basic - ( 30 Nov 2023 06:32 )    Color: x / Appearance: x / SG: x / pH: x  Gluc: 100 mg/dL / Ketone: x  / Bili: x / Urobili: x   Blood: x / Protein: x / Nitrite: x   Leuk Esterase: x / RBC: x / WBC x   Sq Epi: x / Non Sq Epi: x / Bacteria: x      CAPILLARY BLOOD GLUCOSE    General: No acute distress  CV: RRR, CTAB  Lungs: Breathing comfortably on RA   Abd: Soft, mildly distended   Ext:  No peripheral edema, TEDs  Neuro:            Mental status: Knows she is in the hospital, correctly states 2023, close on date states Nov 28             CN: II-VII intact             MSK: LUE Sh abd, EF/EE, WE 4/5.                      RUE 5/5 throughout                       RLE HF 4/5.  Bilateral DF 4/5. Bilateral KE/KF 5/5            Sensation: Intact throughout    Allergies  No Known Allergies    MEDICATIONS  (STANDING):  atorvastatin 40 milliGRAM(s) Oral at bedtime  dextrose 5%. 1000 milliLiter(s) (100 mL/Hr) IV Continuous <Continuous>  dextrose 5%. 1000 milliLiter(s) (50 mL/Hr) IV Continuous <Continuous>  dextrose 50% Injectable 25 Gram(s) IV Push once  dextrose 50% Injectable 12.5 Gram(s) IV Push once  dextrose 50% Injectable 25 Gram(s) IV Push once  enoxaparin Injectable 40 milliGRAM(s) SubCutaneous <User Schedule>  escitalopram 5 milliGRAM(s) Oral daily  famotidine    Tablet 20 milliGRAM(s) Oral two times a day  glucagon  Injectable 1 milliGRAM(s) IntraMuscular once  hydroxychloroquine 200 milliGRAM(s) Oral two times a day  loratadine 10 milliGRAM(s) Oral daily  magnesium oxide 400 milliGRAM(s) Oral at bedtime  metoprolol tartrate 12.5 milliGRAM(s) Oral every 12 hours  modafinil 50 milliGRAM(s) Oral <User Schedule>  multivitamin 1 Tablet(s) Oral daily  polyethylene glycol 3350 17 Gram(s) Oral two times a day  potassium chloride    Tablet ER 10 milliEquivalent(s) Oral daily  senna 2 Tablet(s) Oral at bedtime    MEDICATIONS  (PRN):  acetaminophen     Tablet .. 975 milliGRAM(s) Oral every 8 hours PRN Mild Pain (1 - 3)  bisacodyl 5 milliGRAM(s) Oral every 12 hours PRN Constipation  dextrose Oral Gel 15 Gram(s) Oral once PRN Blood Glucose LESS THAN 70 milliGRAM(s)/deciliter               HPI:  Ginna Vergara is an 83 year old female with PMH of HTN, HLD, Arthritis, and normal pressure hydrocephalus (s/p VPS- March 2023 at [place]); who presented to [facility] on 11/8 for progressive weakness, confusion and decreased PO intake, and was found to have a mixed SDH with 1.4cm midline shift. She was transferred to [facility] later that day for further management. On 11/9, she underwent R sided reilly hole placement followed by MMA embolization on 11/10 with Dr. Rodriguez. Her hospital course was complicated by 2 RRTs for unresponsiveness. Serial brain imaging was stable and she was found to be in rapid AFIB. 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 PM&R was consulted and deemed her an appropriate candidate for IRF. She was medically stabilized and cleared for discharge to [facility].        SUBJECTIVE/OBJECTIVE/ROS: No acute overnight events. Patient was seen this AM. Doing well. No complaints. Able to correctly state the year. In good spirits, alert.       PAST MEDICAL & SURGICAL HISTORY:  Normal pressure hydrocephalus  HTN (hypertension)  HLD (hyperlipidemia)  Arthritis  S/P  shunt    ROS  +neurological deficits  +temperature intolerance, always cold    PHYSICAL EXAM  RECENT LABS    RECENT LABS    Vital Signs Last 24 Hrs  T(C): 36.6 (01 Dec 2023 08:46), Max: 36.6 (30 Nov 2023 21:09)  T(F): 97.8 (01 Dec 2023 08:46), Max: 97.8 (30 Nov 2023 21:09)  HR: 60 (01 Dec 2023 08:46) (60 - 68)  BP: 105/71 (01 Dec 2023 08:46) (104/61 - 114/63)  RR: 16 (01 Dec 2023 08:46) (14 - 16)  SpO2: 98% (01 Dec 2023 08:46) (96% - 98%)    Parameters below as of 01 Dec 2023 08:46  Patient On (Oxygen Delivery Method): room air                          9.8    8.12  )-----------( 222      ( 30 Nov 2023 06:32 )             31.7     11-30    139  |  104  |  32<H>  ----------------------------<  100<H>  4.8   |  25  |  1.09    Ca    10.7<H>      30 Nov 2023 06:32    TPro  6.5  /  Alb  2.8<L>  /  TBili  0.2  /  DBili  x   /  AST  21  /  ALT  21  /  AlkPhos  78  11-30      Urinalysis Basic - ( 30 Nov 2023 06:32 )    Color: x / Appearance: x / SG: x / pH: x  Gluc: 100 mg/dL / Ketone: x  / Bili: x / Urobili: x   Blood: x / Protein: x / Nitrite: x   Leuk Esterase: x / RBC: x / WBC x   Sq Epi: x / Non Sq Epi: x / Bacteria: x      CAPILLARY BLOOD GLUCOSE    General: No acute distress  CV: RRR, CTAB  Lungs: Breathing comfortably on RA   Abd: Soft, mildly distended   Ext:  No peripheral edema, TEDs  Neuro:            Mental status: Knows she is in the hospital, correctly states 2023, close on date states Nov 28             CN: II-VII intact             MSK: LUE Sh abd, EF/EE, WE, -- 4/5.                      RUE 5/5 throughout                       BLE HF 4/5.  Bilateral DF 4/5. Bilateral KE/KF 5/5            Sensation: Intact throughout    Allergies  No Known Allergies    MEDICATIONS  (STANDING):  atorvastatin 40 milliGRAM(s) Oral at bedtime  dextrose 5%. 1000 milliLiter(s) (100 mL/Hr) IV Continuous <Continuous>  dextrose 5%. 1000 milliLiter(s) (50 mL/Hr) IV Continuous <Continuous>  dextrose 50% Injectable 25 Gram(s) IV Push once  dextrose 50% Injectable 12.5 Gram(s) IV Push once  dextrose 50% Injectable 25 Gram(s) IV Push once  enoxaparin Injectable 40 milliGRAM(s) SubCutaneous <User Schedule>  escitalopram 5 milliGRAM(s) Oral daily  famotidine    Tablet 20 milliGRAM(s) Oral two times a day  glucagon  Injectable 1 milliGRAM(s) IntraMuscular once  hydroxychloroquine 200 milliGRAM(s) Oral two times a day  loratadine 10 milliGRAM(s) Oral daily  magnesium oxide 400 milliGRAM(s) Oral at bedtime  metoprolol tartrate 12.5 milliGRAM(s) Oral every 12 hours  modafinil 50 milliGRAM(s) Oral <User Schedule>  multivitamin 1 Tablet(s) Oral daily  polyethylene glycol 3350 17 Gram(s) Oral two times a day  potassium chloride    Tablet ER 10 milliEquivalent(s) Oral daily  senna 2 Tablet(s) Oral at bedtime    MEDICATIONS  (PRN):  acetaminophen     Tablet .. 975 milliGRAM(s) Oral every 8 hours PRN Mild Pain (1 - 3)  bisacodyl 5 milliGRAM(s) Oral every 12 hours PRN Constipation  dextrose Oral Gel 15 Gram(s) Oral once PRN Blood Glucose LESS THAN 70 milliGRAM(s)/deciliter               HPI:  Ginna Vergara is an 83 year old female with PMH of HTN, HLD, Arthritis, and normal pressure hydrocephalus (s/p VPS- March 2023 at [place]); who presented to [facility] on 11/8 for progressive weakness, confusion and decreased PO intake, and was found to have a mixed SDH with 1.4cm midline shift. She was transferred to [facility] later that day for further management. On 11/9, she underwent R sided reilly hole placement followed by MMA embolization on 11/10 with Dr. Rodriguez. Her hospital course was complicated by 2 RRTs for unresponsiveness. Serial brain imaging was stable and she was found to be in rapid AFIB. 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 PM&R was consulted and deemed her an appropriate candidate for IRF. She was medically stabilized and cleared for discharge to [facility].        SUBJECTIVE/OBJECTIVE/ROS: No acute overnight events. Patient was seen this AM. Doing well. No complaints. Able to correctly state the year. In good spirits, alert.       PAST MEDICAL & SURGICAL HISTORY:  Normal pressure hydrocephalus  HTN (hypertension)  HLD (hyperlipidemia)  Arthritis  S/P  shunt    ROS  +neurological deficits  +temperature intolerance, always cold    PHYSICAL EXAM  RECENT LABS    RECENT LABS    Vital Signs Last 24 Hrs  T(C): 36.6 (01 Dec 2023 08:46), Max: 36.6 (30 Nov 2023 21:09)  T(F): 97.8 (01 Dec 2023 08:46), Max: 97.8 (30 Nov 2023 21:09)  HR: 60 (01 Dec 2023 08:46) (60 - 68)  BP: 105/71 (01 Dec 2023 08:46) (104/61 - 114/63)  RR: 16 (01 Dec 2023 08:46) (14 - 16)  SpO2: 98% (01 Dec 2023 08:46) (96% - 98%)    Parameters below as of 01 Dec 2023 08:46  Patient On (Oxygen Delivery Method): room air                          9.8    8.12  )-----------( 222      ( 30 Nov 2023 06:32 )             31.7     11-30    139  |  104  |  32<H>  ----------------------------<  100<H>  4.8   |  25  |  1.09    Ca    10.7<H>      30 Nov 2023 06:32    TPro  6.5  /  Alb  2.8<L>  /  TBili  0.2  /  DBili  x   /  AST  21  /  ALT  21  /  AlkPhos  78  11-30      Urinalysis Basic - ( 30 Nov 2023 06:32 )    Color: x / Appearance: x / SG: x / pH: x  Gluc: 100 mg/dL / Ketone: x  / Bili: x / Urobili: x   Blood: x / Protein: x / Nitrite: x   Leuk Esterase: x / RBC: x / WBC x   Sq Epi: x / Non Sq Epi: x / Bacteria: x      CAPILLARY BLOOD GLUCOSE    General: No acute distress  CV: RRR, CTAB  Lungs: Breathing comfortably on RA   Abd: Soft, mildly distended   Ext:  No peripheral edema, TEDs  Neuro:            Mental status: Knows she is in the hospital, correctly states 2023, close on date states Nov 28             CN: II-VII intact             MSK: LUE Sh abd, EF/EE, WE, -- 4/5.                      RUE 5/5 throughout                       BLE HF 4/5.  Bilateral DF 4/5. Bilateral KE/KF 5/5  Coordination-- Left FNF slow,  intact on right            Sensation: Intact throughout    Allergies  No Known Allergies    MEDICATIONS  (STANDING):  atorvastatin 40 milliGRAM(s) Oral at bedtime  dextrose 5%. 1000 milliLiter(s) (100 mL/Hr) IV Continuous <Continuous>  dextrose 5%. 1000 milliLiter(s) (50 mL/Hr) IV Continuous <Continuous>  dextrose 50% Injectable 25 Gram(s) IV Push once  dextrose 50% Injectable 12.5 Gram(s) IV Push once  dextrose 50% Injectable 25 Gram(s) IV Push once  enoxaparin Injectable 40 milliGRAM(s) SubCutaneous <User Schedule>  escitalopram 5 milliGRAM(s) Oral daily  famotidine    Tablet 20 milliGRAM(s) Oral two times a day  glucagon  Injectable 1 milliGRAM(s) IntraMuscular once  hydroxychloroquine 200 milliGRAM(s) Oral two times a day  loratadine 10 milliGRAM(s) Oral daily  magnesium oxide 400 milliGRAM(s) Oral at bedtime  metoprolol tartrate 12.5 milliGRAM(s) Oral every 12 hours  modafinil 50 milliGRAM(s) Oral <User Schedule>  multivitamin 1 Tablet(s) Oral daily  polyethylene glycol 3350 17 Gram(s) Oral two times a day  potassium chloride    Tablet ER 10 milliEquivalent(s) Oral daily  senna 2 Tablet(s) Oral at bedtime    MEDICATIONS  (PRN):  acetaminophen     Tablet .. 975 milliGRAM(s) Oral every 8 hours PRN Mild Pain (1 - 3)  bisacodyl 5 milliGRAM(s) Oral every 12 hours PRN Constipation  dextrose Oral Gel 15 Gram(s) Oral once PRN Blood Glucose LESS THAN 70 milliGRAM(s)/deciliter

## 2023-12-01 NOTE — PROGRESS NOTE ADULT - ATTENDING COMMENTS
Pt. seen with resident.  Agree with documentation above as per resident with amendments made as appropriate. Patient medically stable. Making progress towards rehab goals.       right SDH s/p evacuation  making progress.  Stable.   Afib --HR controlled

## 2023-12-01 NOTE — PROGRESS NOTE ADULT - ASSESSMENT
Assessment and Plan:   83 year old female with PMH of HTN, HLD, Arthritis, and normal pressure hydrocephalus (s/p VPS- March 2023 at [place]); who presented to [facility] on 11/8 for progressive weakness, confusion and decreased PO intake, and was found to have a mixed SDH with 1.4cm midline shift. She was transferred to [facility] later that day for further management. On 11/9, she underwent R sided reilly hole placement, followed by MMA embolization on 11/10 with Dr. Rodriguez. Her hospital course was complicated by 2 RRTs for unresponsiveness. Serial brain imaging was stable and she was found to be in rapid AFIB. 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 Patient now admitted for a multidisciplinary rehab program with cognitive deficits, gait and ADL impairments.     #Traumatic Subdural Hemorrhage  - CTH 11/8: Moderate to large mixed attenuation right hemispheric convexity SDH compressing right hemisphere and right lateral ventricle with 1.4 cm midline shift to left. Right frontal ventriculostomy shunt catheter within right frontal horn.  - CTH 11/14: Right convexity subdural collection of air, blood and fluid continues to decrease in size.  - S/p R sided reilly hole placement (11/9), followed by MMA embolization (11/10). Staples and 1 suture removed at bedside (11/27). Tolerated well.   - Outpatient follow up with NSGY, Dr. Rodriguez  - Improving in therapies     #Wakefulness   - Modafinil 50 mg q 8am (11/21)    #Agitation-Improved   -cont. Lexapro 5mg daily     #Normal Pressure Hydrocephalus  - S/p VPS March 2023  - Dr. Simon ([place])   - CTH 11/20: deep subcortical white matter ischemia, unchanged. RIGHT frontal shunt catheter tip noted in the RIGHT lateral ventricle. Mild ventriculomegaly unchanged. Tiny residual subdural collection.  -No acute intervention from NSGY standpoint-- per NP discussion with neurosurgeon Dr Méndez 11/20 , follow up outpatient.   -D/W neurosurgeon Dr. Méndez to clarify time for next shunt adjustment-  "should follow up in 6 weeks. At the latest 3 months" 11/27    #UTI-resolved   -UA (+) Leuk esterase, (+) WBC, (-) nitrite  -UCx sensitive to Vantin, cont through 11/29  -Completed Vantin 200 mg po q 12 for 7 days     #Rapid AFIB  - Evaluation for watchman as outpatient   - Metoprolol 12.5mg BID   - No AC d/t SDH    #HLD  - Atorvastatin 40mg at HS     #Arthritis  - Plaquenil 200mg BID (home regimen)    #Skin  - Skin on admission: Right sided surgical incision intact with staples Dry skin over gluteal region. Skin tags throughout body.   - Remove staples on 11/27  - Discontinued Nystatin BID (11/29)     #Pain Mgmt   - Tylenol PRN    #GI/Bowel Mgmt  - Diet: Regular   - Famotidine 20mg BID   - Miralax BID  - Senna     #/Bladder Mgmt   -  No issues     #Health Maintenance  - KCL 10mEq daily   - Magnesium Oxide 400mg at HS  - Fosamax 70mg weekly- home regimen    #Ppx   - DVT ppx: Lovenox    #IDT 11/21   Nursing: incontinent bowel/bladder   Social: lives with , independent prior, retired    Preferences: wants piaylaa   SLP: reglar diet thin liquids, decreased memory/attention/left inattention   OT: supervision for eating/oral hygiene, dependent LBD/footwear   PT: dependent   Goals: Increase attention/focus within 10-15 intervals, ModA transfers/bedmobility    DC: 12/5 Home/GIANLUCA, TBD   TDD 12/5/23    ------------------------------------------------------  OUTPATIENT/FOLLOW UP:    Sierra Rodriguez  Neurosurgery  [address] [place] [id number]  Phone: [phone]  Fax: [phone]  Follow Up Time: 2 weeks    Neurologist- managing VPS   Dr. Aurora Webb   Watchmann device outpatient with cardiology    11/30 Spoke with  bedside, no questions or concerns         Assessment and Plan:   83 year old female with PMH of HTN, HLD, Arthritis, and normal pressure hydrocephalus (s/p VPS- March 2023 at [place]); who presented to [facility] on 11/8 for progressive weakness, confusion and decreased PO intake, and was found to have a mixed SDH with 1.4cm midline shift. She was transferred to [facility] later that day for further management. On 11/9, she underwent R sided reilly hole placement, followed by MMA embolization on 11/10 with Dr. Rodriguez. Her hospital course was complicated by 2 RRTs for unresponsiveness. Serial brain imaging was stable and she was found to be in rapid AFIB. 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 Patient now admitted for a multidisciplinary rehab program with cognitive deficits, gait and ADL impairments.     #Traumatic Subdural Hemorrhage  - CTH 11/8: Moderate to large mixed attenuation right hemispheric convexity SDH compressing right hemisphere and right lateral ventricle with 1.4 cm midline shift to left. Right frontal ventriculostomy shunt catheter within right frontal horn.  - CTH 11/14: Right convexity subdural collection of air, blood and fluid continues to decrease in size.  - S/p R sided reilly hole placement (11/9), followed by MMA embolization (11/10). Staples and 1 suture removed at bedside (11/27). Tolerated well.   - Outpatient follow up with NSGY, Dr. Rodriguez  - Improving in therapies     #Wakefulness   - Modafinil 50 mg q 8am (11/21)    #Agitation-Improved   -cont. Lexapro 5mg daily     #Normal Pressure Hydrocephalus  - S/p VPS March 2023  - Dr. Simon ([place])   - CTH 11/20: deep subcortical white matter ischemia, unchanged. RIGHT frontal shunt catheter tip noted in the RIGHT lateral ventricle. Mild ventriculomegaly unchanged. Tiny residual subdural collection.  -No acute intervention from NSGY standpoint-- per NP discussion with neurosurgeon Dr Méndez 11/20 , follow up outpatient.   -D/W neurosurgeon Dr. Méndez to clarify time for next shunt adjustment-  "should follow up in 6 weeks. At the latest 3 months" 11/27        #Rapid AFIB  - Evaluation for watchman as outpatient   - Metoprolol 12.5mg BID   - No AC d/t SDH    #HLD  - Atorvastatin 40mg at HS     #Arthritis  - Plaquenil 200mg BID (home regimen)    #Skin  - Skin on admission: Right sided surgical incision intact with staples Dry skin over gluteal region. Skin tags throughout body.   - Removed staples on 11/27      #Pain Mgmt   - Tylenol PRN    #GI/Bowel Mgmt  - Diet: Regular   - Famotidine 20mg BID   - Miralax BID  - Senna     #/Bladder Mgmt   -  No issues     #Health Maintenance  - KCL 10mEq daily   - Magnesium Oxide 400mg at HS  - Fosamax 70mg weekly- home regimen    #Ppx   - DVT ppx: Lovenox    #IDT 11/21   Nursing: incontinent bowel/bladder   Social: lives with , independent prior, retired    Preferences: wants anastasiia   SLP: reglar diet thin liquids, decreased memory/attention/left inattention   OT: supervision for eating/oral hygiene, dependent LBD/footwear   PT: dependent   Goals: Increase attention/focus within 10-15 intervals, ModA transfers/bedmobility    DC: 12/5 Home/GIANLUCA, TBD   TDD 12/5/23    ------------------------------------------------------  OUTPATIENT/FOLLOW UP:    Sierra Rodriguez  Neurosurgery  [address] [place] [id number]  Phone: [phone]  Fax: [phone]  Follow Up Time: 2 weeks    Neurologist- managing VPS   Dr. Aurora Webb   Watchmann device outpatient with cardiology    11/30 Spoke with  bedside, no questions or concerns

## 2023-12-01 NOTE — CHART NOTE - NSCHARTNOTEFT_GEN_A_CORE
[facility] Interdisciplinary Plan of Care    REHABILITATION DIAGNOSIS:  Traumatic subdural hemorrhage with loss of consciousness of unspecified duration, initial encounter      COMORBIDITIES/COMPLICATING CONDITIONS IMPACTING REHABILITATION:  HEALTH ISSUES - PROBLEM Dx:  COGNITIVE IMPAIRMENT  WEAKNESS    PAST MEDICAL & SURGICAL HISTORY:  Normal pressure hydrocephalus      HTN (hypertension)      HLD (hyperlipidemia)      Arthritis      S/P  shunt        Based upon consideration of the patient's impairments, functional status, complicating conditions and any other contributing factors and after information garnered from the assessments of all therapy disciplines involved in treating the patient and other pertinent clinicians:    INTERDISCIPLINARY REHABILITATION INTERVENTIONS:    [ X  ] Transfer Training  [ X  ] Bed Mobility  [ X  ] Therapeutic Exercise  [ X ] Balance/Coordination Exercises  [ X ] Locomotion retraining  [ X  ] Stairs  [  X ] Functional Transfer Training  [ X  ] Bowel/Bladder program  [ X  ] Pain Management  [ X  ] Skin/Wound Care  [ X  ] Visual/Perceptual Training  [ X  ] Therapeutic Recreation Activities  [  X ] Neuromuscular Re-education  [ X  ] Activities of Daily Living  [ X  ] Speech Exercise  [X   ] Swallowing Exercises  [   ] Vital Stim  [   ] Dietary Supplements  [   ] Calorie Count  [ X  ] Cognitive Exercises  [  X ] Cognitive/Linguistic Treatment  [  x ] Behavior Program  [  x ] Neuropsych Therapy  [ X  ] Patient/Family Counseling  [ X ] Family Training  [ X  ] Community Re-entry  [   ] Orthotic Evaluation  [   ] Prosthetic Eval/Training    MEDICAL PROGNOSIS: good      REHAB POTENTIAL:  Good    EXPECTED DAILY THERAPY:  3 hours/day, 5 Days a week           ESTIMATED LOS:  [  ] 5-7 Days  [  ] 7-10 Days  [  ] 10- 14 Days  [ X ] 14- 18 Days  [  ] 18- 21 Days    ESTIMATED DISPOSITION:  [X  ] Home   [ X ] Home with Outpatient Therapies  [  ] Home with Home Therapies  [  ] Assisted Living  [  ] Nursing Home  [  ] Long Term Acute Care    INTERDISCIPLINARY FUNCTIONAL OUTCOMES/GOALS:         Gait/Mobility: modified independent        Transfers:  supervision        ADLs: supervision       Functional Transfers: supervision       Medication Management: supervision       Communication: Independent       Cognitive: independent        Dysphagia: independent       Bladder: independent       Bowel: independent     Functional Independent Measures:   7 = Independent  6 = Modified Independent  5 = Supervision  4 = Minimal Assist/ Contact Guard  3 = Moderate Assistance  2 = Maximum Assistance  1 = Total Assistance  0 = Unable to assess
NUTRITION Follow Up Note    SOURCE: Patient [X]  Medical Record [X]  Nursing Staff [X]  Family Member []    DIET: Diet, Regular:   Supplement Feeding Modality:  Oral  Ensure Plus High Protein Cans or Servings Per Day:  1       Frequency:  Two Times a day (23 @ 11:16) [Active]    Patient noted with suboptimal PO intakes consuming 26-75% of meals as per nursing flowsheets. Continues with Ensure Plus High Protein 8oz PO BID (Provides 700kcal & 40grams of Protein) to enhance PO intakes and optimize nutritional status. Recommend providing ongoing encouragement and assistance with meals PRN.     EDEMA: no edema noted    LAST BM: 2023    SKIN: no pressure injuries noted    WEIGHT TRENDS:  Weight in k.7 (2023 15:21)      PERTINENT MEDICATIONS: MEDICATIONS  (STANDING):  atorvastatin 40 milliGRAM(s) Oral at bedtime  cefpodoxime 200 milliGRAM(s) Oral every 12 hours  dextrose 5%. 1000 milliLiter(s) (50 mL/Hr) IV Continuous <Continuous>  dextrose 5%. 1000 milliLiter(s) (100 mL/Hr) IV Continuous <Continuous>  dextrose 50% Injectable 12.5 Gram(s) IV Push once  dextrose 50% Injectable 25 Gram(s) IV Push once  dextrose 50% Injectable 25 Gram(s) IV Push once  enoxaparin Injectable 40 milliGRAM(s) SubCutaneous <User Schedule>  escitalopram 5 milliGRAM(s) Oral daily  famotidine    Tablet 20 milliGRAM(s) Oral two times a day  glucagon  Injectable 1 milliGRAM(s) IntraMuscular once  hydroxychloroquine 200 milliGRAM(s) Oral two times a day  loratadine 10 milliGRAM(s) Oral daily  magnesium oxide 400 milliGRAM(s) Oral at bedtime  metoprolol tartrate 25 milliGRAM(s) Oral every 12 hours  modafinil 50 milliGRAM(s) Oral <User Schedule>  nystatin Powder 1 Application(s) Topical two times a day  polyethylene glycol 3350 17 Gram(s) Oral two times a day  potassium chloride    Tablet ER 10 milliEquivalent(s) Oral daily  senna 2 Tablet(s) Oral at bedtime    MEDICATIONS  (PRN):  acetaminophen     Tablet .. 975 milliGRAM(s) Oral every 8 hours PRN Mild Pain (1 - 3)  bisacodyl 5 milliGRAM(s) Oral every 12 hours PRN Constipation  dextrose Oral Gel 15 Gram(s) Oral once PRN Blood Glucose LESS THAN 70 milliGRAM(s)/deciliter      PERTINENT LABS:   Na141 mmol/L Glu 102 mg/dL<H> K+ 4.6 mmol/L Cr  1.01 mg/dL BUN 33 mg/dL<H> 11-15 Phos 2.5 mg/dL  Alb 2.6 g/dL<L>  Chol 144 mg/dL LDL --    HDL 65 mg/dL Trig 36 mg/dL        ESTIMATED NEEDS:   [X] No Change Since Previous Assessment    PREVIOUS NUTRITION DIAGNOSIS:  1. Severe Protein Calorie Malnutrition  2. Increased Nutrient Needs    NUTRITION DIAGNOSIS IS [X] Ongoing - Addressed with Ensure Plus High Protein 8oz PO BID (Provides 700kcal & 40grams of Protein)     NEW NUTRITION DIAGNOSIS: [X] Not Applicable    INTERVENTIONS:   1. Recommend continue current nutrition plan of care as tolerated   2. Recommend providing ongoing encouragement and assistance with meals PRN.   3. Monitor daily PO intakes, GI tolerance, labs, weights, skin integrity, & BM regularity     MONITORING & EVALUATION:   1. Weights   2. PO intakes   3. Skin integrity   4. Tolerance to diet prescription   5. Labs & POCT  6. Follow up (per protocol)    Registered Dietitian/Nutritionist Remains Available.  Luis Baca RD, CDN    Contact: [phone] or via MS TEAMS
NUTRITION Follow Up Note    SOURCE: Patient [X]  Medical Record [X]  Nursing Staff [X]  Family Member []    DIET: Diet, Regular:   Supplement Feeding Modality:  Oral  Ensure Plus High Protein Cans or Servings Per Day:  1       Frequency:  Two Times a day (11-18-23 @ 11:16) [Active]    Patient noted with variable PO intakes, consuming % of meals as per nursing flowsheets. Continues with Ensure Plus High Protein 8oz PO BID (Provides 700kcal & 40grams of Protein) to enhance PO intakes and optimize nutritional status. Recommend providing ongoing feeding assistance and encouragement with meals PRN.     EDEMA: no edema noted    LAST BM: 26-Nov-2023    SKIN: no pressure injuries noted    WEIGHT TRENDS:  68.7 kG (11/17/23)    PERTINENT MEDICATIONS: MEDICATIONS  (STANDING):  atorvastatin 40 milliGRAM(s) Oral at bedtime  cefpodoxime 200 milliGRAM(s) Oral every 12 hours  dextrose 5%. 1000 milliLiter(s) (50 mL/Hr) IV Continuous <Continuous>  dextrose 5%. 1000 milliLiter(s) (100 mL/Hr) IV Continuous <Continuous>  dextrose 50% Injectable 25 Gram(s) IV Push once  dextrose 50% Injectable 12.5 Gram(s) IV Push once  dextrose 50% Injectable 25 Gram(s) IV Push once  enoxaparin Injectable 40 milliGRAM(s) SubCutaneous <User Schedule>  escitalopram 5 milliGRAM(s) Oral daily  famotidine    Tablet 20 milliGRAM(s) Oral two times a day  glucagon  Injectable 1 milliGRAM(s) IntraMuscular once  hydroxychloroquine 200 milliGRAM(s) Oral two times a day  lactobacillus acidophilus 1 Tablet(s) Oral two times a day with meals  loratadine 10 milliGRAM(s) Oral daily  magnesium oxide 400 milliGRAM(s) Oral at bedtime  metoprolol tartrate 12.5 milliGRAM(s) Oral every 12 hours  modafinil 50 milliGRAM(s) Oral <User Schedule>  multivitamin 1 Tablet(s) Oral daily  nystatin Powder 1 Application(s) Topical two times a day  polyethylene glycol 3350 17 Gram(s) Oral two times a day  potassium chloride    Tablet ER 10 milliEquivalent(s) Oral daily  senna 2 Tablet(s) Oral at bedtime    MEDICATIONS  (PRN):  acetaminophen     Tablet .. 975 milliGRAM(s) Oral every 8 hours PRN Mild Pain (1 - 3)  bisacodyl 5 milliGRAM(s) Oral every 12 hours PRN Constipation  dextrose Oral Gel 15 Gram(s) Oral once PRN Blood Glucose LESS THAN 70 milliGRAM(s)/deciliter      PERTINENT LABS:  11-27 Na137 mmol/L Glu 108 mg/dL<H> K+ 4.8 mmol/L Cr  1.00 mg/dL BUN 21 mg/dL 11-27 Alb 2.7 g/dL<L> 11-08 Chol 144 mg/dL LDL --    HDL 65 mg/dL Trig 36 mg/dL      ESTIMATED NEEDS:   [X] No Change Since Previous Assessment    PREVIOUS NUTRITION DIAGNOSIS:  1. Severe Protein Calorie Malnutrition  2. Increased Nutrient Needs    NUTRITION DIAGNOSIS IS [X] Ongoing -     NEW NUTRITION DIAGNOSIS: [X] Not Applicable    INTERVENTIONS:   1. Recommend continue current nutrition plan of care as tolerated   2. Recommend providing ongoing encouragement and assistance with meals PRN.   3. Monitor daily PO intakes, GI tolerance, labs, weights, skin integrity, & BM regularity     MONITORING & EVALUATION:   1. Weights   2. PO intakes   3. Skin integrity   4. Tolerance to diet prescription   5. Labs & POCT  6. Follow up (per protocol)    Registered Dietitian/Nutritionist Remains Available.  Luis Baca RD, CDN    Contact: [phone] or via MS TEAMS
NUTRITION Follow Up Note    SOURCE: Patient [X]  Medical Record [X]  Nursing Staff [X]  Family Member []    DIET: Diet, Regular:   Supplement Feeding Modality:  Oral  Ensure Plus High Protein Cans or Servings Per Day:  1       Frequency:  Two Times a day (23 @ 11:16) [Active]    Average recorded intake % of meals over length of stay per nursing flow sheets. Continues with Ensure Plus High Protein 8oz PO TID (Provides 1,050kcal & 60grams of Protein) to enhance PO intakes and optimize nutritional status. Recommend providing ongoing encouragement and feeding assistance with meals.     EDEMA: no edema noted    LAST BM: 2023    SKIN: no pressure injuries noted     WEIGHT TRENDS:  Weight in k.5 (2023 23:24)      PERTINENT MEDICATIONS: MEDICATIONS  (STANDING):  atorvastatin 40 milliGRAM(s) Oral at bedtime  dextrose 5%. 1000 milliLiter(s) (50 mL/Hr) IV Continuous <Continuous>  dextrose 5%. 1000 milliLiter(s) (100 mL/Hr) IV Continuous <Continuous>  dextrose 50% Injectable 12.5 Gram(s) IV Push once  dextrose 50% Injectable 25 Gram(s) IV Push once  dextrose 50% Injectable 25 Gram(s) IV Push once  enoxaparin Injectable 40 milliGRAM(s) SubCutaneous <User Schedule>  escitalopram 5 milliGRAM(s) Oral daily  famotidine    Tablet 20 milliGRAM(s) Oral two times a day  glucagon  Injectable 1 milliGRAM(s) IntraMuscular once  hydroxychloroquine 200 milliGRAM(s) Oral two times a day  loratadine 10 milliGRAM(s) Oral daily  magnesium oxide 400 milliGRAM(s) Oral at bedtime  metoprolol tartrate 12.5 milliGRAM(s) Oral every 12 hours  modafinil 50 milliGRAM(s) Oral <User Schedule>  multivitamin 1 Tablet(s) Oral daily  polyethylene glycol 3350 17 Gram(s) Oral two times a day  potassium chloride    Tablet ER 10 milliEquivalent(s) Oral daily  senna 2 Tablet(s) Oral at bedtime    MEDICATIONS  (PRN):  acetaminophen     Tablet .. 975 milliGRAM(s) Oral every 8 hours PRN Mild Pain (1 - 3)  bisacodyl 5 milliGRAM(s) Oral every 12 hours PRN Constipation  dextrose Oral Gel 15 Gram(s) Oral once PRN Blood Glucose LESS THAN 70 milliGRAM(s)/deciliter      PERTINENT LABS:   Na139 mmol/L Glu 100 mg/dL<H> K+ 4.8 mmol/L Cr  1.09 mg/dL BUN 32 mg/dL<H> 1130 Alb 2.8 g/dL<L> 1108 Chol 144 mg/dL LDL --    HDL 65 mg/dL Trig 36 mg/dL        ESTIMATED NEEDS:   [X] No Change Since Previous Assessment    PREVIOUS NUTRITION DIAGNOSIS:  1. Severe Protein Calorie Malnutrition  2. Increased Nutrient Needs    NUTRITION DIAGNOSIS IS [X] Ongoing     NEW NUTRITION DIAGNOSIS: [X] Not Applicable    INTERVENTIONS:   1. Recommend continue current nutrition plan of care as tolerated   2. Recommend providing ongoing encouragement and assistance with meals PRN.   3. Monitor daily PO intakes, GI tolerance, labs, weights, skin integrity, & BM regularity    MONITORING & EVALUATION:   1. Weights   2. PO intakes   3. Skin integrity   4. Tolerance to diet prescription   5. Labs & POCT  6. Follow up (per protocol)    Registered Dietitian/Nutritionist Remains Available.  Luis Baca RD, CDN    Contact: [phone] or via MS TEAMS

## 2023-12-02 PROCEDURE — 99232 SBSQ HOSP IP/OBS MODERATE 35: CPT

## 2023-12-02 RX ADMIN — Medication 10 MILLIEQUIVALENT(S): at 11:51

## 2023-12-02 RX ADMIN — MODAFINIL 50 MILLIGRAM(S): 200 TABLET ORAL at 07:47

## 2023-12-02 RX ADMIN — ESCITALOPRAM OXALATE 5 MILLIGRAM(S): 10 TABLET, FILM COATED ORAL at 11:51

## 2023-12-02 RX ADMIN — LORATADINE 10 MILLIGRAM(S): 10 TABLET ORAL at 11:51

## 2023-12-02 RX ADMIN — ENOXAPARIN SODIUM 40 MILLIGRAM(S): 100 INJECTION SUBCUTANEOUS at 17:07

## 2023-12-02 RX ADMIN — ATORVASTATIN CALCIUM 40 MILLIGRAM(S): 80 TABLET, FILM COATED ORAL at 21:01

## 2023-12-02 RX ADMIN — Medication 12.5 MILLIGRAM(S): at 05:11

## 2023-12-02 RX ADMIN — MAGNESIUM OXIDE 400 MG ORAL TABLET 400 MILLIGRAM(S): 241.3 TABLET ORAL at 21:01

## 2023-12-02 RX ADMIN — Medication 1 TABLET(S): at 11:51

## 2023-12-02 RX ADMIN — Medication 200 MILLIGRAM(S): at 05:11

## 2023-12-02 RX ADMIN — FAMOTIDINE 20 MILLIGRAM(S): 10 INJECTION INTRAVENOUS at 05:11

## 2023-12-02 RX ADMIN — FAMOTIDINE 20 MILLIGRAM(S): 10 INJECTION INTRAVENOUS at 17:07

## 2023-12-02 RX ADMIN — Medication 200 MILLIGRAM(S): at 17:07

## 2023-12-02 RX ADMIN — SENNA PLUS 2 TABLET(S): 8.6 TABLET ORAL at 21:01

## 2023-12-02 RX ADMIN — POLYETHYLENE GLYCOL 3350 17 GRAM(S): 17 POWDER, FOR SOLUTION ORAL at 05:10

## 2023-12-02 NOTE — PROGRESS NOTE ADULT - ASSESSMENT
83 year old female with PMH of HTN, HLD, Arthritis, and normal pressure hydrocephalus s/p VPS who presented to [facility] on 11/8/23 with mixed SDH with 1.4cm midline shift. She was transferred to [facility] later that day for further management. On 11/9, she underwent R sided reilly hole placement, followed by MMA embolization on 11/10 with Dr. Rodriguez. Her hospital course was signifiicant for rapid AFIB.     # Traumatic Subdural Hemorrhage  - S/p R sided reilly hole placement (11/9), followed by MMA embolization (11/10). Staples and 1 suture removed at bedside (11/27)  - CTH 11/14: Right convexity subdural collection of air, blood and fluid continues to decrease in size.  - Right sided surgical incision intact with staples Dry skin over gluteal region. Skin tags throughout body.   - Removed staples on 11/27  - Modafinil 50 mg q 8am (11/21)  - continue comprehensive rehab program Ot PT SLP 3 hours daily 5 x week  - Outpatient follow up with [facility], Dr. Rodriguez    #Agitation  - Improved   -cont. Lexapro 5mg daily     # Normal Pressure Hydrocephalus  - S/p VPS March 2023  - CTH 11/20: deep subcortical white matter ischemia, unchanged. RIGHT frontal shunt catheter tip noted in the RIGHT lateral ventricle. Mild ventriculomegaly unchanged. Tiny residual subdural collection.  -follow up outpatient.     # Rapid AFIB  - Evaluation for watchman as outpatient   - Metoprolol 12.5mg BID   - No AC d/t SDH  - HR 53-77 BP  (102/60 - 114/64) 12/2    # HLD  - Atorvastatin 40mg at HS     # Arthritis  - Plaquenil 200mg BID (home regimen)    # Pain Mgmt   - Tylenol PRN    #GI/Bowel Mgmt  - Diet: Regular   - Famotidine 20mg BID   - Miralax BID  - Senna   - dulcolax PRN    # Health Maintenance  - KCL 10mEq daily   - Magnesium Oxide 400mg at HS  - Fosamax 70mg weekly- home regimen    # DVT ppx:   - Lovenox        ------------------------------------------------------  OUTPATIENT/FOLLOW UP:    Sierra Rodriguez  Neurosurgery  [address] [place] [id number]  Phone: [phone]  Fax: [phone]  Follow Up Time: 2 weeks    Neurologist- managing VPS   Dr. Aurora Webb   Watchmann device outpatient with cardiology

## 2023-12-02 NOTE — PROGRESS NOTE ADULT - SUBJECTIVE AND OBJECTIVE BOX
Hospitalist: Benjamin Washington DO    CHIEF COMPLAINT: Patient is a 83y old  female who presents with a chief complaint of Subdural Hemorrhage (02 Dec 2023 12:49)      SUBJECTIVE / OVERNIGHT EVENTS: Patient seen and examined. No acute events overnight. Pain well controlled and patient without any complaints.    MEDICATIONS  (STANDING):  atorvastatin 40 milliGRAM(s) Oral at bedtime  dextrose 5%. 1000 milliLiter(s) (50 mL/Hr) IV Continuous <Continuous>  dextrose 5%. 1000 milliLiter(s) (100 mL/Hr) IV Continuous <Continuous>  dextrose 50% Injectable 25 Gram(s) IV Push once  dextrose 50% Injectable 12.5 Gram(s) IV Push once  dextrose 50% Injectable 25 Gram(s) IV Push once  enoxaparin Injectable 40 milliGRAM(s) SubCutaneous <User Schedule>  escitalopram 5 milliGRAM(s) Oral daily  famotidine    Tablet 20 milliGRAM(s) Oral two times a day  glucagon  Injectable 1 milliGRAM(s) IntraMuscular once  hydroxychloroquine 200 milliGRAM(s) Oral two times a day  loratadine 10 milliGRAM(s) Oral daily  magnesium oxide 400 milliGRAM(s) Oral at bedtime  metoprolol tartrate 12.5 milliGRAM(s) Oral every 12 hours  modafinil 50 milliGRAM(s) Oral <User Schedule>  multivitamin 1 Tablet(s) Oral daily  polyethylene glycol 3350 17 Gram(s) Oral two times a day  potassium chloride    Tablet ER 10 milliEquivalent(s) Oral daily  senna 2 Tablet(s) Oral at bedtime    MEDICATIONS  (PRN):  acetaminophen     Tablet .. 975 milliGRAM(s) Oral every 8 hours PRN Mild Pain (1 - 3)  bisacodyl 5 milliGRAM(s) Oral every 12 hours PRN Constipation  dextrose Oral Gel 15 Gram(s) Oral once PRN Blood Glucose LESS THAN 70 milliGRAM(s)/deciliter      VITALS:  T(F): 97.8 (12-02-23 @ 07:49), Max: 98.2 (12-01-23 @ 19:46)  HR: 53 (12-02-23 @ 07:49) (53 - 77)  BP: 111/63 (12-02-23 @ 07:49) (102/60 - 114/64)  RR: 15 (12-02-23 @ 07:49) (14 - 15)  SpO2: 94% (12-02-23 @ 07:49)      REVIEW OF SYSTEMS:  For ROV please refer back to H&P     PHYSICAL EXAM:  General: NAD, Comfortable  Pulm: CTA BL No Rhonchi Rales or wheezes  Neck: Supple, No JVD  CVS: RRR No rubs murmurs or gallops  Abdomen: Soft, Nontender, Nondistended; No masses or organomegally  Extremities: No calf tenderness, No pitting edema      RADIOLOGY & ADDITIONAL TESTS:    Imaging Personally Reviewed:    [X] Consultant(s) Notes Reviewed:  [X] Care Discussed with Consultants/Other Providers:

## 2023-12-02 NOTE — PROGRESS NOTE ADULT - SUBJECTIVE AND OBJECTIVE BOX
Patient is a 83y old  Female who presents with a chief complaint of Subdural Hemorrhage (01 Dec 2023 12:35)      HPI:  Ginna Vergara is an 83 year old female with PMH of HTN, HLD, Arthritis, and normal pressure hydrocephalus (s/p VPS- March 2023 at [facility]); who presented to [facility] on 11/8 for progressive weakness, confusion and decreased PO intake, and was found to have a mixed SDH with 1.4cm midline shift. She was transferred to [facility] later that day for further management. On 11/9, she underwent R sided reilly hole placement followed by MMA embolization on 11/10 with Dr. Rodriguez. Her hospital course was complicated by 2 RRTs for unresponsiveness. Serial brain imaging was stable and she was found to be in rapid AFIB. 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 PM&R was consulted and deemed her an appropriate candidate for IRF. She was medically stabilized and cleared for discharge to [facility].  (17 Nov 2023 11:31)      PAST MEDICAL & SURGICAL HISTORY:  Normal pressure hydrocephalus      HTN (hypertension)      HLD (hyperlipidemia)      Arthritis      S/P  shunt          MEDICATIONS  (STANDING):  atorvastatin 40 milliGRAM(s) Oral at bedtime  dextrose 5%. 1000 milliLiter(s) (50 mL/Hr) IV Continuous <Continuous>  dextrose 5%. 1000 milliLiter(s) (100 mL/Hr) IV Continuous <Continuous>  dextrose 50% Injectable 12.5 Gram(s) IV Push once  dextrose 50% Injectable 25 Gram(s) IV Push once  dextrose 50% Injectable 25 Gram(s) IV Push once  enoxaparin Injectable 40 milliGRAM(s) SubCutaneous <User Schedule>  escitalopram 5 milliGRAM(s) Oral daily  famotidine    Tablet 20 milliGRAM(s) Oral two times a day  glucagon  Injectable 1 milliGRAM(s) IntraMuscular once  hydroxychloroquine 200 milliGRAM(s) Oral two times a day  loratadine 10 milliGRAM(s) Oral daily  magnesium oxide 400 milliGRAM(s) Oral at bedtime  metoprolol tartrate 12.5 milliGRAM(s) Oral every 12 hours  modafinil 50 milliGRAM(s) Oral <User Schedule>  multivitamin 1 Tablet(s) Oral daily  polyethylene glycol 3350 17 Gram(s) Oral two times a day  potassium chloride    Tablet ER 10 milliEquivalent(s) Oral daily  senna 2 Tablet(s) Oral at bedtime    MEDICATIONS  (PRN):  acetaminophen     Tablet .. 975 milliGRAM(s) Oral every 8 hours PRN Mild Pain (1 - 3)  bisacodyl 5 milliGRAM(s) Oral every 12 hours PRN Constipation  dextrose Oral Gel 15 Gram(s) Oral once PRN Blood Glucose LESS THAN 70 milliGRAM(s)/deciliter      Allergies    No Known Allergies    Intolerances          VITALS  83y  Vital Signs Last 24 Hrs  T(C): 36.6 (02 Dec 2023 07:49), Max: 36.8 (01 Dec 2023 19:46)  T(F): 97.8 (02 Dec 2023 07:49), Max: 98.2 (01 Dec 2023 19:46)  HR: 53 (02 Dec 2023 07:49) (53 - 77)  BP: 111/63 (02 Dec 2023 07:49) (102/60 - 114/64)  BP(mean): --  RR: 15 (02 Dec 2023 07:49) (14 - 15)  SpO2: 94% (02 Dec 2023 07:49) (94% - 98%)    Parameters below as of 02 Dec 2023 07:49  Patient On (Oxygen Delivery Method): room air      Daily     Daily         RECENT LABS:                      CAPILLARY BLOOD GLUCOSE

## 2023-12-02 NOTE — PROGRESS NOTE ADULT - ASSESSMENT
83F HTN HLD Arthritis NP Hydrocephalis (VPS placed in Mar 2023)  Came to [place] on nov8 for weakness,  SDH with midline Shift  SP Craniotomy, MMA embolization  Devoloped Afib with RVR  TX to rehab    #SDH  - Seizure Profx  - Care per orders  - no additional developments    #Rapid AFIB, HLD  - decreased metoprolol dose 12.5mg BID 11/24  - No AC  - Evaluation for Watchmann device outpatient with cardiology  - Atorvastatin 40mg at HS     #UTI  - UCx with proteus- follow up sensitivities   - Vantin 200mg BID for 7 days  - nov28 Pan sensitive culture. 2 more days of abx  - Nov30 DC meds    #Arthritis  - Plaquenil 200mg BID    #DVT PPx  - Lovenox 40 SUbQ

## 2023-12-03 PROCEDURE — 99232 SBSQ HOSP IP/OBS MODERATE 35: CPT

## 2023-12-03 RX ADMIN — Medication 10 MILLIEQUIVALENT(S): at 12:35

## 2023-12-03 RX ADMIN — Medication 200 MILLIGRAM(S): at 05:52

## 2023-12-03 RX ADMIN — Medication 1 TABLET(S): at 12:35

## 2023-12-03 RX ADMIN — LORATADINE 10 MILLIGRAM(S): 10 TABLET ORAL at 12:35

## 2023-12-03 RX ADMIN — POLYETHYLENE GLYCOL 3350 17 GRAM(S): 17 POWDER, FOR SOLUTION ORAL at 05:52

## 2023-12-03 RX ADMIN — FAMOTIDINE 20 MILLIGRAM(S): 10 INJECTION INTRAVENOUS at 05:52

## 2023-12-03 RX ADMIN — MODAFINIL 50 MILLIGRAM(S): 200 TABLET ORAL at 07:51

## 2023-12-03 RX ADMIN — FAMOTIDINE 20 MILLIGRAM(S): 10 INJECTION INTRAVENOUS at 17:08

## 2023-12-03 RX ADMIN — Medication 200 MILLIGRAM(S): at 17:08

## 2023-12-03 RX ADMIN — ATORVASTATIN CALCIUM 40 MILLIGRAM(S): 80 TABLET, FILM COATED ORAL at 21:12

## 2023-12-03 RX ADMIN — SENNA PLUS 2 TABLET(S): 8.6 TABLET ORAL at 21:12

## 2023-12-03 RX ADMIN — Medication 12.5 MILLIGRAM(S): at 17:08

## 2023-12-03 RX ADMIN — ESCITALOPRAM OXALATE 5 MILLIGRAM(S): 10 TABLET, FILM COATED ORAL at 12:35

## 2023-12-03 RX ADMIN — POLYETHYLENE GLYCOL 3350 17 GRAM(S): 17 POWDER, FOR SOLUTION ORAL at 17:08

## 2023-12-03 RX ADMIN — ENOXAPARIN SODIUM 40 MILLIGRAM(S): 100 INJECTION SUBCUTANEOUS at 17:08

## 2023-12-03 RX ADMIN — MAGNESIUM OXIDE 400 MG ORAL TABLET 400 MILLIGRAM(S): 241.3 TABLET ORAL at 21:12

## 2023-12-03 NOTE — PROGRESS NOTE ADULT - SUBJECTIVE AND OBJECTIVE BOX
Patient is a 83y old  Female who presents with a chief complaint of Subdural Hemorrhage (02 Dec 2023 14:40)      HPI:  Ginna Vergara is an 83 year old female with PMH of HTN, HLD, Arthritis, and normal pressure hydrocephalus (s/p VPS- March 2023 at [place]); who presented to [facility] on 11/8 for progressive weakness, confusion and decreased PO intake, and was found to have a mixed SDH with 1.4cm midline shift. She was transferred to [facility] later that day for further management. On 11/9, she underwent R sided reilly hole placement followed by MMA embolization on 11/10 with Dr. Rodriguez. Her hospital course was complicated by 2 RRTs for unresponsiveness. Serial brain imaging was stable and she was found to be in rapid AFIB. 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 PM&R was consulted and deemed her an appropriate candidate for IRF. She was medically stabilized and cleared for discharge to [facility].  (17 Nov 2023 11:31)      PAST MEDICAL & SURGICAL HISTORY:  Normal pressure hydrocephalus      HTN (hypertension)      HLD (hyperlipidemia)      Arthritis      S/P  shunt          MEDICATIONS  (STANDING):  atorvastatin 40 milliGRAM(s) Oral at bedtime  dextrose 5%. 1000 milliLiter(s) (50 mL/Hr) IV Continuous <Continuous>  dextrose 5%. 1000 milliLiter(s) (100 mL/Hr) IV Continuous <Continuous>  dextrose 50% Injectable 12.5 Gram(s) IV Push once  dextrose 50% Injectable 25 Gram(s) IV Push once  dextrose 50% Injectable 25 Gram(s) IV Push once  enoxaparin Injectable 40 milliGRAM(s) SubCutaneous <User Schedule>  escitalopram 5 milliGRAM(s) Oral daily  famotidine    Tablet 20 milliGRAM(s) Oral two times a day  glucagon  Injectable 1 milliGRAM(s) IntraMuscular once  hydroxychloroquine 200 milliGRAM(s) Oral two times a day  loratadine 10 milliGRAM(s) Oral daily  magnesium oxide 400 milliGRAM(s) Oral at bedtime  metoprolol tartrate 12.5 milliGRAM(s) Oral every 12 hours  modafinil 50 milliGRAM(s) Oral <User Schedule>  multivitamin 1 Tablet(s) Oral daily  polyethylene glycol 3350 17 Gram(s) Oral two times a day  potassium chloride    Tablet ER 10 milliEquivalent(s) Oral daily  senna 2 Tablet(s) Oral at bedtime    MEDICATIONS  (PRN):  acetaminophen     Tablet .. 975 milliGRAM(s) Oral every 8 hours PRN Mild Pain (1 - 3)  bisacodyl 5 milliGRAM(s) Oral every 12 hours PRN Constipation  dextrose Oral Gel 15 Gram(s) Oral once PRN Blood Glucose LESS THAN 70 milliGRAM(s)/deciliter      Allergies    No Known Allergies    Intolerances          VITALS  83y  Vital Signs Last 24 Hrs  T(C): 36.6 (03 Dec 2023 07:54), Max: 36.9 (02 Dec 2023 19:49)  T(F): 97.8 (03 Dec 2023 07:54), Max: 98.5 (02 Dec 2023 19:49)  HR: 61 (03 Dec 2023 07:54) (54 - 62)  BP: 115/68 (03 Dec 2023 07:54) (94/57 - 116/66)  BP(mean): --  RR: 15 (03 Dec 2023 07:54) (14 - 15)  SpO2: 96% (03 Dec 2023 07:54) (96% - 98%)    Parameters below as of 03 Dec 2023 07:54  Patient On (Oxygen Delivery Method): room air      Daily     Daily         RECENT LABS:                      CAPILLARY BLOOD GLUCOSE                   Patient is a 83y old  Female who presents with a chief complaint of Subdural Hemorrhage (02 Dec 2023 14:40)      HPI:  Ginna Vergara is an 83 year old female with PMH of HTN, HLD, Arthritis, and normal pressure hydrocephalus (s/p VPS- March 2023 at [place]); who presented to [facility] on 11/8 for progressive weakness, confusion and decreased PO intake, and was found to have a mixed SDH with 1.4cm midline shift. She was transferred to [facility] later that day for further management. On 11/9, she underwent R sided reilly hole placement followed by MMA embolization on 11/10 with Dr. Rodriguez. Her hospital course was complicated by 2 RRTs for unresponsiveness. Serial brain imaging was stable and she was found to be in rapid AFIB. 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 PM&R was consulted and deemed her an appropriate candidate for IRF. She was medically stabilized and cleared for discharge to [facility].  (17 Nov 2023 11:31)      PAST MEDICAL & SURGICAL HISTORY:  Normal pressure hydrocephalus      HTN (hypertension)      HLD (hyperlipidemia)      Arthritis      S/P  shunt          MEDICATIONS  (STANDING):  atorvastatin 40 milliGRAM(s) Oral at bedtime  dextrose 5%. 1000 milliLiter(s) (50 mL/Hr) IV Continuous <Continuous>  dextrose 5%. 1000 milliLiter(s) (100 mL/Hr) IV Continuous <Continuous>  dextrose 50% Injectable 12.5 Gram(s) IV Push once  dextrose 50% Injectable 25 Gram(s) IV Push once  dextrose 50% Injectable 25 Gram(s) IV Push once  enoxaparin Injectable 40 milliGRAM(s) SubCutaneous <User Schedule>  escitalopram 5 milliGRAM(s) Oral daily  famotidine    Tablet 20 milliGRAM(s) Oral two times a day  glucagon  Injectable 1 milliGRAM(s) IntraMuscular once  hydroxychloroquine 200 milliGRAM(s) Oral two times a day  loratadine 10 milliGRAM(s) Oral daily  magnesium oxide 400 milliGRAM(s) Oral at bedtime  metoprolol tartrate 12.5 milliGRAM(s) Oral every 12 hours  modafinil 50 milliGRAM(s) Oral <User Schedule>  multivitamin 1 Tablet(s) Oral daily  polyethylene glycol 3350 17 Gram(s) Oral two times a day  potassium chloride    Tablet ER 10 milliEquivalent(s) Oral daily  senna 2 Tablet(s) Oral at bedtime    MEDICATIONS  (PRN):  acetaminophen     Tablet .. 975 milliGRAM(s) Oral every 8 hours PRN Mild Pain (1 - 3)  bisacodyl 5 milliGRAM(s) Oral every 12 hours PRN Constipation  dextrose Oral Gel 15 Gram(s) Oral once PRN Blood Glucose LESS THAN 70 milliGRAM(s)/deciliter      Allergies    No Known Allergies    Intolerances          VITALS  83y  Vital Signs Last 24 Hrs  T(C): 36.6 (03 Dec 2023 07:54), Max: 36.9 (02 Dec 2023 19:49)  T(F): 97.8 (03 Dec 2023 07:54), Max: 98.5 (02 Dec 2023 19:49)  HR: 61 (03 Dec 2023 07:54) (54 - 62)  BP: 115/68 (03 Dec 2023 07:54) (94/57 - 116/66)  BP(mean): --  RR: 15 (03 Dec 2023 07:54) (14 - 15)  SpO2: 96% (03 Dec 2023 07:54) (96% - 98%)    Parameters below as of 03 Dec 2023 07:54  Patient On (Oxygen Delivery Method): room air      Daily     Daily         RECENT LABS:                      CAPILLARY BLOOD GLUCOSE                   Patient is a 83y old  Female who presents with a chief complaint of Subdural Hemorrhage (02 Dec 2023 14:40)      HPI:  Ginna Vergara is an 83 year old female with PMH of HTN, HLD, Arthritis, and normal pressure hydrocephalus (s/p VPS- March 2023 at [place]); who presented to [facility] on 11/8 for progressive weakness, confusion and decreased PO intake, and was found to have a mixed SDH with 1.4cm midline shift. She was transferred to [facility] later that day for further management. On 11/9, she underwent R sided reilly hole placement followed by MMA embolization on 11/10 with Dr. Rodriguez. Her hospital course was complicated by 2 RRTs for unresponsiveness. Serial brain imaging was stable and she was found to be in rapid AFIB. 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 PM&R was consulted and deemed her an appropriate candidate for IRF. She was medically stabilized and cleared for discharge to [facility].  (17 Nov 2023 11:31)      PAST MEDICAL & SURGICAL HISTORY:  Normal pressure hydrocephalus      HTN (hypertension)      HLD (hyperlipidemia)      Arthritis      S/P  shunt          MEDICATIONS  (STANDING):  atorvastatin 40 milliGRAM(s) Oral at bedtime  dextrose 5%. 1000 milliLiter(s) (50 mL/Hr) IV Continuous <Continuous>  dextrose 5%. 1000 milliLiter(s) (100 mL/Hr) IV Continuous <Continuous>  dextrose 50% Injectable 12.5 Gram(s) IV Push once  dextrose 50% Injectable 25 Gram(s) IV Push once  dextrose 50% Injectable 25 Gram(s) IV Push once  enoxaparin Injectable 40 milliGRAM(s) SubCutaneous <User Schedule>  escitalopram 5 milliGRAM(s) Oral daily  famotidine    Tablet 20 milliGRAM(s) Oral two times a day  glucagon  Injectable 1 milliGRAM(s) IntraMuscular once  hydroxychloroquine 200 milliGRAM(s) Oral two times a day  loratadine 10 milliGRAM(s) Oral daily  magnesium oxide 400 milliGRAM(s) Oral at bedtime  metoprolol tartrate 12.5 milliGRAM(s) Oral every 12 hours  modafinil 50 milliGRAM(s) Oral <User Schedule>  multivitamin 1 Tablet(s) Oral daily  polyethylene glycol 3350 17 Gram(s) Oral two times a day  potassium chloride    Tablet ER 10 milliEquivalent(s) Oral daily  senna 2 Tablet(s) Oral at bedtime    MEDICATIONS  (PRN):  acetaminophen     Tablet .. 975 milliGRAM(s) Oral every 8 hours PRN Mild Pain (1 - 3)  bisacodyl 5 milliGRAM(s) Oral every 12 hours PRN Constipation  dextrose Oral Gel 15 Gram(s) Oral once PRN Blood Glucose LESS THAN 70 milliGRAM(s)/deciliter      Allergies    No Known Allergies    Intolerances          VITALS  83y  Vital Signs Last 24 Hrs  T(C): 36.6 (03 Dec 2023 07:54), Max: 36.9 (02 Dec 2023 19:49)  T(F): 97.8 (03 Dec 2023 07:54), Max: 98.5 (02 Dec 2023 19:49)  HR: 61 (03 Dec 2023 07:54) (54 - 62)  BP: 115/68 (03 Dec 2023 07:54) (94/57 - 116/66)  BP(mean): --  RR: 15 (03 Dec 2023 07:54) (14 - 15)  SpO2: 96% (03 Dec 2023 07:54) (96% - 98%)    Parameters below as of 03 Dec 2023 07:54  Patient On (Oxygen Delivery Method): room air      Daily     Daily         RECENT LABS:                      CAPILLARY BLOOD GLUCOSE

## 2023-12-03 NOTE — PROGRESS NOTE ADULT - MOTOR
no calf swelling or TTP  no erythema or warmth  left hand: no swelling  left shoulder 5-/5 elbow flexion 5/5 gross grasp 4+/5
bilateral calves soft no TTP  no erythema or warmth, no pedal edema  normal tone BUE and LE

## 2023-12-03 NOTE — PROGRESS NOTE ADULT - ASSESSMENT
83F HTN HLD Arthritis NP Hydrocephalis (VPS placed in Mar 2023)  Came to [place] on nov8 for weakness,  SDH with midline Shift  SP Craniotomy, MMA embolization  Devoloped Afib with RVR  TX to rehab    #SDH  - Seizure Profx  - Care per orders  - no additional developments    #Rapid AFIB, HLD  - decreased metoprolol dose 12.5mg BID 11/24  - No AC  - Evaluation for Watchmann device outpatient with cardiology  - Atorvastatin 40mg at HS     #UTI  - UCx with proteus- follow up sensitivities   - Vantin 200mg BID for 7 days  - nov28 Pan sensitive culture. 2 more days of abx  - Nov30 DC meds    #Arthritis  - Plaquenil 200mg BID    #DVT PPx  - Lovenox 40 SUbQ 83F HTN HLD Arthritis NP Hydrocephalis (VPS placed in Mar 2023)  Came to [place] on nov8 for weakness,  SDH with midline Shift  SP Craniotomy, MMA embolization  Devoloped Afib with RVR  TX to rehab    #SDH  - Seizure Profx  - Care per orders  - no additional developments    #Rapid AFIB, HLD  - decreased metoprolol dose 12.5mg BID 11/24  - No AC  - Evaluation for Watchmann device outpatient with cardiology  - Atorvastatin 40mg at HS     #UTI  - UCx with proteus- follow up sensitivities   - Vantin 200mg BID for 7 days  - nov28 Pan sensitive culture. 2 more days of abx  - Nov30 DC meds    #Arthritis  - Plaquenil 200mg BID    #DVT PPx  - Lovenox 40 SUbQ 83F HTN HLD Arthritis NP Hydrocephalis (VPS placed in Mar 2023)  Came to [place] on nov8 for weakness,  SDH with midline Shift  SP Craniotomy, MMA embolization  Devoloped Afib with RVR  TX to rehab    #SDH  - Seizure Profx  - Care per orders  - no additional developments    #Rapid AFIB, HLD  - decreased metoprolol dose 12.5mg BID 11/24  - No AC  - Evaluation for Watchmann device outpatient with cardiology  - Atorvastatin 40mg at HS     #UTI  - UCx with proteus- follow up sensitivities   - Vantin 200mg BID for 7 days  - nov28 Pan sensitive culture. 2 more days of abx  - Nov30 DC meds    #Arthritis  - Plaquenil 200mg BID    #DVT PPx  - Lovenox 40 SUbQ

## 2023-12-03 NOTE — PROGRESS NOTE ADULT - ASSESSMENT
83 year old female with PMH of HTN, HLD, Arthritis, and normal pressure hydrocephalus s/p VPS who presented to [facility] on 11/8/23 with mixed SDH with 1.4cm midline shift. She was transferred to [facility] later that day for further management. On 11/9, she underwent R sided reilly hole placement, followed by MMA embolization on 11/10 with Dr. Rodriguez. Her hospital course was signifiicant for rapid AFIB.     # Traumatic Subdural Hemorrhage  - S/p R sided reilly hole placement (11/9), followed by MMA embolization (11/10). Staples and 1 suture removed at bedside (11/27)  - CTH 11/14: Right convexity subdural collection of air, blood and fluid continues to decrease in size.  - Right sided surgical incision intact with staples Dry skin over gluteal region. Skin tags throughout body.   - Removed staples on 11/27  - Modafinil 50 mg q 8am (11/21)  - continue comprehensive rehab program Ot PT SLP 3 hours daily 5 x week  - Outpatient follow up with NSGY, Dr. Rodriguez    #Agitation  - Improved   - cont. Lexapro 5mg daily   - if insomnia or ruminating thoughts persist, recommend psychology eval/supportive counseling    # Normal Pressure Hydrocephalus  - S/p VPS March 2023  - CTH 11/20: deep subcortical white matter ischemia, unchanged. RIGHT frontal shunt catheter tip noted in the RIGHT lateral ventricle. Mild ventriculomegaly unchanged. Tiny residual subdural collection.  -follow up outpatient.     # Rapid AFIB  - Evaluation for watchman as outpatient   - Metoprolol 12.5mg BID   - No AC d/t SDH  - HR 54-62 BP (94/57 - 116/66) 12/3. Encourage fluids    # HLD  - Atorvastatin 40mg at HS     # Arthritis  - Plaquenil 200mg BID (home regimen)    # Pain Mgmt   - Tylenol PRN    #GI/Bowel Mgmt  - Diet: Regular   - Famotidine 20mg BID   - Miralax BID  - Senna   - dulcolax PRN. Had BM 12/2    # Health Maintenance  - KCL 10mEq daily   - Magnesium Oxide 400mg at HS  - Fosamax 70mg weekly- home regimen    # DVT ppx:   - Lovenox        ------------------------------------------------------  OUTPATIENT/FOLLOW UP:    Sierra Rodriguez  Neurosurgery  [address] [place] [id number]  Phone: [phone]  Fax: [phone]  Follow Up Time: 2 weeks    Neurologist- managing VPS   Dr. Aurora Webb   Watchmann device outpatient with cardiology           83 year old female with PMH of HTN, HLD, Arthritis, and normal pressure hydrocephalus s/p VPS who presented to [facility] on 11/8/23 with mixed SDH with 1.4cm midline shift. She was transferred to [facility] later that day for further management. On 11/9, she underwent R sided reilly hole placement, followed by MMA embolization on 11/10 with Dr. Rodriguez. Her hospital course was signifiicant for rapid AFIB.     # Traumatic Subdural Hemorrhage  - S/p R sided reilly hole placement (11/9), followed by MMA embolization (11/10). Staples and 1 suture removed at bedside (11/27)  - CTH 11/14: Right convexity subdural collection of air, blood and fluid continues to decrease in size.  - Right sided surgical incision intact with staples Dry skin over gluteal region. Skin tags throughout body.   - Removed staples on 11/27  - Modafinil 50 mg q 8am (11/21)  - continue comprehensive rehab program Ot PT SLP 3 hours daily 5 x week  - Outpatient follow up with NSGY, Dr. Rodriguez    #Agitation  - Improved   - cont. Lexapro 5mg daily   - if insomnia or ruminating thoughts persist, recommend psychology eval/supportive counseling    # Normal Pressure Hydrocephalus  - S/p VPS March 2023  - CTH 11/20: deep subcortical white matter ischemia, unchanged. RIGHT frontal shunt catheter tip noted in the RIGHT lateral ventricle. Mild ventriculomegaly unchanged. Tiny residual subdural collection.  -follow up outpatient.     # Rapid AFIB  - Evaluation for watchman as outpatient   - Metoprolol 12.5mg BID   - No AC d/t SDH  - HR 54-62 BP (94/57 - 116/66) 12/3. Encourage fluids    # HLD  - Atorvastatin 40mg at HS     # Arthritis  - Plaquenil 200mg BID (home regimen)    # Pain Mgmt   - Tylenol PRN    #GI/Bowel Mgmt  - Diet: Regular   - Famotidine 20mg BID   - Miralax BID  - Senna   - dulcolax PRN. Had BM 12/2    # Health Maintenance  - KCL 10mEq daily   - Magnesium Oxide 400mg at HS  - Fosamax 70mg weekly- home regimen    # DVT ppx:   - Lovenox        ------------------------------------------------------  OUTPATIENT/FOLLOW UP:    Sierra Rodriguez  Neurosurgery  [address] [place] [id number]  Phone: [phone]  Fax: [phone]  Follow Up Time: 2 weeks    Neurologist- managing VPS   Dr. Aurora Webb   Watchmann device outpatient with cardiology           83 year old female with PMH of HTN, HLD, Arthritis, and normal pressure hydrocephalus s/p VPS who presented to [facility] on 11/8/23 with mixed SDH with 1.4cm midline shift. She was transferred to [facility] later that day for further management. On 11/9, she underwent R sided reilly hole placement, followed by MMA embolization on 11/10 with Dr. Rodriguez. Her hospital course was signifiicant for rapid AFIB.     # Traumatic Subdural Hemorrhage  - S/p R sided reilly hole placement (11/9), followed by MMA embolization (11/10). Staples and 1 suture removed at bedside (11/27)  - CTH 11/14: Right convexity subdural collection of air, blood and fluid continues to decrease in size.  - Right sided surgical incision intact with staples Dry skin over gluteal region. Skin tags throughout body.   - Removed staples on 11/27  - Modafinil 50 mg q 8am (11/21)  - continue comprehensive rehab program Ot PT SLP 3 hours daily 5 x week  - Outpatient follow up with NSGY, Dr. Rodriguez    #Agitation  - Improved   - cont. Lexapro 5mg daily   - if insomnia or ruminating thoughts persist, recommend psychology eval/supportive counseling    # Normal Pressure Hydrocephalus  - S/p VPS March 2023  - CTH 11/20: deep subcortical white matter ischemia, unchanged. RIGHT frontal shunt catheter tip noted in the RIGHT lateral ventricle. Mild ventriculomegaly unchanged. Tiny residual subdural collection.  -follow up outpatient.     # Rapid AFIB  - Evaluation for watchman as outpatient   - Metoprolol 12.5mg BID   - No AC d/t SDH  - HR 54-62 BP (94/57 - 116/66) 12/3. Encourage fluids    # HLD  - Atorvastatin 40mg at HS     # Arthritis  - Plaquenil 200mg BID (home regimen)    # Pain Mgmt   - Tylenol PRN    #GI/Bowel Mgmt  - Diet: Regular   - Famotidine 20mg BID   - Miralax BID  - Senna   - dulcolax PRN. Had BM 12/2    # Health Maintenance  - KCL 10mEq daily   - Magnesium Oxide 400mg at HS  - Fosamax 70mg weekly- home regimen    # DVT ppx:   - Lovenox        ------------------------------------------------------  OUTPATIENT/FOLLOW UP:    Sierra Rodriguez  Neurosurgery  [address] [place] [id number]  Phone: [phone]  Fax: [phone]  Follow Up Time: 2 weeks    Neurologist- managing VPS   Dr. Aurora Webb   Watchmann device outpatient with cardiology

## 2023-12-03 NOTE — PROGRESS NOTE ADULT - COMMENTS
Patient denies any palpitations or chest discomfort. Slept well overnight, no H/A or dizziness. Tony any abdominal discomfort or N/V, on lexparo    Cooperative, pleasant. Reports no BM x 2 days; has oral dulcolax PRN if needed
Patient reports not sleeping well last night "thinking of things, problems to solve" She looks awake and alert this morning, and reports that it was one of those nights, not having difficulties normally. She declines assist, denies it's anything we can help with. mood today is pleasant , NAD and cooperative. Tony pain or B/B issues

## 2023-12-03 NOTE — PROGRESS NOTE ADULT - SUBJECTIVE AND OBJECTIVE BOX
Hospitalist: Benjamin Washington DO    CHIEF COMPLAINT: Patient is a 83y old  female who presents with a chief complaint of Subdural Hemorrhage (03 Dec 2023 09:48)      SUBJECTIVE / OVERNIGHT EVENTS: Patient seen and examined. No acute events overnight. Pain well controlled and patient without any complaints.    MEDICATIONS  (STANDING):  atorvastatin 40 milliGRAM(s) Oral at bedtime  dextrose 5%. 1000 milliLiter(s) (50 mL/Hr) IV Continuous <Continuous>  dextrose 5%. 1000 milliLiter(s) (100 mL/Hr) IV Continuous <Continuous>  dextrose 50% Injectable 12.5 Gram(s) IV Push once  dextrose 50% Injectable 25 Gram(s) IV Push once  dextrose 50% Injectable 25 Gram(s) IV Push once  enoxaparin Injectable 40 milliGRAM(s) SubCutaneous <User Schedule>  escitalopram 5 milliGRAM(s) Oral daily  famotidine    Tablet 20 milliGRAM(s) Oral two times a day  glucagon  Injectable 1 milliGRAM(s) IntraMuscular once  hydroxychloroquine 200 milliGRAM(s) Oral two times a day  loratadine 10 milliGRAM(s) Oral daily  magnesium oxide 400 milliGRAM(s) Oral at bedtime  metoprolol tartrate 12.5 milliGRAM(s) Oral every 12 hours  modafinil 50 milliGRAM(s) Oral <User Schedule>  multivitamin 1 Tablet(s) Oral daily  polyethylene glycol 3350 17 Gram(s) Oral two times a day  potassium chloride    Tablet ER 10 milliEquivalent(s) Oral daily  senna 2 Tablet(s) Oral at bedtime    MEDICATIONS  (PRN):  acetaminophen     Tablet .. 975 milliGRAM(s) Oral every 8 hours PRN Mild Pain (1 - 3)  bisacodyl 5 milliGRAM(s) Oral every 12 hours PRN Constipation  dextrose Oral Gel 15 Gram(s) Oral once PRN Blood Glucose LESS THAN 70 milliGRAM(s)/deciliter      VITALS:  T(F): 97.8 (12-03-23 @ 07:54), Max: 98.5 (12-02-23 @ 19:49)  HR: 61 (12-03-23 @ 07:54) (54 - 62)  BP: 115/68 (12-03-23 @ 07:54) (94/57 - 116/66)  RR: 15 (12-03-23 @ 07:54) (14 - 15)  SpO2: 96% (12-03-23 @ 07:54)      REVIEW OF SYSTEMS:  For ROV please refer back to H&P     PHYSICAL EXAM:  General: NAD, Comfortable  Pulm: CTA BL No Rhonchi Rales or wheezes  Neck: Supple, No JVD  CVS: RRR No rubs murmurs or gallops  Abdomen: Soft, Nontender, Nondistended; No masses or organomegally  Extremities: No calf tenderness, No pitting edema      RADIOLOGY & ADDITIONAL TESTS:    Imaging Personally Reviewed:    [X] Consultant(s) Notes Reviewed:  [X] Care Discussed with Consultants/Other Providers:

## 2023-12-04 LAB
ALBUMIN SERPL ELPH-MCNC: 3 G/DL — LOW (ref 3.3–5)
ALBUMIN SERPL ELPH-MCNC: 3 G/DL — LOW (ref 3.3–5)
ALP SERPL-CCNC: 81 U/L — SIGNIFICANT CHANGE UP (ref 40–120)
ALP SERPL-CCNC: 81 U/L — SIGNIFICANT CHANGE UP (ref 40–120)
ALT FLD-CCNC: 19 U/L — SIGNIFICANT CHANGE UP (ref 10–45)
ALT FLD-CCNC: 19 U/L — SIGNIFICANT CHANGE UP (ref 10–45)
ANION GAP SERPL CALC-SCNC: 6 MMOL/L — SIGNIFICANT CHANGE UP (ref 5–17)
ANION GAP SERPL CALC-SCNC: 6 MMOL/L — SIGNIFICANT CHANGE UP (ref 5–17)
AST SERPL-CCNC: 19 U/L — SIGNIFICANT CHANGE UP (ref 10–40)
AST SERPL-CCNC: 19 U/L — SIGNIFICANT CHANGE UP (ref 10–40)
BASOPHILS # BLD AUTO: 0.03 K/UL — SIGNIFICANT CHANGE UP (ref 0–0.2)
BASOPHILS # BLD AUTO: 0.03 K/UL — SIGNIFICANT CHANGE UP (ref 0–0.2)
BASOPHILS NFR BLD AUTO: 0.5 % — SIGNIFICANT CHANGE UP (ref 0–2)
BASOPHILS NFR BLD AUTO: 0.5 % — SIGNIFICANT CHANGE UP (ref 0–2)
BILIRUB SERPL-MCNC: 0.3 MG/DL — SIGNIFICANT CHANGE UP (ref 0.2–1.2)
BILIRUB SERPL-MCNC: 0.3 MG/DL — SIGNIFICANT CHANGE UP (ref 0.2–1.2)
BUN SERPL-MCNC: 23 MG/DL — SIGNIFICANT CHANGE UP (ref 7–23)
BUN SERPL-MCNC: 23 MG/DL — SIGNIFICANT CHANGE UP (ref 7–23)
CALCIUM SERPL-MCNC: 10.4 MG/DL — SIGNIFICANT CHANGE UP (ref 8.4–10.5)
CALCIUM SERPL-MCNC: 10.4 MG/DL — SIGNIFICANT CHANGE UP (ref 8.4–10.5)
CHLORIDE SERPL-SCNC: 105 MMOL/L — SIGNIFICANT CHANGE UP (ref 96–108)
CHLORIDE SERPL-SCNC: 105 MMOL/L — SIGNIFICANT CHANGE UP (ref 96–108)
CO2 SERPL-SCNC: 28 MMOL/L — SIGNIFICANT CHANGE UP (ref 22–31)
CO2 SERPL-SCNC: 28 MMOL/L — SIGNIFICANT CHANGE UP (ref 22–31)
CREAT SERPL-MCNC: 1.2 MG/DL — SIGNIFICANT CHANGE UP (ref 0.5–1.3)
CREAT SERPL-MCNC: 1.2 MG/DL — SIGNIFICANT CHANGE UP (ref 0.5–1.3)
EGFR: 44 ML/MIN/1.73M2 — LOW
EGFR: 44 ML/MIN/1.73M2 — LOW
EOSINOPHIL # BLD AUTO: 0.25 K/UL — SIGNIFICANT CHANGE UP (ref 0–0.5)
EOSINOPHIL # BLD AUTO: 0.25 K/UL — SIGNIFICANT CHANGE UP (ref 0–0.5)
EOSINOPHIL NFR BLD AUTO: 3.8 % — SIGNIFICANT CHANGE UP (ref 0–6)
EOSINOPHIL NFR BLD AUTO: 3.8 % — SIGNIFICANT CHANGE UP (ref 0–6)
GLUCOSE SERPL-MCNC: 101 MG/DL — HIGH (ref 70–99)
GLUCOSE SERPL-MCNC: 101 MG/DL — HIGH (ref 70–99)
HCT VFR BLD CALC: 33.4 % — LOW (ref 34.5–45)
HCT VFR BLD CALC: 33.4 % — LOW (ref 34.5–45)
HGB BLD-MCNC: 10.3 G/DL — LOW (ref 11.5–15.5)
HGB BLD-MCNC: 10.3 G/DL — LOW (ref 11.5–15.5)
IMM GRANULOCYTES NFR BLD AUTO: 0.5 % — SIGNIFICANT CHANGE UP (ref 0–0.9)
IMM GRANULOCYTES NFR BLD AUTO: 0.5 % — SIGNIFICANT CHANGE UP (ref 0–0.9)
LYMPHOCYTES # BLD AUTO: 1.7 K/UL — SIGNIFICANT CHANGE UP (ref 1–3.3)
LYMPHOCYTES # BLD AUTO: 1.7 K/UL — SIGNIFICANT CHANGE UP (ref 1–3.3)
LYMPHOCYTES # BLD AUTO: 25.6 % — SIGNIFICANT CHANGE UP (ref 13–44)
LYMPHOCYTES # BLD AUTO: 25.6 % — SIGNIFICANT CHANGE UP (ref 13–44)
MCHC RBC-ENTMCNC: 28.1 PG — SIGNIFICANT CHANGE UP (ref 27–34)
MCHC RBC-ENTMCNC: 28.1 PG — SIGNIFICANT CHANGE UP (ref 27–34)
MCHC RBC-ENTMCNC: 30.8 GM/DL — LOW (ref 32–36)
MCHC RBC-ENTMCNC: 30.8 GM/DL — LOW (ref 32–36)
MCV RBC AUTO: 91.3 FL — SIGNIFICANT CHANGE UP (ref 80–100)
MCV RBC AUTO: 91.3 FL — SIGNIFICANT CHANGE UP (ref 80–100)
MONOCYTES # BLD AUTO: 0.74 K/UL — SIGNIFICANT CHANGE UP (ref 0–0.9)
MONOCYTES # BLD AUTO: 0.74 K/UL — SIGNIFICANT CHANGE UP (ref 0–0.9)
MONOCYTES NFR BLD AUTO: 11.2 % — SIGNIFICANT CHANGE UP (ref 2–14)
MONOCYTES NFR BLD AUTO: 11.2 % — SIGNIFICANT CHANGE UP (ref 2–14)
NEUTROPHILS # BLD AUTO: 3.88 K/UL — SIGNIFICANT CHANGE UP (ref 1.8–7.4)
NEUTROPHILS # BLD AUTO: 3.88 K/UL — SIGNIFICANT CHANGE UP (ref 1.8–7.4)
NEUTROPHILS NFR BLD AUTO: 58.4 % — SIGNIFICANT CHANGE UP (ref 43–77)
NEUTROPHILS NFR BLD AUTO: 58.4 % — SIGNIFICANT CHANGE UP (ref 43–77)
NRBC # BLD: 0 /100 WBCS — SIGNIFICANT CHANGE UP (ref 0–0)
NRBC # BLD: 0 /100 WBCS — SIGNIFICANT CHANGE UP (ref 0–0)
PLATELET # BLD AUTO: 254 K/UL — SIGNIFICANT CHANGE UP (ref 150–400)
PLATELET # BLD AUTO: 254 K/UL — SIGNIFICANT CHANGE UP (ref 150–400)
POTASSIUM SERPL-MCNC: 4.8 MMOL/L — SIGNIFICANT CHANGE UP (ref 3.5–5.3)
POTASSIUM SERPL-MCNC: 4.8 MMOL/L — SIGNIFICANT CHANGE UP (ref 3.5–5.3)
POTASSIUM SERPL-SCNC: 4.8 MMOL/L — SIGNIFICANT CHANGE UP (ref 3.5–5.3)
POTASSIUM SERPL-SCNC: 4.8 MMOL/L — SIGNIFICANT CHANGE UP (ref 3.5–5.3)
PROT SERPL-MCNC: 6.4 G/DL — SIGNIFICANT CHANGE UP (ref 6–8.3)
PROT SERPL-MCNC: 6.4 G/DL — SIGNIFICANT CHANGE UP (ref 6–8.3)
RBC # BLD: 3.66 M/UL — LOW (ref 3.8–5.2)
RBC # BLD: 3.66 M/UL — LOW (ref 3.8–5.2)
RBC # FLD: 16 % — HIGH (ref 10.3–14.5)
RBC # FLD: 16 % — HIGH (ref 10.3–14.5)
SODIUM SERPL-SCNC: 139 MMOL/L — SIGNIFICANT CHANGE UP (ref 135–145)
SODIUM SERPL-SCNC: 139 MMOL/L — SIGNIFICANT CHANGE UP (ref 135–145)
WBC # BLD: 6.63 K/UL — SIGNIFICANT CHANGE UP (ref 3.8–10.5)
WBC # BLD: 6.63 K/UL — SIGNIFICANT CHANGE UP (ref 3.8–10.5)
WBC # FLD AUTO: 6.63 K/UL — SIGNIFICANT CHANGE UP (ref 3.8–10.5)
WBC # FLD AUTO: 6.63 K/UL — SIGNIFICANT CHANGE UP (ref 3.8–10.5)

## 2023-12-04 PROCEDURE — 99232 SBSQ HOSP IP/OBS MODERATE 35: CPT

## 2023-12-04 RX ADMIN — Medication 12.5 MILLIGRAM(S): at 05:48

## 2023-12-04 RX ADMIN — FAMOTIDINE 20 MILLIGRAM(S): 10 INJECTION INTRAVENOUS at 17:25

## 2023-12-04 RX ADMIN — Medication 200 MILLIGRAM(S): at 05:47

## 2023-12-04 RX ADMIN — SENNA PLUS 2 TABLET(S): 8.6 TABLET ORAL at 22:03

## 2023-12-04 RX ADMIN — Medication 10 MILLIEQUIVALENT(S): at 12:41

## 2023-12-04 RX ADMIN — ATORVASTATIN CALCIUM 40 MILLIGRAM(S): 80 TABLET, FILM COATED ORAL at 22:03

## 2023-12-04 RX ADMIN — ENOXAPARIN SODIUM 40 MILLIGRAM(S): 100 INJECTION SUBCUTANEOUS at 17:24

## 2023-12-04 RX ADMIN — Medication 975 MILLIGRAM(S): at 02:30

## 2023-12-04 RX ADMIN — POLYETHYLENE GLYCOL 3350 17 GRAM(S): 17 POWDER, FOR SOLUTION ORAL at 05:47

## 2023-12-04 RX ADMIN — MODAFINIL 50 MILLIGRAM(S): 200 TABLET ORAL at 08:03

## 2023-12-04 RX ADMIN — FAMOTIDINE 20 MILLIGRAM(S): 10 INJECTION INTRAVENOUS at 05:47

## 2023-12-04 RX ADMIN — ESCITALOPRAM OXALATE 5 MILLIGRAM(S): 10 TABLET, FILM COATED ORAL at 12:41

## 2023-12-04 RX ADMIN — MAGNESIUM OXIDE 400 MG ORAL TABLET 400 MILLIGRAM(S): 241.3 TABLET ORAL at 22:03

## 2023-12-04 RX ADMIN — Medication 12.5 MILLIGRAM(S): at 17:25

## 2023-12-04 RX ADMIN — LORATADINE 10 MILLIGRAM(S): 10 TABLET ORAL at 12:42

## 2023-12-04 RX ADMIN — Medication 1 TABLET(S): at 12:42

## 2023-12-04 RX ADMIN — Medication 200 MILLIGRAM(S): at 17:25

## 2023-12-04 RX ADMIN — Medication 975 MILLIGRAM(S): at 01:34

## 2023-12-04 RX ADMIN — POLYETHYLENE GLYCOL 3350 17 GRAM(S): 17 POWDER, FOR SOLUTION ORAL at 17:24

## 2023-12-04 NOTE — PROGRESS NOTE ADULT - SUBJECTIVE AND OBJECTIVE BOX
Full note to follow, consult received and patient seen. Discussed with Dr. Nita Mauricio. 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 Liver lesions need to be reviewed with radiology / tumor board. Will discuss with Dr. Brian Coleman also. Patient is a 83y old  Female who presents with a chief complaint of Subdural Hemorrhage (03 Dec 2023 12:59)      Patient seen and examined at bedside. No events overnight. Patient without headache, changes in vision, nausea or vomiting, chest pain, sob, abd pain.    ALLERGIES:  No Known Allergies    MEDICATIONS  (STANDING):  atorvastatin 40 milliGRAM(s) Oral at bedtime  dextrose 5%. 1000 milliLiter(s) (100 mL/Hr) IV Continuous <Continuous>  dextrose 5%. 1000 milliLiter(s) (50 mL/Hr) IV Continuous <Continuous>  dextrose 50% Injectable 25 Gram(s) IV Push once  dextrose 50% Injectable 12.5 Gram(s) IV Push once  dextrose 50% Injectable 25 Gram(s) IV Push once  enoxaparin Injectable 40 milliGRAM(s) SubCutaneous <User Schedule>  escitalopram 5 milliGRAM(s) Oral daily  famotidine    Tablet 20 milliGRAM(s) Oral two times a day  glucagon  Injectable 1 milliGRAM(s) IntraMuscular once  hydroxychloroquine 200 milliGRAM(s) Oral two times a day  loratadine 10 milliGRAM(s) Oral daily  magnesium oxide 400 milliGRAM(s) Oral at bedtime  metoprolol tartrate 12.5 milliGRAM(s) Oral every 12 hours  modafinil 50 milliGRAM(s) Oral <User Schedule>  multivitamin 1 Tablet(s) Oral daily  polyethylene glycol 3350 17 Gram(s) Oral two times a day  potassium chloride    Tablet ER 10 milliEquivalent(s) Oral daily  senna 2 Tablet(s) Oral at bedtime    MEDICATIONS  (PRN):  acetaminophen     Tablet .. 975 milliGRAM(s) Oral every 8 hours PRN Mild Pain (1 - 3)  bisacodyl 5 milliGRAM(s) Oral every 12 hours PRN Constipation  dextrose Oral Gel 15 Gram(s) Oral once PRN Blood Glucose LESS THAN 70 milliGRAM(s)/deciliter    Vital Signs Last 24 Hrs  T(F): 98.8 (04 Dec 2023 09:19), Max: 98.8 (04 Dec 2023 09:19)  HR: 66 (04 Dec 2023 09:19) (62 - 68)  BP: 114/62 (04 Dec 2023 09:19) (110/60 - 117/67)  RR: 15 (04 Dec 2023 09:19) (14 - 15)  SpO2: 99% (04 Dec 2023 09:19) (96% - 99%)  I&O's Summary      PHYSICAL EXAM:  GENERAL: NAD, laying in bed  EYES: PEERL, conjunctiva and sclera clear  ENMT: Moist mucous membranes, Supple, No JVD, R side cranial incision   CHEST/LUNG: Clear to auscultation bilaterally, good air entry, non-labored breathing  HEART: RRR; S1/S2, No murmur  ABDOMEN: Soft, Nontender, Nondistended; Bowel sounds present  EXTREMITIES: No calf tenderness, No cyanosis, No edema  SKIN: Warm, perfused  PSYCH: Normal mood, Normal affect    LABS:                        10.3   6.63  )-----------( 254      ( 04 Dec 2023 05:39 )             33.4     12-04    139  |  105  |  23  ----------------------------<  101  4.8   |  28  |  1.20    Ca    10.4      04 Dec 2023 05:39    TPro  6.4  /  Alb  3.0  /  TBili  0.3  /  DBili  x   /  AST  19  /  ALT  19  /  AlkPhos  81  12-04              11-08 Chol 144 mg/dL LDL -- HDL 65 mg/dL Trig 36 mg/dL                  Urinalysis Basic - ( 04 Dec 2023 05:39 )    Color: x / Appearance: x / SG: x / pH: x  Gluc: 101 mg/dL / Ketone: x  / Bili: x / Urobili: x   Blood: x / Protein: x / Nitrite: x   Leuk Esterase: x / RBC: x / WBC x   Sq Epi: x / Non Sq Epi: x / Bacteria: x        COVID-19 PCR: NotDetec (11-17-23 @ 19:55)      RADIOLOGY & ADDITIONAL TESTS:    Care Discussed with Consultants/Other Providers:

## 2023-12-04 NOTE — PROGRESS NOTE ADULT - ASSESSMENT
84 y/o woman with PMH of HTN, HLD, Arthritis, and normal pressure hydrocephalus (s/p VPS- March 2023 at [place]); who presented to [facility] on 11/8 for progressive weakness, confusion and decreased PO intake, and was found to have a mixed SDH with 1.4cm midline shift. She was transferred to [facility] later that day for further management. On 11/9, she underwent R sided reilly hole placement, followed by MMA embolization on 11/10 with Dr. Rodriguez. Her hospital course was complicated by 2 RRTs for unresponsiveness. Serial brain imaging was stable and she was found to be in rapid AFIB. 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 Patient now admitted for a multidisciplinary rehab program with cognitive deficits, gait and ADL impairments.     #SDH  - S/p VPS March 2023  - CTH 11/8: Moderate to large mixed attenuation right hemispheric convexity SDH compressing right hemisphere and right lateral ventricle with 1.4 cm midline shift to left. Right frontal ventriculostomy shunt catheter within right frontal horn.  - CTH 11/14:Right convexity subdural collection of air, blood and fluid continues to decrease in size.  - S/p R sided reilly hole placement (11/9), followed by MMA embolization (11/10)  - Seizure PPx per primary team  - continue comprehensive rehab program    #Rapid AFIB, HLD  - decreased metoprolol dose 12.5mg BID 11/24  - No AC  - Evaluation for Watchmann device outpatient with cardiology  - Atorvastatin 40mg at HS     #UTI  - UCx with proteus  - Vantin course completed    #Arthritis  - Plaquenil 200mg BID    #DVT PPx  - Lovenox 40 SUbQ 82 y/o woman with PMH of HTN, HLD, Arthritis, and normal pressure hydrocephalus (s/p VPS- March 2023 at [place]); who presented to [facility] on 11/8 for progressive weakness, confusion and decreased PO intake, and was found to have a mixed SDH with 1.4cm midline shift. She was transferred to [facility] later that day for further management. On 11/9, she underwent R sided reilly hole placement, followed by MMA embolization on 11/10 with Dr. Rodriguez. Her hospital course was complicated by 2 RRTs for unresponsiveness. Serial brain imaging was stable and she was found to be in rapid AFIB. 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 Patient now admitted for a multidisciplinary rehab program with cognitive deficits, gait and ADL impairments.     #SDH  - S/p VPS March 2023  - CTH 11/8: Moderate to large mixed attenuation right hemispheric convexity SDH compressing right hemisphere and right lateral ventricle with 1.4 cm midline shift to left. Right frontal ventriculostomy shunt catheter within right frontal horn.  - CTH 11/14:Right convexity subdural collection of air, blood and fluid continues to decrease in size.  - S/p R sided reilly hole placement (11/9), followed by MMA embolization (11/10)  - Seizure PPx per primary team  - continue comprehensive rehab program    #Rapid AFIB, HLD  - decreased metoprolol dose 12.5mg BID 11/24  - No AC  - Evaluation for Watchmann device outpatient with cardiology  - Atorvastatin 40mg at HS     #UTI  - UCx with proteus  - Vantin course completed    #Arthritis  - Plaquenil 200mg BID    #DVT PPx  - Lovenox 40 SUbQ

## 2023-12-04 NOTE — PROGRESS NOTE ADULT - ASSESSMENT
Assessment and Plan:   83 year old female with PMH of HTN, HLD, Arthritis, and normal pressure hydrocephalus (s/p VPS- March 2023 at [place]); who presented to [facility] on 11/8 for progressive weakness, confusion and decreased PO intake, and was found to have a mixed SDH with 1.4cm midline shift. She was transferred to [facility] later that day for further management. On 11/9, she underwent R sided reilly hole placement, followed by MMA embolization on 11/10 with Dr. Rodriguez. Her hospital course was complicated by 2 RRTs for unresponsiveness. Serial brain imaging was stable and she was found to be in rapid AFIB. 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 Patient now admitted for a multidisciplinary rehab program with cognitive deficits, gait and ADL impairments.     #Traumatic Subdural Hemorrhage  - CTH 11/8: Moderate to large mixed attenuation right hemispheric convexity SDH compressing right hemisphere and right lateral ventricle with 1.4 cm midline shift to left. Right frontal ventriculostomy shunt catheter within right frontal horn.  - CTH 11/14: Right convexity subdural collection of air, blood and fluid continues to decrease in size.  - S/p R sided reilly hole placement (11/9), followed by MMA embolization (11/10). Staples and 1 suture removed at bedside (11/27). Tolerated well.   - Outpatient follow up with NSGY, Dr. Rodriguez  - Improving in therapies     #Wakefulness   - Modafinil 50 mg q 8am (11/21)    #Agitation-Improved   -cont. Lexapro 5mg daily     #Normal Pressure Hydrocephalus  - S/p VPS March 2023  - Dr. Simon ([place])   - CTH 11/20: deep subcortical white matter ischemia, unchanged. RIGHT frontal shunt catheter tip noted in the RIGHT lateral ventricle. Mild ventriculomegaly unchanged. Tiny residual subdural collection.  -No acute intervention from NSGY standpoint-- per NP discussion with neurosurgeon Dr Méndez 11/20 , follow up outpatient.   -D/W neurosurgeon Dr. Méndez to clarify time for next shunt adjustment-  "should follow up in 6 weeks. At the latest 3 months" 11/27      #Rapid AFIB  - Evaluation for watchman as outpatient   - Metoprolol 12.5mg BID   - No AC d/t SDH    #HLD  - Atorvastatin 40mg at HS     #Arthritis  - Plaquenil 200mg BID (home regimen)    #Skin  - Skin on admission: Right sided surgical incision intact with staples Dry skin over gluteal region. Skin tags throughout body.   - Removed staples on 11/27      #Pain Mgmt   - Tylenol PRN    #GI/Bowel Mgmt  - Diet: Regular   - Famotidine 20mg BID   - Miralax BID  - Senna     #/Bladder Mgmt   -  No issues     #Health Maintenance  - KCL 10mEq daily   - Magnesium Oxide 400mg at HS  - Fosamax 70mg weekly- home regimen    #Ppx   - DVT ppx: Lovenox    #IDT 11/21   Nursing: incontinent bowel/bladder   Social: lives with , independent prior, retired    Preferences: wants anastasiia   SLP: reglar diet thin liquids, decreased memory/attention/left inattention   OT: supervision for eating/oral hygiene, dependent LBD/footwear   PT: dependent   Goals: Increase attention/focus within 10-15 intervals, ModA transfers/bedmobility    DC: 12/5 Home/GIANLUCA, TBD   TDD 12/5/23    ------------------------------------------------------  OUTPATIENT/FOLLOW UP:    Sierra Rodriguez  Neurosurgery  [address] [place] [id number]  Phone: [phone]  Fax: [phone]  Follow Up Time: 2 weeks    Neurologist- managing VPS   Dr. Aurora Webb   Watchmann device outpatient with cardiology             Assessment and Plan:   83 year old female with PMH of HTN, HLD, Arthritis, and normal pressure hydrocephalus (s/p VPS- March 2023 at [place]); who presented to [facility] on 11/8 for progressive weakness, confusion and decreased PO intake, and was found to have a mixed SDH with 1.4cm midline shift. She was transferred to [facility] later that day for further management. On 11/9, she underwent R sided reilly hole placement, followed by MMA embolization on 11/10 with Dr. Rodriguez. Her hospital course was complicated by 2 RRTs for unresponsiveness. Serial brain imaging was stable and she was found to be in rapid AFIB. 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 Patient now admitted for a multidisciplinary rehab program with cognitive deficits, gait and ADL impairments.     #Traumatic Subdural Hemorrhage  - CTH 11/8: Moderate to large mixed attenuation right hemispheric convexity SDH compressing right hemisphere and right lateral ventricle with 1.4 cm midline shift to left. Right frontal ventriculostomy shunt catheter within right frontal horn.  - CTH 11/14: Right convexity subdural collection of air, blood and fluid continues to decrease in size.  - S/p R sided reilly hole placement (11/9), followed by MMA embolization (11/10). Staples and 1 suture removed at bedside (11/27). Tolerated well.   - Outpatient follow up with NSGY, Dr. Rodriguez  - Improving in therapies     #Wakefulness   - Modafinil 50 mg q 8am (11/21)    #Agitation-Improved   -cont. Lexapro 5mg daily     #Normal Pressure Hydrocephalus  - S/p VPS March 2023  - Dr. Simon ([place])   - CTH 11/20: deep subcortical white matter ischemia, unchanged. RIGHT frontal shunt catheter tip noted in the RIGHT lateral ventricle. Mild ventriculomegaly unchanged. Tiny residual subdural collection.  -No acute intervention from NSGY standpoint-- per NP discussion with neurosurgeon Dr Méndez 11/20 , follow up outpatient.   -D/W neurosurgeon Dr. Méndez to clarify time for next shunt adjustment-  "should follow up in 6 weeks. At the latest 3 months" 11/27      #Rapid AFIB  - Evaluation for watchman as outpatient   - Metoprolol 12.5mg BID   - No AC d/t SDH    #HLD  - Atorvastatin 40mg at HS     #Arthritis  - Plaquenil 200mg BID (home regimen)    #Skin  - Skin on admission: Right sided surgical incision intact with staples Dry skin over gluteal region. Skin tags throughout body.   - Removed staples on 11/27      #Pain Mgmt   - Tylenol PRN    #GI/Bowel Mgmt  - Diet: Regular   - Famotidine 20mg BID   - Miralax BID  - Senna     #/Bladder Mgmt   -  No issues     #Health Maintenance  - KCL 10mEq daily   - Magnesium Oxide 400mg at HS  - Fosamax 70mg weekly- home regimen    #Ppx   - DVT ppx: Lovenox    #IDT 11/21   Nursing: incontinent bowel/bladder   Social: lives with , independent prior, retired    Preferences: wants anastasiia   SLP: reglar diet thin liquids, decreased memory/attention/left inattention   OT: supervision for eating/oral hygiene, dependent LBD/footwear   PT: dependent   Goals: Increase attention/focus within 10-15 intervals, ModA transfers/bedmobility    DC: 12/5 Home/GIANLUCA, TBD   TDD 12/5/23    ------------------------------------------------------  OUTPATIENT/FOLLOW UP:    Sierra Rodriguez  Neurosurgery  [address] [place] [id number]  Phone: [phone]  Fax: [phone]  Follow Up Time: 2 weeks    Neurologist- managing VPS   Dr. Aurora Webb   Watchmann device outpatient with cardiology

## 2023-12-04 NOTE — PROGRESS NOTE ADULT - SUBJECTIVE AND OBJECTIVE BOX
HPI:  Ginna Vergara is an 83 year old female with PMH of HTN, HLD, Arthritis, and normal pressure hydrocephalus (s/p VPS- March 2023 at [place]); who presented to [facility] on 11/8 for progressive weakness, confusion and decreased PO intake, and was found to have a mixed SDH with 1.4cm midline shift. She was transferred to [facility] later that day for further management. On 11/9, she underwent R sided reilly hole placement followed by MMA embolization on 11/10 with Dr. Rodriguez. Her hospital course was complicated by 2 RRTs for unresponsiveness. Serial brain imaging was stable and she was found to be in rapid AFIB. 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 PM&R was consulted and deemed her an appropriate candidate for IRF. She was medically stabilized and cleared for discharge to [facility].  (17 Nov 2023 11:31)      Subjective:  Seen this AM.   Slept during the night.  No pain. Tolerating therapy      VITALS  Vital Signs Last 24 Hrs  T(C): 37.1 (04 Dec 2023 09:19), Max: 37.1 (04 Dec 2023 09:19)  T(F): 98.8 (04 Dec 2023 09:19), Max: 98.8 (04 Dec 2023 09:19)  HR: 66 (04 Dec 2023 09:19) (62 - 68)  BP: 114/62 (04 Dec 2023 09:19) (110/60 - 117/67)  BP(mean): --  RR: 15 (04 Dec 2023 09:19) (14 - 15)  SpO2: 99% (04 Dec 2023 09:19) (96% - 99%)    Parameters below as of 04 Dec 2023 09:19  Patient On (Oxygen Delivery Method): room air        REVIEW OF SYMPTOMS  Neurological deficits      PHYSICAL EXAM  General: No acute distress  CV:warm, well perfused  Lungs: Breathing comfortably on RA   Abd: Soft, mildly distended   Ext:  No peripheral edema, TEDs  Neuro:            Mental status: Knows she is in the hospital, states 2023 w/ [facility] cues, knows month but not date            CN: II-VII intact             MSK: LUE Sh abd, EF/EE, WE, -- 4/5.                      RUE 5/5 throughout                       BLE HF 4/5.  Bilateral DF 4/5. Bilateral KE/KF 5/5  Coordination-- Left FNF slow,  intact on right            Sensation: Intact throughout    RECENT LABS                        10.3   6.63  )-----------( 254      ( 04 Dec 2023 05:39 )             33.4     12-04    139  |  105  |  23  ----------------------------<  101<H>  4.8   |  28  |  1.20    Ca    10.4      04 Dec 2023 05:39    TPro  6.4  /  Alb  3.0<L>  /  TBili  0.3  /  DBili  x   /  AST  19  /  ALT  19  /  AlkPhos  81  12-04      Urinalysis Basic - ( 04 Dec 2023 05:39 )    Color: x / Appearance: x / SG: x / pH: x  Gluc: 101 mg/dL / Ketone: x  / Bili: x / Urobili: x   Blood: x / Protein: x / Nitrite: x   Leuk Esterase: x / RBC: x / WBC x   Sq Epi: x / Non Sq Epi: x / Bacteria: x          RADIOLOGY/OTHER RESULTS      MEDICATIONS  (STANDING):  atorvastatin 40 milliGRAM(s) Oral at bedtime  dextrose 5%. 1000 milliLiter(s) (100 mL/Hr) IV Continuous <Continuous>  dextrose 5%. 1000 milliLiter(s) (50 mL/Hr) IV Continuous <Continuous>  dextrose 50% Injectable 25 Gram(s) IV Push once  dextrose 50% Injectable 12.5 Gram(s) IV Push once  dextrose 50% Injectable 25 Gram(s) IV Push once  enoxaparin Injectable 40 milliGRAM(s) SubCutaneous <User Schedule>  escitalopram 5 milliGRAM(s) Oral daily  famotidine    Tablet 20 milliGRAM(s) Oral two times a day  glucagon  Injectable 1 milliGRAM(s) IntraMuscular once  hydroxychloroquine 200 milliGRAM(s) Oral two times a day  loratadine 10 milliGRAM(s) Oral daily  magnesium oxide 400 milliGRAM(s) Oral at bedtime  metoprolol tartrate 12.5 milliGRAM(s) Oral every 12 hours  modafinil 50 milliGRAM(s) Oral <User Schedule>  multivitamin 1 Tablet(s) Oral daily  polyethylene glycol 3350 17 Gram(s) Oral two times a day  potassium chloride    Tablet ER 10 milliEquivalent(s) Oral daily  senna 2 Tablet(s) Oral at bedtime    MEDICATIONS  (PRN):  acetaminophen     Tablet .. 975 milliGRAM(s) Oral every 8 hours PRN Mild Pain (1 - 3)  bisacodyl 5 milliGRAM(s) Oral every 12 hours PRN Constipation  dextrose Oral Gel 15 Gram(s) Oral once PRN Blood Glucose LESS THAN 70 milliGRAM(s)/deciliter         HPI:  Ginna Vergara is an 83 year old female with PMH of HTN, HLD, Arthritis, and normal pressure hydrocephalus (s/p VPS- March 2023 at [place]); who presented to [facility] on 11/8 for progressive weakness, confusion and decreased PO intake, and was found to have a mixed SDH with 1.4cm midline shift. She was transferred to [facility] later that day for further management. On 11/9, she underwent R sided reilly hole placement followed by MMA embolization on 11/10 with Dr. Rodriguez. Her hospital course was complicated by 2 RRTs for unresponsiveness. Serial brain imaging was stable and she was found to be in rapid AFIB. 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 PM&R was consulted and deemed her an appropriate candidate for IRF. She was medically stabilized and cleared for discharge to [facility].  (17 Nov 2023 11:31)      Subjective:  Seen this AM.   Slept during the night.  No pain. Tolerating therapy      VITALS  Vital Signs Last 24 Hrs  T(C): 37.1 (04 Dec 2023 09:19), Max: 37.1 (04 Dec 2023 09:19)  T(F): 98.8 (04 Dec 2023 09:19), Max: 98.8 (04 Dec 2023 09:19)  HR: 66 (04 Dec 2023 09:19) (62 - 68)  BP: 114/62 (04 Dec 2023 09:19) (110/60 - 117/67)  BP(mean): --  RR: 15 (04 Dec 2023 09:19) (14 - 15)  SpO2: 99% (04 Dec 2023 09:19) (96% - 99%)    Parameters below as of 04 Dec 2023 09:19  Patient On (Oxygen Delivery Method): room air        REVIEW OF SYMPTOMS  Neurological deficits      PHYSICAL EXAM  General: No acute distress  CV:warm, well perfused  Lungs: Breathing comfortably on RA   Abd: Soft, mildly distended   Ext:  No peripheral edema, TEDs  Neuro:            Mental status: Knows she is in the hospital, states 2023 w/ [facility] cues, knows month but not date            CN: II-VII intact             MSK: LUE Sh abd, EF/EE, WE, -- 4/5.                      RUE 5/5 throughout                       BLE HF 4/5.  Bilateral DF 4/5. Bilateral KE/KF 5/5  Coordination-- Left FNF slow,  intact on right            Sensation: Intact throughout    RECENT LABS                        10.3   6.63  )-----------( 254      ( 04 Dec 2023 05:39 )             33.4     12-04    139  |  105  |  23  ----------------------------<  101<H>  4.8   |  28  |  1.20    Ca    10.4      04 Dec 2023 05:39    TPro  6.4  /  Alb  3.0<L>  /  TBili  0.3  /  DBili  x   /  AST  19  /  ALT  19  /  AlkPhos  81  12-04      Urinalysis Basic - ( 04 Dec 2023 05:39 )    Color: x / Appearance: x / SG: x / pH: x  Gluc: 101 mg/dL / Ketone: x  / Bili: x / Urobili: x   Blood: x / Protein: x / Nitrite: x   Leuk Esterase: x / RBC: x / WBC x   Sq Epi: x / Non Sq Epi: x / Bacteria: x          RADIOLOGY/OTHER RESULTS      MEDICATIONS  (STANDING):  atorvastatin 40 milliGRAM(s) Oral at bedtime  dextrose 5%. 1000 milliLiter(s) (100 mL/Hr) IV Continuous <Continuous>  dextrose 5%. 1000 milliLiter(s) (50 mL/Hr) IV Continuous <Continuous>  dextrose 50% Injectable 25 Gram(s) IV Push once  dextrose 50% Injectable 12.5 Gram(s) IV Push once  dextrose 50% Injectable 25 Gram(s) IV Push once  enoxaparin Injectable 40 milliGRAM(s) SubCutaneous <User Schedule>  escitalopram 5 milliGRAM(s) Oral daily  famotidine    Tablet 20 milliGRAM(s) Oral two times a day  glucagon  Injectable 1 milliGRAM(s) IntraMuscular once  hydroxychloroquine 200 milliGRAM(s) Oral two times a day  loratadine 10 milliGRAM(s) Oral daily  magnesium oxide 400 milliGRAM(s) Oral at bedtime  metoprolol tartrate 12.5 milliGRAM(s) Oral every 12 hours  modafinil 50 milliGRAM(s) Oral <User Schedule>  multivitamin 1 Tablet(s) Oral daily  polyethylene glycol 3350 17 Gram(s) Oral two times a day  potassium chloride    Tablet ER 10 milliEquivalent(s) Oral daily  senna 2 Tablet(s) Oral at bedtime    MEDICATIONS  (PRN):  acetaminophen     Tablet .. 975 milliGRAM(s) Oral every 8 hours PRN Mild Pain (1 - 3)  bisacodyl 5 milliGRAM(s) Oral every 12 hours PRN Constipation  dextrose Oral Gel 15 Gram(s) Oral once PRN Blood Glucose LESS THAN 70 milliGRAM(s)/deciliter

## 2023-12-05 ENCOUNTER — TRANSCRIPTION ENCOUNTER (OUTPATIENT)
Age: 83
End: 2023-12-05

## 2023-12-05 VITALS
HEART RATE: 60 BPM | TEMPERATURE: 98 F | RESPIRATION RATE: 14 BRPM | SYSTOLIC BLOOD PRESSURE: 106 MMHG | DIASTOLIC BLOOD PRESSURE: 68 MMHG | OXYGEN SATURATION: 97 %

## 2023-12-05 PROCEDURE — 92523 SPEECH SOUND LANG COMPREHEN: CPT

## 2023-12-05 PROCEDURE — 85025 COMPLETE CBC W/AUTO DIFF WBC: CPT

## 2023-12-05 PROCEDURE — 97530 THERAPEUTIC ACTIVITIES: CPT

## 2023-12-05 PROCEDURE — 92507 TX SP LANG VOICE COMM INDIV: CPT

## 2023-12-05 PROCEDURE — 70450 CT HEAD/BRAIN W/O DYE: CPT

## 2023-12-05 PROCEDURE — 97167 OT EVAL HIGH COMPLEX 60 MIN: CPT

## 2023-12-05 PROCEDURE — 87077 CULTURE AEROBIC IDENTIFY: CPT

## 2023-12-05 PROCEDURE — 87086 URINE CULTURE/COLONY COUNT: CPT

## 2023-12-05 PROCEDURE — 87635 SARS-COV-2 COVID-19 AMP PRB: CPT

## 2023-12-05 PROCEDURE — 99238 HOSP IP/OBS DSCHRG MGMT 30/<: CPT

## 2023-12-05 PROCEDURE — 97112 NEUROMUSCULAR REEDUCATION: CPT

## 2023-12-05 PROCEDURE — 85027 COMPLETE CBC AUTOMATED: CPT

## 2023-12-05 PROCEDURE — 97110 THERAPEUTIC EXERCISES: CPT

## 2023-12-05 PROCEDURE — 81001 URINALYSIS AUTO W/SCOPE: CPT

## 2023-12-05 PROCEDURE — 80053 COMPREHEN METABOLIC PANEL: CPT

## 2023-12-05 PROCEDURE — 97116 GAIT TRAINING THERAPY: CPT

## 2023-12-05 PROCEDURE — 92610 EVALUATE SWALLOWING FUNCTION: CPT

## 2023-12-05 PROCEDURE — 0225U NFCT DS DNA&RNA 21 SARSCOV2: CPT

## 2023-12-05 PROCEDURE — 80048 BASIC METABOLIC PNL TOTAL CA: CPT

## 2023-12-05 PROCEDURE — 99232 SBSQ HOSP IP/OBS MODERATE 35: CPT

## 2023-12-05 PROCEDURE — 97163 PT EVAL HIGH COMPLEX 45 MIN: CPT

## 2023-12-05 PROCEDURE — 87186 SC STD MICRODIL/AGAR DIL: CPT

## 2023-12-05 PROCEDURE — 97535 SELF CARE MNGMENT TRAINING: CPT

## 2023-12-05 PROCEDURE — 36415 COLL VENOUS BLD VENIPUNCTURE: CPT

## 2023-12-05 RX ADMIN — Medication 200 MILLIGRAM(S): at 05:33

## 2023-12-05 RX ADMIN — Medication 1 TABLET(S): at 11:17

## 2023-12-05 RX ADMIN — FAMOTIDINE 20 MILLIGRAM(S): 10 INJECTION INTRAVENOUS at 05:33

## 2023-12-05 RX ADMIN — LORATADINE 10 MILLIGRAM(S): 10 TABLET ORAL at 11:19

## 2023-12-05 RX ADMIN — POLYETHYLENE GLYCOL 3350 17 GRAM(S): 17 POWDER, FOR SOLUTION ORAL at 05:33

## 2023-12-05 RX ADMIN — Medication 10 MILLIEQUIVALENT(S): at 11:17

## 2023-12-05 RX ADMIN — MODAFINIL 50 MILLIGRAM(S): 200 TABLET ORAL at 07:28

## 2023-12-05 RX ADMIN — ESCITALOPRAM OXALATE 5 MILLIGRAM(S): 10 TABLET, FILM COATED ORAL at 11:17

## 2023-12-05 RX ADMIN — Medication 12.5 MILLIGRAM(S): at 05:33

## 2023-12-05 NOTE — DISCHARGE NOTE NURSING/CASE MANAGEMENT/SOCIAL WORK - PATIENT PORTAL LINK FT
You can access the FollowMyHealth Patient Portal offered by [facility] by registering at the following website: [email or web address]. By joining [employer]’s FollowMyHealth portal, you will also be able to view your health information using other applications (apps) compatible with our system. You can access the FollowMyHealth Patient Portal offered by [facility] by registering at the following website: [email or web address]. By joining [employer]’s FollowMyHealth portal, you will also be able to view your health information using other applications (apps) compatible with our system.

## 2023-12-05 NOTE — DISCHARGE NOTE NURSING/CASE MANAGEMENT/SOCIAL WORK - NSDPFAC_GEN_ALL_CORE
[facility]- [address] [place] [address] [place] [address]- [phone]  [facility]- [address] [place] [address] [place] [address]- [phone]

## 2023-12-05 NOTE — PROGRESS NOTE ADULT - REASON FOR ADMISSION
Subdural Hemorrhage

## 2023-12-05 NOTE — PROGRESS NOTE ADULT - ASSESSMENT
Assessment and Plan:   83 year old female with PMH of HTN, HLD, Arthritis, and normal pressure hydrocephalus (s/p VPS- March 2023 at [place]); who presented to [facility] on 11/8 for progressive weakness, confusion and decreased PO intake, and was found to have a mixed SDH with 1.4cm midline shift. She was transferred to [facility] later that day for further management. On 11/9, she underwent R sided reilly hole placement, followed by MMA embolization on 11/10 with Dr. Rodriguez. Her hospital course was complicated by 2 RRTs for unresponsiveness. Serial brain imaging was stable and she was found to be in rapid AFIB. 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 Patient now admitted for a multidisciplinary rehab program with cognitive deficits, gait and ADL impairments.     #Traumatic Subdural Hemorrhage  - CTH 11/8: Moderate to large mixed attenuation right hemispheric convexity SDH compressing right hemisphere and right lateral ventricle with 1.4 cm midline shift to left. Right frontal ventriculostomy shunt catheter within right frontal horn.  - CTH 11/14: Right convexity subdural collection of air, blood and fluid continues to decrease in size.  - S/p R sided reilly hole placement (11/9), followed by MMA embolization (11/10). Staples and 1 suture removed at bedside (11/27). Tolerated well.   - Outpatient follow up with NSGY, Dr. Rodriguez  - Improving in therapies     #Wakefulness   - Modafinil 50 mg q 8am (11/21)    #Agitation-Improved   -cont. Lexapro 5mg daily     #Normal Pressure Hydrocephalus  - S/p VPS March 2023  - Dr. Simon ([place])   - CTH 11/20: deep subcortical white matter ischemia, unchanged. RIGHT frontal shunt catheter tip noted in the RIGHT lateral ventricle. Mild ventriculomegaly unchanged. Tiny residual subdural collection.  -No acute intervention from NSGY standpoint-- per NP discussion with neurosurgeon Dr Méndez 11/20 , follow up outpatient.   -D/W neurosurgeon Dr. Méndez to clarify time for next shunt adjustment-  "should follow up in 6 weeks. At the latest 3 months" 11/27.  aware.       #Rapid AFIB  - Evaluation for watchman as outpatient   - Metoprolol 12.5mg BID   - No AC d/t SDH    #HLD  - Atorvastatin 40mg at HS     #Arthritis  - Plaquenil 200mg BID (home regimen)    #Skin  - Skin on admission: Right sided surgical incision intact with staples Dry skin over gluteal region. Skin tags throughout body.   - Removed staples on 11/27      #Pain Mgmt   - Tylenol PRN    #GI/Bowel Mgmt  - Diet: Regular   - Famotidine 20mg BID   - Miralax BID  - Senna     #/Bladder Mgmt   -  No issues     #Health Maintenance  - KCL 10mEq daily   - Magnesium Oxide 400mg at HS  - Fosamax 70mg weekly- home regimen    #Ppx   - DVT ppx: Lovenox    #IDT 11/21   Nursing: incontinent bowel/bladder   Social: lives with , independent prior, retired    Preferences: wants anastasiia   SLP: reglar diet thin liquids, decreased memory/attention/left inattention   OT: supervision for eating/oral hygiene, dependent LBD/footwear   PT: dependent   Goals: Increase attention/focus within 10-15 intervals, ModA transfers/bedmobility    DC: 12/5 GIANLUCA  TDD 12/5/23    ------------------------------------------------------  OUTPATIENT/FOLLOW UP:    Sierra Rodriguez  Neurosurgery  [address] [place] [id number]  Phone: [phone]  Fax: [phone]  Follow Up Time: 2 weeks    Neurologist- managing VPS   Dr. Aurora Webb   Watchmann device outpatient with cardiology             Assessment and Plan:   83 year old female with PMH of HTN, HLD, Arthritis, and normal pressure hydrocephalus (s/p VPS- March 2023 at [place]); who presented to [facility] on 11/8 for progressive weakness, confusion and decreased PO intake, and was found to have a mixed SDH with 1.4cm midline shift. She was transferred to [facility] later that day for further management. On 11/9, she underwent R sided reilly hole placement, followed by MMA embolization on 11/10 with Dr. Rodriguez. Her hospital course was complicated by 2 RRTs for unresponsiveness. Serial brain imaging was stable and she was found to be in rapid AFIB. 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 Patient now admitted for a multidisciplinary rehab program with cognitive deficits, gait and ADL impairments.     #Traumatic Subdural Hemorrhage  - CTH 11/8: Moderate to large mixed attenuation right hemispheric convexity SDH compressing right hemisphere and right lateral ventricle with 1.4 cm midline shift to left. Right frontal ventriculostomy shunt catheter within right frontal horn.  - CTH 11/14: Right convexity subdural collection of air, blood and fluid continues to decrease in size.  - S/p R sided reilly hole placement (11/9), followed by MMA embolization (11/10). Staples and 1 suture removed at bedside (11/27). Tolerated well.   - Outpatient follow up with NSGY, Dr. Rodriguez  - Improving in therapies     #Wakefulness   - Modafinil 50 mg q 8am (11/21)    #Agitation-Improved   -cont. Lexapro 5mg daily     #Normal Pressure Hydrocephalus  - S/p VPS March 2023  - Dr. Simon ([place])   - CTH 11/20: deep subcortical white matter ischemia, unchanged. RIGHT frontal shunt catheter tip noted in the RIGHT lateral ventricle. Mild ventriculomegaly unchanged. Tiny residual subdural collection.  -No acute intervention from NSGY standpoint-- per NP discussion with neurosurgeon Dr Méndez 11/20 , follow up outpatient.   -D/W neurosurgeon Dr. Méndez to clarify time for next shunt adjustment-  "should follow up in 6 weeks. At the latest 3 months" 11/27.  aware.       #Rapid AFIB  - Evaluation for watchman as outpatient   - Metoprolol 12.5mg BID   - No AC d/t SDH    #HLD  - Atorvastatin 40mg at HS     #Arthritis  - Plaquenil 200mg BID (home regimen)    #Skin  - Skin on admission: Right sided surgical incision intact with staples Dry skin over gluteal region. Skin tags throughout body.   - Removed staples on 11/27      #Pain Mgmt   - Tylenol PRN    #GI/Bowel Mgmt  - Diet: Regular   - Famotidine 20mg BID   - Miralax BID  - Senna     #/Bladder Mgmt   -  No issues     #Health Maintenance  - KCL 10mEq daily   - Magnesium Oxide 400mg at HS  - Fosamax 70mg weekly- home regimen    #Ppx   - DVT ppx: Lovenox    #IDT 11/21   Nursing: incontinent bowel/bladder   Social: lives with , independent prior, retired    Preferences: wants anastasiia   SLP: reglar diet thin liquids, decreased memory/attention/left inattention   OT: supervision for eating/oral hygiene, dependent LBD/footwear   PT: dependent   Goals: Increase attention/focus within 10-15 intervals, ModA transfers/bedmobility    DC: 12/5 GIANLUCA  TDD 12/5/23    ------------------------------------------------------  OUTPATIENT/FOLLOW UP:    Sierra Rodriguez  Neurosurgery  [address] [place] [id number]  Phone: [phone]  Fax: [phone]  Follow Up Time: 2 weeks    Neurologist- managing VPS   Dr. Aurora Webb   Watchmann device outpatient with cardiology

## 2023-12-05 NOTE — PROGRESS NOTE ADULT - SUBJECTIVE AND OBJECTIVE BOX
HPI:  Ginna Vergara is an 83 year old female with PMH of HTN, HLD, Arthritis, and normal pressure hydrocephalus (s/p VPS- March 2023 at [place]); who presented to [facility] on 11/8 for progressive weakness, confusion and decreased PO intake, and was found to have a mixed SDH with 1.4cm midline shift. She was transferred to [facility] later that day for further management. On 11/9, she underwent R sided reilly hole placement followed by MMA embolization on 11/10 with Dr. Rodriguez. Her hospital course was complicated by 2 RRTs for unresponsiveness. Serial brain imaging was stable and she was found to be in rapid AFIB. 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 PM&R was consulted and deemed her an appropriate candidate for IRF. She was medically stabilized and cleared for discharge to [facility].  (17 Nov 2023 11:31)      Subjective:  Seen this AM lying in bed, alert and pleasant. Denies discomforts. Reports sleeping and eating well.       Vital Signs Last 24 Hrs  T(C): 36.7 (05 Dec 2023 07:12), Max: 36.8 (04 Dec 2023 19:54)  T(F): 98.1 (05 Dec 2023 07:12), Max: 98.2 (04 Dec 2023 19:54)  HR: 60 (05 Dec 2023 07:12) (60 - 62)  BP: 106/68 (05 Dec 2023 07:12) (106/68 - 110/64)  RR: 14 (05 Dec 2023 07:12) (14 - 15)  SpO2: 97% (05 Dec 2023 07:12) (97% - 97%)    Parameters below as of 05 Dec 2023 07:12  Patient On (Oxygen Delivery Method): room air      REVIEW OF SYMPTOMS  Neurological deficits      PHYSICAL EXAM  General: No acute distress  CV:warm, well perfused  Lungs: Breathing comfortably on RA   Abd: Soft, mildly distended   Ext:  No peripheral edema, TEDs  Neuro:            Mental status: Knows she is in the hospital, states 2023 w/ [facility] cues, knows month but not date            CN: II-VII intact             MSK: LUE Sh abd, EF/EE, WE, -- 4/5.                      RUE 5/5 throughout                       BLE HF 4/5.  Bilateral DF 4/5. Bilateral KE/KF 5/5  Coordination-- Left FNF slow,  intact on right            Sensation: Intact throughout    RECENT LABS                        10.3   6.63  )-----------( 254      ( 04 Dec 2023 05:39 )             33.4     12-04    139  |  105  |  23  ----------------------------<  101<H>  4.8   |  28  |  1.20    Ca    10.4      04 Dec 2023 05:39    TPro  6.4  /  Alb  3.0<L>  /  TBili  0.3  /  DBili  x   /  AST  19  /  ALT  19  /  AlkPhos  81  12-04      Urinalysis Basic - ( 04 Dec 2023 05:39 )    Color: x / Appearance: x / SG: x / pH: x  Gluc: 101 mg/dL / Ketone: x  / Bili: x / Urobili: x   Blood: x / Protein: x / Nitrite: x   Leuk Esterase: x / RBC: x / WBC x   Sq Epi: x / Non Sq Epi: x / Bacteria: x          RADIOLOGY/OTHER RESULTS      MEDICATIONS  (STANDING):  atorvastatin 40 milliGRAM(s) Oral at bedtime  dextrose 5%. 1000 milliLiter(s) (50 mL/Hr) IV Continuous <Continuous>  dextrose 5%. 1000 milliLiter(s) (100 mL/Hr) IV Continuous <Continuous>  dextrose 50% Injectable 25 Gram(s) IV Push once  dextrose 50% Injectable 12.5 Gram(s) IV Push once  dextrose 50% Injectable 25 Gram(s) IV Push once  enoxaparin Injectable 40 milliGRAM(s) SubCutaneous <User Schedule>  escitalopram 5 milliGRAM(s) Oral daily  famotidine    Tablet 20 milliGRAM(s) Oral two times a day  glucagon  Injectable 1 milliGRAM(s) IntraMuscular once  hydroxychloroquine 200 milliGRAM(s) Oral two times a day  loratadine 10 milliGRAM(s) Oral daily  magnesium oxide 400 milliGRAM(s) Oral at bedtime  metoprolol tartrate 12.5 milliGRAM(s) Oral every 12 hours  multivitamin 1 Tablet(s) Oral daily  polyethylene glycol 3350 17 Gram(s) Oral two times a day  potassium chloride    Tablet ER 10 milliEquivalent(s) Oral daily  senna 2 Tablet(s) Oral at bedtime    MEDICATIONS  (PRN):  acetaminophen     Tablet .. 975 milliGRAM(s) Oral every 8 hours PRN Mild Pain (1 - 3)  bisacodyl 5 milliGRAM(s) Oral every 12 hours PRN Constipation  dextrose Oral Gel 15 Gram(s) Oral once PRN Blood Glucose LESS THAN 70 milliGRAM(s)/deciliter           HPI:  Ginna Vergara is an 83 year old female with PMH of HTN, HLD, Arthritis, and normal pressure hydrocephalus (s/p VPS- March 2023 at [place]); who presented to [facility] on 11/8 for progressive weakness, confusion and decreased PO intake, and was found to have a mixed SDH with 1.4cm midline shift. She was transferred to [facility] later that day for further management. On 11/9, she underwent R sided reilly hole placement followed by MMA embolization on 11/10 with Dr. Rodriguez. Her hospital course was complicated by 2 RRTs for unresponsiveness. Serial brain imaging was stable and she was found to be in rapid AFIB. 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 PM&R was consulted and deemed her an appropriate candidate for IRF. She was medically stabilized and cleared for discharge to [facility].  (17 Nov 2023 11:31)      Subjective:  Seen this AM lying in bed, alert and pleasant. Denies discomforts. Reports sleeping and eating well.       Vital Signs Last 24 Hrs  T(C): 36.7 (05 Dec 2023 07:12), Max: 36.8 (04 Dec 2023 19:54)  T(F): 98.1 (05 Dec 2023 07:12), Max: 98.2 (04 Dec 2023 19:54)  HR: 60 (05 Dec 2023 07:12) (60 - 62)  BP: 106/68 (05 Dec 2023 07:12) (106/68 - 110/64)  RR: 14 (05 Dec 2023 07:12) (14 - 15)  SpO2: 97% (05 Dec 2023 07:12) (97% - 97%)    Parameters below as of 05 Dec 2023 07:12  Patient On (Oxygen Delivery Method): room air      REVIEW OF SYMPTOMS  Neurological deficits      PHYSICAL EXAM  General: No acute distress  CV:warm, well perfused  Lungs: Breathing comfortably on RA   Abd: Soft, mildly distended   Ext:  No peripheral edema, TEDs  Neuro:            Mental status: Knows she is in the hospital, states 2023 w/ [facility] cues, knows month but not date            CN: II-VII intact             MSK: LUE Sh abd, EF/EE, WE, -- 4/5.                      RUE 5/5 throughout                       BLE HF 4/5.  Bilateral DF 4/5. Bilateral KE/KF 5/5  Coordination-- Left FNF slow,  intact on right            Sensation: Intact throughout    RECENT LABS                        10.3   6.63  )-----------( 254      ( 04 Dec 2023 05:39 )             33.4     12-04    139  |  105  |  23  ----------------------------<  101<H>  4.8   |  28  |  1.20    Ca    10.4      04 Dec 2023 05:39    TPro  6.4  /  Alb  3.0<L>  /  TBili  0.3  /  DBili  x   /  AST  19  /  ALT  19  /  AlkPhos  81  12-04      Urinalysis Basic - ( 04 Dec 2023 05:39 )    Color: x / Appearance: x / SG: x / pH: x  Gluc: 101 mg/dL / Ketone: x  / Bili: x / Urobili: x   Blood: x / Protein: x / Nitrite: x   Leuk Esterase: x / RBC: x / WBC x   Sq Epi: x / Non Sq Epi: x / Bacteria: x          RADIOLOGY/OTHER RESULTS      MEDICATIONS  (STANDING):  atorvastatin 40 milliGRAM(s) Oral at bedtime  dextrose 5%. 1000 milliLiter(s) (50 mL/Hr) IV Continuous <Continuous>  dextrose 5%. 1000 milliLiter(s) (100 mL/Hr) IV Continuous <Continuous>  dextrose 50% Injectable 25 Gram(s) IV Push once  dextrose 50% Injectable 12.5 Gram(s) IV Push once  dextrose 50% Injectable 25 Gram(s) IV Push once  enoxaparin Injectable 40 milliGRAM(s) SubCutaneous <User Schedule>  escitalopram 5 milliGRAM(s) Oral daily  famotidine    Tablet 20 milliGRAM(s) Oral two times a day  glucagon  Injectable 1 milliGRAM(s) IntraMuscular once  hydroxychloroquine 200 milliGRAM(s) Oral two times a day  loratadine 10 milliGRAM(s) Oral daily  magnesium oxide 400 milliGRAM(s) Oral at bedtime  metoprolol tartrate 12.5 milliGRAM(s) Oral every 12 hours  multivitamin 1 Tablet(s) Oral daily  polyethylene glycol 3350 17 Gram(s) Oral two times a day  potassium chloride    Tablet ER 10 milliEquivalent(s) Oral daily  senna 2 Tablet(s) Oral at bedtime    MEDICATIONS  (PRN):  acetaminophen     Tablet .. 975 milliGRAM(s) Oral every 8 hours PRN Mild Pain (1 - 3)  bisacodyl 5 milliGRAM(s) Oral every 12 hours PRN Constipation  dextrose Oral Gel 15 Gram(s) Oral once PRN Blood Glucose LESS THAN 70 milliGRAM(s)/deciliter

## 2023-12-05 NOTE — PROGRESS NOTE ADULT - ASSESSMENT
82 y/o woman with PMH of HTN, HLD, Arthritis, and normal pressure hydrocephalus (s/p VPS- March 2023 at [place]); who presented to [facility] on 11/8 for progressive weakness, confusion and decreased PO intake, and was found to have a mixed SDH with 1.4cm midline shift. She was transferred to [facility] later that day for further management. On 11/9, she underwent R sided reilly hole placement, followed by MMA embolization on 11/10 with Dr. Rodriguez. Her hospital course was complicated by 2 RRTs for unresponsiveness. Serial brain imaging was stable and she was found to be in rapid AFIB. 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 Patient now admitted for a multidisciplinary rehab program with cognitive deficits, gait and ADL impairments.     #SDH  - S/p VPS March 2023  - CTH 11/8: Moderate to large mixed attenuation right hemispheric convexity SDH compressing right hemisphere and right lateral ventricle with 1.4 cm midline shift to left. Right frontal ventriculostomy shunt catheter within right frontal horn.  - CTH 11/14:Right convexity subdural collection of air, blood and fluid continues to decrease in size.  - S/p R sided reilly hole placement (11/9), followed by MMA embolization (11/10)  - Seizure PPx per primary team  - continue comprehensive rehab program    #Rapid AFIB, HLD  - decreased metoprolol dose 12.5mg BID 11/24  - No AC  - Evaluation for Watchmann device outpatient with cardiology  - Atorvastatin 40mg at HS     #UTI  - UCx with proteus  - Vantin course completed    #Arthritis  - Plaquenil 200mg BID    #DVT PPx  - Lovenox 40 SUbQ 82 y/o woman with PMH of HTN, HLD, Arthritis, and normal pressure hydrocephalus (s/p VPS- March 2023 at [place]); who presented to [facility] on 11/8 for progressive weakness, confusion and decreased PO intake, and was found to have a mixed SDH with 1.4cm midline shift. She was transferred to [facility] later that day for further management. On 11/9, she underwent R sided reilly hole placement, followed by MMA embolization on 11/10 with Dr. Rodriguez. Her hospital course was complicated by 2 RRTs for unresponsiveness. Serial brain imaging was stable and she was found to be in rapid AFIB. 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 Patient now admitted for a multidisciplinary rehab program with cognitive deficits, gait and ADL impairments.     #SDH  - S/p VPS March 2023  - CTH 11/8: Moderate to large mixed attenuation right hemispheric convexity SDH compressing right hemisphere and right lateral ventricle with 1.4 cm midline shift to left. Right frontal ventriculostomy shunt catheter within right frontal horn.  - CTH 11/14:Right convexity subdural collection of air, blood and fluid continues to decrease in size.  - S/p R sided reilly hole placement (11/9), followed by MMA embolization (11/10)  - Seizure PPx per primary team  - continue comprehensive rehab program    #Rapid AFIB, HLD  - decreased metoprolol dose 12.5mg BID 11/24  - No AC  - Evaluation for Watchmann device outpatient with cardiology  - Atorvastatin 40mg at HS     #UTI  - UCx with proteus  - Vantin course completed    #Arthritis  - Plaquenil 200mg BID    #DVT PPx  - Lovenox 40 SUbQ

## 2023-12-05 NOTE — PROGRESS NOTE ADULT - SUBJECTIVE AND OBJECTIVE BOX
HPI:  Ginna Vergara is an 83 year old female with PMH of HTN, HLD, Arthritis, and normal pressure hydrocephalus (s/p VPS- March 2023 at [place]); who presented to [facility] on 11/8 for progressive weakness, confusion and decreased PO intake, and was found to have a mixed SDH with 1.4cm midline shift. She was transferred to [facility] later that day for further management. On 11/9, she underwent R sided reilly hole placement followed by MMA embolization on 11/10 with Dr. Rodriguez. Her hospital course was complicated by 2 RRTs for unresponsiveness. Serial brain imaging was stable and she was found to be in rapid AFIB. 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 PM&R was consulted and deemed her an appropriate candidate for IRF. She was medically stabilized and cleared for discharge to [facility].  (17 Nov 2023 11:31)    Subjective:  No acute overnight events. Patient seen and examined by bedside. Doing well today. No complaints of CP/SOB/NV. No new numbness tingling or weakness. Sleeping well. Pain controlled. Plan for discharge to [facility] today. VSS. Afebrile.     VITALS  Vital Signs Last 24 Hrs  T(C): 37.1 (04 Dec 2023 09:19), Max: 37.1 (04 Dec 2023 09:19)  T(F): 98.8 (04 Dec 2023 09:19), Max: 98.8 (04 Dec 2023 09:19)  HR: 66 (04 Dec 2023 09:19) (62 - 68)  BP: 114/62 (04 Dec 2023 09:19) (110/60 - 117/67)  BP(mean): --  RR: 15 (04 Dec 2023 09:19) (14 - 15)  SpO2: 99% (04 Dec 2023 09:19) (96% - 99%)    Parameters below as of 04 Dec 2023 09:19  Patient On (Oxygen Delivery Method): room air    REVIEW OF SYMPTOMS  Neurological deficits    PHYSICAL EXAM  General: No acute distress  CV: warm, well perfused  Lungs: Breathing comfortably on RA   Abd: Soft, mildly distended   Ext:  No peripheral edema, TEDs  Neuro:            Mental status: answering questions appropriately             CN: No facial asymmetry             MSK: MOHINI Arana abd, EF/EE, WE, -- 4/5.                      RUE 5/5 throughout                       BLE HF 4/5.  Bilateral DF 4/5. Bilateral KE/KF 5/5    RECENT LABS                        10.3   6.63  )-----------( 254      ( 04 Dec 2023 05:39 )             33.4     12-04    139  |  105  |  23  ----------------------------<  101<H>  4.8   |  28  |  1.20    Ca    10.4      04 Dec 2023 05:39    TPro  6.4  /  Alb  3.0<L>  /  TBili  0.3  /  DBili  x   /  AST  19  /  ALT  19  /  AlkPhos  81  12-04      Urinalysis Basic - ( 04 Dec 2023 05:39 )    Color: x / Appearance: x / SG: x / pH: x  Gluc: 101 mg/dL / Ketone: x  / Bili: x / Urobili: x   Blood: x / Protein: x / Nitrite: x   Leuk Esterase: x / RBC: x / WBC x   Sq Epi: x / Non Sq Epi: x / Bacteria: x    RADIOLOGY/OTHER RESULTS  Reviewed     MEDICATIONS  (STANDING):  atorvastatin 40 milliGRAM(s) Oral at bedtime  dextrose 5%. 1000 milliLiter(s) (100 mL/Hr) IV Continuous <Continuous>  dextrose 5%. 1000 milliLiter(s) (50 mL/Hr) IV Continuous <Continuous>  dextrose 50% Injectable 25 Gram(s) IV Push once  dextrose 50% Injectable 12.5 Gram(s) IV Push once  dextrose 50% Injectable 25 Gram(s) IV Push once  enoxaparin Injectable 40 milliGRAM(s) SubCutaneous <User Schedule>  escitalopram 5 milliGRAM(s) Oral daily  famotidine    Tablet 20 milliGRAM(s) Oral two times a day  glucagon  Injectable 1 milliGRAM(s) IntraMuscular once  hydroxychloroquine 200 milliGRAM(s) Oral two times a day  loratadine 10 milliGRAM(s) Oral daily  magnesium oxide 400 milliGRAM(s) Oral at bedtime  metoprolol tartrate 12.5 milliGRAM(s) Oral every 12 hours  modafinil 50 milliGRAM(s) Oral <User Schedule>  multivitamin 1 Tablet(s) Oral daily  polyethylene glycol 3350 17 Gram(s) Oral two times a day  potassium chloride    Tablet ER 10 milliEquivalent(s) Oral daily  senna 2 Tablet(s) Oral at bedtime    MEDICATIONS  (PRN):  acetaminophen     Tablet .. 975 milliGRAM(s) Oral every 8 hours PRN Mild Pain (1 - 3)  bisacodyl 5 milliGRAM(s) Oral every 12 hours PRN Constipation  dextrose Oral Gel 15 Gram(s) Oral once PRN Blood Glucose LESS THAN 70 milliGRAM(s)/deciliter    Assessment and Plan:   83 year old female with PMH of HTN, HLD, Arthritis, and normal pressure hydrocephalus (s/p VPS- March 2023 at [place]); who presented to [facility] on 11/8 for progressive weakness, confusion and decreased PO intake, and was found to have a mixed SDH with 1.4cm midline shift. She was transferred to [facility] later that day for further management. On 11/9, she underwent R sided reilly hole placement, followed by MMA embolization on 11/10 with Dr. Rodriguez. Her hospital course was complicated by 2 RRTs for unresponsiveness. Serial brain imaging was stable and she was found to be in rapid AFIB. 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 Patient now admitted for a multidisciplinary rehab program with cognitive deficits, gait and ADL impairments.     #Traumatic Subdural Hemorrhage  - CTH 11/8: Moderate to large mixed attenuation right hemispheric convexity SDH compressing right hemisphere and right lateral ventricle with 1.4 cm midline shift to left. Right frontal ventriculostomy shunt catheter within right frontal horn.  - CTH 11/14: Right convexity subdural collection of air, blood and fluid continues to decrease in size.  - S/p R sided reilly hole placement (11/9), followed by MMA embolization (11/10). Staples and 1 suture removed at bedside (11/27). Tolerated well.   - Outpatient follow up with Dr. Michael MARIE    #Wakefulness   - Trial of Modafinil 50 mg q 8am (discontinued 11/28)  - Well controlled     #Agitation-Improved   -cont. Lexapro 5mg daily     #Normal Pressure Hydrocephalus  - S/p VPS March 2023  - Dr. Simon ([place])   - CTH 11/20: deep subcortical white matter ischemia, unchanged. RIGHT frontal shunt catheter tip noted in the RIGHT lateral ventricle. Mild ventriculomegaly unchanged. Tiny residual subdural collection.  -No acute intervention from CYNTHIA standpoint-- per NP discussion with neurosurgeon Dr Méndez 11/20 , follow up outpatient.   -D/W neurosurgeon Dr. Méndez to clarify time for next shunt adjustment-  "should follow up in 6 weeks. At the latest 3 months" 11/27    #Rapid AFIB  - Evaluation for watchman as outpatient   - Metoprolol 12.5mg BID   - No AC d/t SDH    #HLD  - Atorvastatin 40mg at HS     #Arthritis  - Plaquenil 200mg BID (home regimen)    #Skin  - Removed staples on 11/27    #Pain Mgmt   - Tylenol PRN    #GI/Bowel Mgmt  - Diet: Regular   - Famotidine 20mg BID   - Miralax BID  - Senna     #/Bladder Mgmt   -  No issues     #Health Maintenance  - KCL 10mEq daily   - Magnesium Oxide 400mg at HS  - Fosamax 70mg weekly- home regimen    #Ppx   - DVT ppx: Lovenox    #IDT 11/21   Nursing: incontinent bowel/bladder   Social: lives with , independent prior, retired    Preferences: zohreh laurent   SLP: reglar diet thin liquids, decreased memory/attention/left inattention   OT: supervision for eating/oral hygiene, dependent LBD/footwear   PT: dependent   Goals: Increase attention/focus within 10-15 intervals, ModA transfers/bedmobility    DC: 12/5 Home/GIANLUCA, TBD   TDD 12/5/23    ------------------------------------------------------  OUTPATIENT/FOLLOW UP:    Sierra Rodriguez  Neurosurgery  [address] [place] [id number]  Phone: [phone]  Fax: [phone]  Follow Up Time: 2 weeks    Neurologist- managing VPS   Dr. Aurora Webb   Watchmann device outpatient with cardiology           HPI:  Ginna Vergara is an 83 year old female with PMH of HTN, HLD, Arthritis, and normal pressure hydrocephalus (s/p VPS- March 2023 at [place]); who presented to [facility] on 11/8 for progressive weakness, confusion and decreased PO intake, and was found to have a mixed SDH with 1.4cm midline shift. She was transferred to [facility] later that day for further management. On 11/9, she underwent R sided reilly hole placement followed by MMA embolization on 11/10 with Dr. Rodriguez. Her hospital course was complicated by 2 RRTs for unresponsiveness. Serial brain imaging was stable and she was found to be in rapid AFIB. 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 PM&R was consulted and deemed her an appropriate candidate for IRF. She was medically stabilized and cleared for discharge to [facility].  (17 Nov 2023 11:31)    Subjective:  No acute overnight events. Patient seen and examined by bedside. Doing well today. No complaints of CP/SOB/NV. No new numbness tingling or weakness. Sleeping well. Pain controlled. Plan for discharge to [facility] today. VSS. Afebrile.     VITALS  Vital Signs Last 24 Hrs  T(C): 37.1 (04 Dec 2023 09:19), Max: 37.1 (04 Dec 2023 09:19)  T(F): 98.8 (04 Dec 2023 09:19), Max: 98.8 (04 Dec 2023 09:19)  HR: 66 (04 Dec 2023 09:19) (62 - 68)  BP: 114/62 (04 Dec 2023 09:19) (110/60 - 117/67)  BP(mean): --  RR: 15 (04 Dec 2023 09:19) (14 - 15)  SpO2: 99% (04 Dec 2023 09:19) (96% - 99%)    Parameters below as of 04 Dec 2023 09:19  Patient On (Oxygen Delivery Method): room air    REVIEW OF SYMPTOMS  Neurological deficits    PHYSICAL EXAM  General: No acute distress  CV: warm, well perfused  Lungs: Breathing comfortably on RA   Abd: Soft, mildly distended   Ext:  No peripheral edema, TEDs  Neuro:            Mental status: answering questions appropriately             CN: No facial asymmetry             MSK: MOHINI Arana abd, EF/EE, WE, -- 4/5.                      RUE 5/5 throughout                       BLE HF 4/5.  Bilateral DF 4/5. Bilateral KE/KF 5/5    RECENT LABS                        10.3   6.63  )-----------( 254      ( 04 Dec 2023 05:39 )             33.4     12-04    139  |  105  |  23  ----------------------------<  101<H>  4.8   |  28  |  1.20    Ca    10.4      04 Dec 2023 05:39    TPro  6.4  /  Alb  3.0<L>  /  TBili  0.3  /  DBili  x   /  AST  19  /  ALT  19  /  AlkPhos  81  12-04      Urinalysis Basic - ( 04 Dec 2023 05:39 )    Color: x / Appearance: x / SG: x / pH: x  Gluc: 101 mg/dL / Ketone: x  / Bili: x / Urobili: x   Blood: x / Protein: x / Nitrite: x   Leuk Esterase: x / RBC: x / WBC x   Sq Epi: x / Non Sq Epi: x / Bacteria: x    RADIOLOGY/OTHER RESULTS  Reviewed     MEDICATIONS  (STANDING):  atorvastatin 40 milliGRAM(s) Oral at bedtime  dextrose 5%. 1000 milliLiter(s) (100 mL/Hr) IV Continuous <Continuous>  dextrose 5%. 1000 milliLiter(s) (50 mL/Hr) IV Continuous <Continuous>  dextrose 50% Injectable 25 Gram(s) IV Push once  dextrose 50% Injectable 12.5 Gram(s) IV Push once  dextrose 50% Injectable 25 Gram(s) IV Push once  enoxaparin Injectable 40 milliGRAM(s) SubCutaneous <User Schedule>  escitalopram 5 milliGRAM(s) Oral daily  famotidine    Tablet 20 milliGRAM(s) Oral two times a day  glucagon  Injectable 1 milliGRAM(s) IntraMuscular once  hydroxychloroquine 200 milliGRAM(s) Oral two times a day  loratadine 10 milliGRAM(s) Oral daily  magnesium oxide 400 milliGRAM(s) Oral at bedtime  metoprolol tartrate 12.5 milliGRAM(s) Oral every 12 hours  modafinil 50 milliGRAM(s) Oral <User Schedule>  multivitamin 1 Tablet(s) Oral daily  polyethylene glycol 3350 17 Gram(s) Oral two times a day  potassium chloride    Tablet ER 10 milliEquivalent(s) Oral daily  senna 2 Tablet(s) Oral at bedtime    MEDICATIONS  (PRN):  acetaminophen     Tablet .. 975 milliGRAM(s) Oral every 8 hours PRN Mild Pain (1 - 3)  bisacodyl 5 milliGRAM(s) Oral every 12 hours PRN Constipation  dextrose Oral Gel 15 Gram(s) Oral once PRN Blood Glucose LESS THAN 70 milliGRAM(s)/deciliter    Assessment and Plan:   83 year old female with PMH of HTN, HLD, Arthritis, and normal pressure hydrocephalus (s/p VPS- March 2023 at [place]); who presented to [facility] on 11/8 for progressive weakness, confusion and decreased PO intake, and was found to have a mixed SDH with 1.4cm midline shift. She was transferred to [facility] later that day for further management. On 11/9, she underwent R sided reilly hole placement, followed by MMA embolization on 11/10 with Dr. Rodriguez. Her hospital course was complicated by 2 RRTs for unresponsiveness. Serial brain imaging was stable and she was found to be in rapid AFIB. 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 Patient now admitted for a multidisciplinary rehab program with cognitive deficits, gait and ADL impairments.     #Traumatic Subdural Hemorrhage  - CTH 11/8: Moderate to large mixed attenuation right hemispheric convexity SDH compressing right hemisphere and right lateral ventricle with 1.4 cm midline shift to left. Right frontal ventriculostomy shunt catheter within right frontal horn.  - CTH 11/14: Right convexity subdural collection of air, blood and fluid continues to decrease in size.  - S/p R sided reilly hole placement (11/9), followed by MMA embolization (11/10). Staples and 1 suture removed at bedside (11/27). Tolerated well.   - Outpatient follow up with Dr. Michael MARIE    #Wakefulness   - Trial of Modafinil 50 mg q 8am (discontinued 11/28)  - Well controlled     #Agitation-Improved   -cont. Lexapro 5mg daily     #Normal Pressure Hydrocephalus  - S/p VPS March 2023  - Dr. Simon ([place])   - CTH 11/20: deep subcortical white matter ischemia, unchanged. RIGHT frontal shunt catheter tip noted in the RIGHT lateral ventricle. Mild ventriculomegaly unchanged. Tiny residual subdural collection.  -No acute intervention from CYNTHIA standpoint-- per NP discussion with neurosurgeon Dr Méndez 11/20 , follow up outpatient.   -D/W neurosurgeon Dr. Méndez to clarify time for next shunt adjustment-  "should follow up in 6 weeks. At the latest 3 months" 11/27    #Rapid AFIB  - Evaluation for watchman as outpatient   - Metoprolol 12.5mg BID   - No AC d/t SDH    #HLD  - Atorvastatin 40mg at HS     #Arthritis  - Plaquenil 200mg BID (home regimen)    #Skin  - Removed staples on 11/27    #Pain Mgmt   - Tylenol PRN    #GI/Bowel Mgmt  - Diet: Regular   - Famotidine 20mg BID   - Miralax BID  - Senna     #/Bladder Mgmt   -  No issues     #Health Maintenance  - KCL 10mEq daily   - Magnesium Oxide 400mg at HS  - Fosamax 70mg weekly- home regimen    #Ppx   - DVT ppx: Lovenox    #IDT 11/21   Nursing: incontinent bowel/bladder   Social: lives with , independent prior, retired    Preferences: zohreh laurent   SLP: reglar diet thin liquids, decreased memory/attention/left inattention   OT: supervision for eating/oral hygiene, dependent LBD/footwear   PT: dependent   Goals: Increase attention/focus within 10-15 intervals, ModA transfers/bedmobility    DC: 12/5 Home/GIANLUCA, TBD   TDD 12/5/23    ------------------------------------------------------  OUTPATIENT/FOLLOW UP:    Sierra Rodriguez  Neurosurgery  [address] [place] [id number]  Phone: [phone]  Fax: [phone]  Follow Up Time: 2 weeks    Neurologist- managing VPS   Dr. Aurora Webb   Watchmann device outpatient with cardiology           HPI:  Ginna Vergara is an 83 year old female with PMH of HTN, HLD, Arthritis, and normal pressure hydrocephalus (s/p VPS- March 2023 at [place]); who presented to [facility] on 11/8 for progressive weakness, confusion and decreased PO intake, and was found to have a mixed SDH with 1.4cm midline shift. She was transferred to [facility] later that day for further management. On 11/9, she underwent R sided reilly hole placement followed by MMA embolization on 11/10 with Dr. Rodriguez. Her hospital course was complicated by 2 RRTs for unresponsiveness. Serial brain imaging was stable and she was found to be in rapid AFIB. 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 PM&R was consulted and deemed her an appropriate candidate for IRF. She was medically stabilized and cleared for discharge to [facility].  (17 Nov 2023 11:31)    Subjective:  No acute overnight events. Patient seen and examined by bedside. Doing well today. No complaints of CP/SOB/NV. No new numbness tingling or weakness. Sleeping well. Pain controlled. Plan for discharge to [facility] today. VSS. Afebrile.     VITALS  Vital Signs Last 24 Hrs  T(C): 37.1 (04 Dec 2023 09:19), Max: 37.1 (04 Dec 2023 09:19)  T(F): 98.8 (04 Dec 2023 09:19), Max: 98.8 (04 Dec 2023 09:19)  HR: 66 (04 Dec 2023 09:19) (62 - 68)  BP: 114/62 (04 Dec 2023 09:19) (110/60 - 117/67)  BP(mean): --  RR: 15 (04 Dec 2023 09:19) (14 - 15)  SpO2: 99% (04 Dec 2023 09:19) (96% - 99%)    Parameters below as of 04 Dec 2023 09:19  Patient On (Oxygen Delivery Method): room air    REVIEW OF SYMPTOMS  Neurological deficits    PHYSICAL EXAM  General: No acute distress  CV: warm, well perfused  Lungs: Breathing comfortably on RA   Abd: Soft, mildly distended   Ext:  No peripheral edema, TEDs  Neuro:            Mental status: answering questions appropriately             CN: No facial asymmetry             MSK: MOHINI Arana abd, EF/EE, WE, -- 4/5.                      RUE 5/5 throughout                       BLE HF 4/5.  Bilateral DF 4/5. Bilateral KE/KF 5/5    RECENT LABS                        10.3   6.63  )-----------( 254      ( 04 Dec 2023 05:39 )             33.4     12-04    139  |  105  |  23  ----------------------------<  101<H>  4.8   |  28  |  1.20    Ca    10.4      04 Dec 2023 05:39    TPro  6.4  /  Alb  3.0<L>  /  TBili  0.3  /  DBili  x   /  AST  19  /  ALT  19  /  AlkPhos  81  12-04      Urinalysis Basic - ( 04 Dec 2023 05:39 )    Color: x / Appearance: x / SG: x / pH: x  Gluc: 101 mg/dL / Ketone: x  / Bili: x / Urobili: x   Blood: x / Protein: x / Nitrite: x   Leuk Esterase: x / RBC: x / WBC x   Sq Epi: x / Non Sq Epi: x / Bacteria: x    RADIOLOGY/OTHER RESULTS  Reviewed     MEDICATIONS  (STANDING):  atorvastatin 40 milliGRAM(s) Oral at bedtime  dextrose 5%. 1000 milliLiter(s) (100 mL/Hr) IV Continuous <Continuous>  dextrose 5%. 1000 milliLiter(s) (50 mL/Hr) IV Continuous <Continuous>  dextrose 50% Injectable 25 Gram(s) IV Push once  dextrose 50% Injectable 12.5 Gram(s) IV Push once  dextrose 50% Injectable 25 Gram(s) IV Push once  enoxaparin Injectable 40 milliGRAM(s) SubCutaneous <User Schedule>  escitalopram 5 milliGRAM(s) Oral daily  famotidine    Tablet 20 milliGRAM(s) Oral two times a day  glucagon  Injectable 1 milliGRAM(s) IntraMuscular once  hydroxychloroquine 200 milliGRAM(s) Oral two times a day  loratadine 10 milliGRAM(s) Oral daily  magnesium oxide 400 milliGRAM(s) Oral at bedtime  metoprolol tartrate 12.5 milliGRAM(s) Oral every 12 hours  modafinil 50 milliGRAM(s) Oral <User Schedule>  multivitamin 1 Tablet(s) Oral daily  polyethylene glycol 3350 17 Gram(s) Oral two times a day  potassium chloride    Tablet ER 10 milliEquivalent(s) Oral daily  senna 2 Tablet(s) Oral at bedtime    MEDICATIONS  (PRN):  acetaminophen     Tablet .. 975 milliGRAM(s) Oral every 8 hours PRN Mild Pain (1 - 3)  bisacodyl 5 milliGRAM(s) Oral every 12 hours PRN Constipation  dextrose Oral Gel 15 Gram(s) Oral once PRN Blood Glucose LESS THAN 70 milliGRAM(s)/deciliter    Assessment and Plan:   83 year old female with PMH of HTN, HLD, Arthritis, and normal pressure hydrocephalus (s/p VPS- March 2023 at [place]); who presented to [facility] on 11/8 for progressive weakness, confusion and decreased PO intake, and was found to have a mixed SDH with 1.4cm midline shift. She was transferred to [facility] later that day for further management. On 11/9, she underwent R sided reilly hole placement, followed by MMA embolization on 11/10 with Dr. Rodriguez. Her hospital course was complicated by 2 RRTs for unresponsiveness. Serial brain imaging was stable and she was found to be in rapid AFIB. 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 Patient now admitted for a multidisciplinary rehab program with cognitive deficits, gait and ADL impairments.     Patient medically stable for discharge to [facility] today.  Discharge medications and instructions sent to pt's  by CEE.     #Traumatic Subdural Hemorrhage  - CTH 11/8: Moderate to large mixed attenuation right hemispheric convexity SDH compressing right hemisphere and right lateral ventricle with 1.4 cm midline shift to left. Right frontal ventriculostomy shunt catheter within right frontal horn.  - CTH 11/14: Right convexity subdural collection of air, blood and fluid continues to decrease in size.  - S/p R sided reilly hole placement (11/9), followed by MMA embolization (11/10). Staples and 1 suture removed at bedside (11/27). Tolerated well.   - Outpatient follow up with Dr. Michael MARIE    #Wakefulness   -cont. Modafinil 50 mg q 8am     #Agitation-Improved   -cont. Lexapro 5mg daily     #Normal Pressure Hydrocephalus  - S/p VPS March 2023  - Dr. Simon ([place])   - CTH 11/20: deep subcortical white matter ischemia, unchanged. RIGHT frontal shunt catheter tip noted in the RIGHT lateral ventricle. Mild ventriculomegaly unchanged. Tiny residual subdural collection.  -No acute intervention from NSGY standpoint-- per NP discussion with neurosurgeon Dr Méndez 11/20 , follow up outpatient.   -D/W neurosurgeon Dr. Méndez to clarify time for next shunt adjustment-  "should follow up in 6 weeks. At the latest 3 months" 11/27    #Rapid AFIB  - Evaluation for watchman as outpatient   - Metoprolol 12.5mg BID   - No AC d/t SDH    #HLD  - Atorvastatin 40mg at HS     #Arthritis  - Plaquenil 200mg BID (home regimen)    #Skin  - Removed staples on 11/27    #Pain Mgmt   - Tylenol PRN    #GI/Bowel Mgmt  - Diet: Regular   - Famotidine 20mg BID   - Miralax BID  - Senna     #/Bladder Mgmt   -  No issues     #Health Maintenance  - KCL 10mEq daily   - Magnesium Oxide 400mg at HS  - Fosamax 70mg weekly- home regimen    #Ppx   - DVT ppx: Lovenox    #IDT 11/21   Nursing: incontinent bowel/bladder   Social: lives with , independent prior, retired    Preferences: zohreh laurent   SLP: reglar diet thin liquids, decreased memory/attention/left inattention   OT: supervision for eating/oral hygiene, dependent LBD/footwear   PT: dependent   Goals: Increase attention/focus within 10-15 intervals, ModA transfers/bedmobility    DC: 12/5 Home/GIANLUCA, TBD   TDD 12/5/23    ------------------------------------------------------  OUTPATIENT/FOLLOW UP:    Sierra Rodriguez  Neurosurgery  [address] [place] [address]  Phone: [phone]  Fax: [phone]  Follow Up Time: 2 weeks    Neurologist- managing VPS   Dr. Aurora Webb   Watchmann device outpatient with cardiology           HPI:  Ginna Vergara is an 83 year old female with PMH of HTN, HLD, Arthritis, and normal pressure hydrocephalus (s/p VPS- March 2023 at [place]); who presented to [facility] on 11/8 for progressive weakness, confusion and decreased PO intake, and was found to have a mixed SDH with 1.4cm midline shift. She was transferred to [facility] later that day for further management. On 11/9, she underwent R sided reilly hole placement followed by MMA embolization on 11/10 with Dr. Rodriguez. Her hospital course was complicated by 2 RRTs for unresponsiveness. Serial brain imaging was stable and she was found to be in rapid AFIB. 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 PM&R was consulted and deemed her an appropriate candidate for IRF. She was medically stabilized and cleared for discharge to [facility].  (17 Nov 2023 11:31)    Subjective:  No acute overnight events. Patient seen and examined by bedside. Doing well today. No complaints of CP/SOB/NV. No new numbness tingling or weakness. Sleeping well. Pain controlled. Plan for discharge to [facility] today. VSS. Afebrile.     VITALS  Vital Signs Last 24 Hrs  T(C): 37.1 (04 Dec 2023 09:19), Max: 37.1 (04 Dec 2023 09:19)  T(F): 98.8 (04 Dec 2023 09:19), Max: 98.8 (04 Dec 2023 09:19)  HR: 66 (04 Dec 2023 09:19) (62 - 68)  BP: 114/62 (04 Dec 2023 09:19) (110/60 - 117/67)  BP(mean): --  RR: 15 (04 Dec 2023 09:19) (14 - 15)  SpO2: 99% (04 Dec 2023 09:19) (96% - 99%)    Parameters below as of 04 Dec 2023 09:19  Patient On (Oxygen Delivery Method): room air    REVIEW OF SYMPTOMS  Neurological deficits    PHYSICAL EXAM  General: No acute distress  CV: warm, well perfused  Lungs: Breathing comfortably on RA   Abd: Soft, mildly distended   Ext:  No peripheral edema, TEDs  Neuro:            Mental status: answering questions appropriately             CN: No facial asymmetry             MSK: MOHINI Arana abd, EF/EE, WE, -- 4/5.                      RUE 5/5 throughout                       BLE HF 4/5.  Bilateral DF 4/5. Bilateral KE/KF 5/5    RECENT LABS                        10.3   6.63  )-----------( 254      ( 04 Dec 2023 05:39 )             33.4     12-04    139  |  105  |  23  ----------------------------<  101<H>  4.8   |  28  |  1.20    Ca    10.4      04 Dec 2023 05:39    TPro  6.4  /  Alb  3.0<L>  /  TBili  0.3  /  DBili  x   /  AST  19  /  ALT  19  /  AlkPhos  81  12-04      Urinalysis Basic - ( 04 Dec 2023 05:39 )    Color: x / Appearance: x / SG: x / pH: x  Gluc: 101 mg/dL / Ketone: x  / Bili: x / Urobili: x   Blood: x / Protein: x / Nitrite: x   Leuk Esterase: x / RBC: x / WBC x   Sq Epi: x / Non Sq Epi: x / Bacteria: x    RADIOLOGY/OTHER RESULTS  Reviewed     MEDICATIONS  (STANDING):  atorvastatin 40 milliGRAM(s) Oral at bedtime  dextrose 5%. 1000 milliLiter(s) (100 mL/Hr) IV Continuous <Continuous>  dextrose 5%. 1000 milliLiter(s) (50 mL/Hr) IV Continuous <Continuous>  dextrose 50% Injectable 25 Gram(s) IV Push once  dextrose 50% Injectable 12.5 Gram(s) IV Push once  dextrose 50% Injectable 25 Gram(s) IV Push once  enoxaparin Injectable 40 milliGRAM(s) SubCutaneous <User Schedule>  escitalopram 5 milliGRAM(s) Oral daily  famotidine    Tablet 20 milliGRAM(s) Oral two times a day  glucagon  Injectable 1 milliGRAM(s) IntraMuscular once  hydroxychloroquine 200 milliGRAM(s) Oral two times a day  loratadine 10 milliGRAM(s) Oral daily  magnesium oxide 400 milliGRAM(s) Oral at bedtime  metoprolol tartrate 12.5 milliGRAM(s) Oral every 12 hours  modafinil 50 milliGRAM(s) Oral <User Schedule>  multivitamin 1 Tablet(s) Oral daily  polyethylene glycol 3350 17 Gram(s) Oral two times a day  potassium chloride    Tablet ER 10 milliEquivalent(s) Oral daily  senna 2 Tablet(s) Oral at bedtime    MEDICATIONS  (PRN):  acetaminophen     Tablet .. 975 milliGRAM(s) Oral every 8 hours PRN Mild Pain (1 - 3)  bisacodyl 5 milliGRAM(s) Oral every 12 hours PRN Constipation  dextrose Oral Gel 15 Gram(s) Oral once PRN Blood Glucose LESS THAN 70 milliGRAM(s)/deciliter    Assessment and Plan:   83 year old female with PMH of HTN, HLD, Arthritis, and normal pressure hydrocephalus (s/p VPS- March 2023 at [facility]); who presented to [facility] on 11/8 for progressive weakness, confusion and decreased PO intake, and was found to have a mixed SDH with 1.4cm midline shift. She was transferred to [facility] later that day for further management. On 11/9, she underwent R sided reilly hole placement, followed by MMA embolization on 11/10 with Dr. Rodriguez. Her hospital course was complicated by 2 RRTs for unresponsiveness. Serial brain imaging was stable and she was found to be in rapid AFIB. 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 Patient now admitted for a multidisciplinary rehab program with cognitive deficits, gait and ADL impairments.     Patient medically stable for discharge to [facility] today.  Discharge medications and instructions sent to pt's  by CEE.     #Traumatic Subdural Hemorrhage  - CTH 11/8: Moderate to large mixed attenuation right hemispheric convexity SDH compressing right hemisphere and right lateral ventricle with 1.4 cm midline shift to left. Right frontal ventriculostomy shunt catheter within right frontal horn.  - CTH 11/14: Right convexity subdural collection of air, blood and fluid continues to decrease in size.  - S/p R sided reilly hole placement (11/9), followed by MMA embolization (11/10). Staples and 1 suture removed at bedside (11/27). Tolerated well.   - Outpatient follow up with Dr. Michael MARIE    #Wakefulness   -cont. Modafinil 50 mg q 8am     #Agitation-Improved   -cont. Lexapro 5mg daily     #Normal Pressure Hydrocephalus  - S/p VPS March 2023  - Dr. Simon ([facility])   - CTH 11/20: deep subcortical white matter ischemia, unchanged. RIGHT frontal shunt catheter tip noted in the RIGHT lateral ventricle. Mild ventriculomegaly unchanged. Tiny residual subdural collection.  -No acute intervention from NSGY standpoint-- per NP discussion with neurosurgeon Dr Méndez 11/20 , follow up outpatient.   -D/W neurosurgeon Dr. Méndez to clarify time for next shunt adjustment-  "should follow up in 6 weeks. At the latest 3 months" 11/27    #Rapid AFIB  - Evaluation for watchman as outpatient   - Metoprolol 12.5mg BID   - No AC d/t SDH    #HLD  - Atorvastatin 40mg at HS     #Arthritis  - Plaquenil 200mg BID (home regimen)    #Skin  - Removed staples on 11/27    #Pain Mgmt   - Tylenol PRN    #GI/Bowel Mgmt  - Diet: Regular   - Famotidine 20mg BID   - Miralax BID  - Senna     #/Bladder Mgmt   -  No issues     #Health Maintenance  - KCL 10mEq daily   - Magnesium Oxide 400mg at HS  - Fosamax 70mg weekly- home regimen    #Ppx   - DVT ppx: Lovenox    #IDT 11/21   Nursing: incontinent bowel/bladder   Social: lives with , independent prior, retired    Preferences: zohreh laurent   SLP: reglar diet thin liquids, decreased memory/attention/left inattention   OT: supervision for eating/oral hygiene, dependent LBD/footwear   PT: dependent   Goals: Increase attention/focus within 10-15 intervals, ModA transfers/bedmobility    DC: 12/5 Home/GIANLUCA, TBD   TDD 12/5/23    ------------------------------------------------------  OUTPATIENT/FOLLOW UP:    Sierra Rodriguez  Neurosurgery  [address] [place] [address]  Phone: [phone]  Fax: [phone]  Follow Up Time: 2 weeks    Neurologist- managing VPS   Dr. Aurora Webb   Watchmann device outpatient with cardiology

## 2023-12-05 NOTE — PROGRESS NOTE ADULT - SUBJECTIVE AND OBJECTIVE BOX
CC: Patient is a 83y old  Female who presents with a chief complaint of Subdural Hemorrhage (05 Dec 2023 13:02)      Interval History: Patient seen and examined at bedside. No acute overnight events. No complaints this morning.    ALLERGIES:  No Known Allergies    MEDICATIONS  (STANDING):  atorvastatin 40 milliGRAM(s) Oral at bedtime  dextrose 5%. 1000 milliLiter(s) (50 mL/Hr) IV Continuous <Continuous>  dextrose 5%. 1000 milliLiter(s) (100 mL/Hr) IV Continuous <Continuous>  dextrose 50% Injectable 12.5 Gram(s) IV Push once  dextrose 50% Injectable 25 Gram(s) IV Push once  dextrose 50% Injectable 25 Gram(s) IV Push once  enoxaparin Injectable 40 milliGRAM(s) SubCutaneous <User Schedule>  escitalopram 5 milliGRAM(s) Oral daily  famotidine    Tablet 20 milliGRAM(s) Oral two times a day  glucagon  Injectable 1 milliGRAM(s) IntraMuscular once  hydroxychloroquine 200 milliGRAM(s) Oral two times a day  loratadine 10 milliGRAM(s) Oral daily  magnesium oxide 400 milliGRAM(s) Oral at bedtime  metoprolol tartrate 12.5 milliGRAM(s) Oral every 12 hours  multivitamin 1 Tablet(s) Oral daily  polyethylene glycol 3350 17 Gram(s) Oral two times a day  potassium chloride    Tablet ER 10 milliEquivalent(s) Oral daily  senna 2 Tablet(s) Oral at bedtime    MEDICATIONS  (PRN):  acetaminophen     Tablet .. 975 milliGRAM(s) Oral every 8 hours PRN Mild Pain (1 - 3)  bisacodyl 5 milliGRAM(s) Oral every 12 hours PRN Constipation  dextrose Oral Gel 15 Gram(s) Oral once PRN Blood Glucose LESS THAN 70 milliGRAM(s)/deciliter    Vital Signs Last 24 Hrs  T(F): 98.1 (05 Dec 2023 07:12), Max: 98.2 (04 Dec 2023 19:54)  HR: 60 (05 Dec 2023 07:12) (60 - 62)  BP: 106/68 (05 Dec 2023 07:12) (106/68 - 110/64)  RR: 14 (05 Dec 2023 07:12) (14 - 15)  SpO2: 97% (05 Dec 2023 07:12) (97% - 97%)  I&O's Summary    04 Dec 2023 07:01  -  05 Dec 2023 07:00  --------------------------------------------------------  IN: 600 mL / OUT: 0 mL / NET: 600 mL          PHYSICAL EXAM:  GENERAL: pt laying in bed in NAD  CHEST/LUNG: Clear to percussion bilaterally; No rales, rhonchi, wheezing, or rubs; normal respiratory effort   HEART: Regular rate and rhythm; No murmurs noted  ABDOMEN: Soft, Nontender, Nondistended; Bowel sounds present   MUSCULOSKELETAL/EXTREMITIES: no calf tenderness, no edema noted to BLE  NERVOUS SYSTEM:   Sensation intact; follows commands  PSYCH: Appropriate affect, Alert & Oriented x 3     LABS:                        10.3   6.63  )-----------( 254      ( 04 Dec 2023 05:39 )             33.4       12-04    139  |  105  |  23  ----------------------------<  101  4.8   |  28  |  1.20    Ca    10.4      04 Dec 2023 05:39    TPro  6.4  /  Alb  3.0  /  TBili  0.3  /  DBili  x   /  AST  19  /  ALT  19  /  AlkPhos  81  12-04                11-08 Chol 144 mg/dL LDL -- HDL 65 mg/dL Trig 36 mg/dL                  Urinalysis Basic - ( 04 Dec 2023 05:39 )    Color: x / Appearance: x / SG: x / pH: x  Gluc: 101 mg/dL / Ketone: x  / Bili: x / Urobili: x   Blood: x / Protein: x / Nitrite: x   Leuk Esterase: x / RBC: x / WBC x   Sq Epi: x / Non Sq Epi: x / Bacteria: x        COVID-19 PCR: NotDetec (11-17-23 @ 19:55)      Care Discussed with Consultants/Other Providers: Yes with rehab team

## 2023-12-05 NOTE — DISCHARGE NOTE NURSING/CASE MANAGEMENT/SOCIAL WORK - NSDCPEFALRISK_GEN_ALL_CORE
For information on Fall & Injury Prevention, visit: [email or web address] OR  [email or web address] OR  https://www.cdc.gov/steadi/patient.html For information on Fall & Injury Prevention, visit: [email or web address] OR  [email or web address] OR  https://www.cdc.gov/steadi/patient.html

## 2023-12-05 NOTE — PROGRESS NOTE ADULT - NUTRITIONAL ASSESSMENT
This patient has been assessed with a concern for Malnutrition and has been determined to have a diagnosis/diagnoses of Severe protein-calorie malnutrition.    This patient is being managed with:   Diet Regular-  Supplement Feeding Modality:  Oral  Ensure Plus High Protein Cans or Servings Per Day:  1       Frequency:  Two Times a day  Entered: Nov 18 2023 11:15AM  

## 2023-12-05 NOTE — PROGRESS NOTE ADULT - PROVIDER SPECIALTY LIST ADULT
Internal Medicine
Physiatry
Rehab Medicine
Physiatry
Rehab Medicine
Hospitalist
Internal Medicine
Rehab Medicine
Hospitalist
Hospitalist
Internal Medicine
Internal Medicine
Hospitalist
Hospitalist
Rehab Medicine
Physiatry
Hospitalist

## 2023-12-05 NOTE — PROGRESS NOTE ADULT - ATTENDING COMMENTS
Pt. seen with resident & fellow.  Agree with documentation above as per fellow with amendments made as appropriate. Patient medically stable. Making progress towards rehab goals.     SDH  Patient medically stable for discharge to [facility] today.  Discharge medications and instructions sent to pt's  by JENNIFER Pt. seen with resident & fellow.  Agree with documentation above as per fellow with amendments made as appropriate. Patient medically stable. Making progress towards rehab goals.     SDH  Patient medically stable for discharge to [facility] today.  Discharge medications and instructions sent to pt's  by JENNIFER

## 2023-12-05 NOTE — DISCHARGE NOTE NURSING/CASE MANAGEMENT/SOCIAL WORK - NSDCFUADDAPPT_GEN_ALL_CORE_FT
Josette Cobb  [facility] PreAdmits  Scheduled Appointment: 11/17/2023 Josette Cobb  [facility] PreAdmits  Scheduled Appointment: 11/17/2023

## 2023-12-13 PROCEDURE — 93005 ELECTROCARDIOGRAM TRACING: CPT

## 2023-12-13 PROCEDURE — 84480 ASSAY TRIIODOTHYRONINE (T3): CPT

## 2023-12-13 PROCEDURE — 82962 GLUCOSE BLOOD TEST: CPT

## 2023-12-13 PROCEDURE — 97530 THERAPEUTIC ACTIVITIES: CPT

## 2023-12-13 PROCEDURE — 70450 CT HEAD/BRAIN W/O DYE: CPT | Mod: MG

## 2023-12-13 PROCEDURE — 75898 FOLLOW-UP ANGIOGRAPHY: CPT

## 2023-12-13 PROCEDURE — 86901 BLOOD TYPING SEROLOGIC RH(D): CPT

## 2023-12-13 PROCEDURE — 87641 MR-STAPH DNA AMP PROBE: CPT

## 2023-12-13 PROCEDURE — 86900 BLOOD TYPING SEROLOGIC ABO: CPT

## 2023-12-13 PROCEDURE — 80053 COMPREHEN METABOLIC PANEL: CPT

## 2023-12-13 PROCEDURE — C1889: CPT

## 2023-12-13 PROCEDURE — 84439 ASSAY OF FREE THYROXINE: CPT

## 2023-12-13 PROCEDURE — 83036 HEMOGLOBIN GLYCOSYLATED A1C: CPT

## 2023-12-13 PROCEDURE — 95700 EEG CONT REC W/VID EEG TECH: CPT

## 2023-12-13 PROCEDURE — 84100 ASSAY OF PHOSPHORUS: CPT

## 2023-12-13 PROCEDURE — 75894 X-RAYS TRANSCATH THERAPY: CPT

## 2023-12-13 PROCEDURE — 84443 ASSAY THYROID STIM HORMONE: CPT

## 2023-12-13 PROCEDURE — C1894: CPT

## 2023-12-13 PROCEDURE — 97110 THERAPEUTIC EXERCISES: CPT

## 2023-12-13 PROCEDURE — 74018 RADEX ABDOMEN 1 VIEW: CPT

## 2023-12-13 PROCEDURE — 71045 X-RAY EXAM CHEST 1 VIEW: CPT

## 2023-12-13 PROCEDURE — 95813 EEG EXTND MNTR 61-119 MIN: CPT

## 2023-12-13 PROCEDURE — C1760: CPT

## 2023-12-13 PROCEDURE — C8929: CPT

## 2023-12-13 PROCEDURE — 83735 ASSAY OF MAGNESIUM: CPT

## 2023-12-13 PROCEDURE — 97167 OT EVAL HIGH COMPLEX 60 MIN: CPT

## 2023-12-13 PROCEDURE — 36415 COLL VENOUS BLD VENIPUNCTURE: CPT

## 2023-12-13 PROCEDURE — 36224 PLACE CATH CAROTD ART: CPT

## 2023-12-13 PROCEDURE — C1887: CPT

## 2023-12-13 PROCEDURE — 85027 COMPLETE CBC AUTOMATED: CPT

## 2023-12-13 PROCEDURE — 86850 RBC ANTIBODY SCREEN: CPT

## 2023-12-13 PROCEDURE — 85730 THROMBOPLASTIN TIME PARTIAL: CPT

## 2023-12-13 PROCEDURE — 61624 TCAT PERM OCCLS/EMBOLJ CNS: CPT

## 2023-12-13 PROCEDURE — 95714 VEEG EA 12-26 HR UNMNTR: CPT

## 2023-12-13 PROCEDURE — C1769: CPT

## 2023-12-13 PROCEDURE — G1004: CPT

## 2023-12-13 PROCEDURE — 80061 LIPID PANEL: CPT

## 2023-12-13 PROCEDURE — 93970 EXTREMITY STUDY: CPT

## 2023-12-13 PROCEDURE — C1713: CPT

## 2023-12-13 PROCEDURE — 85025 COMPLETE CBC W/AUTO DIFF WBC: CPT

## 2023-12-13 PROCEDURE — 85610 PROTHROMBIN TIME: CPT

## 2023-12-13 PROCEDURE — C9399: CPT

## 2023-12-13 PROCEDURE — 36226 PLACE CATH VERTEBRAL ART: CPT

## 2023-12-13 PROCEDURE — 36227 PLACE CATH XTRNL CAROTID: CPT

## 2023-12-13 PROCEDURE — 80048 BASIC METABOLIC PNL TOTAL CA: CPT

## 2023-12-13 PROCEDURE — 84436 ASSAY OF TOTAL THYROXINE: CPT

## 2023-12-13 PROCEDURE — 87640 STAPH A DNA AMP PROBE: CPT

## 2023-12-13 PROCEDURE — 70250 X-RAY EXAM OF SKULL: CPT

## 2024-01-19 ENCOUNTER — OUTPATIENT (OUTPATIENT)
Dept: OUTPATIENT SERVICES | Facility: HOSPITAL | Age: 84
LOS: 1 days | End: 2024-01-19
Payer: MEDICARE

## 2024-01-19 DIAGNOSIS — G91.2 (IDIOPATHIC) NORMAL PRESSURE HYDROCEPHALUS: ICD-10-CM

## 2024-01-19 DIAGNOSIS — Z98.2 PRESENCE OF CEREBROSPINAL FLUID DRAINAGE DEVICE: Chronic | ICD-10-CM

## 2024-01-19 PROCEDURE — 70470 CT HEAD/BRAIN W/O & W/DYE: CPT

## 2024-01-19 PROCEDURE — 70470 CT HEAD/BRAIN W/O & W/DYE: CPT | Mod: 26,MH

## 2024-02-05 ENCOUNTER — APPOINTMENT (OUTPATIENT)
Dept: NEUROSURGERY | Facility: CLINIC | Age: 84
End: 2024-02-05
Payer: MEDICARE

## 2024-02-05 VITALS
SYSTOLIC BLOOD PRESSURE: 119 MMHG | OXYGEN SATURATION: 96 % | DIASTOLIC BLOOD PRESSURE: 75 MMHG | WEIGHT: 202 LBS | BODY MASS INDEX: 32.08 KG/M2 | HEIGHT: 66.5 IN | TEMPERATURE: 97.6 F | HEART RATE: 67 BPM

## 2024-02-05 DIAGNOSIS — Z87.39 PERSONAL HISTORY OF OTHER DISEASES OF THE MUSCULOSKELETAL SYSTEM AND CONNECTIVE TISSUE: ICD-10-CM

## 2024-02-05 PROCEDURE — 62252 CSF SHUNT REPROGRAM: CPT | Mod: 26

## 2024-02-05 PROCEDURE — 99024 POSTOP FOLLOW-UP VISIT: CPT

## 2024-02-05 NOTE — END OF VISIT
[FreeTextEntry3] : I reviewed her most recent CT of the head which demonstrates complete resolution of her right-sided chronic subdural hematoma with increased ventricular size compared to her last CT scan completed in November.  At this point I will reopen her shunt.  The  was used to adjust her shunt to a setting of 130.  She will need a confirmatory x-ray.  She will see me in the office in follow-up in 1 month.

## 2024-02-05 NOTE — HISTORY OF PRESENT ILLNESS
[FreeTextEntry1] : 83-year-old female with a history of normal pressure hydrocephalus status post ventriculoperitoneal shunting (Hakim valve) with Dr. Simon in [place] with a recent hospitalization in November after she developed a right-sided chronic subdural hematoma.  She underwent bur hole drainage with Dr. Sierra Rodriguez followed by MMA embolization.  She is seen in the office today on follow-up.  She and her  states that her walking has become significantly worse in the past 3 weeks.  Most recent CT of the head demonstrates increased ventriculomegaly.  She has no other complaints at this time.

## 2024-02-05 NOTE — PHYSICAL EXAM
[General Appearance - Alert] : alert [General Appearance - In No Acute Distress] : in no acute distress [Cranial Nerves Optic (II)] : visual acuity intact bilaterally,  pupils equal round and reactive to light [Cranial Nerves Oculomotor (III)] : extraocular motion intact [Cranial Nerves Facial (VII)] : face symmetrical [Cranial Nerves Vestibulocochlear (VIII)] : hearing was intact bilaterally [Cranial Nerves Accessory (XI - Cranial And Spinal)] : head turning and shoulder shrug symmetric [Motor Strength] : muscle strength was normal in all four extremities [FreeTextEntry8] : Wheelchair-bound with significant unsteady gait [PERRL With Normal Accommodation] : pupils were equal in size, round, reactive to light, with normal accommodation [] : no respiratory distress [Abdomen Soft] : soft

## 2024-03-04 ENCOUNTER — EMERGENCY (EMERGENCY)
Facility: HOSPITAL | Age: 84
LOS: 1 days | Discharge: HOME | End: 2024-03-04
Attending: STUDENT IN AN ORGANIZED HEALTH CARE EDUCATION/TRAINING PROGRAM | Admitting: STUDENT IN AN ORGANIZED HEALTH CARE EDUCATION/TRAINING PROGRAM
Payer: MEDICARE

## 2024-03-04 VITALS
HEART RATE: 84 BPM | TEMPERATURE: 98 F | RESPIRATION RATE: 16 BRPM | DIASTOLIC BLOOD PRESSURE: 75 MMHG | SYSTOLIC BLOOD PRESSURE: 126 MMHG | OXYGEN SATURATION: 98 %

## 2024-03-04 VITALS
DIASTOLIC BLOOD PRESSURE: 73 MMHG | OXYGEN SATURATION: 93 % | SYSTOLIC BLOOD PRESSURE: 118 MMHG | HEART RATE: 76 BPM | RESPIRATION RATE: 18 BRPM | TEMPERATURE: 98 F

## 2024-03-04 DIAGNOSIS — Z98.2 PRESENCE OF CEREBROSPINAL FLUID DRAINAGE DEVICE: Chronic | ICD-10-CM

## 2024-03-04 PROCEDURE — 72170 X-RAY EXAM OF PELVIS: CPT

## 2024-03-04 PROCEDURE — 99284 EMERGENCY DEPT VISIT MOD MDM: CPT | Mod: 25

## 2024-03-04 PROCEDURE — 72170 X-RAY EXAM OF PELVIS: CPT | Mod: 26

## 2024-03-04 PROCEDURE — 70450 CT HEAD/BRAIN W/O DYE: CPT | Mod: 26,MC

## 2024-03-04 PROCEDURE — 71045 X-RAY EXAM CHEST 1 VIEW: CPT

## 2024-03-04 PROCEDURE — 99284 EMERGENCY DEPT VISIT MOD MDM: CPT | Mod: FS

## 2024-03-04 PROCEDURE — 72125 CT NECK SPINE W/O DYE: CPT | Mod: 26,MC

## 2024-03-04 PROCEDURE — 71045 X-RAY EXAM CHEST 1 VIEW: CPT | Mod: 26

## 2024-03-04 PROCEDURE — 70450 CT HEAD/BRAIN W/O DYE: CPT | Mod: MC

## 2024-03-04 PROCEDURE — 72125 CT NECK SPINE W/O DYE: CPT | Mod: MC

## 2024-03-04 NOTE — ED ADULT NURSE NOTE - NSFALLHARMRISKINTERV_ED_ALL_ED

## 2024-03-04 NOTE — ED PROVIDER NOTE - CCCP TRG CHIEF CMPLNT
Garett Calderón CPT 53355 and 56342.  Order letter printed for MD signature, will fax once signed.    Late entry: Documents faxed on 10/9. Confirmation of fax received.   fall

## 2024-03-04 NOTE — ED PROVIDER NOTE - CARE PROVIDER_API CALL
Koby Gray  Internal Medicine  [address] [place] [address], [facility] [place] [place] [address]  Phone: [phone]  Fax: [phone]  Follow Up Time: 1-3 Days

## 2024-03-04 NOTE — ED PROVIDER NOTE - NSFOLLOWUPINSTRUCTIONS_ED_ALL_ED_FT
Follow-up with the PCP for reevaluation, ongoing care and treatment.  If having worsening of symptoms or related symptoms, return to the ER immediately.    Head Injury, Adult  Three rear views of the head showing how quick, sudden head movements injure the brain.  There are many types of head injuries. They can be as minor as a small bump. Some head injuries can be worse. Worse injuries include:  A strong hit to the head that shakes the brain back and forth, causing damage (concussion).  A bruise (contusion) of the brain. This means there is bleeding in the brain that can cause swelling.  A cracked skull (skull fracture).  Bleeding in the brain that gathers, gets thick (makes a clot), and forms a bump (hematoma).  Most problems from a head injury come in the first 24 hours. However, you may still have side effects up to 7–10 days after your injury. It is important to watch your condition for any changes. You may need to be watched in the emergency department or urgent care, or you may need to stay in the hospital.    What are the causes?  There are many possible causes of a head injury. A serious head injury may be caused by:  A car accident.  Bicycle or motorcycle accidents.  Sports injuries.  Falls.  Being hit by an object.  What are the signs or symptoms?  Symptoms of a head injury include a bruise, bump, or bleeding where the injury happened. Other physical symptoms may include:  Headache.  Feeling like you may vomit (nauseous) or vomiting.  Dizziness.  Blurred or double vision.  Being uncomfortable around bright lights or loud noises.  Feeling tired.  Trouble waking up.  Severe symptoms such as:  Feeling weak or numb on one side of the body.  Slurred speech.  Swallowing problems.  Fainting.  Shaking movements that you cannot control (seizures).  Mental or emotional symptoms may include:  Feeling grumpy or cranky.  Confusion and memory problems.  Having trouble paying attention or concentrating.  Changes in eating or sleeping habits.  Feeling worried or nervous (anxious).  Feeling sad (depressed).  How is this treated?  Treatment for this condition depends on how bad the injury is and the type of injury you have. The main goal is to prevent problems and to allow the brain time to heal.    Mild head injury    If you have a mild head injury, you may be sent home, and treatment may include:  Being watched. A responsible adult should stay with you for 24 hours after your injury and check on you often.  Physical rest.  Brain rest.  Pain medicines.  Very bad head injury    If you have a very bad head injury, treatment may include:  Being watched closely. This includes staying in the hospital.  Medicines to:  Help with pain.  Prevent seizures.  Help with brain swelling.  Protecting your airway and using a machine that helps you breathe (ventilator).  Watching for and manage swelling inside the brain.  Brain surgery. This may be needed to:  Remove a collection of blood or blood clots.  Stop the bleeding.  Remove a part of the skull. This allows room for the brain to swell.  Follow these instructions at home:  Activity    Rest.  Avoid activities that are hard or tiring.  Make sure you get enough sleep.  Let your brain rest. Do fewer activities that need a lot of thought or attention, such as:  Watching TV.  Playing memory games and doing puzzles.  Job-related work or homework.  Working on the computer, social media, and texting.  Avoid activities that could cause another head injury until your doctor says it is okay. This includes playing sports.  Ask your doctor when it is safe for you to go back to your normal activities, such as work or school.  Ask your doctor when you can drive, ride a bicycle, or use machines. Do not do these activities if you are dizzy.  Lifestyle    A sign telling the reader not to drink beer, wine, or hard liquor.  Do not drink alcohol until your doctor says it is okay.  Do not use drugs.  If it is hard to remember things, write them down.  If you are easily distracted, try to do one thing at a time.  Talk with family members or close friends when making important decisions.  Tell your friends, family, a trusted co-worker, and  about your injury, symptoms, and limits (restrictions). Have them watch for any problems that are new or getting worse.  General instructions    Take over-the-counter and prescription medicines only as told by your doctor.  Have a responsible adult stay with you for 24 hours after your head injury. This person should watch you for any changes in your symptoms and be ready to get help.  Keep all follow-up visits to catch any new problems early.  How is this prevented?    Having another head injury can be dangerous. Another injury can lead to brain damage, brain swelling, or death. You can avoid this by:  Working on your balance and strength. This can help you avoid falls.  Wearing a seat belt when you are in a moving vehicle.  Wearing a helmet when you:  Ride a bicycle.  Ski.  Do any other sport or activity that has a risk of injury.  Making your home safer by:  Getting rid of clutter from the floors and stairs. This includes things that can make you trip.  Using grab bars in bathrooms and handrails by stairs.  Placing non-slip mats on floors and in bathtubs.  Putting more light in dim areas.  Where to find more information  Brain Injury Association: biausa.org  Contact a doctor if:  These symptoms do not go away:  Headaches.  Dizziness.  Double vision or vision changes.  Trouble sleeping.  Changes in mood.  You have new symptoms.  Get help right away if:  You have sudden:  Headache that is very bad.  Vomiting that does not stop.  Changes in the size of one of your pupils. Pupils are the black centers of your eyes.  Changes in how you see (vision).  More confusion or more grumpy moods.  You have a seizure.  Your symptoms get worse.  You have a clear or bloody fluid coming from your nose or ears.  These symptoms may be an emergency. Get help right away. Call 911.  Do not wait to see if the symptoms will go away.  Do not drive yourself to the hospital.

## 2024-03-04 NOTE — ED PROVIDER NOTE - ATTENDING APP SHARED VISIT CONTRIBUTION OF CARE
Morgan Soria MD (Attending Physician):    I performed a history and physical exam of the patient and discussed their management with the DAISY. I have reviewed the DAISY note and agree with the documented findings and plan of care, except as noted. This was a shared visit with an DAISY. I reviewed and verified the documentation and independently performed my own history/exam/medical decision making. My medical decision making and observations are found below. Please refer to any progress notes for updates on clinical course.    HPI:  27-year-old male with no significant past medical history brought in by ambulance presents with complaint of chest pain x 1 hour.  Patient states that he was at work when he started having chest pain around 2:30 PM today.  States that pain has been intermittent and denies any chest pain at this time.  Denies any provoking or alleviating factors.  States that he has had similar chest pain intermittently for the past 2 years but has not sought any prior medical attention however states that pain was worse today.  Denies shortness of breath, palpitations, nausea, vomiting, numbness, dizziness, fever, cough, calf pain/swelling, recent travel or other symptoms.    PE:  GEN - NAD, well appearing, A&Ox3  HEAD - NC/AT  EYES - PERRL, EOMI  ENT - Airway patent, mucous membranes moist  NECK - Supple, non-tender without lymphadenopathy, no masses  PULMONARY - CTA b/l, symmetric breath sounds, no W/R/R  CARDIAC - +S1S2, RRR, no M/G/R, no JVD  CHEST - B/l chest wall and ribs NT  ABDOMEN - +BS, ND, NT, soft, no guarding, no rebound, no masses, no rigidity   - No CVA TTP b/l  BACK - No midline tenderness  EXTREMITIES - Symmetric pulses, no edema. +Mildly decreased ROM in LUE 2/2 to chronic pain (pt's baseline). FROM in all other extremities.  SKIN - No rash or bruising  NEUROLOGIC - Alert, speech clear, CN II-XII grossly intact, no pronator drift, +unable to assess for gait at this time, strength and sensation grossly intact, FTN negative b/l  PSYCH - Normal mood/affect, normal insight    MDM:  DDx includes, but not limited to: intracranial bleed, c-spine injury, rib fractures, pelvic fracture. Pt doesn't want any pain meds at this time. CT head/c-spine, cxr, x-ray pelvis. Dispo pending w/u. Morgan Soria MD (Attending Physician):    I performed a history and physical exam of the patient and discussed their management with the DAISY. I have reviewed the DAISY note and agree with the documented findings and plan of care, except as noted. This was a shared visit with an DAISY. I reviewed and verified the documentation and independently performed my own history/exam/medical decision making. My medical decision making and observations are found below. Please refer to any progress notes for updates on clinical course.    HPI:  83-year-old female with history of hypertension, hyperlipidemia, osteoporosis, depression, history of subdural hemorrhage BIBA from [facility] presents with complaint of head injury status post fall today.  Patient states that she was wearing socks and slipped falling backwards hitting her head today.  Denies LOC, headache, dizziness, nausea, vomiting, vision changes, numbness, focal weakness, neck/back/ acute arm/leg pain, chest pain, SOB, abdominal pain or other symptoms/injuries.  States that she has chronic pain to her left upper arm which she gets physical therapy for. Denies any pain or symptoms at this time.    PE:  GEN - NAD, well appearing, A&Ox3  HEAD - NC/AT  EYES - PERRL, EOMI  ENT - Airway patent, mucous membranes moist  NECK - Supple, non-tender without lymphadenopathy, no masses  PULMONARY - CTA b/l, symmetric breath sounds, no W/R/R  CARDIAC - +S1S2, RRR, no M/G/R, no JVD  CHEST - B/l chest wall and ribs NT  ABDOMEN - +BS, ND, NT, soft, no guarding, no rebound, no masses, no rigidity   - No CVA TTP b/l  BACK - No midline tenderness  PELVIS/HIP - Stable, NT  EXTREMITIES - Symmetric pulses, no edema. +Mildly decreased ROM in LUE 2/2 to chronic pain (pt's baseline). FROM in all other extremities.  SKIN - No rash or bruising  NEUROLOGIC - Alert, speech clear, CN II-XII grossly intact, no pronator drift, +unable to assess for gait at this time, strength and sensation grossly intact, FTN negative b/l  PSYCH - Normal mood/affect, normal insight    MDM:  DDx includes, but not limited to: intracranial bleed, c-spine injury, rib fractures, pelvic fracture. Pt doesn't want any pain meds at this time. CT head/c-spine, cxr, x-ray pelvis. Dispo pending w/u.

## 2024-03-04 NOTE — ED PROVIDER NOTE - OBJECTIVE STATEMENT
83-year-old female with history of hypertension, hyperlipidemia, osteoporosis, depression, history of subdural hemorrhage BIBA from [facility] presents with complaint of head injury status post fall today.  Patient states that she was wearing socks and slipped falling backwards hitting her head today.  Denies LOC, headache, dizziness, nausea, vomiting, vision changes, numbness, focal weakness, neck/back/ acute arm/leg pain, chest pain, SOB, abdominal pain or other symptoms/injuries.  States that she has chronic pain to her left upper arm which she gets physical therapy for. Denies any pain or symptoms at this time.

## 2024-03-04 NOTE — ED PROVIDER NOTE - MUSCULOSKELETAL, MLM
Spine appears normal, C/T/L spine NT with FROM, no ecchymosis noted, RUE and BLE NT with FROM, pelvis stable and NT, chronic pain left upper arm

## 2024-03-04 NOTE — ED ADULT NURSE NOTE - NSFALLASSESSNEED_ED_ALL_ED
Pt [address] [facility], 1964, [facility] chart was reviewed.   Lamotrigine 200mg qty 60 was sent to [facility] DR KARTHIKEYAN HIGGINBOTHAM       This note was not shared with the patient due to reasonable likelihood of causing patient harm yes

## 2024-03-04 NOTE — ED PROVIDER NOTE - IV ALTEPLASE DOOR HIDDEN
Left patient voicemail to set up consult on 9/8/21 @ 3pm with Dr. Villegas.  
show
Alert and oriented to person, place and time

## 2024-03-04 NOTE — ED PROVIDER NOTE - PATIENT PORTAL LINK FT
You can access the FollowMyHealth Patient Portal offered by [facility] by registering at the following website: [email or web address]. By joining [employer]’s FollowMyHealth portal, you will also be able to view your health information using other applications (apps) compatible with our system.

## 2024-03-04 NOTE — ED PROVIDER NOTE - PROGRESS NOTE DETAILS
Morgan Soria MD (Attending Physician): Imaging non-actionable. Pt was re-evaluated at bedside, VSS, feeling better overall. Results were discussed with patient as well as return precautions and follow up plan with PCP. Time was taken to answer any questions that the patient had before providing them with discharge paperwork.

## 2024-03-06 ENCOUNTER — APPOINTMENT (OUTPATIENT)
Dept: NEUROSURGERY | Facility: CLINIC | Age: 84
End: 2024-03-06
Payer: MEDICARE

## 2024-03-06 DIAGNOSIS — G91.9 HYDROCEPHALUS, UNSPECIFIED: ICD-10-CM

## 2024-03-06 PROCEDURE — 99214 OFFICE O/P EST MOD 30 MIN: CPT

## 2024-03-06 NOTE — END OF VISIT
[Time Spent: ___ minutes] : I have spent [unfilled] minutes of time on the encounter. [FreeTextEntry3] : I reviewed Ms. Vergara's skull x-ray and confirmed her shunt setting at 130.  She still has difficulty ambulating and is only able to weight-bear.  She also has a left arm contracture and weakness for approximately 6 weeks.  I referred her to neurology to further work this up.  I informed her and her  to follow-up with her neurosurgeon, Dr. Rodriguez for any further care needed.

## 2024-03-06 NOTE — DATA REVIEWED
[de-identified] : ACC: [id number] EXAM: CT CERVICAL SPINE ORDERED BY: LY CARRILLO  ACC: [id number] EXAM: CT BRAIN ORDERED BY: LY CARRILLO  PROCEDURE DATE: 03/04/2024    INTERPRETATION: CLINICAL INFORMATION: fall with head injury.  COMPARISON: 1/19/2024   TECHNIQUE:  CT BRAIN: Axial images were obtained of the brain using multislice helical technique. Reformatted coronal and sagittal images were obtained.  CT CERVICAL SPINE: Axial images were obtained of the cervical spine using multislice helical technique. Reformatted coronal and sagittal images were obtained.  FINDINGS:  CT BRAIN: Limited by patient motion and beam hardening artifact during image acquisition. There is a  shunt catheter in place via a right frontal approach. The catheter courses into the frontal horn of the right lateral ventricle. Residual prominence of the ventricles out of proportion to the sulci without transependymal flow of CSF patient with a known history of normal pressure hydrocephalus..  VENTRICLES AND SULCI: Stable size and configuration compared with prior study dated 1/19/2024. INTRA-AXIAL: No intracranial mass, acute hemorrhage, or midline shift. There are periventricular and subcortical white matter hypodensities, consistent with microvascular type changes. EXTRA-AXIAL: No mass or fluid collection. Basal cisterns are normal in appearance.  VISUALIZED SINUSES: Clear. TYMPANOMASTOID CAVITIES: Clear. VISUALIZED ORBITS: Normal. CALVARIUM: Intact.  MISCELLANEOUS: None.   CT CERVICAL SPINE:  VERTEBRAL BODIES: Normal in height. No acute fracture. Multilevel degenerative changes including marginal osteophytes. ALIGNMENT: No subluxation. INTERVERTEBRAL DISC SPACES:  C2-C3: There is a small Schmorl's node along the inferior endplate of C2. No significant spinal canal stenosis or neural foraminal narrowing.  C3-C4: Narrowing of the intervertebral disc space with endplate degenerative changes. Right far lateral posterior osteophyte formation. Cannot exclude encroachment on the ventral surface of the spinal cord. Moderate right and mild to moderate left neural foraminal narrowing.  C4-C5: No significant spinal canal stenosis or neural foraminal narrowing.  C5-C6: Narrowing of the intervertebral disc space with endplate degenerative changes. Left far lateral to neural foraminal posterior osteophyte formation. Cannot exclude encroachment on the ventral surface of the spinal cord. Moderate left neural foraminal narrowing.  C6-C7: There is a small Schmorl's node along the inferior endplate of the C6 vertebral body. No significant spinal canal stenosis or neural foraminal narrowing.  C7-T1: No significant spinal canal stenosis or neural foraminal narrowing.  VISUALIZED LUNGS: Clear.  MISCELLANEOUS: None.   IMPRESSION:  CT HEAD: Significantly limited by patient motion during image acquisition. Stable appearance of the ventricular system which is prominent out of proportion to the sulci.  shunt catheter. No acute hemorrhage, mass effect, or midline shift. No significant interval change.  CT CERVICAL SPINE: No acute fracture or traumatic subluxation. Multi-level degenerative changes, as described level by level in detail above. If there is clinical concern for myelopathy or radiculopathy, consider further evaluation via MR imaging of the cervical spine, provided the patient has no contraindications..    --- End of Report ---

## 2024-03-06 NOTE — PHYSICAL EXAM
[General Appearance - Alert] : alert [General Appearance - In No Acute Distress] : in no acute distress [Cranial Nerves Optic (II)] : visual acuity intact bilaterally,  pupils equal round and reactive to light [Cranial Nerves Facial (VII)] : face symmetrical [Cranial Nerves Oculomotor (III)] : extraocular motion intact [Cranial Nerves Vestibulocochlear (VIII)] : hearing was intact bilaterally [Cranial Nerves Accessory (XI - Cranial And Spinal)] : head turning and shoulder shrug symmetric [Motor Strength] : muscle strength was normal in all four extremities [PERRL With Normal Accommodation] : pupils were equal in size, round, reactive to light, with normal accommodation [Abdomen Soft] : soft [] : no respiratory distress [FreeTextEntry8] : Wheelchair-bound with significant unsteady gait

## 2024-03-06 NOTE — HISTORY OF PRESENT ILLNESS
[FreeTextEntry1] : 83-year-old female with a history of normal pressure hydrocephalus status post ventriculoperitoneal shunting (Hakim valve) with Dr. Simon in [place] with a recent hospitalization in November after she developed a right-sided chronic subdural hematoma.  She underwent bur hole drainage with Dr. Sierra Rodriguez followed by MMA embolization.  She is seen in the office today on follow-up.  She and her  states that her walking has become significantly worse in the past 3 weeks.   Patient presents for follow-up visit.  Patient mentions that she slipped that her her wheelchair over the weekend which resulted in her having to go to the hospital repeat CAT scan of the head and cervical spine were obtained that reveals shunt catheter in place and prominent out of proportion ventricular system.  She does have left arm contracture for about 1.5 months.  She mentions she is working with physical therapy.  She is able to weight-bear with a walker but not ambulate many steps

## 2024-03-06 NOTE — ASSESSMENT
[FreeTextEntry1] : Ms. Vazquez presents with above history and imaging.  She is neurologically intact with exception of left arm contracture x 1.5 months. Decision to leave the Hakim  shunt at 130  given she is high risk for subdural hematomas  Plan: She should follow up with Dr. Rodriguez from the perspective post SDH surgery. Neurology for evaluation of left arm contracture- treatment for spasticity f/u with me as needed post MMA embolization Patient has been given an opportunity to ask and have their questions answered to their satisfaction. Patient knows to call the office if there are any new or worsening symptoms.     I, Dr. Kyle Méndez, personally performed the evaluation and management (E/M) services for this established patient who presents today. That E/M includes conducting the clinically appropriate interval history &/or exam and establishing a continued plan of care. Today, my DAISY, Rosi AlvarezONTRAPORTYudelka, was here to observe my evaluation and management service for this patient and follow the plan of care established by me going forward.

## 2024-03-06 NOTE — REVIEW OF SYSTEMS
[As Noted in HPI] : as noted in HPI [Negative] : Heme/Lymph [Difficulty Walking] : difficulty walking

## 2024-04-18 ENCOUNTER — APPOINTMENT (OUTPATIENT)
Dept: NEUROSURGERY | Facility: CLINIC | Age: 84
End: 2024-04-18
Payer: MEDICARE

## 2024-04-18 VITALS
TEMPERATURE: 98 F | SYSTOLIC BLOOD PRESSURE: 117 MMHG | DIASTOLIC BLOOD PRESSURE: 69 MMHG | OXYGEN SATURATION: 99 % | HEART RATE: 61 BPM

## 2024-04-18 DIAGNOSIS — G91.9 HYDROCEPHALUS, UNSPECIFIED: ICD-10-CM

## 2024-04-18 PROCEDURE — 99214 OFFICE O/P EST MOD 30 MIN: CPT

## 2024-04-18 NOTE — HISTORY OF PRESENT ILLNESS
[FreeTextEntry1] : 83-year-old female with a history of normal pressure hydrocephalus status post ventriculoperitoneal shunting (Hakim valve) with Dr. Simon in [place] with a recent hospitalization in November after she developed a right-sided chronic subdural hematoma. She underwent bur hole drainage with Dr. Sierra Rodriguez followed by MMA embolization. Patient presents for her 2nd follow-up with Dr. Rodriguez since the surgery.  Patient presents today accompanied with her  and reports that she fell yesterday when she tried to get up and ambulate without her walker.  Patient denies any LOC, head strike or changes from her baseline. Patient denies any complaints at this time.  Patient is agreeable to get a repeat CTH and follow-up in office in 4-6 weeks.

## 2024-04-18 NOTE — PHYSICAL EXAM
[General Appearance - Alert] : alert [General Appearance - In No Acute Distress] : in no acute distress [General Appearance - Well Nourished] : well nourished [General Appearance - Well Developed] : well developed [General Appearance - Well-Appearing] : healthy appearing [FreeTextEntry1] : General: NAD, NCAT Lungs: respirations are unlabored and even Neuro: AOx3 - self, place, why she is here, unable to get the year.  Speech is clear and fluent, [employer] 5/5, uses a wheelchair and ambulates with a walker baseline.  PERRL, EOMI.  Incision is C/D/I. No facial droop or pronator drift is appreciated.

## 2024-05-02 ENCOUNTER — APPOINTMENT (OUTPATIENT)
Dept: CT IMAGING | Facility: CLINIC | Age: 84
End: 2024-05-02
Payer: MEDICARE

## 2024-05-02 ENCOUNTER — OUTPATIENT (OUTPATIENT)
Dept: OUTPATIENT SERVICES | Facility: HOSPITAL | Age: 84
LOS: 1 days | End: 2024-05-02
Payer: MEDICARE

## 2024-05-02 DIAGNOSIS — G91.9 HYDROCEPHALUS, UNSPECIFIED: ICD-10-CM

## 2024-05-02 DIAGNOSIS — Z98.2 PRESENCE OF CEREBROSPINAL FLUID DRAINAGE DEVICE: Chronic | ICD-10-CM

## 2024-05-02 PROCEDURE — 70450 CT HEAD/BRAIN W/O DYE: CPT | Mod: 26,MH

## 2024-05-02 PROCEDURE — 70450 CT HEAD/BRAIN W/O DYE: CPT

## 2024-06-28 NOTE — ED ADULT TRIAGE NOTE - BP NONINVASIVE DIASTOLIC (MM HG)
The patient's goals for the shift include      The clinical goals for the shift include Pain control and increased activity    Over the shift, the patient is making adequate progress towards stated care plan goals   76

## 2024-07-22 NOTE — DISCHARGE NOTE PROVIDER - EXTENDED VTE YES NO FOR MLM ENOXAPARIN
Patient Education   Preventive Care Advice   This is general advice given by our system to help you stay healthy. However, your care team may have specific advice just for you. Please talk to your care team about your preventive care needs.  Nutrition  Eat 5 or more servings of fruits and vegetables each day.  Try wheat bread, brown rice and whole grain pasta (instead of white bread, rice, and pasta).  Get enough calcium and vitamin D. Check the label on foods and aim for 100% of the RDA (recommended daily allowance).  Lifestyle  Exercise at least 150 minutes each week  (30 minutes a day, 5 days a week).  Do muscle strengthening activities 2 days a week. These help control your weight and prevent disease.  No smoking.  Wear sunscreen to prevent skin cancer.  Have a dental exam and cleaning every 6 months.  Yearly exams  See your health care team every year to talk about:  Any changes in your health.  Any medicines your care team has prescribed.  Preventive care, family planning, and ways to prevent chronic diseases.  Shots (vaccines)   HPV shots (up to age 26), if you've never had them before.  Hepatitis B shots (up to age 59), if you've never had them before.  COVID-19 shot: Get this shot when it's due.  Flu shot: Get a flu shot every year.  Tetanus shot: Get a tetanus shot every 10 years.  Pneumococcal, hepatitis A, and RSV shots: Ask your care team if you need these based on your risk.  Shingles shot (for age 50 and up)  General health tests  Diabetes screening:  Starting at age 35, Get screened for diabetes at least every 3 years.  If you are younger than age 35, ask your care team if you should be screened for diabetes.  Cholesterol test: At age 39, start having a cholesterol test every 5 years, or more often if advised.  Bone density scan (DEXA): At age 50, ask your care team if you should have this scan for osteoporosis (brittle bones).  Hepatitis C: Get tested at least once in your life.  STIs (sexually  transmitted infections)  Before age 24: Ask your care team if you should be screened for STIs.  After age 24: Get screened for STIs if you're at risk. You are at risk for STIs (including HIV) if:  You are sexually active with more than one person.  You don't use condoms every time.  You or a partner was diagnosed with a sexually transmitted infection.  If you are at risk for HIV, ask about PrEP medicine to prevent HIV.  Get tested for HIV at least once in your life, whether you are at risk for HIV or not.  Cancer screening tests  Cervical cancer screening: If you have a cervix, begin getting regular cervical cancer screening tests starting at age 21.  Breast cancer scan (mammogram): If you've ever had breasts, begin having regular mammograms starting at age 40. This is a scan to check for breast cancer.  Colon cancer screening: It is important to start screening for colon cancer at age 45.  Have a colonoscopy test every 10 years (or more often if you're at risk) Or, ask your provider about stool tests like a FIT test every year or Cologuard test every 3 years.  To learn more about your testing options, visit:   .  For help making a decision, visit:   https://bit.ly/xq49057.  Prostate cancer screening test: If you have a prostate, ask your care team if a prostate cancer screening test (PSA) at age 55 is right for you.  Lung cancer screening: If you are a current or former smoker ages 50 to 80, ask your care team if ongoing lung cancer screenings are right for you.  For informational purposes only. Not to replace the advice of your health care provider. Copyright   2023 [facility]. All rights reserved. Clinically reviewed by the [facility]. Bubbli [id number] - REV 01/24.  Preventing Falls: Care Instructions  Injuries and health problems such as trouble walking or poor eyesight can increase your risk of falling. So can some medicines. But there are things you can do to help  "prevent falls. You can exercise to get stronger. You can also arrange your home to make it safer.    Talk to your doctor about the medicines you take. Ask if any of them increase the risk of falls and whether they can be changed or stopped.   Try to exercise regularly. It can help improve your strength and balance. This can help lower your risk of falling.     Practice fall safety and prevention.    Wear low-heeled shoes that fit well and give your feet good support. Talk to your doctor if you have foot problems that make this hard.  Carry a cellphone or wear a medical alert device that you can use to call for help.  Use stepladders instead of chairs to reach high objects. Don't climb if you're at risk for falls. Ask for help, if needed.  Wear the correct eyeglasses, if you need them.    Make your home safer.    Remove rugs, cords, clutter, and furniture from walkways.  Keep your house well lit. Use night-lights in hallways and bathrooms.  Install and use sturdy handrails on stairways.  Wear nonskid footwear, even inside. Don't walk barefoot or in socks without shoes.    Be safe outside.    Use handrails, curb cuts, and ramps whenever possible.  Keep your hands free by using a shoulder bag or backpack.  Try to walk in well-lit areas. Watch out for uneven ground, changes in pavement, and debris.  Be careful in the winter. Walk on the grass or gravel when sidewalks are slippery. Use de-icer on steps and walkways. Add non-slip devices to shoes.    Put grab bars and nonskid mats in your shower or tub and near the toilet. Try to use a shower chair or bath bench when bathing.   Get into a tub or shower by putting in your weaker leg first. Get out with your strong side first. Have a phone or medical alert device in the bathroom with you.   Where can you learn more?  Go to https://www.[employer].net/patiented  Enter G117 in the search box to learn more about \"Preventing Falls: Care Instructions.\"  Current as of: July 17, " 2023               Content Version: 14.0    5216-7883 [employer].   Care instructions adapted under license by your healthcare professional. If you have questions about a medical condition or this instruction, always ask your healthcare professional. [employer] disclaims any warranty or liability for your use of this information.      Hearing Loss: Care Instructions  Overview     Hearing loss is a sudden or slow decrease in how well you hear. It can range from slight to profound. Permanent hearing loss can occur with aging. It also can happen when you are exposed long-term to loud noise. Examples include listening to loud music, riding motorcycles, or being around other loud machines.  Hearing loss can affect your work and home life. It can make you feel lonely or depressed. You may feel that you have lost your independence. But hearing aids and other devices can help you hear better and feel connected to others.  Follow-up care is a key part of your treatment and safety. Be sure to make and go to all appointments, and call your doctor if you are having problems. It's also a good idea to know your test results and keep a list of the medicines you take.  How can you care for yourself at home?  Avoid loud noises whenever possible. This helps keep your hearing from getting worse.  Always wear hearing protection around loud noises.  Wear a hearing aid as directed.  A professional can help you pick a hearing aid that will work best for you.  You can also get hearing aids over the counter for mild to moderate hearing loss.  Have hearing tests as your doctor suggests. They can show whether your hearing has changed. Your hearing aid may need to be adjusted.  Use other devices as needed. These may include:  Telephone amplifiers and hearing aids that can connect to a television, stereo, radio, or microphone.  Devices that use lights or vibrations. These alert you to the doorbell, a ringing  "telephone, or a baby monitor.  Television closed-captioning. This shows the words at the bottom of the screen. Most new TVs can do this.  TTY (text telephone). This lets you type messages back and forth on the telephone instead of talking or listening. These devices are also called TDD. When messages are typed on the keyboard, they are sent over the phone line to a receiving TTY. The message is shown on a monitor.  Use text messaging, social media, and email if it is hard for you to communicate by telephone.  Try to learn a listening technique called speechreading. It is not lipreading. You pay attention to people's gestures, expressions, posture, and tone of voice. These clues can help you understand what a person is saying. Face the person you are talking to, and have them face you. Make sure the lighting is good. You need to see the other person's face clearly.  Think about counseling if you need help to adjust to your hearing loss.  When should you call for help?  Watch closely for changes in your health, and be sure to contact your doctor if:    You think your hearing is getting worse.     You have new symptoms, such as dizziness or nausea.   Where can you learn more?  Go to https://www.[employer].net/patiented  Enter R798 in the search box to learn more about \"Hearing Loss: Care Instructions.\"  Current as of: September 27, 2023               Content Version: 14.0    9280-4850 [email or web address].   Care instructions adapted under license by your healthcare professional. If you have questions about a medical condition or this instruction, always ask your healthcare professional. [email or web address] disclaims any warranty or liability for your use of this information.      Learning About Sleeping Well  What does sleeping well mean?     Sleeping well means getting enough sleep to feel good and stay healthy. How much sleep is enough varies among people.  The number of hours you sleep and how you feel " when you wake up are both important. If you do not feel refreshed, you probably need more sleep. Another sign of not getting enough sleep is feeling tired during the day.  Experts recommend that adults get at least 7 or more hours of sleep per day. Children and older adults need more sleep.  Why is getting enough sleep important?  Getting enough quality sleep is a basic part of good health. When your sleep suffers, your physical health, mood, and your thoughts can suffer too. You may find yourself feeling more grumpy or stressed. Not getting enough sleep also can lead to serious problems, including injury, accidents, anxiety, and depression.  What might cause poor sleeping?  Many things can cause sleep problems, including:  Changes to your sleep schedule.  Stress. Stress can be caused by fear about a single event, such as giving a speech. Or you may have ongoing stress, such as worry about work or school.  Depression, anxiety, and other mental or emotional conditions.  Changes in your sleep habits or surroundings. This includes changes that happen where you sleep, such as noise, light, or sleeping in a different bed. It also includes changes in your sleep pattern, such as having jet lag or working a late shift.  Health problems, such as pain, breathing problems, and restless legs syndrome.  Lack of regular exercise.  Using alcohol, nicotine, or caffeine before bed.  How can you help yourself?  Here are some tips that may help you sleep more soundly and wake up feeling more refreshed.  Your sleeping area   Use your bedroom only for sleeping and sex. A bit of light reading may help you fall asleep. But if it doesn't, do your reading elsewhere in the house. Try not to use your TV, computer, smartphone, or tablet while you are in bed.  Be sure your bed is big enough to stretch out comfortably, especially if you have a sleep partner.  Keep your bedroom quiet, dark, and cool. Use curtains, blinds, or a sleep mask to block  "out light. To block out noise, use earplugs, soothing music, or a \"white noise\" machine.  Your evening and bedtime routine   Create a relaxing bedtime routine. You might want to take a warm shower or bath, or listen to soothing music.  Go to bed at the same time every night. And get up at the same time every morning, even if you feel tired.  What to avoid   Limit caffeine (coffee, tea, caffeinated sodas) during the day, and don't have any for at least 6 hours before bedtime.  Avoid drinking alcohol before bedtime. Alcohol can cause you to wake up more often during the night.  Try not to smoke or use tobacco, especially in the evening. Nicotine can keep you awake.  Limit naps during the day, especially close to bedtime.  Avoid lying in bed awake for too long. If you can't fall asleep or if you wake up in the middle of the night and can't get back to sleep within about 20 minutes, get out of bed and go to another room until you feel sleepy.  Avoid taking medicine right before bed that may keep you awake or make you feel hyper or energized. Your doctor can tell you if your medicine may do this and if you can take it earlier in the day.  If you can't sleep   Imagine yourself in a peaceful, pleasant scene. Focus on the details and feelings of being in a place that is relaxing.  Get up and do a quiet or boring activity until you feel sleepy.  Avoid drinking any liquids before going to bed to help prevent waking up often to use the bathroom.  Where can you learn more?  Go to https://www.[employer].net/patiented  Enter J942 in the search box to learn more about \"Learning About Sleeping Well.\"  Current as of: July 10, 2023               Content Version: 14.0    0340-1223 [email or web address].   Care instructions adapted under license by your healthcare professional. If you have questions about a medical condition or this instruction, always ask your healthcare professional. [email or web address] disclaims any warranty " or liability for your use of this information.          ,

## 2024-08-05 ENCOUNTER — NON-APPOINTMENT (OUTPATIENT)
Age: 84
End: 2024-08-05

## 2024-08-06 ENCOUNTER — APPOINTMENT (OUTPATIENT)
Dept: NEUROSURGERY | Facility: CLINIC | Age: 84
End: 2024-08-06

## 2024-08-06 PROCEDURE — 99214 OFFICE O/P EST MOD 30 MIN: CPT

## 2024-08-06 NOTE — DATA REVIEWED
[de-identified] : CT Head Non Con (PACS) 5/2/24:  FINDINGS: Multiple right-sided calvarial reilly holes are again seen. A right-sided ventriculoperitoneal shunt catheter is seen appears unchanged in position. Ventricular size and configuration also appears unchanged and remains dilated.  Evidence of right-sided middle meningeal artery embolization procedure. Evaluation for an underlying subdural hematoma is limited by motion artifact.  No gross subdural hematoma is seen.  There is no acute intracranial hemorrhage.  There is diffuse cerebral volume loss with prominence of the sulci, fissures, and cisternal spaces which is normal for the patient's age. 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  The paranasal sinuses and tympanomastoid cavities are clear. The orbits appear unremarkable.  IMPRESSION: No acute intracranial findings.  Multiple unchanged chronic findings, as discussed.

## 2024-08-06 NOTE — PHYSICAL EXAM
[FreeTextEntry1] : Patient is awake and alert, oriented to person, place, season and month, not year. Able to express where she lives and for how long, who accompanied her on her visit today. Appears to be related to possible dementia.  Well appearing, well nourished, in no acute distress  Following simple commands  PERRL, EOMI  Face symmetric, Tongue midline, hearing grossly intact, speech clear, head turning and shoulder shrug intact  No drift of upper extremities 5/5 Right upper extremity  5/5 Left upper extremity  5/5 Right lower extremity  5/5 Left lower extremity except for 4+/5 R Hip flexor  Sensation grossly intact to light touch on all extremities

## 2024-08-06 NOTE — REASON FOR VISIT
[Post Op: _________] : a [unfilled] post op visit [FreeTextEntry1] : s/p reilly hole drainage on 11/9/23 for right sided chronic SDH , pt has a hx of NPH s/p  shunt at [place]

## 2024-08-06 NOTE — HISTORY OF PRESENT ILLNESS
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 Simon in [place] with a recent hospitalization in November 2024 after she developed a right-sided chronic subdural hematoma. She underwent reilyl hole drainage on 11/9/24 with Dr. Sierra Rodriguez followed by MMA embolization on 11/10/24 with Dr. Méndez. Patient presents today accompanied by her  for a follow up visit. Pt was last seen on 4/18/24 after a fall and was sent for a CT Head Non Con which she completed on 5/2/24 (PACS), no acute intracranial hemorrhage was found. Pt reports feeling well, denies headache, cognition difficulties, nausea, vomiting, visual changes, weakness or paresthesia of her lower extremities. Pt mentions she will have occasional pain in the posterior aspect of her head that will resolve after she lays down. Pt does not follow with Dr. Simon for her shunt since she is now on [place] and not in [place]. Pt has been attending PT for her gait instability with improvement, she ambulates with a walker. Pt has a home health aide for assistance at home. Pt denies any new neurological complaints.

## 2024-08-06 NOTE — ASSESSMENT
[FreeTextEntry1] : 85 y/o very pleasant female with a hx of normal pressure hydrocephalus status post ventriculoperitoneal shunting (Hakim valve) with Dr. Simon in [place] with a recent hospitalization in November 2024 after she developed a right-sided chronic subdural hematoma. She underwent reilly hole drainage on 11/9/24 with Dr. Sierra Rodriguez followed by MMA embolization on 11/10/24 with Dr. Méndez. Patient presents today accompanied by her  for a follow up visit. Pt was last seen on 4/18/24 after a fall and was sent for a CT Head Non Con which she completed on 5/2/24 (PACS), no acute intracranial hemorrhage was found. Today she reports feeling well, denies headache, cognition difficulties, nausea, vomiting, visual changes, weakness or paresthesia of her lower extremities. Pt mentions she will have occasional pain in the posterior aspect of her head that will resolve after she lays down, this does not significantly impact that patient. Pt does not follow with Dr. Simon for her shunt since she is now on [place] and not in [place], an XR Skull was last performed on 11/14/23 when pt was hospitalized, showing right sided shunt in place, Hakim 130. Overall pt appears to be doing well, neurologically intact with possible aspect of dementia impacting pt's ability to recall the year, she is 5/5 strength in BL upper and lower extremities except for some weakness of LLE hip flexor, pt reports her LLE gives her difficulty and is undergoing PT for it with improvement. Per pt's  her cardiologist is requesting an MRI, they will contact the office once MRI is scheduled to make an appointment to check the shunt valve setting before and after MRI.    Plan: - Follow up with PCP regarding cold intolerance, gait  - Follow up with Neurosurgery once MRI for Cardiology is ordered and scheduled, pt to call the office once this is complete so we can eval shunt setting prior to and after MRI for Cardio

## 2024-09-24 ENCOUNTER — APPOINTMENT (OUTPATIENT)
Dept: NEUROSURGERY | Facility: CLINIC | Age: 84
End: 2024-09-24
Payer: MEDICARE

## 2024-09-24 VITALS
HEART RATE: 55 BPM | WEIGHT: 160 LBS | BODY MASS INDEX: 25.82 KG/M2 | TEMPERATURE: 97.2 F | OXYGEN SATURATION: 99 % | DIASTOLIC BLOOD PRESSURE: 80 MMHG | SYSTOLIC BLOOD PRESSURE: 150 MMHG

## 2024-09-24 PROCEDURE — 99214 OFFICE O/P EST MOD 30 MIN: CPT

## 2024-09-24 NOTE — ASSESSMENT
[FreeTextEntry1] : Ms. Ginna Vergara is a very pleasant 83 y/o female, accompanied by her , with a hx of normal pressure hydrocephalus status post ventriculoperitoneal shunting (Hakim valve) with Dr. Simon in [place] with a recent hospitalization in November 2023 after she developed a right-sided chronic subdural hematoma. She underwent reilly hole drainage on 11/9/23 with Dr. Sierra Rodriguez followed by MMA embolization on 11/10/23 with Dr. Méndez. Patient presents today accompanied by her  for a follow up visit after recent MRA Head ordered by her PCP for possibility of stenosis seen on CTA to set and check her Hakim shunt. Hakim shunt was adjusted to 130.  Plan: - Encouraged continued PT for gait training  - XRay Skull AP & Lateral to evaluate shunt setting  - Pt will call after complete so we can discuss results

## 2024-09-24 NOTE — HISTORY OF PRESENT ILLNESS
[FreeTextEntry1] : Ms. Ginna Vergara is a very pleasant 83 y/o female, accompanied by her , with a hx of normal pressure hydrocephalus status post ventriculoperitoneal shunting (Hakim valve) with Dr. Simon in [place] with a recent hospitalization in November 2023 after she developed a right-sided chronic subdural hematoma. She underwent reilly hole drainage on 11/9/23 with Dr. Sierra Rodriguez followed by MMA embolization on 11/10/23 with Dr. Méndez. Patient presents today accompanied by her  for a follow up visit after recent MRA Head ordered by her PCP for possibility of stenosis seen on CTA to set and check her Hakim shunt. She reports mild occipital region ache that occurs 1-2 times weekly, that is relieved with laying down for the past 2 months, this has remained stable since her previous visit. She denies any headaches, nausea, vomiting, visual changes or recent falls. She ambulates with a walker and is undergoing physical therapy for gait training, but hasn't gone recently due to her shoulder bothering her. She denies any new neurologic symptoms.

## 2024-09-24 NOTE — PHYSICAL EXAM
[FreeTextEntry1] : Patient is awake and alert, oriented to person, place, some difficulty with year secondary to dementia (stable from previous visit) Well appearing in no acute distress Following simple commands PERRL, EOMI  Face symmetric with normal eye closure and smile, Tongue midline, hearing grossly intact, speech clear No drift of upper extremities 5/5 Right upper extremity 5/5 Left upper extremity 5/5 Right lower extremity 5/5 Left lower extremity Sensation grossly intact to light touch on all extremities Slow shuffling gait, utilizing a walker  Incision is well healed of right skull. Right sided palpable  shunt.  Right sided [place] Hakim shunt set to 130 today in office.

## 2024-09-27 ENCOUNTER — APPOINTMENT (OUTPATIENT)
Dept: NEUROSURGERY | Facility: CLINIC | Age: 84
End: 2024-09-27
Payer: MEDICARE

## 2024-09-27 ENCOUNTER — OUTPATIENT (OUTPATIENT)
Dept: OUTPATIENT SERVICES | Facility: HOSPITAL | Age: 84
LOS: 1 days | End: 2024-09-27
Payer: MEDICARE

## 2024-09-27 ENCOUNTER — APPOINTMENT (OUTPATIENT)
Dept: RADIOLOGY | Facility: CLINIC | Age: 84
End: 2024-09-27
Payer: MEDICARE

## 2024-09-27 VITALS
HEART RATE: 68 BPM | TEMPERATURE: 97.7 F | HEIGHT: 65 IN | WEIGHT: 160 LBS | DIASTOLIC BLOOD PRESSURE: 77 MMHG | SYSTOLIC BLOOD PRESSURE: 145 MMHG | OXYGEN SATURATION: 97 % | BODY MASS INDEX: 26.66 KG/M2

## 2024-09-27 DIAGNOSIS — Z98.2 PRESENCE OF CEREBROSPINAL FLUID DRAINAGE DEVICE: Chronic | ICD-10-CM

## 2024-09-27 DIAGNOSIS — G91.9 HYDROCEPHALUS, UNSPECIFIED: ICD-10-CM

## 2024-09-27 PROCEDURE — 70250 X-RAY EXAM OF SKULL: CPT

## 2024-09-27 PROCEDURE — 70250 X-RAY EXAM OF SKULL: CPT | Mod: 26

## 2024-09-27 PROCEDURE — 99214 OFFICE O/P EST MOD 30 MIN: CPT

## 2024-09-27 NOTE — PHYSICAL EXAM
[FreeTextEntry1] : Awake and alert, accompanied by her   Well appearing in no acute distress Interactive and appropriate Moving all extremities  Slow, shuffling gait utilizing a walker  Incision is well healed of right skull, right sided palpable  shunt.   Right sided [place] Hakim shunt set at 130 in office today, device read "Adjustment Complete".

## 2024-09-27 NOTE — DATA REVIEWED
[de-identified] : X-Ray Skull AP & Lateral 9/27/24 (Brigid Baker): IMPRESSION:  Postsurgical changes with right frontal ventricular shunt catheter in place.  Hakim setting appears to be at 30.

## 2024-09-27 NOTE — ASSESSMENT
[FreeTextEntry1] : Ms. 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 Simon in [place] with a recent hospitalization in November 2023 after she developed a right-sided chronic subdural hematoma. She underwent reilly hole drainage on 11/9/23 with Dr. Sierra Rodriguez followed by MMA embolization on 11/10/23 with Dr. Méndez. She presents today for a follow up visit after XRay Skull obtained at [facility] revealed a Hakim setting of 30 after a recent attempt at adjustment to 130 in the office 3 days ago on 9/24/24. She denies any new neurologic symptoms, stating she feels as she always does. She was adjusted to a shunt setting of Hakim 130. Ms. Vergara will have a repeat Skull XR performed today to confirm shunt setting. All questions were answered.     Plan: - Repeat XRay Skull AP & Lateral to confirm shunt setting, will discuss results over the phone when completed - If correct shunt setting, patient is healing well post-operatively approximately 10 months s/p right sided reilly hole drainage for chronic SDH in 11/2023 and may follow up as needed

## 2024-09-27 NOTE — HISTORY OF PRESENT ILLNESS
[FreeTextEntry1] : Ms. 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 Simon in [place] with a recent hospitalization in November 2023 after she developed a right-sided chronic subdural hematoma. She underwent reilly hole drainage on 11/9/23 with Dr. Sierra Rodriguez followed by MMA embolization on 11/10/23 with Dr. Méndez. She presents today after we reviewed recent XRay Skull AP & Lateral that was taken today to evaluate shunt setting and check for setting of Hakim 130 after a recent MRI. The Xray today revealed a Hakim setting of 30. We called the patient to bring her in for an appointment to attempt to appropriately set her shunt at 130. She denies any new neurological symptoms, and states her head feels as it always does.

## 2024-12-23 NOTE — CARE COORDINATION ASSESSMENT. - MET/SPOKE WITH
----- Message from Grisleda Alvarado MD sent at 12/23/2024  5:04 AM CST -----  Please inform patient:  Liver lesion is growing slowly over 7 years, now 4.1 cm, but still benign. No new lesions. Continue to monitor yearly with U/s and every 3 yrs (2027) with MRI w wo contrast.       Kyle [phone]/spouse

## (undated) DEVICE — AESCULAP SCALPFIX 10 CLIPS

## (undated) DEVICE — DRSG 4 X 8

## (undated) DEVICE — SUT VICRYL 2-0 18" CP-2 UNDYED (POP-OFF)

## (undated) DEVICE — WARMING BLANKET LOWER ADULT

## (undated) DEVICE — SPONGE SURGICAL STRIP 1/2 X 6"

## (undated) DEVICE — MIDAS REX MR8 TAPERED SM BORE 2.3MM X 7CM FOOTED

## (undated) DEVICE — PACK NEURO

## (undated) DEVICE — GLV 6.5 PROTEXIS (BLUE)

## (undated) DEVICE — ELCTR BOVIE TIP BLADE INSULATED 4" EDGE

## (undated) DEVICE — DRSG XEROFORM 5 X 9"

## (undated) DEVICE — SUT VICRYL 0 18" CT-1 UNDYED (POP-OFF)

## (undated) DEVICE — SPONGE SURGICAL STRIP 1/4 X 6"

## (undated) DEVICE — SPONGE SURGICAL STRIP 1" X 6"

## (undated) DEVICE — DRSG XEROFORM 1 X 8"

## (undated) DEVICE — GLV 6.5 PROTEXIS (WHITE)

## (undated) DEVICE — VENODYNE/SCD SLEEVE CALF MEDIUM

## (undated) DEVICE — CODMAN RANEY SCALP CLIPS (BLUE) MEDIUM

## (undated) DEVICE — SUT VICRYL 3-0 18" SH (POP-OFF)

## (undated) DEVICE — FRAZIER SUCTION TIP 10FR

## (undated) DEVICE — ELCTR SUBDERMAL NDL 27G X 1/2" WITH TWISTED PAIR

## (undated) DEVICE — CODMAN PERFORATOR 14MM (BLUE)

## (undated) DEVICE — DRAIN JACKSON PRATT 3 SPRING RESERVOIR W 10FR PVC DRAIN

## (undated) DEVICE — TUBING FOR SMOKE EVACUATOR (PURPLE END)

## (undated) DEVICE — ELCTR ROCKER SWITCH PENCIL BLUE 10FT

## (undated) DEVICE — DRAPE CRANIOTOMY W POUCH 100X104"

## (undated) DEVICE — STAPLER SKIN PROXIMATE

## (undated) DEVICE — PREP DURAPREP 26CC

## (undated) DEVICE — Device

## (undated) DEVICE — ELCTR MONOPOLAR STIMULATOR PROBE FLUSH-TIP

## (undated) DEVICE — ELCTR SUBDERMAL NDL CLASSIC 1.5M X 59" (6 COLOR)

## (undated) DEVICE — TUBING CONNECTING 6MM 20FT

## (undated) DEVICE — GOWN XXL

## (undated) DEVICE — ELCTR GROUNDING PAD ADULT COVIDIEN

## (undated) DEVICE — SUT NUROLON 4-0 8-18" TF (POP-OFF)

## (undated) DEVICE — CLIPPER BLADE NEURO (BLUE)

## (undated) DEVICE — LONE STAR RETRACTOR RING 12MM BLUNT DISP

## (undated) DEVICE — SUT ETHILON 3-0 18" PS-1